# Patient Record
Sex: FEMALE | Race: WHITE | NOT HISPANIC OR LATINO | Employment: FULL TIME | URBAN - METROPOLITAN AREA
[De-identification: names, ages, dates, MRNs, and addresses within clinical notes are randomized per-mention and may not be internally consistent; named-entity substitution may affect disease eponyms.]

---

## 2017-05-09 ENCOUNTER — OFFICE VISIT (OUTPATIENT)
Dept: FAMILY MEDICINE | Facility: CLINIC | Age: 32
End: 2017-05-09
Payer: COMMERCIAL

## 2017-05-09 VITALS
RESPIRATION RATE: 16 BRPM | SYSTOLIC BLOOD PRESSURE: 110 MMHG | BODY MASS INDEX: 39.27 KG/M2 | TEMPERATURE: 98 F | WEIGHT: 221.6 LBS | HEART RATE: 78 BPM | DIASTOLIC BLOOD PRESSURE: 82 MMHG | HEIGHT: 63 IN

## 2017-05-09 DIAGNOSIS — Z00.00 ENCOUNTER FOR ROUTINE ADULT HEALTH EXAMINATION WITHOUT ABNORMAL FINDINGS: Primary | ICD-10-CM

## 2017-05-09 DIAGNOSIS — J45.990 EXERCISE-INDUCED ASTHMA: ICD-10-CM

## 2017-05-09 LAB
ANION GAP SERPL CALCULATED.3IONS-SCNC: 6 MMOL/L (ref 3–14)
BUN SERPL-MCNC: 13 MG/DL (ref 7–30)
CALCIUM SERPL-MCNC: 9 MG/DL (ref 8.5–10.1)
CHLORIDE SERPL-SCNC: 107 MMOL/L (ref 94–109)
CHOLEST SERPL-MCNC: 129 MG/DL
CO2 SERPL-SCNC: 28 MMOL/L (ref 20–32)
CREAT SERPL-MCNC: 0.8 MG/DL (ref 0.52–1.04)
GFR SERPL CREATININE-BSD FRML MDRD: 82 ML/MIN/1.7M2
GLUCOSE SERPL-MCNC: 90 MG/DL (ref 70–99)
HBA1C MFR BLD: 5 % (ref 4.3–6)
HDLC SERPL-MCNC: 36 MG/DL
LDLC SERPL CALC-MCNC: 77 MG/DL
NONHDLC SERPL-MCNC: 93 MG/DL
POTASSIUM SERPL-SCNC: 4.1 MMOL/L (ref 3.4–5.3)
SODIUM SERPL-SCNC: 141 MMOL/L (ref 133–144)
TRIGL SERPL-MCNC: 81 MG/DL
TSH SERPL DL<=0.005 MIU/L-ACNC: 2.43 MU/L (ref 0.4–4)

## 2017-05-09 PROCEDURE — 36415 COLL VENOUS BLD VENIPUNCTURE: CPT | Performed by: FAMILY MEDICINE

## 2017-05-09 PROCEDURE — 84443 ASSAY THYROID STIM HORMONE: CPT | Performed by: FAMILY MEDICINE

## 2017-05-09 PROCEDURE — 83036 HEMOGLOBIN GLYCOSYLATED A1C: CPT | Performed by: FAMILY MEDICINE

## 2017-05-09 PROCEDURE — G0145 SCR C/V CYTO,THINLAYER,RESCR: HCPCS | Performed by: FAMILY MEDICINE

## 2017-05-09 PROCEDURE — 80048 BASIC METABOLIC PNL TOTAL CA: CPT | Performed by: FAMILY MEDICINE

## 2017-05-09 PROCEDURE — 87624 HPV HI-RISK TYP POOLED RSLT: CPT | Performed by: FAMILY MEDICINE

## 2017-05-09 PROCEDURE — 99395 PREV VISIT EST AGE 18-39: CPT | Performed by: FAMILY MEDICINE

## 2017-05-09 PROCEDURE — 80061 LIPID PANEL: CPT | Performed by: FAMILY MEDICINE

## 2017-05-09 RX ORDER — ALBUTEROL SULFATE 90 UG/1
2 AEROSOL, METERED RESPIRATORY (INHALATION) EVERY 4 HOURS PRN
Qty: 1 INHALER | Refills: 1 | Status: SHIPPED | OUTPATIENT
Start: 2017-05-09 | End: 2018-03-27

## 2017-05-09 NOTE — NURSING NOTE
"Chief Complaint   Patient presents with     Physical     with pap; fasting       Initial /82 (BP Location: Right arm, Patient Position: Chair, Cuff Size: Adult Large)  Pulse 78  Temp 98  F (36.7  C) (Oral)  Resp 16  Ht 5' 3.25\" (1.607 m)  Wt 221 lb 9.6 oz (100.5 kg)  LMP 05/03/2017  Breastfeeding? No  BMI 38.94 kg/m2 Estimated body mass index is 38.94 kg/(m^2) as calculated from the following:    Height as of this encounter: 5' 3.25\" (1.607 m).    Weight as of this encounter: 221 lb 9.6 oz (100.5 kg).  Medication Reconciliation: complete   Sarah Soliz CMA ([facility])      "

## 2017-05-09 NOTE — PROGRESS NOTES
SUBJECTIVE:     CC: Tosin Pfeiffer is an 31 year old woman who presents for preventive health visit.     Healthy Habits:  [facility]  Answers for HPI/ROS submitted by the patient on 5/8/2017   Annual Exam:  Getting at least 3 servings of Calcium per day:: Yes  Bi-annual eye exam:: NO  Dental care twice a year:: Yes  Sleep apnea or symptoms of sleep apnea:: None  Diet:: Regular (no restrictions)  Frequency of exercise:: 2-3 days/week  Taking medications regularly:: Not Applicable  Medication side effects:: Not applicable  Additional concerns today:: No  Q1: Little interest or pleasure in doing things: 0=Not at all  Q2: Feeling down, depressed or hopeless: 0=Not at all  PHQ-2 Score: 0  Duration of exercise:: 30-45 minutes      Today's PHQ-2 Score:   PHQ-2 ( 1999 [employer]) 5/9/2017 5/8/2017   Q1: Little interest or pleasure in doing things 0 -   Q2: Feeling down, depressed or hopeless 0 -   PHQ-2 Score 0 -   Little interest or pleasure in doing things - Not at all   Feeling down, depressed or hopeless - Not at all   PHQ-2 Score - 0     Abuse: Current or Past(Physical, Sexual or Emotional)- No  Do you feel safe in your environment - Yes    Social History   Substance Use Topics     Smoking status: Former Smoker     Types: Cigarettes     Smokeless tobacco: Never Used     Alcohol use 1.2 oz/week     2 Standard drinks or equivalent per week     The patient does not drink >3 drinks per day nor >7 drinks per week.    Recent Labs   Lab Test  05/19/15   0910  02/15/13   1027   CHOL  143  133   HDL  38*  53   LDL  83  69   TRIG  112  57   CHOLHDLRATIO  3.8  2.5       Reviewed orders with patient.  Reviewed health maintenance and updated orders accordingly - Yes    Mammo Decision Support:  Mammogram not appropriate for this patient based on age.    Pertinent mammograms are reviewed under the imaging tab.  History of abnormal Pap smear: NO - age 30-65 PAP every 5 years with negative HPV co-testing  "recommended    Reviewed and updated as needed this visit by clinical staff  Tobacco  Allergies  Meds  Soc Hx        Reviewed and updated as needed this visit by Provider            ROS:  C: NEGATIVE for fever, chills, change in weight  I: NEGATIVE for worrisome rashes, moles or lesions  E: NEGATIVE for vision changes or irritation  ENT: NEGATIVE for ear, mouth and throat problems  R: NEGATIVE for significant cough or SOB  B: NEGATIVE for masses, tenderness or discharge  CV: NEGATIVE for chest pain, palpitations or peripheral edema  GI: NEGATIVE for nausea, abdominal pain, heartburn, or change in bowel habits  : NEGATIVE for unusual urinary or vaginal symptoms. Periods are regular but are lighter than previous, last child born 2 years ago. Felt irritable, bloating. Not using contraception, LMP less than 1 week ago.   M: NEGATIVE for significant arthralgias or myalgia  N: NEGATIVE for weakness, dizziness or paresthesias  P: NEGATIVE for changes in mood or affect    Problem list, Medication list, Allergies, and Medical/Social/Surgical histories reviewed in EPIC and updated as appropriate.  OBJECTIVE:     /82 (BP Location: Right arm, Patient Position: Chair, Cuff Size: Adult Large)  Pulse 78  Temp 98  F (36.7  C) (Oral)  Resp 16  Ht 5' 3.25\" (1.607 m)  Wt 221 lb 9.6 oz (100.5 kg)  LMP 05/03/2017  Breastfeeding? No  BMI 38.94 kg/m2  EXAM:  GENERAL: healthy, alert and no distress  EYES: Eyes grossly normal to inspection, PERRL and conjunctivae and sclerae normal  HENT: ear canals and TM's normal, nose and mouth without ulcers or lesions  NECK: no adenopathy, no asymmetry, masses, or scars and thyroid normal to palpation  RESP: lungs clear to auscultation - no rales, rhonchi or wheezes  BREAST: normal without masses, tenderness or nipple discharge and no palpable axillary masses or adenopathy  CV: regular rate and rhythm, normal S1 S2, no S3 or S4, no murmur, click or rub, no peripheral edema and " "peripheral pulses strong  ABDOMEN: soft, nontender, no hepatosplenomegaly, no masses and bowel sounds normal  : normal external genitalia, vaginal mucosa, cervix, adnexa and uterus  MS: no gross musculoskeletal defects noted, no edema  SKIN: no suspicious lesions or rashes  NEURO: Normal strength and tone, mentation intact and speech normal  PSYCH: mentation appears normal, affect normal/bright    ASSESSMENT/PLAN:       1. Encounter for routine adult health examination without abnormal findings  - Lipid Profile with reflex to direct LDL  - Hemoglobin A1c  - TSH with free T4 reflex  - Pap imaged thin layer screen with HPV - recommended age 30 - 65 years (select HPV order below)  - HPV High Risk Types DNA Cervical  - Basic metabolic panel  (Ca, Cl, CO2, Creat, Gluc, K, Na, BUN)    2. BMI 38.0-38.9,adult - discussed weight loss, she is working on diet and exercise currently.  - TSH with free T4 reflex    3. Exercise-induced asthma - occasion, will make refills available should she need them  - albuterol (PROAIR HFA/PROVENTIL HFA/VENTOLIN HFA) 108 (90 BASE) MCG/ACT Inhaler; Inhale 2 puffs into the lungs every 4 hours as needed  Dispense: 1 Inhaler; Refill: 1    COUNSELING:   Reviewed preventive health counseling, as reflected in patient instructions     reports that she has quit smoking. Her smoking use included Cigarettes. She has never used smokeless tobacco.    Estimated body mass index is 38.94 kg/(m^2) as calculated from the following:    Height as of this encounter: 5' 3.25\" (1.607 m).    Weight as of this encounter: 221 lb 9.6 oz (100.5 kg).       Anali Salazar MD  "

## 2017-05-09 NOTE — MR AVS SNAPSHOT
After Visit Summary   5/9/2017    Tosin Pfeiffer    MRN: [id number]           Patient Information     Date Of Birth          1985        Visit Information        Provider Department      5/9/2017 8:30 AM Anali Salazar MD [facility]        Today's Diagnoses     Encounter for routine adult health examination without abnormal findings    -  1    BMI 38.0-38.9,adult        Exercise-induced asthma          Care Instructions      Preventive Health Recommendations  Female Ages 26 - 39  Yearly exam:   See your health care provider every year in order to    Review health changes.     Discuss preventive care.      Review your medicines if you your doctor has prescribed any.    Until age 30: Get a Pap test every three years (more often if you have had an abnormal result).    After age 30: Talk to your doctor about whether you should have a Pap test every 3 years or have a Pap test with HPV screening every 5 years.   You do not need a Pap test if your uterus was removed (hysterectomy) and you have not had cancer.  You should be tested each year for STDs (sexually transmitted diseases), if you're at risk.   Talk to your provider about how often to have your cholesterol checked.  If you are at risk for diabetes, you should have a diabetes test (fasting glucose).  Shots: Get a flu shot each year. Get a tetanus shot every 10 years.   Nutrition:     Eat at least 5 servings of fruits and vegetables each day.    Eat whole-grain bread, whole-wheat pasta and brown rice instead of white grains and rice.    Talk to your provider about Calcium and Vitamin D.     Lifestyle    Exercise at least 150 minutes a week (30 minutes a day, 5 days of the week). This will help you control your weight and prevent disease.    Limit alcohol to one drink per day.    No smoking.     Wear sunscreen to prevent skin cancer.    See your dentist every six months for an exam and cleaning.          Follow-ups after your  "visit        Who to contact     If you have questions or need follow up information about today's clinic visit or your schedule please contact [facility] directly at [phone].  Normal or non-critical lab and imaging results will be communicated to you by MyChart, letter or phone within 4 business days after the clinic has received the results. If you do not hear from us within 7 days, please contact the clinic through MyChart or phone. If you have a critical or abnormal lab result, we will notify you by phone as soon as possible.  Submit refill requests through [employer] or call your pharmacy and they will forward the refill request to us. Please allow 3 business days for your refill to be completed.          Additional Information About Your Visit        [employer]ial[employer] Information     [employer] gives you secure access to your electronic health record. If you see a primary care provider, you can also send messages to your care team and make appointments. If you have questions, please call your primary care clinic.  If you do not have a primary care provider, please call [phone] and they will assist you.        Care EveryWhere ID     This is your Care EveryWhere ID. This could be used by other organizations to access your [place] medical records  [id number]        Your Vitals Were     Pulse Temperature Respirations Height Last Period Breastfeeding?    78 98  F (36.7  C) (Oral) 16 5' 3.25\" (1.607 m) 05/03/2017 No    BMI (Body Mass Index)                   38.94 kg/m2            Blood Pressure from Last 3 Encounters:   05/09/17 110/82   09/10/15 110/80   05/19/15 123/80    Weight from Last 3 Encounters:   05/09/17 221 lb 9.6 oz (100.5 kg)   09/10/15 219 lb (99.3 kg)   05/19/15 226 lb 11.2 oz (102.8 kg)              We Performed the Following     Basic metabolic panel  (Ca, Cl, CO2, Creat, Gluc, K, Na, BUN)     Hemoglobin A1c     HPV High Risk Types DNA Cervical     Lipid Profile with reflex to direct " LDL     Pap imaged thin layer screen with HPV - recommended age 30 - 65 years (select HPV order below)     TSH with free T4 reflex          Today's Medication Changes          These changes are accurate as of: 5/9/17  9:05 AM.  If you have any questions, ask your nurse or doctor.               Start taking these medicines.        Dose/Directions    albuterol 108 (90 BASE) MCG/ACT Inhaler   Commonly known as:  PROAIR HFA/PROVENTIL HFA/VENTOLIN HFA   Used for:  Exercise-induced asthma   Started by:  Anali Salazar MD        Dose:  2 puff   Inhale 2 puffs into the lungs every 4 hours as needed   Quantity:  1 Inhaler   Refills:  1            Where to get your medicines      These medications were sent to [place] [address] [place] [facility] [address] [place] [id number] [facility] [address]    Hours:  Tech issues with their phone system Phone:  [phone]     albuterol 108 (90 BASE) MCG/ACT Inhaler                Primary Care Provider Office Phone # Fax #    Ramona Ann Aaseby-Aguilera, PA-C [phone]       [facility] [address] [address] [facility] [address]        Thank you!     Thank you for choosing [facility]  for your care. Our goal is always to provide you with excellent care. Hearing back from our patients is one way we can continue to improve our services. Please take a few minutes to complete the written survey that you may receive in the mail after your visit with us. Thank you!             Your Updated Medication List - Protect others around you: Learn how to safely use, store and throw away your medicines at www.disposemymeds.org.          This list is accurate as of: 5/9/17  9:05 AM.  Always use your most recent med list.                   Brand Name Dispense Instructions for use    albuterol 108 (90 BASE) MCG/ACT Inhaler    PROAIR HFA/PROVENTIL HFA/VENTOLIN HFA    1 Inhaler    Inhale 2 puffs into the lungs every 4 hours as needed

## 2017-05-09 NOTE — LETTER
My Asthma Action Plan  Name: Tosin Pfeiffer   YOB: 1985  Date: 5/9/2017   My doctor: Anali Salazar MD   My clinic: [facility]        My Control Medicine:   My Rescue Medicine: Albuterol (Proair/Ventolin/Proventil) inhaler 2 puffs every 4 hours prn   My Asthma Severity: intermittent  Avoid your asthma triggers:                GREEN ZONE     Good Control    I feel good    No cough or wheeze    Can work, sleep and play without asthma symptoms       Take your asthma control medicine every day.     1. If exercise triggers your asthma, take your rescue medication    15 minutes before exercise or sports, and    During exercise if you have asthma symptoms  2. Spacer to use with inhaler: If you have a spacer, make sure to use it with your inhaler             YELLOW ZONE     Getting Worse  I have ANY of these:    I do not feel good    Cough or wheeze    Chest feels tight    Wake up at night   1. Keep taking your Green Zone medications  2. Start taking your rescue medicine:    every 20 minutes for up to 1 hour. Then every 4 hours for 24-48 hours.  3. If you stay in the Yellow Zone for more than 12-24 hours, contact your doctor.  4. If you do not return to the Green Zone in 12-24 hours or you get worse, start taking your oral steroid medicine if prescribed by your provider.           RED ZONE     Medical Alert - Get Help  I have ANY of these:    I feel awful    Medicine is not helping    Breathing getting harder    Trouble walking or talking    Nose opens wide to breathe       1. Take your rescue medicine NOW  2. If your provider has prescribed an oral steroid medicine, start taking it NOW  3. Call your doctor NOW  4. If you are still in the Red Zone after 20 minutes and you have not reached your doctor:    Take your rescue medicine again and    Call 911 or go to the emergency room right away    See your regular doctor within 2 weeks of an Emergency Room or Urgent Care visit for follow-up  treatment.        Electronically signed by: Anali Salazar, May 9, 2017    Annual Reminders:  Meet with Asthma Educator,  Flu Shot in the Fall, consider Pneumonia Vaccination for patients with asthma (aged 19 and older).    Pharmacy:    [facility] [address] [place] [facility] [address]  [facility] [address] [place] [facility] [address] [facility]                    Asthma Triggers  How To Control Things That Make Your Asthma Worse    Triggers are things that make your asthma worse.  Look at the list below to help you find your triggers and what you can do about them.  You can help prevent asthma flare-ups by staying away from your triggers.      Trigger                                                          What you can do   Cigarette Smoke  Tobacco smoke can make asthma worse. Do not allow smoking in your home, car or around you.  Be sure no one smokes at a child s day care or school.  If you smoke, ask your health care provider for ways to help you quit.  Ask family members to quit too.  Ask your health care provider for a referral to Quit Plan to help you quit smoking, or call 8-968-531-PLAN.     Colds, Flu, Bronchitis  These are common triggers of asthma. Wash your hands often.  Don t touch your eyes, nose or mouth.  Get a flu shot every year.     Dust Mites  These are tiny bugs that live in cloth or carpet. They are too small to see. Wash sheets and blankets in hot water every week.   Encase pillows and mattress in dust mite proof covers.  Avoid having carpet if you can. If you have carpet, vacuum weekly.   Use a dust mask and HEPA vacuum.   Pollen and Outdoor Mold  Some people are allergic to trees, grass, or weed pollen, or molds. Try to keep your windows closed.  Limit time out doors when pollen count is high.   Ask you health care provider about taking medicine during allergy season.     Animal Dander  Some people are allergic to skin flakes, urine or saliva from pets with fur or feathers.  Keep pets with fur or feathers out of your home.    If you can t keep the pet outdoors, then keep the pet out of your bedroom.  Keep the bedroom door closed.  Keep pets off cloth furniture and away from stuffed toys.     Mice, Rats, and Cockroaches  Some people are allergic to the waste from these pests.   Cover food and garbage.  Clean up spills and food crumbs.  Store grease in the refrigerator.   Keep food out of the bedroom.   Indoor Mold  This can be a trigger if your home has high moisture. Fix leaking faucets, pipes, or other sources of water.   Clean moldy surfaces.  Dehumidify basement if it is damp and smelly.   Smoke, Strong Odors, and Sprays  These can reduce air quality. Stay away from strong odors and sprays, such as perfume, powder, hair spray, paints, smoke incense, paint, cleaning products, candles and new carpet.   Exercise or Sports  Some people with asthma have this trigger. Be active!  Ask your doctor about taking medicine before sports or exercise to prevent symptoms.    Warm up for 5-10 minutes before and after sports or exercise.     Other Triggers of Asthma  Cold air:  Cover your nose and mouth with a scarf.  Sometimes laughing or crying can be a trigger.  Some medicines and food can trigger asthma.

## 2017-05-10 LAB
COPATH REPORT: NORMAL
PAP: NORMAL

## 2017-05-10 ASSESSMENT — ASTHMA QUESTIONNAIRES: ACT_TOTALSCORE: 22

## 2017-05-11 LAB
FINAL DIAGNOSIS: NORMAL
HPV HR 12 DNA CVX QL NAA+PROBE: NEGATIVE
HPV16 DNA SPEC QL NAA+PROBE: NEGATIVE
HPV18 DNA SPEC QL NAA+PROBE: NEGATIVE
SPECIMEN DESCRIPTION: NORMAL

## 2018-02-01 ENCOUNTER — ALLIED HEALTH/NURSE VISIT (OUTPATIENT)
Dept: NURSING | Facility: CLINIC | Age: 33
End: 2018-02-01
Payer: COMMERCIAL

## 2018-02-01 ENCOUNTER — TELEPHONE (OUTPATIENT)
Dept: OBGYN | Facility: CLINIC | Age: 33
End: 2018-02-01

## 2018-02-01 DIAGNOSIS — Z67.11 BLOOD TYPE A-: Primary | ICD-10-CM

## 2018-02-01 DIAGNOSIS — O20.9 VAGINAL BLEEDING IN PREGNANCY, FIRST TRIMESTER: ICD-10-CM

## 2018-02-01 DIAGNOSIS — O26.859 SPOTTING IN EARLY PREGNANCY: ICD-10-CM

## 2018-02-01 DIAGNOSIS — O26.859 SPOTTING IN EARLY PREGNANCY: Primary | ICD-10-CM

## 2018-02-01 LAB — B-HCG SERPL-ACNC: ABNORMAL IU/L (ref 0–5)

## 2018-02-01 PROCEDURE — 99207 ZZC NO CHARGE NURSE ONLY: CPT

## 2018-02-01 PROCEDURE — 96372 THER/PROPH/DIAG INJ SC/IM: CPT

## 2018-02-01 PROCEDURE — 84702 CHORIONIC GONADOTROPIN TEST: CPT | Performed by: FAMILY MEDICINE

## 2018-02-01 PROCEDURE — 36415 COLL VENOUS BLD VENIPUNCTURE: CPT | Performed by: FAMILY MEDICINE

## 2018-02-01 NOTE — TELEPHONE ENCOUNTER
Pt calls with LMP 12/20/17.  She is about 6w1d.  She had intercourse this am and had some spotting after. Some bright red blood today and then a little clot this afternoon.  Slight/dull cramping.    Pt blood type listed as A neg, will come in for rhogam.    Will go to lab for hcg as well.  Routed to MD as chidi HECTOR R.N.  [facility]

## 2018-02-01 NOTE — MR AVS SNAPSHOT
After Visit Summary   2/1/2018    Tosin Pfeiffer    MRN: [id number]           Patient Information     Date Of Birth          1985        Visit Information        Provider Department      2/1/2018 3:45 PM RI OB NURSE [facility]        Today's Diagnoses     Blood type A-    -  1    Vaginal bleeding in pregnancy, first trimester           Follow-ups after your visit        Your next 10 appointments already scheduled     Feb 06, 2018  8:30 AM CST   [facility]    [address]  [address]  [facility] [address] [phone]            Feb 20, 2018  1:15 PM CST   (Arrive by 1:00 PM)   Ultrasound with RIUS1   [facility]    [address]  [address]  [facility] [address] [phone]            Feb 27, 2018  1:00 PM CST   New Prenatal with Tari Ramirez, DO   [facility]    [address] [address] [phone]              Future tests that were ordered for you today     Open Standing Orders        Priority Remaining Interval Expires Ordered    HCG quantitative pregnancy STAT 1/2 2/1/2019 2/1/2018            Who to contact     If you have questions or need follow up information about today's clinic visit or your schedule please contact [facility] directly at [phone].  Normal or non-critical lab and imaging results will be communicated to you by MyChart, letter or phone within 4 business days after the clinic has received the results. If you do not hear from us within 7 days, please contact the clinic through MyChart or phone. If you have a critical or abnormal lab result, we will notify you by phone as soon as possible.  Submit refill requests through [employer] or call your pharmacy and they will forward the refill request to us. Please  allow 3 business days for your refill to be completed.          Additional Information About Your Visit        MyChart Information     [employer] gives you secure access to your electronic health record. If you see a primary care provider, you can also send messages to your care team and make appointments. If you have questions, please call your primary care clinic.  If you do not have a primary care provider, please call [phone] and they will assist you.        Care EveryWhere ID     This is your Care EveryWhere ID. This could be used by other organizations to access your [place] medical records  [id number]         Blood Pressure from Last 3 Encounters:   05/09/17 110/82   09/10/15 110/80   05/19/15 123/80    Weight from Last 3 Encounters:   05/09/17 221 lb 9.6 oz (100.5 kg)   09/10/15 219 lb (99.3 kg)   05/19/15 226 lb 11.2 oz (102.8 kg)              We Performed the Following     INJECTION INTRAMUSCULAR OR SUB-Q     RH IMMUNE GLOBULIN        Primary Care Provider Office Phone # Fax #    Meron Ann Aaseby-Aguilera, PA-C [phone] [address] [address] [place] [id number]        Equal Access to Services     FARIHA SOLIS AH: Hadii aad ku hadasho Soomaali, waaxda luqadaha, qaybta kaalmada adeegyada, molly renteria. So [facility] [phone].    ATENCIÓN: Si habla español, tiene a albright disposición servicios gratuitos de asistencia lingüística. Joelle al [phone].    We comply with applicable federal civil rights laws and [place] laws. We do not discriminate on the basis of race, color, national origin, age, disability, sex, sexual orientation, or gender identity.            Thank you!     Thank you for choosing [facility]  for your care. Our goal is always to provide you with excellent care. Hearing back from our patients is one way we can continue to improve our services. Please take a few minutes to complete the written survey that you may receive in the  mail after your visit with us. Thank you!             Your Updated Medication List - Protect others around you: Learn how to safely use, store and throw away your medicines at www.disposemymeds.org.          This list is accurate as of 2/1/18  4:08 PM.  Always use your most recent med list.                   Brand Name Dispense Instructions for use Diagnosis    albuterol 108 (90 BASE) MCG/ACT Inhaler    PROAIR HFA/PROVENTIL HFA/VENTOLIN HFA    1 Inhaler    Inhale 2 puffs into the lungs every 4 hours as needed    Exercise-induced asthma

## 2018-02-01 NOTE — TELEPHONE ENCOUNTER
Lets set her up with ultrasound as well please   [facility] [place] and in [place] hours   And ultrasound   Dr. Tari Ramirez, DO    Obstetrics and Gynecology  [facility] [place] [facility] [place]

## 2018-02-01 NOTE — NURSING NOTE
Patient came in for Rhogam injection due to vaginal bleeding/spotting in early pregnancy. She is Rh negative.   Musa Gee, CMA

## 2018-02-02 ENCOUNTER — RADIANT APPOINTMENT (OUTPATIENT)
Dept: ULTRASOUND IMAGING | Facility: CLINIC | Age: 33
End: 2018-02-02
Attending: FAMILY MEDICINE
Payer: COMMERCIAL

## 2018-02-02 DIAGNOSIS — O26.859 SPOTTING IN EARLY PREGNANCY: ICD-10-CM

## 2018-02-02 PROCEDURE — 76801 OB US < 14 WKS SINGLE FETUS: CPT | Performed by: OBSTETRICS & GYNECOLOGY

## 2018-02-02 PROCEDURE — 76817 TRANSVAGINAL US OBSTETRIC: CPT | Performed by: OBSTETRICS & GYNECOLOGY

## 2018-02-06 ENCOUNTER — NURSE TRIAGE (OUTPATIENT)
Dept: NURSING | Facility: CLINIC | Age: 33
End: 2018-02-06

## 2018-02-06 ENCOUNTER — PRENATAL OFFICE VISIT (OUTPATIENT)
Dept: NURSING | Facility: CLINIC | Age: 33
End: 2018-02-06
Payer: COMMERCIAL

## 2018-02-06 ENCOUNTER — TELEPHONE (OUTPATIENT)
Dept: OBGYN | Facility: CLINIC | Age: 33
End: 2018-02-06

## 2018-02-06 VITALS
BODY MASS INDEX: 39.51 KG/M2 | HEIGHT: 63 IN | HEART RATE: 66 BPM | WEIGHT: 223 LBS | SYSTOLIC BLOOD PRESSURE: 120 MMHG | DIASTOLIC BLOOD PRESSURE: 76 MMHG

## 2018-02-06 DIAGNOSIS — O26.859 SPOTTING IN EARLY PREGNANCY: Primary | ICD-10-CM

## 2018-02-06 DIAGNOSIS — Z34.80 PRENATAL CARE, SUBSEQUENT PREGNANCY, UNSPECIFIED TRIMESTER: Primary | ICD-10-CM

## 2018-02-06 DIAGNOSIS — O26.859 SPOTTING IN EARLY PREGNANCY: ICD-10-CM

## 2018-02-06 LAB
ABO + RH BLD: ABNORMAL
ABO + RH BLD: ABNORMAL
B-HCG SERPL-ACNC: ABNORMAL IU/L (ref 0–5)
BLD GP AB INVEST PLASRBC-IMP: ABNORMAL
BLD GP AB SCN SERPL QL: ABNORMAL
BLOOD BANK CMNT PATIENT-IMP: ABNORMAL
ERYTHROCYTE [DISTWIDTH] IN BLOOD BY AUTOMATED COUNT: 13.1 % (ref 10–15)
HBV SURFACE AG SERPL QL IA: NONREACTIVE
HCT VFR BLD AUTO: 38.5 % (ref 35–47)
HGB BLD-MCNC: 12.5 G/DL (ref 11.7–15.7)
HIV 1+2 AB+HIV1 P24 AG SERPL QL IA: NONREACTIVE
MCH RBC QN AUTO: 31.1 PG (ref 26.5–33)
MCHC RBC AUTO-ENTMCNC: 32.5 G/DL (ref 31.5–36.5)
MCV RBC AUTO: 96 FL (ref 78–100)
PLATELET # BLD AUTO: 222 10E9/L (ref 150–450)
RBC # BLD AUTO: 4.02 10E12/L (ref 3.8–5.2)
SPECIMEN EXP DATE BLD: ABNORMAL
T PALLIDUM IGG+IGM SER QL: NEGATIVE
WBC # BLD AUTO: 8.6 10E9/L (ref 4–11)

## 2018-02-06 PROCEDURE — 36415 COLL VENOUS BLD VENIPUNCTURE: CPT | Performed by: FAMILY MEDICINE

## 2018-02-06 PROCEDURE — 86900 BLOOD TYPING SEROLOGIC ABO: CPT | Performed by: FAMILY MEDICINE

## 2018-02-06 PROCEDURE — 86901 BLOOD TYPING SEROLOGIC RH(D): CPT | Performed by: FAMILY MEDICINE

## 2018-02-06 PROCEDURE — 86850 RBC ANTIBODY SCREEN: CPT | Performed by: FAMILY MEDICINE

## 2018-02-06 PROCEDURE — 86780 TREPONEMA PALLIDUM: CPT | Performed by: FAMILY MEDICINE

## 2018-02-06 PROCEDURE — 84702 CHORIONIC GONADOTROPIN TEST: CPT | Performed by: FAMILY MEDICINE

## 2018-02-06 PROCEDURE — 86762 RUBELLA ANTIBODY: CPT | Performed by: FAMILY MEDICINE

## 2018-02-06 PROCEDURE — 99207 ZZC NO CHARGE NURSE ONLY: CPT

## 2018-02-06 PROCEDURE — 85027 COMPLETE CBC AUTOMATED: CPT | Performed by: FAMILY MEDICINE

## 2018-02-06 PROCEDURE — 87086 URINE CULTURE/COLONY COUNT: CPT | Performed by: FAMILY MEDICINE

## 2018-02-06 PROCEDURE — 86870 RBC ANTIBODY IDENTIFICATION: CPT | Performed by: FAMILY MEDICINE

## 2018-02-06 PROCEDURE — 87340 HEPATITIS B SURFACE AG IA: CPT | Performed by: FAMILY MEDICINE

## 2018-02-06 PROCEDURE — 87389 HIV-1 AG W/HIV-1&-2 AB AG IA: CPT | Performed by: FAMILY MEDICINE

## 2018-02-06 RX ORDER — PRENATAL VIT/IRON FUM/FOLIC AC 27MG-0.8MG
TABLET ORAL DAILY
COMMUNITY
End: 2021-02-24

## 2018-02-06 NOTE — TELEPHONE ENCOUNTER
Patient states she called to schedule for follow ultra sound as recommended by MADELAINE Rodriguez RN, and was told there is no order.  FNA routed to PCP Pool.

## 2018-02-06 NOTE — TELEPHONE ENCOUNTER
Clinic Action Needed:  Yes  Reason for Call:  Patient states she called to schedule for follow ultra sound as recommended by MADELAINE Rodriguez RN, and was told there is no order.  Routed to:  PCP Pool

## 2018-02-06 NOTE — MR AVS SNAPSHOT
After Visit Summary   2/6/2018    Tosin Pfeiffer    MRN: [id number]           Patient Information     Date Of Birth          1985        Visit Information        Provider Department      2/6/2018 8:30 AM EA PRENATAL NURSE [facility]        Today's Diagnoses     Prenatal care, subsequent pregnancy, unspecified trimester    -  1    Spotting in early pregnancy           Follow-ups after your visit        Your next 10 appointments already scheduled     Feb 20, 2018  1:15 PM CST   (Arrive by 1:00 PM)   [facility] with RIUS[facility]   [facility]    [address]  [address]  [facility] [id number] [phone]            Feb 27, 2018  1:00 PM CST   New Prenatal with Tari Ramirez, DO   [facility] ([facility])    [address] [place] [id number] [phone]              Future tests that were ordered for you today     Open Future Orders        Priority Expected Expires Ordered    US OB < 14 weeks, single,  for dating ([id number]) Routine  5/7/2018 2/6/2018            Who to contact     If you have questions or need follow up information about today's clinic visit or your schedule please [facility] at [phone].  Normal or non-critical lab and imaging results will be communicated to you by MyChart, letter or phone within 4 business days after the clinic has received the results. If you do not hear from us within 7 days, please contact the clinic through MyChart or phone. If you have a critical or abnormal lab result, we will notify you by phone as soon as possible.  Submit refill requests through [employer] or call your pharmacy and they will forward the refill request to us. Please allow 3 business days for your refill to be completed.          Additional Information About Your Visit        [employer] Information     [employer] gives you secure access to your  "electronic health record. If you see a primary care provider, you can also send messages to your care team and make appointments. If you have questions, please call your primary care clinic.  If you do not have a primary care provider, please call [phone] and they will assist you.        Care EveryWhere ID     This is your Care EveryWhere ID. This could be used by other organizations to access your [place] medical records  [id number]        Your Vitals Were     Pulse Height Last Period BMI (Body Mass Index)          66 5' 3\" (1.6 m) 12/20/2017 (Exact Date) 39.5 kg/m2         Blood Pressure from Last 3 Encounters:   02/06/18 120/76   05/09/17 110/82   09/10/15 110/80    Weight from Last 3 Encounters:   02/06/18 223 lb (101.2 kg)   05/09/17 221 lb 9.6 oz (100.5 kg)   09/10/15 219 lb (99.3 kg)              We Performed the Following     ABO/RH Type and Screen     Anti Treponema     CBC with Platelets     HCG quantitative pregnancy     Hepatitis B surface antigen     HIV Antigen Antibody Combo     Rubella Antibody IgG Quantitative     Urine Culture Aerobic Bacterial        Primary Care Provider Office Phone # Fax #    Meron Ann Aaseby-Aguilera, PA-C [phone] [address] [address] [facility] [address]        Equal Access to Services     FARIHA SOLIS AH: Hadii aad ku hadasho Soomaali, waaxda luqadaha, qaybta kaalmada adeegyada, waxay neida haymichael avila . So [facility] [phone].    ATENCIÓN: Si habla español, tiene a albright disposición servicios gratuitos de asistencia lingüística. Llame al [phone].    We comply with applicable federal civil rights laws and [place] laws. We do not discriminate on the basis of race, color, national origin, age, disability, sex, sexual orientation, or gender identity.            Thank you!     Thank you for choosing [facility]  for your care. Our goal is always to provide you with excellent care. Hearing back from our patients is one way " we can continue to improve our services. Please take a few minutes to complete the written survey that you may receive in the mail after your visit with us. Thank you!             Your Updated Medication List - Protect others around you: Learn how to safely use, store and throw away your medicines at www.disposemymeds.org.          This list is accurate as of 2/6/18  8:56 AM.  Always use your most recent med list.                   Brand Name Dispense Instructions for use Diagnosis    albuterol 108 (90 BASE) MCG/ACT Inhaler    PROAIR HFA/PROVENTIL HFA/VENTOLIN HFA    1 Inhaler    Inhale 2 puffs into the lungs every 4 hours as needed    Exercise-induced asthma        MG Caps           prenatal multivitamin plus iron 27-0.8 MG Tabs per tablet      Take by mouth daily

## 2018-02-06 NOTE — PROGRESS NOTES
"Chief Complaint   Patient presents with     Prenatal Care     NPN nurse visit   6w6d  Estimated Date of Delivery: Sep 26, 2018      Initial /76 (BP Location: Right arm, Patient Position: Sitting, Cuff Size: Adult Large)  Pulse 66  Ht 5' 3\" (1.6 m)  Wt 223 lb (101.2 kg)  LMP 12/20/2017 (Exact Date)  BMI 39.5 kg/m2 Estimated body mass index is 39.5 kg/(m^2) as calculated from the following:    Height as of this encounter: 5' 3\" (1.6 m).    Weight as of this encounter: 223 lb (101.2 kg).  Medication Reconciliation: complete     Prenatal book and folder (containing standard educational hand-outs and brochures) given to patient. Information in folder reviewed. Questions answered.     Discussed and gave written information on options available for 1st and 2nd trimester screening, CVS, amniocentesis, AFP, and QUAD screen plus optimal time-frame for testing. Brochure given on optional screening available to determine Cystic Fibrosis carrier status. Pt instructed to check with insurance regarding coverage of these optional tests. Pt advised to call back if she desires testing or has any questions or concerns.    Prenatal labs obtained. New prenatal visit scheduled on 2/27 with Dr. Ramirez.    Pap not due. Last pap 5/2017.        "

## 2018-02-07 LAB
BACTERIA SPEC CULT: NO GROWTH
RUBV IGG SERPL IA-ACNC: 23 IU/ML
SPECIMEN SOURCE: NORMAL

## 2018-02-15 DIAGNOSIS — O26.859 SPOTTING IN EARLY PREGNANCY: ICD-10-CM

## 2018-02-27 ENCOUNTER — PRENATAL OFFICE VISIT (OUTPATIENT)
Dept: OBGYN | Facility: CLINIC | Age: 33
End: 2018-02-27
Payer: COMMERCIAL

## 2018-02-27 VITALS
BODY MASS INDEX: 40.2 KG/M2 | WEIGHT: 226.9 LBS | DIASTOLIC BLOOD PRESSURE: 70 MMHG | HEIGHT: 63 IN | SYSTOLIC BLOOD PRESSURE: 104 MMHG

## 2018-02-27 DIAGNOSIS — Z34.91 PRENATAL CARE IN FIRST TRIMESTER: Primary | ICD-10-CM

## 2018-02-27 PROCEDURE — 99207 ZZC FIRST OB VISIT: CPT | Performed by: FAMILY MEDICINE

## 2018-02-27 PROCEDURE — 87491 CHLMYD TRACH DNA AMP PROBE: CPT | Performed by: FAMILY MEDICINE

## 2018-02-27 PROCEDURE — 87591 N.GONORRHOEAE DNA AMP PROB: CPT | Performed by: FAMILY MEDICINE

## 2018-02-27 NOTE — PROGRESS NOTES
Tosin Pfeiffer is a 32 year old  @ 9w6d wks EGA with Sep 26, 2018 who presents to the clinic for an new ob visit.       Estimated Date of Delivery: Sep 26, 2018  Reviewed nurse intake visit on 18  Concerns: Patient has had two ultrasounds revealing corpus luteal cysts but otherwise wnl. Denies nausea or vomiting.     Patient has a 3.5 year old daughter at home. Previous delivery was a c/s. Hx of insulin gestational diabetes. 8 lbs 11 oz. Tosin is considering .     Patient Active Problem List   Diagnosis     Hyperlipidemia LDL goal <160     Blood type A-     BMI 38.0-38.9,adult     Exercise-induced asthma     Past Medical History:   Diagnosis Date     Asthma      Diabetes (H)     Gestational diabetes     Past Surgical History:   Procedure Laterality Date      SECTION  2014    Procedure:  SECTION;  Surgeon: Cam Armendariz MD;  Location: [facility] OR     wisdom teeth removed  age 15     Current Outpatient Prescriptions   Medication Sig Dispense Refill     Prenatal Vit-Fe Fumarate-FA (PRENATAL MULTIVITAMIN PLUS IRON) 27-0.8 MG TABS per tablet Take by mouth daily       Docosahexaenoic Acid (DHA) 200 MG CAPS        albuterol (PROAIR HFA/PROVENTIL HFA/VENTOLIN HFA) 108 (90 BASE) MCG/ACT Inhaler Inhale 2 puffs into the lungs every 4 hours as needed 1 Inhaler 1       ====================================================  PERSONAL/SOCIAL HISTORY  Social History     Social History     Marital status:      Spouse name: N/A     Number of children: N/A     Years of education: N/A     Social History Main Topics     Smoking status: Former Smoker     Packs/day: 1.00     Types: Cigarettes     Start date:      Quit date:      Smokeless tobacco: Never Used     Alcohol use No     Drug use: No     Sexual activity: Yes     Partners: Male     Birth control/ protection: None      Comment: using nothing for contraception     Other Topics Concern     Parent/Sibling W/ Cabg, Mi Or Angioplasty  "Before 65f 55m? No     Social History Narrative     since 2013 and has a 10 month old infant    Working full time, doing desk work, accounting,             =====================================================   REVIEW OF SYSTEMS  C: NEGATIVE for fever, chills, change in weight  I: NEGATIVE for worrisome rashes, moles or lesions  E: NEGATIVE for vision changes or irritation  ENT: NEGATIVE for ear, mouth and throat problems  R: NEGATIVE for significant cough or SOB  B: NEGATIVE for masses, tenderness or discharge  CV: NEGATIVE for chest pain, palpitations or peripheral edema  GI: NEGATIVE for nausea, abdominal pain, heartburn, or change in bowel habits  : NEGATIVE for unusual urinary or vaginal symptoms. Periods are regular.  M: NEGATIVE for significant arthralgias or myalgia  N: NEGATIVE for weakness, dizziness or paresthesias  P: NEGATIVE for changes in mood or affect  ====================================================    This document serves as a record of the services and decisions personally performed and made by Tari Ramirez DO. It was created on his/her behalf by Nils Izaguirre, a trained medical scribe. The creation of this document is based the provider's statements to the medical scribe.  Scribemanuel Izaguirre 1:08 PM, February 27, 2018    PHYSICAL EXAM:  /70  Ht 1.6 m (5' 3\")  Wt 102.9 kg (226 lb 14.4 oz)  LMP 12/20/2017 (Exact Date)  BMI 40.19 kg/m2   GENERAL:  Pleasant pregnant female, alert, well groomed.  SKIN:  Warm and dry, without lesions or rashes  HEAD: Symmetrical features.  LUNGS:  Clear to auscultation.  BREAST:  Symmetrical.  No dominant, fixed or suspicious masses are noted.  No skin or nipple changes or axillary nodes.  Self exam is taught and encouraged.  Nipples everted.      HEART:  RRR without murmur.  ABDOMEN: Soft without masses , tenderness or organomegaly.  No CVA tenderness. No scars noted. FHT 150s  MUSCULOSKELETAL:  Full range of motion  EXTREMITIES:  No " edema. No significant varicosities.   GENITALIA:  BUS WNL, no lesions noted   VAGINA:  Pink, normal rugae and discharge normal and physiologic,   CERVIX:  smooth, without discharge or CMT and parous os,   firm/ closed 4 cm long.  UTERUS: Anteverted, nontender 12 weeks in size.  ADNEXA: Without masses or tenderness  PELVIS:   Adequate, Pelvis proven to - pelvis not tested.    =========================================  ICSI Routine Prenatal [place] Guideline  ASSESSMENT/PLAN:  Tosin Pfeiffer is a 32 year old  @ 9w6d  wks EGA with EDC Estimated Date of Delivery: Sep 26, 2018 who presents to the clinic for an new ob visit.       1) Intrauterine pregnancy: Concerns: none today  2) EDUCATION :    RECOMMENDED WEIGHT GAIN: 25-35 lbs.  Instructed on best evidence for: weight gain for her BMI for pregnancy; healthy diet and foods to avoid; exercise and activity during pregnancy; avoiding exposure to toxoplasmosis; and maintenance of a generally healthy lifestyle.   Discussed the harms, benefits, side effects and alternative therapies for current prescribed and OTC medications.  3) Counseled about screening tests (1st trimester screen and targeted anatomy scan and AFP and quad screen if first trimester screening not done) and invasive definitive testing (chorionic villus sampling and genetic amniocentesis).  Patient declines genetic screening.   4) Hx of insulin GDM: GTT at 20 weeks with repeat at 28 weeks  5) Patient is considering :  calculator 35%. Discussed risks.  Would be concerned with CPD due to history of 8# 11 oz baby and arrest of descent, would not specifically recommend  but would be ok for MELANY if the patient desires and the fetal weight is less than previous baby.  Information given and will carefully proceed. She is undecided today.     6) Return to clinic in 4 weeks    The information in this document, created by the medical scribe for me, accurately reflects the services I personally performed  and the decisions made by me. I have reviewed and approved this document for accuracy prior to leaving the patient care area.  aTri Ramirez,   1:29 PM, 02/27/18    Dr. Tari Ramirez, DO    OB/GYN   [facility]

## 2018-02-27 NOTE — NURSING NOTE
"9w6d    Chief Complaint   Patient presents with     Prenatal Care     pap not due--had US 2/15/18--discuss US       Initial /70  Ht 5' 3\" (1.6 m)  Wt 226 lb 14.4 oz (102.9 kg)  LMP 12/20/2017 (Exact Date)  BMI 40.19 kg/m2 Estimated body mass index is 40.19 kg/(m^2) as calculated from the following:    Height as of this encounter: 5' 3\" (1.6 m).    Weight as of this encounter: 226 lb 14.4 oz (102.9 kg).  Medication Reconciliation: complete   Denise Thomason CMA      "

## 2018-02-27 NOTE — PATIENT INSTRUCTIONS
For innatal fetal dna test:  Schedule nurse appt [place] location   [phone]  (gender)     Return in 4 weeks     Patients                              Statement of Acknowledgement re:  MELANY/      RISKS:    I understand that I have option for a repeat C/section or to attempt a       I understand that that the risk of a uterine rupture during an attempted  is at least 1%.        Should my uterus rupture, even if being monitored closely in labor and delivery; there may not be sufficient time to operate and prevent death or permanent brain injury to my baby.      The exact frequency of death or permanent brain injury to the baby when the uterus ruptures is uncertain; but has been reported as high as 50%.        The risks to me after rupture of the uterus include but are not limited to:  o Hysterectomy (loss of uterus and ability to have future children)  o Blood transfusion  o Infection  o Injury to internal organs (bowel, bladder or ureters)  o Blood coagulation problems  o Death        I understand that if I choose a  and then ultimately need to have a C/section; that the risks of a repeat C/section is then higher.      BENEFITS:    I understand that approximately 60% of woman who undergo a trial of , successfully deliver vaginally.      I understand that the benefits of a successful  when compared to a repeat C/section may include; decreased blood loss, and a shorter recovery time.  Factors to be considered in patient's who may be candidates for a trial of labor and potential vaginal birth after  section:      1. The patient and provider will review at length the previous obstetric history and discuss the risks and benefits and alternative forms of therapy and provide singed   Informed consent.  The patient agrees to comply with these guidelines to minimize   Risk and maximize chances for successful vaginal birth after  section.     2. The patient must agree to delivery  "at [facility].  The [facility] In House OB call physician will serve as the immediately available phyiscian after hours, on weekends, and on holidays allowing the primary OB physician to care   For other patients outside of labor and delivery.  The in house call physician will satisfy the requirement of \"being immediately available\" which is taken to mean being in the hospital in close proximity to labor and delivery.  A patient who shows up at [facility] who may otherwise be a candidate for a trial of labor after  section would not be allowed a trial of labor and would be a candidate for a repeat  section as appropriate backup support is note available through our practice.     3. The patient will have one previously documented low segment transverse uterine incision.   Documentation includes review of the operative report.  Previous uterine   Rupture, incisions the contractile portion of the uterus, uterine abnormalities or   Previous complications that could compromise the integrity of the previous uterine   Incision may be consider contraindications to a trial of labor after  section.    4.  Labor onset must be spontaneous and note greater than 40 weeks of gestation. Induction of labor may increase the risk of uterine rupture and would prevent the patient from being a good candidate for a trial of labor.     5. The use of oxytocin to augment labor may be considered appropriate and is not a   Contraindication to a trial of labor after  section.      6.  A carbone gestation in vertex presentation is needed.  Datea ont eh use of external cephalic version (ECV) is limited and the decision to consider ECV and subsequent trial   Of labor after  section will be made with the patient and physician prior to the onset of labor.      7.  Epidural analgesia is an option for pain management in labor.     8.  The patient must agree to " continuous fetal monitoring and admission laboratory testing including but not limited to a hemoglobin, type and screen, and IV access.     9.  The patient must have adequate prenatal care and no confounding maternal or fetal medical or obstetrical risk factors.         Dr. Tari Ramirez, DO    Obstetrics and Gynecology  [facility] [place]

## 2018-02-27 NOTE — MR AVS SNAPSHOT
After Visit Summary   2018    Tosin Pfeiffer    MRN: [id number]           Patient Information     Date Of Birth          1985        Visit Information        Provider Department      2018 1:00 PM Tari Ramirez, [facility]        Today's Diagnoses     Prenatal care in first trimester    -  1      Care Instructions    For innatal fetal dna test:  Schedule nurse appt [place] location   [phone]  (gender)     Return in 4 weeks     Patients                              Statement of Acknowledgement re:  MELANY/      RISKS:    I understand that I have option for a repeat C/section or to attempt a       I understand that that the risk of a uterine rupture during an attempted  is at least 1%.        Should my uterus rupture, even if being monitored closely in labor and delivery; there may not be sufficient time to operate and prevent death or permanent brain injury to my baby.      The exact frequency of death or permanent brain injury to the baby when the uterus ruptures is uncertain; but has been reported as high as 50%.        The risks to me after rupture of the uterus include but are not limited to:  o Hysterectomy (loss of uterus and ability to have future children)  o Blood transfusion  o Infection  o Injury to internal organs (bowel, bladder or ureters)  o Blood coagulation problems  o Death        I understand that if I choose a  and then ultimately need to have a C/section; that the risks of a repeat C/section is then higher.      BENEFITS:    I understand that approximately 60% of woman who undergo a trial of , successfully deliver vaginally.      I understand that the benefits of a successful  when compared to a repeat C/section may include; decreased blood loss, and a shorter recovery time.  Factors to be considered in patient's who may be candidates for a trial of labor and potential vaginal birth after  section:      1.  "The patient and provider will review at length the previous obstetric history and discuss the risks and benefits and alternative forms of therapy and provide singed   Informed consent.  The patient agrees to comply with these guidelines to minimize   Risk and maximize chances for successful vaginal birth after  section.     2. The patient must agree to delivery at [facility].  The [facility] In House OB call physician will serve as the immediately available phyiscian after hours, on weekends, and on holidays allowing the primary OB physician to care   For other patients outside of labor and delivery.  The in house call physician will satisfy the requirement of \"being immediately available\" which is taken to mean being in the hospital in close proximity to labor and delivery.  A patient who shows up at [facility] who may otherwise be a candidate for a trial of labor after  section would not be allowed a trial of labor and would be a candidate for a repeat  section as appropriate backup support is note available through our practice.     3. The patient will have one previously documented low segment transverse uterine incision.   Documentation includes review of the operative report.  Previous uterine   Rupture, incisions the contractile portion of the uterus, uterine abnormalities or   Previous complications that could compromise the integrity of the previous uterine   Incision may be consider contraindications to a trial of labor after  section.    4.  Labor onset must be spontaneous and note greater than 40 weeks of gestation. Induction of labor may increase the risk of uterine rupture and would prevent the patient from being a good candidate for a trial of labor.     5. The use of oxytocin to augment labor may be considered appropriate and is not a   Contraindication to a trial of labor after  section.      6.  A carbone " gestation in vertex presentation is needed.  Datea ont eh use of external cephalic version (ECV) is limited and the decision to consider ECV and subsequent trial   Of labor after  section will be made with the patient and physician prior to the onset of labor.      7.  Epidural analgesia is an option for pain management in labor.     8.  The patient must agree to continuous fetal monitoring and admission laboratory testing including but not limited to a hemoglobin, type and screen, and IV access.     9.  The patient must have adequate prenatal care and no confounding maternal or fetal medical or obstetrical risk factors.         Dr. Tari Ramirez, DO    Obstetrics and Gynecology  [facility] and [place]                 Follow-ups after your visit        Who to contact     If you have questions or need follow up information about today's clinic visit or your schedule please contact [facility] directly at [phone].  Normal or non-critical lab and imaging results will be communicated to you by Indixhart, letter or phone within 4 business days after the clinic has received the results. If you do not hear from us within 7 days, please contact the clinic through [employer] or phone. If you have a critical or abnormal lab result, we will notify you by phone as soon as possible.  Submit refill requests through Traka or call your pharmacy and they will forward the refill request to us. Please allow 3 business days for your refill to be completed.          Additional Information About Your Visit        Traka Information     Traka gives you secure access to your electronic health record. If you see a primary care provider, you can also send messages to your care team and make appointments. If you have questions, please call your primary care clinic.  If you do not have a primary care provider, please call [phone] and they will assist you.        Care EveryWhere ID     This  "is your Care EveryWhere ID. This could be used by other organizations to access your [place] medical records  [id number]        Your Vitals Were     Height Last Period BMI (Body Mass Index)             5' 3\" (1.6 m) 12/20/2017 (Exact Date) 40.19 kg/m2          Blood Pressure from Last 3 Encounters:   02/27/18 104/70   02/06/18 120/76   05/09/17 110/82    Weight from Last 3 Encounters:   02/27/18 226 lb 14.4 oz (102.9 kg)   02/06/18 223 lb (101.2 kg)   05/09/17 221 lb 9.6 oz (100.5 kg)              We Performed the Following     CHLAMYDIA TRACHOMATIS PCR     NEISSERIA GONORRHOEA PCR        Primary Care Provider Office Phone # Fax #    Ramona Ann Aaseby-Aguilera, PA-C [phone] [address] [facility] [address]        Equal Access to Services     FARIHA SOLIS : Hadii aad ku hadasho Soomaali, waaxda luqadaha, qaybta kaalmada adeegyada, waxay neida avila . So [facility] [phone].    ATENCIÓN: Si ada perez, tiene a albright disposición servicios gratuitos de asistencia lingüística. Fernandoame al [phone].    We comply with applicable federal civil rights laws and [place] laws. We do not discriminate on the basis of race, color, national origin, age, disability, sex, sexual orientation, or gender identity.            Thank you!     Thank you for choosing [facility]  for your care. Our goal is always to provide you with excellent care. Hearing back from our patients is one way we can continue to improve our services. Please take a few minutes to complete the written survey that you may receive in the mail after your visit with us. Thank you!             Your Updated Medication List - Protect others around you: Learn how to safely use, store and throw away your medicines at www.disposemymeds.org.          This list is accurate as of 2/27/18  1:29 PM.  Always use your most recent med list.                   Brand Name Dispense Instructions for use Diagnosis    " albuterol 108 (90 BASE) MCG/ACT Inhaler    PROAIR HFA/PROVENTIL HFA/VENTOLIN HFA    1 Inhaler    Inhale 2 puffs into the lungs every 4 hours as needed    Exercise-induced asthma        MG Caps           prenatal multivitamin plus iron 27-0.8 MG Tabs per tablet      Take by mouth daily

## 2018-02-28 LAB
C TRACH DNA SPEC QL NAA+PROBE: NEGATIVE
N GONORRHOEA DNA SPEC QL NAA+PROBE: NEGATIVE
SPECIMEN SOURCE: NORMAL
SPECIMEN SOURCE: NORMAL

## 2018-03-27 ENCOUNTER — PRENATAL OFFICE VISIT (OUTPATIENT)
Dept: OBGYN | Facility: CLINIC | Age: 33
End: 2018-03-27
Payer: COMMERCIAL

## 2018-03-27 VITALS
BODY MASS INDEX: 39.86 KG/M2 | SYSTOLIC BLOOD PRESSURE: 118 MMHG | DIASTOLIC BLOOD PRESSURE: 70 MMHG | HEART RATE: 79 BPM | WEIGHT: 225 LBS

## 2018-03-27 DIAGNOSIS — Z34.91 PRENATAL CARE IN FIRST TRIMESTER: Primary | ICD-10-CM

## 2018-03-27 PROCEDURE — 99207 ZZC PRENATAL VISIT: CPT | Performed by: FAMILY MEDICINE

## 2018-03-27 NOTE — PATIENT INSTRUCTIONS
Return in 4 weeks     For innatal fetal dna test:  Schedule nurse appt [place] location   [phone]    Dr. Tari Ramirez, DO    Obstetrics and Gynecology  [facility] [place] and [place]

## 2018-03-27 NOTE — NURSING NOTE
"Chief Complaint   Patient presents with     Prenatal Care     13 weeks 6 days- no concerns        Initial /70 (BP Location: Right arm, Patient Position: Sitting, Cuff Size: Adult Large)  Pulse 79  Wt 225 lb (102.1 kg)  LMP 12/20/2017 (Exact Date)  Breastfeeding? No  BMI 39.86 kg/m2 Estimated body mass index is 39.86 kg/(m^2) as calculated from the following:    Height as of 2/27/18: 5' 3\" (1.6 m).    Weight as of this encounter: 225 lb (102.1 kg).  Medication Reconciliation: complete     13w6d  Link Brown CMA       "

## 2018-03-27 NOTE — MR AVS SNAPSHOT
After Visit Summary   3/27/2018    Tosin Pfeiffer    MRN: [id number]           Patient Information     Date Of Birth          1985        Visit Information        Provider Department      3/27/2018 3:45 PM Tari Ramirez, DO [facility]        Today's Diagnoses     Prenatal care in first trimester    -  1      Care Instructions    Return in 4 weeks     For innatal fetal dna test:  Schedule nurse appt [place] location   [phone]    Dr. Tari Ramirez, DO    Obstetrics and Gynecology  [facility] [place] [facility] [place]                 Follow-ups after your visit        Who to contact     If you have questions or need follow up information about today's clinic visit or your schedule please contact [facility] directly at [phone].  Normal or non-critical lab and imaging results will be communicated to you by JinkoS[employer] Holdinghart, letter or phone within 4 business days after the clinic has received the results. If you do not hear from us within 7 days, please contact the clinic through [employer] or phone. If you have a critical or abnormal lab result, we will notify you by phone as soon as possible.  Submit refill requests through Rentamus or call your pharmacy and they will forward the refill request to us. Please allow 3 business days for your refill to be completed.          Additional Information About Your Visit        [employer] Information     Rentamus gives you secure access to your electronic health record. If you see a primary care provider, you can also send messages to your care team and make appointments. If you have questions, please call your primary care clinic.  If you do not have a primary care provider, please call [phone] and they will assist you.        Care EveryWhere ID     This is your Care EveryWhere ID. This could be used by other organizations to access your [place] medical records  [id number]        Your Vitals Were     Pulse Last  Period Breastfeeding? BMI (Body Mass Index)          79 12/20/2017 (Exact Date) No 39.86 kg/m2         Blood Pressure from Last 3 Encounters:   03/27/18 118/70   02/27/18 104/70   02/06/18 120/76    Weight from Last 3 Encounters:   03/27/18 225 lb (102.1 kg)   02/27/18 226 lb 14.4 oz (102.9 kg)   02/06/18 223 lb (101.2 kg)              Today, you had the following     No orders found for display       Primary Care Provider Office Phone # Fax #    Meron Ann Aaseby-Aguilera, PA-C [phone] [address]  [facility] [address]        Equal Access to Services     FARIHA SOLIS : Hadii yadira henaoo Solyle, waaxda luqadaha, qaybta kaalmada adeegyada, molly renteria. So [facility] [phone].    ATENCIÓN: Si habla español, tiene a albright disposición servicios gratuitos de asistencia lingüística. LlOhio[facility] [phone].    We comply with applicable federal civil rights laws and [place] laws. We do not discriminate on the basis of race, color, national origin, age, disability, sex, sexual orientation, or gender identity.            Thank you!     Thank you for choosing [facility]  for your care. Our goal is always to provide you with excellent care. Hearing back from our patients is one way we can continue to improve our services. Please take a few minutes to complete the written survey that you may receive in the mail after your visit with us. Thank you!             Your Updated Medication List - Protect others around you: Learn how to safely use, store and throw away your medicines at www.disposemymeds.org.          This list is accurate as of 3/27/18  3:56 PM.  Always use your most recent med list.                   Brand Name Dispense Instructions for use Diagnosis     MG Caps           prenatal multivitamin plus iron 27-0.8 MG Tabs per tablet      Take by mouth daily

## 2018-03-27 NOTE — PROGRESS NOTES
CC: Here for routine prenatal visit   32 year old y/o  @ 13w6d with Estimated Date of Delivery: Sep 26, 2018   HPI: Patient states she is doing well. No vaginal bleeding.    This document serves as a record of the services and decisions personally performed and made by Tari Ramirez DO. It was created on his/her behalf by Nils Izaguirre, a trained medical scribe. The creation of this document is based the provider's statements to the medical scribe.  Scribe Nils Izaguirre 3:52 PM, 2018    /70 (BP Location: Right arm, Patient Position: Sitting, Cuff Size: Adult Large)  Pulse 79  Wt 102.1 kg (225 lb)  LMP 2017 (Exact Date)  Breastfeeding? No  BMI 39.86 kg/m2  See OB flowsheet    1) concerns: none today  2) Routine Care: Patient is undecided on genetic screening at this time  3) Hx of insulin GDM: GTT at 20 weeks with repeat at 28 weeks  4) Patient is considering :  calculator 35%. Discussed risks.  Would be concerned with CPD due to history of 8# 11 oz baby and arrest of descent, would not specifically recommend  but would be ok for MELANY if the patient desires and the fetal weight is less than previous baby.  Information given and will carefully proceed. She is undecided today.   5)  Return to clinic in 4 weeks    The information in this document, created by the medical scribe for me, accurately reflects the services I personally performed and the decisions made by me. I have reviewed and approved this document for accuracy prior to leaving the patient care area.  Tari Ramirez DO  3:56 PM, 18

## 2018-04-24 ENCOUNTER — PRENATAL OFFICE VISIT (OUTPATIENT)
Dept: OBGYN | Facility: CLINIC | Age: 33
End: 2018-04-24
Payer: COMMERCIAL

## 2018-04-24 VITALS
DIASTOLIC BLOOD PRESSURE: 70 MMHG | SYSTOLIC BLOOD PRESSURE: 110 MMHG | HEIGHT: 63 IN | BODY MASS INDEX: 40.38 KG/M2 | WEIGHT: 227.9 LBS

## 2018-04-24 DIAGNOSIS — Z34.92 PRENATAL CARE IN SECOND TRIMESTER: Primary | ICD-10-CM

## 2018-04-24 PROCEDURE — 99207 ZZC PRENATAL VISIT: CPT | Performed by: FAMILY MEDICINE

## 2018-04-24 NOTE — MR AVS SNAPSHOT
After Visit Summary   4/24/2018    Tosin Pfeiffer    MRN: [id number]           Patient Information     Date Of Birth          1985        Visit Information        Provider Department      4/24/2018 2:15 PM Tari Ramirez,  [facility]        Today's Diagnoses     Prenatal care in second trimester    -  1      Care Instructions    Ultrasound will call you       Dr. Tari Ramirez, DO    Obstetrics and Gynecology  [facility] [place]                 Follow-ups after your visit        Your next 10 appointments already scheduled     May 22, 2018  4:00 PM CDT   ESTABLISHED PRENATAL with Tari Ramirez DO   [facility]    [address] [address]   [phone]            Jun 19, 2018  1:00 PM CDT   ESTABLISHED PRENATAL with Tari Ramirez DO   [facility]    [address] [address]   [phone]            Jul 17, 2018  1:00 PM CDT   ESTABLISHED PRENATAL with Tari Ramirez DO   [facility]    [address] [address]   [phone]            Aug 14, 2018  1:00 PM CDT   ESTABLISHED PRENATAL with Tari Ramirez DO   [facility] ([facility]    [address] [address]   [phone]              Future tests that were ordered for you today     Open Future Orders        Priority Expected Expires Ordered    US OB > 14 Weeks Complete Single Routine  4/24/2019 4/24/2018            Who to contact     If you have questions or need follow up information about today's clinic visit or your schedule please contact [facility] directly at [phone].  Normal or non-critical lab and imaging results will be communicated to you by MyChart, letter or phone within 4 business days after the  "clinic has received the results. If you do not hear from us within 7 days, please contact the clinic through Desti or phone. If you have a critical or abnormal lab result, we will notify you by phone as soon as possible.  Submit refill requests through Desti or call your pharmacy and they will forward the refill request to us. Please allow 3 business days for your refill to be completed.          Additional Information About Your Visit        [employer] Information     Desti gives you secure access to your electronic health record. If you see a primary care provider, you can also send messages to your care team and make appointments. If you have questions, please call your primary care clinic.  If you do not have a primary care provider, please call [phone] and they will assist you.        Care EveryWhere ID     This is your Care EveryWhere ID. This could be used by other organizations to access your [place] medical records  [id number]        Your Vitals Were     Height Last Period BMI (Body Mass Index)             5' 3\" (1.6 m) 12/20/2017 (Exact Date) 40.37 kg/m2          Blood Pressure from Last 3 Encounters:   04/24/18 110/70   03/27/18 118/70   02/27/18 104/70    Weight from Last 3 Encounters:   04/24/18 227 lb 14.4 oz (103.4 kg)   03/27/18 225 lb (102.1 kg)   02/27/18 226 lb 14.4 oz (102.9 kg)               Primary Care Provider Office Phone # Fax #    Meron Ann Aaseby-Aguilera, PA-C [phone] [address] [facility] [address]        Equal Access to Services     [facility] Center[facility] AH: Hadii aad ku hadasho Soomaali, waaxda luqadaha, qaybta kaalmada adeegyada, molly renteria. So [facility] [phone].    ATENCIÓN: Si habla español, tiene a albright disposición servicios gratuitos de asistencia lingüística. Fernandoame al [phone].    We comply with applicable federal civil rights laws and [place] laws. We do not discriminate on the basis of race, color, national " origin, age, disability, sex, sexual orientation, or gender identity.            Thank you!     Thank you for choosing [facility]  for your care. Our goal is always to provide you with excellent care. Hearing back from our patients is one way we can continue to improve our services. Please take a few minutes to complete the written survey that you may receive in the mail after your visit with us. Thank you!             Your Updated Medication List - Protect others around you: Learn how to safely use, store and throw away your medicines at www.disposemymeds.org.          This list is accurate as of 4/24/18  2:36 PM.  Always use your most recent med list.                   Brand Name Dispense Instructions for use Diagnosis     MG Caps           prenatal multivitamin plus iron 27-0.8 MG Tabs per tablet      Take by mouth daily

## 2018-04-24 NOTE — PATIENT INSTRUCTIONS
Ultrasound will call you       Dr. Tari Ramirez, DO    Obstetrics and Gynecology  [facility] [place] [facility] [place]

## 2018-04-24 NOTE — PROGRESS NOTES
"CC: Here for routine prenatal visit   32 year old y/o  @ 17w6d with Estimated Date of Delivery: Sep 26, 2018   HPI: Patient states she is doing well. + fetal movement. No vaginal bleeding, no LOF.    This document serves as a record of the services and decisions personally performed and made by Tari Ramirez DO. It was created on his/her behalf by Nils Izaguirre, a trained medical scribe. The creation of this document is based the provider's statements to the medical scribe.  Scribemanuel Izaguirre 2:36 PM, 2018    /70  Ht 1.6 m (5' 3\")  Wt 103.4 kg (227 lb 14.4 oz)  LMP 2017 (Exact Date)  BMI 40.37 kg/m2  See OB flowsheet    1) concerns: none today  2) Routine Care: Patient is undecided on genetic screening at this time, GTT at next visit  3) Hx of insulin GDM: GTT at 20 weeks with repeat at 28 weeks  4) Patient is considering :  calculator 35%. Discussed risks.  Would be concerned with CPD due to history of 8# 11 oz baby and arrest of descent, would not specifically recommend  but would be ok for MELANY if the patient desires and the fetal weight is less than previous baby. Information given and will carefully proceed. She is undecided today.   5)  Return to clinic in 4 weeks    The information in this document, created by the medical scribe for me, accurately reflects the services I personally performed and the decisions made by me. I have reviewed and approved this document for accuracy prior to leaving the patient care area.  Tari Ramirez DO  2:38 PM, 18    "

## 2018-04-24 NOTE — NURSING NOTE
"17w6d    Chief Complaint   Patient presents with     Prenatal Care       Initial /70  Ht 5' 3\" (1.6 m)  Wt 227 lb 14.4 oz (103.4 kg)  LMP 12/20/2017 (Exact Date)  BMI 40.37 kg/m2 Estimated body mass index is 40.37 kg/(m^2) as calculated from the following:    Height as of this encounter: 5' 3\" (1.6 m).    Weight as of this encounter: 227 lb 14.4 oz (103.4 kg).  Medication Reconciliation: complete   Denise Thomason CMA      "

## 2018-05-07 ENCOUNTER — RADIANT APPOINTMENT (OUTPATIENT)
Dept: ULTRASOUND IMAGING | Facility: CLINIC | Age: 33
End: 2018-05-07
Attending: FAMILY MEDICINE
Payer: COMMERCIAL

## 2018-05-07 DIAGNOSIS — Z34.92 PRENATAL CARE IN SECOND TRIMESTER: ICD-10-CM

## 2018-05-07 PROCEDURE — 76805 OB US >/= 14 WKS SNGL FETUS: CPT | Performed by: FAMILY MEDICINE

## 2018-05-22 ENCOUNTER — PRENATAL OFFICE VISIT (OUTPATIENT)
Dept: OBGYN | Facility: CLINIC | Age: 33
End: 2018-05-22
Payer: COMMERCIAL

## 2018-05-22 VITALS
DIASTOLIC BLOOD PRESSURE: 70 MMHG | BODY MASS INDEX: 40.75 KG/M2 | HEIGHT: 63 IN | WEIGHT: 230 LBS | SYSTOLIC BLOOD PRESSURE: 118 MMHG

## 2018-05-22 DIAGNOSIS — R73.09 ELEVATED GLUCOSE: ICD-10-CM

## 2018-05-22 DIAGNOSIS — Z34.92 PRENATAL CARE IN SECOND TRIMESTER: Primary | ICD-10-CM

## 2018-05-22 PROCEDURE — 82950 GLUCOSE TEST: CPT | Performed by: FAMILY MEDICINE

## 2018-05-22 PROCEDURE — 36415 COLL VENOUS BLD VENIPUNCTURE: CPT | Performed by: FAMILY MEDICINE

## 2018-05-22 PROCEDURE — 99207 ZZC PRENATAL VISIT: CPT | Performed by: FAMILY MEDICINE

## 2018-05-22 NOTE — MR AVS SNAPSHOT
After Visit Summary   5/22/2018    Tosin Pfeiffer    MRN: [id number]           Patient Information     Date Of Birth          1985        Visit Information        Provider Department      5/22/2018 4:00 PM Tari Ramirez, DO [facility]        Today's Diagnoses     Prenatal care in second trimester    -  1      Care Instructions    Return in 4 weeks    Phone numbers [place]:  Day/ night [phone] ask for ob triage  Emergency:  Call labor and delivery:  [phone]    What should I call about??    Contraction every 5 minutes for 1 hour 1 minute long (032), bleeding, loss of fluid, headache that doesn't resolve with tylenol, and decreased fetal movement     Start kick counts @ 26-28 weeks Normal movement is listed below.    Please call if you do not feel the baby move!  We will have you come in for fetal heart rate monitoring:   Perception of at least 10 FMs during 12 hours of normal maternal activity   Perception of least 10 FMs over two hours when the mother is at rest and focused on [facility] Address   [address], [place] [address] [phone]    Dr. Tari Ramirez, DO    OB/GYN   [facility]                            Follow-ups after your visit        Your next 10 appointments already scheduled     May 25, 2018  8:15 AM CDT   US OB LIMITED ONE OR MORE FETUSES with OXUS1   [facility]    [address] [address] [phone]           Please bring a list of your medicines (including vitamins, minerals and over-the-counter drugs). Also, tell your doctor about any allergies you may have. Wear comfortable clothes and leave your valuables at home.  If you re less than 20 weeks drink four 8-ounce glasses of fluid an hour before your exam. If you need to empty your bladder before your exam, try to release only a  little urine. Then, drink another glass of fluid.  You may have up to two family members in the exam room. If you bring a small child, an adult must be there to care for him or her.  Please call the Imaging Department at your exam site with any questions.            Jun 19, 2018 11:00 AM CDT   ESTABLISHED PRENATAL with Tari Ramirez,    [facility] ([facility])    [address]  [facility] [address]   [phone]            Jul 17, 2018  1:00 PM CDT   ESTABLISHED PRENATAL with Tari Ramirez,    [facility]    [address] [facility] [address] [phone]            Aug 14, 2018  1:00 PM CDT   ESTABLISHED PRENATAL with Tari Ramirez, DO   [facility]    [address] [facility] [address] [phone]              Who to contact     If you have questions or need follow up information about today's clinic visit or your schedule please contact [facility] directly at [phone].  Normal or non-critical lab and imaging results will be communicated to you by MyChart, letter or phone within 4 business days after the clinic has received the results. If you do not hear from us within 7 days, please contact the clinic through [employer] or phone. If you have a critical or abnormal lab result, we will notify you by phone as soon as possible.  Submit refill requests through [employer] or call your pharmacy and they will forward the refill request to us. Please allow 3 business days for your refill to be completed.          Additional Information About Your Visit        [employer] Information     [employer] gives you secure access to your electronic health record. If you see a primary care provider, you can also send messages to your care team and make appointments. If you have questions, please call your primary care clinic.  If you do not  "have a primary care provider, please call [phone] and they will assist you.        Care EveryWhere ID     This is your Care EveryWhere ID. This could be used by other organizations to access your [place] medical records  [id number]        Your Vitals Were     Height Last Period BMI (Body Mass Index)             5' 3\" (1.6 m) 12/20/2017 (Exact Date) 40.74 kg/m2          Blood Pressure from Last 3 Encounters:   05/22/18 118/70   04/24/18 110/70   03/27/18 118/70    Weight from Last 3 Encounters:   05/22/18 230 lb (104.3 kg)   04/24/18 227 lb 14.4 oz (103.4 kg)   03/27/18 225 lb (102.1 kg)              We Performed the Following     Glucose tolerance, gest screen, 1 hour          Today's Medication Changes          These changes are accurate as of 5/22/18  4:21 PM.  If you have any questions, ask your nurse or doctor.               Start taking these medicines.        Dose/Directions    ranitidine 300 MG tablet   Commonly known as:  ZANTAC   Used for:  Prenatal care in second trimester   Started by:  Tari Ramirez DO        Dose:  300 mg   Take 1 tablet (300 mg) by mouth At Bedtime   Quantity:  30 tablet   Refills:  1            Where to get your medicines      These medications were sent to [place] [facility] [id number]    Hours:  Tech issues with their phone system Phone:  [phone]     ranitidine 300 MG tablet                Primary Care Provider Office Phone # Fax #    Ramona Ann Aaseby-Aguilera, PA-C [phone] [address] [facility] [address]        Equal Access to Services     TIFFANY SOLIS AH: Hadii yadira Islas, wamariida luqadaha, qaybta kaalmada fanny, molly renteria. So [facility] [phone].    ATENCIÓN: Si habla español, tiene a albright disposición servicios gratuitos de asistencia lingüística. Llame al [phone].    We comply with applicable federal civil rights " laws and [place] laws. We do not discriminate on the basis of race, color, national origin, age, disability, sex, sexual orientation, or gender identity.            Thank you!     Thank you for choosing [facility]  for your care. Our goal is always to provide you with excellent care. Hearing back from our patients is one way we can continue to improve our services. Please take a few minutes to complete the written survey that you may receive in the mail after your visit with us. Thank you!             Your Updated Medication List - Protect others around you: Learn how to safely use, store and throw away your medicines at www.disposemymeds.org.          This list is accurate as of 5/22/18  4:21 PM.  Always use your most recent med list.                   Brand Name Dispense Instructions for use Diagnosis     MG Caps           prenatal multivitamin plus iron 27-0.8 MG Tabs per tablet      Take by mouth daily        ranitidine 300 MG tablet    ZANTAC    30 tablet    Take 1 tablet (300 mg) by mouth At Bedtime    Prenatal care in second trimester

## 2018-05-22 NOTE — NURSING NOTE
"21w6d    Chief Complaint   Patient presents with     Prenatal Care     having acid reflux       Initial /70  Ht 5' 3\" (1.6 m)  Wt 230 lb (104.3 kg)  LMP 2017 (Exact Date)  BMI 40.74 kg/m2 Estimated body mass index is 40.74 kg/(m^2) as calculated from the following:    Height as of this encounter: 5' 3\" (1.6 m).    Weight as of this encounter: 230 lb (104.3 kg).  BP completed using cuff size: large          "

## 2018-05-22 NOTE — PATIENT INSTRUCTIONS
Return in 4 weeks    Phone numbers [place]:  Day/ night [phone] ask for ob triage  Emergency:  Call labor and delivery:  [phone]    What should I call about??    Contraction every 5 minutes for 1 hour 1 minute long (511), bleeding, loss of fluid, headache that doesn't resolve with tylenol, and decreased fetal movement     Start kick counts @ 26-28 weeks Normal movement is listed below.    Please call if you do not feel the baby move!  We will have you come in for fetal heart rate monitoring:   Perception of at least 10 FMs during 12 hours of normal maternal activity   Perception of least 10 FMs over two hours when the mother is at rest and focused on [facility] Address   [address] [facility], [place] [address] [phone]    Dr. Tari Ramirez, DO    OB/GYN   [facility]

## 2018-05-22 NOTE — PROGRESS NOTES
"CC: Here for routine prenatal visit   32 year old y/o  @ 21w6d with Estimated Date of Delivery: Sep 26, 2018   HPI: acid reflux, wants to do 1 hour test early today           LMP 2017 (Exact Date)  /70  Ht 5' 3\" (1.6 m)  Wt 230 lb (104.3 kg)  LMP 2017 (Exact Date)  BMI 40.74 kg/m2   See OB flowsheet  + fetal movement, no contractions, no bleeding, no loss of fluid   Discussed monitoring fetal movement       1) concerns: GERD, rx zantac, early 1 hour test today, hx of GDM   2) Routine care: Undecided on genetic screening; GC - negative; GTT - pending  3) Hx of insulin GDM: GTT at 20 weeks with repeat at 28 weeks  4) Patient is considering :  calculator 35%. Discussed risks.  Would be concerned with CPD due to history of 8# 11 oz baby and arrest of descent, would not specifically recommend  but would be ok for MELANY if the patient desires and the fetal weight is less than previous baby. Information given and will carefully proceed. She is undecided today.  5) Return: 4 weeks        Tillmeenakshi    "

## 2018-05-23 LAB — GLUCOSE 1H P 50 G GLC PO SERPL-MCNC: 163 MG/DL (ref 60–129)

## 2018-05-25 ENCOUNTER — RADIANT APPOINTMENT (OUTPATIENT)
Dept: ULTRASOUND IMAGING | Facility: CLINIC | Age: 33
End: 2018-05-25
Attending: FAMILY MEDICINE
Payer: COMMERCIAL

## 2018-05-25 DIAGNOSIS — Z34.92 PRENATAL CARE IN SECOND TRIMESTER: ICD-10-CM

## 2018-05-25 PROCEDURE — 76816 OB US FOLLOW-UP PER FETUS: CPT | Performed by: OBSTETRICS & GYNECOLOGY

## 2018-05-29 DIAGNOSIS — R73.09 ELEVATED GLUCOSE: ICD-10-CM

## 2018-05-29 DIAGNOSIS — O09.92 SUPERVISION OF HIGH RISK PREGNANCY IN SECOND TRIMESTER: Primary | ICD-10-CM

## 2018-05-29 LAB
GLUCOSE 1H P 100 G GLC PO SERPL-MCNC: 216 MG/DL (ref 60–179)
GLUCOSE 2H P 100 G GLC PO SERPL-MCNC: 147 MG/DL (ref 60–154)
GLUCOSE 3H P 100 G GLC PO SERPL-MCNC: 96 MG/DL (ref 60–139)
GLUCOSE P FAST SERPL-MCNC: 93 MG/DL (ref 60–94)

## 2018-05-29 PROCEDURE — 82951 GLUCOSE TOLERANCE TEST (GTT): CPT | Performed by: FAMILY MEDICINE

## 2018-05-29 PROCEDURE — 36415 COLL VENOUS BLD VENIPUNCTURE: CPT | Performed by: FAMILY MEDICINE

## 2018-05-29 PROCEDURE — 82952 GTT-ADDED SAMPLES: CPT | Performed by: FAMILY MEDICINE

## 2018-06-04 ENCOUNTER — TELEPHONE (OUTPATIENT)
Dept: OBGYN | Facility: CLINIC | Age: 33
End: 2018-06-04

## 2018-06-04 NOTE — TELEPHONE ENCOUNTER
Diabetes Education Scheduling Outreach #1:    Call to patient to schedule. Patient declined to schedule. Would like to speak to provider before scheduling.            Yolanda Jamison  [place] [facility]  Diabetes and Nutrition Scheduling

## 2018-06-05 NOTE — TELEPHONE ENCOUNTER
Scheduled for GDM class 6/11/18.  No further outreach needed at this time.    Mercedes Morillo MS, RD, LD, CDE

## 2018-06-11 ENCOUNTER — ALLIED HEALTH/NURSE VISIT (OUTPATIENT)
Dept: EDUCATION SERVICES | Facility: CLINIC | Age: 33
End: 2018-06-11
Payer: COMMERCIAL

## 2018-06-11 DIAGNOSIS — O24.419 GESTATIONAL DIABETES: Primary | ICD-10-CM

## 2018-06-11 PROCEDURE — G0109 DIAB MANAGE TRN IND/GROUP: HCPCS

## 2018-06-11 RX ORDER — LANCETS
EACH MISCELLANEOUS
Qty: 100 EACH | Refills: 1 | Status: SHIPPED | OUTPATIENT
Start: 2018-06-11 | End: 2018-07-06 | Stop reason: ALTCHOICE

## 2018-06-11 NOTE — PROGRESS NOTES
Diabetes Self-Management Training - Gestational Diabetes    SUBJECTIVE/OBJECTIVE:  Tosin Pfeiffer presents today for education related to gestational diabetes.  She is accompanied by self    Patient's gestational diabetes management related comments/concerns: had it in previous pregnancy    Patient's emotional response to diabetes: expresses readiness to learn and concern for health and well-being    LMP 2017 (Exact Date)    Pre pregnancy weight: 224#    Weight gain 5 lbs at 24 weeks gestation.    Estimated Date of Delivery: Sep 26, 2018    Lab Results   Component Value Date    GLC 90 2017       1 hour OGTT: 163 on 18  3 hour OGTT: Fastin; 1 hr: 216; 2 hr: 147, 3 hr: 96 on 18    History   Smoking Status     Former Smoker     Packs/day: 1.00     Types: Cigarettes     Start date:      Quit date:    Smokeless Tobacco     Never Used       Lifestyle and Health Behaviors:  Physical Activity: walking 45 min 4x/week    Nutrition:  Patient eats 3 meals and 1 snacks per day.    Breakfast:  2 egg, 2 miguel, 1/2 bagel woth cream cheese, strawberries  Snack:  no  Lunch:  Lettuce salad with chicken and croutons OR sandwich with fruit and veggies  Snack:  no  Dinner:  Pork tacos OR pork tenderloin, potatoes, corn  Bedtime Snack:  Ice cream    Other time(s) food is eaten? no     Beverages: mostly water    Cultural/Islam diet restrictions: No     Biggest challenge to healthy eating is:  knowing what to eat    Pre-Vira Vitamin: Yes    Supplements: No   Experiencing nausea?  No     Socio/Economic considerations:  Support System: family and spouse/significant other    Health Beliefs and Attitudes:   Stage of Change: PREPARATION (Decided to change - considering how)    ASSESSMENT:  Needs assistance with meal planning and BG testing    INTERVENTION:  Patient was instructed on Accu-Chek Autumn meter and was able to provide an accurate return demonstration. Patient's blood glucose reading today was 126  mg/dL.    Educational topics covered today:  GDM diagnosis, pathophysiology, Risks and Complications of GDM, Means of controlling GDM, Using a Blood Glucose Monitor, Blood Glucose Goals, Logging and Interpreting Glucose Results, Ketone Testing, When to Call a Diabetes Educator or OB Provider, Healthy Eating During Pregnancy, Counting Carbohydrates, Meal Planning for GDM, and Physical Activity    Educational materials provided today:   Rex Understanding Gestational Diabetes  GDM Log Book  Sharps Disposal  Care After Delivery    Pt verbalized understanding of concepts discussed and recommendations provided today.     PLAN:  Check glucose 4 times daily, before breakfast and 1 hour after each meal.     Check Ketones daily for one week, if negative, reduce testing to once a week.     Physical activity recommended: as able.    Meal plan: 2-3 carbs at breakfast, 3-4 carbs at lunch, 3-4 carbs at supper, 1-2 carbs at 3 snacks a day.  Follow consistent CHO meal plan, eat CHO and protein/fat at all meals/snacks.    Call/e-mail/MyChart message diabetes educator if 3 or more blood sugars are above the goal in 1 week or if ketones are positive.     Call/e-mail/MyChart message with questions/concerns.    FOLLOW-UP:  Call or e-mail educator if 3 or more blood sugars are above goal in 1 week.  Call or e-mail with questions or concerns.  Appointment scheduled on 6/21.     ROBB Toribio CDE  Time Spent: 90 minutes  Encounter type: Group class

## 2018-06-11 NOTE — MR AVS SNAPSHOT
After Visit Summary   6/11/2018    Tosin Pfeiffer    MRN: [id number]           Patient Information     Date Of Birth          1985        Visit Information        Provider Department      6/11/2018 8:30 AM RI GDM [facility]        Today's Diagnoses     Gestational diabetes    -  1       Follow-ups after your visit        Your next 10 appointments already scheduled     Jun 19, 2018 11:00 AM CDT   ESTABLISHED PRENATAL with Tari Ramirez DO   [facility]    [address]  [facility] [address]   [phone]            Jun 21, 2018 12:30 PM CDT   Diabetic Education with Sabrina Cho RN   [facility] ([facility]    [address]  [facility] [id number] [phone]            Jul 17, 2018  1:00 PM CDT   ESTABLISHED PRENATAL with Tari Ramirez DO   [facility]    [address] [facility] [address] [phone]            Aug 14, 2018  1:00 PM CDT   ESTABLISHED PRENATAL with Tari Ramirez DO   [facility] ([facility])    [address] [facility] [address] [phone]              Who to contact     If you have questions or need follow up information about today's clinic visit or your schedule please contact [facility] directly at [phone].  Normal or non-critical lab and imaging results will be communicated to you by MyChart, letter or phone within 4 business days after the clinic has received the results. If you do not hear from us within 7 days, please contact the clinic through [employer] or phone. If you have a critical or abnormal lab result, we will notify you by phone as soon as possible.  Submit refill requests through [employer] or call your pharmacy and they will forward the refill request to us. Please  allow 3 business days for your refill to be completed.          Additional Information About Your Visit        MyChart Information     [employer] gives you secure access to your electronic health record. If you see a primary care provider, you can also send messages to your care team and make appointments. If you have questions, please call your primary care clinic.  If you do not have a primary care provider, please call [phone] and they will assist you.        Care EveryWhere ID     This is your Care EveryWhere ID. This could be used by other organizations to access your [place] medical records  [id number]        Your Vitals Were     Last Period                   12/20/2017 (Exact Date)            Blood Pressure from Last 3 Encounters:   05/22/18 118/70   04/24/18 110/70   03/27/18 118/70    Weight from Last 3 Encounters:   05/22/18 104.3 kg (230 lb)   04/24/18 103.4 kg (227 lb 14.4 oz)   03/27/18 102.1 kg (225 lb)              We Performed the Following     DIABETES EDUCATION - Group []           Today's Medication Changes          These changes are accurate as of 6/11/18 10:31 AM.  If you have any questions, ask your nurse or doctor.               Start taking these medicines.        Dose/Directions    acetone (Urine) test Strp   Used for:  Gestational diabetes        Test once daily x1 week, then reduce to once weekly once consistently negative   Quantity:  25 each   Refills:  1       blood glucose monitoring lancets   Used for:  Gestational diabetes        Use to test blood sugar 4 times daily or as directed.   Quantity:  100 each   Refills:  1       blood glucose monitoring test strip   Commonly known as:  ACCU-CHEK GUIDE   Used for:  Gestational diabetes        Use to test blood sugar 4 times daily or as directed.   Quantity:  50 each   Refills:  5            Where to get your medicines      These medications were sent to [facility] [address] [address] [facility] [address] [facility]  [address] [facility]  [place] [address]    Hours:  Tech issues with their phone system Phone:  [phone]     acetone (Urine) test Strp    blood glucose monitoring lancets    blood glucose monitoring test strip                Primary Care Provider Office Phone # Fax #    Ramona Ann Aaseby-Aguilera, PA-C [phone] [address]  [facility] [address]        Equal Access to Services     FARIHA SOLIS : Hadii aad ku hadasho Soomaali, waaxda luqadaha, qaybta kaalmada adeegyada, waxay idiin hayaan adeeg khcarolyn la'jamierah . So [facility] [phone].    ATENCIÓN: Si habla español, tiene a albright disposición servicios gratuitos de asistencia lingüística. Llame al [phone].    We comply with applicable federal civil rights laws and [place] laws. We do not discriminate on the basis of race, color, national origin, age, disability, sex, sexual orientation, or gender identity.            Thank you!     Thank you for choosing [facility]  for your care. Our goal is always to provide you with excellent care. Hearing back from our patients is one way we can continue to improve our services. Please take a few minutes to complete the written survey that you may receive in the mail after your visit with us. Thank you!             Your Updated Medication List - Protect others around you: Learn how to safely use, store and throw away your medicines at www.disposemymeds.org.          This list is accurate as of 6/11/18 10:31 AM.  Always use your most recent med list.                   Brand Name Dispense Instructions for use Diagnosis    acetone (Urine) test Strp     25 each    Test once daily x1 week, then reduce to once weekly once consistently negative    Gestational diabetes       blood glucose monitoring lancets     100 each    Use to test blood sugar 4 times daily or as directed.    Gestational diabetes       blood glucose monitoring test strip    ACCU-CHEK GUIDE    50 each    Use to test blood  sugar 4 times daily or as directed.    Gestational diabetes        MG Caps           prenatal multivitamin plus iron 27-0.8 MG Tabs per tablet      Take by mouth daily        ranitidine 300 MG tablet    ZANTAC    30 tablet    Take 1 tablet (300 mg) by mouth At Bedtime    Prenatal care in second trimester

## 2018-06-13 ENCOUNTER — MYC MEDICAL ADVICE (OUTPATIENT)
Dept: OBGYN | Facility: CLINIC | Age: 33
End: 2018-06-13

## 2018-06-13 DIAGNOSIS — O24.419 GESTATIONAL DIABETES: Primary | ICD-10-CM

## 2018-06-13 DIAGNOSIS — O24.419 GESTATIONAL DIABETES MELLITUS (GDM): Primary | ICD-10-CM

## 2018-06-15 ENCOUNTER — TELEPHONE (OUTPATIENT)
Dept: EDUCATION SERVICES | Facility: CLINIC | Age: 33
End: 2018-06-15

## 2018-06-15 NOTE — TELEPHONE ENCOUNTER
Gestational Diabetes Follow-up    Subjective/Objective:    Tosin Pfeiffer sent in blood glucose log for review. Last date of communication was: 6/11/18.    Gestational diabetes is being managed with diet and activity    Taking diabetes medications: no    Estimated Date of Delivery: Sep 26, 2018    BG/Food Log:     Good Morning,    My glucose levels have been elevated yesterday and this morning so I thought I better check in let the team know.      6/14 my levels were, starting in the morning 115, 141, 158, 146  This morning my level was 115    I am trying to get rid of a cold so I am not sure if that may have an effect on the levels.    On a side note, I had some issues with the testing supplies.  When I attended the glucose education class, I brought my old meter, but the educator asked if I would like a new one because it was free, of course I said sure.  I was given an Accu-Check Guide at the class.  I went to go and  the rest of the supplies at the pharmacy and the lancets were the Accu-Check brand but they said only OneStep testing strips were covered, which I thought was odd.  The pharmacy said that they would contact my doctor. I picked up the Accu-check lancets but not the stips in hopes they can get my insurance to cover the Accu-Check strips.  2 days later I did not hear anything so I contacted my clinic. After much confusion, come to find out my insurance only covers OneStep brand.  I had to purchase a new meter that was OneStep (paid out of pocket) because the Accu-Check meter was already processed through my insurance which I received at the Glucose Educator Class and now cannot use and the lancets that I previous picked up as well because I needed the OneStep brand (paid out of pocket).  Not sure if there is anything that can be done as far as reimbursement on this mess, if anything I just wanted to inform the clinic that either they should mention to prospective patients that they should be  informed as to what their insurance covers before attending the class or the Educators should double check this before processing meters for patients. Because of this I was not able to test for a few days.     Thank you    Tosin Hernandez    Assessment:  No food logs or ketone test results provided.     Fasting blood glucoses: 0% in target.  After breakfast: 0% in target.  After lunch: 0% in target.  After dinner: 0% in target.    Discussed patient's meal plan and BG targets over the phone. Patient had GDM with previous pregnancy and required insulin at that time as well. Reviewed bedtime snack with patient - she ate 1/2 an english muffin with peanut butter. This morning she had 1 cup Cheerios and 1/2 cup milk. 1 hour post-breakfast she was 158. Discussed the importance of sticking with the meal plan, shooting for 30 g CHO at breakfast with some protein. Reviewed likely need for insulin in the near future. Patient was understanding and agreeable to this. She has a CDE follow up visit next week (6/21). Explained that she can get education on injections, type of insulin, etc.at that visit. Patient asked about an insulin pump. Discussed likely need for only 1 injection/day at HS to start so an insulin pump is likely not necessary.     Also discussed issues patient had with insurance covering meter and test strips. She now has supplies for the OneTouch meter and is able to check 4 times/day. Explained that there is no way to reimburse patient for meter or supplies and that original meter provided was not run through her insurance. Also explained that we do choose meters based on what we think patient's insurance will cover but it's not always accurate. No further questions or concerns.     Plan/Response:  If fasting numbers remain above goal while following diet closely, recommend that patient begin NPH insulin - 10 units at HS with plan to adjust dosing per protocol.  Follow-up on 6/21 for diet & BG review, insulin  joycelyn Serna RD, LD     Any diabetes medication dose changes were made via the CDE Protocol and Collaborative Practice Agreement with the patient's OB/GYN provider. A copy of this encounter was shared with the provider.

## 2018-06-19 ENCOUNTER — PRENATAL OFFICE VISIT (OUTPATIENT)
Dept: OBGYN | Facility: CLINIC | Age: 33
End: 2018-06-19
Payer: COMMERCIAL

## 2018-06-19 VITALS
SYSTOLIC BLOOD PRESSURE: 110 MMHG | HEIGHT: 63 IN | WEIGHT: 228.4 LBS | BODY MASS INDEX: 40.47 KG/M2 | DIASTOLIC BLOOD PRESSURE: 64 MMHG

## 2018-06-19 DIAGNOSIS — Z34.92 PRENATAL CARE IN SECOND TRIMESTER: Primary | ICD-10-CM

## 2018-06-19 DIAGNOSIS — O24.419 GDM, CLASS A2: ICD-10-CM

## 2018-06-19 PROCEDURE — 99207 ZZC PRENATAL VISIT: CPT | Performed by: FAMILY MEDICINE

## 2018-06-19 NOTE — PATIENT INSTRUCTIONS
Return in 4 weeks    Phone numbers [place]:  Day/ night [phone] ask for ob triage  Emergency:  Call labor and delivery:  [phone]    What should I call about??    Contraction every 5 minutes for 1 hour 1 minute long (511), bleeding, loss of fluid, headache that doesn't resolve with tylenol, and decreased fetal movement     Start kick counts @ 26-28 weeks   Keep track of movement and discover your normal baby movement pattern   guideline is listed below  Please call if you do not feel the baby move!  We will have you come in for fetal heart rate monitoring:   Perception of at least 10 FMs during 12 hours of normal maternal activity   Perception of least 10 FMs over two hours when the mother is at rest and focused on [facility] Address   [address], [place] [address] [phone]    Dr. Tari Ramirez, DO    OB/GYN   [facility] and [facility]

## 2018-06-19 NOTE — PROGRESS NOTES
"CC: Here for routine prenatal visit   32 year old y/o  @ 25w6d with Estimated Date of Delivery: Sep 26, 2018   HPI: doing well, starting insulin  /64  Ht 5' 3\" (1.6 m)  Wt 228 lb 6.4 oz (103.6 kg)  LMP 2017 (Exact Date)  BMI 40.46 kg/m2  See OB flowsheet  + fetal movement, no contractions, no bleeding, no loss of fluid   Discussed monitoring fetal movement       1) concerns: GERD, rx zantac, early 1 hour test today, hx of GDM   2) Routine care: Undecided on genetic screening; GC - negative; GTT - pending  3) GDM: will be starting insulin   4) Patient is considering :  calculator 35%. Discussed risks.  I am concerned with CPD due to history of 8# 11 oz baby @ 38 weeks and arrest of descent, would not specifically recommend  but would be ok for MELANY if the patient desires and the fetal weight is less than previous baby. Information given and will carefully proceed. Desires ,   consent [x]   Plan for CS if no labor by 40-41 weeks   5) Return: 4 weeks    Tillleonardas    "

## 2018-06-19 NOTE — NURSING NOTE
"25w6d    Chief Complaint   Patient presents with     Prenatal Care     checking blood sugars--numbers high--coordinating with diabetic ed       Initial /64  Ht 5' 3\" (1.6 m)  Wt 228 lb 6.4 oz (103.6 kg)  LMP 2017 (Exact Date)  BMI 40.46 kg/m2 Estimated body mass index is 40.46 kg/(m^2) as calculated from the following:    Height as of this encounter: 5' 3\" (1.6 m).    Weight as of this encounter: 228 lb 6.4 oz (103.6 kg).  BP completed using cuff size: large      "

## 2018-06-19 NOTE — MR AVS SNAPSHOT
After Visit Summary   6/19/2018    Tosin Pfeiffer    MRN: [id number]           Patient Information     Date Of Birth          1985        Visit Information        Provider Department      6/19/2018 11:00 AM Tari Ramirez, DO [facility]        Today's Diagnoses     Prenatal care in second trimester    -  1    GDM, class A2          Care Instructions    Return in 4 weeks    Phone numbers [place]:  Day/ night [phone] ask for ob triage  Emergency:  Call labor and delivery:  [phone]    What should I call about??    Contraction every 5 minutes for 1 hour 1 minute long (600), bleeding, loss of fluid, headache that doesn't resolve with tylenol, and decreased fetal movement     Start kick counts @ 26-28 weeks   Keep track of movement and discover your normal baby movement pattern   guideline is listed below  Please call if you do not feel the baby move!  We will have you come in for fetal heart rate monitoring:   Perception of at least 10 FMs during 12 hours of normal maternal activity   Perception of least 10 FMs over two hours when the mother is at rest and focused on [facility] Address   [address] [facility], [place] [address] [phone]    Dr. Tari Ramirez, DO    OB/GYN   [facility]                                      Follow-ups after your visit        Additional Services     MAT FETAL MED CTR REFERRAL-PREGNANCY       Body mass index is 40.46 kg/(m^2).    >> Patient may proceed with recommendations for further testing as directed by the Maternal Fetal Medicine Specialist >>    >> If requesting Fetal Echo: MFM will determine appropriate location for exam due to indication.    >> If requesting Lung Maturity Amnio:  If results indicate fetal lung maturity, induction or C/S is recommended within 36 hours.  Please schedule accordingly.     Dear Patient:   Please be aware that coverage of these services  is subject to the terms and limitations of your health insurance plan.  Call member services at your health plan with any benefit or coverage questions.      Please bring the following to your appointment:    >>  Any x-rays, CTs or MRIs which have been performed.  Contact the facility where they were done to arrange for  prior to your scheduled appointment.  Any new CT, MRI or other procedures ordered by your specialist must be performed at a [facility] or coordinated by your clinic's referral office.  >>  List of current medications   >>  This referral request   >>  Any documents/labs given to you for this referral                  Your next 10 appointments already scheduled     Jun 21, 2018 12:30 PM CDT   Diabetic Education with Sabrina Cho RN   [facility] [place] ([facility])    [address] [facility] [id number] [phone]            Jul 17, 2018  1:00 PM CDT   ESTABLISHED PRENATAL with Tari Ramirez DO   [facility]    [address] [facility] [address] [phone]            Aug 14, 2018  1:00 PM CDT   ESTABLISHED PRENATAL with Tari Ramirez DO   [facility] ([facility]    [address] [facility] [address] [phone]              Who to contact     If you have questions or need follow up information about today's clinic visit or your schedule please contact [facility] directly at [phone].  Normal or non-critical lab and imaging results will be communicated to you by MyChart, letter or phone within 4 business days after the clinic has received the results. If you do not hear from us within 7 days, please contact the clinic through MyChart or phone. If you have a critical or abnormal lab result, we will notify you by phone as soon as possible.  Submit refill requests through [employer] or call your  "pharmacy and they will forward the refill request to us. Please allow 3 business days for your refill to be completed.          Additional Information About Your Visit        MyChart Information     [employer] gives you secure access to your electronic health record. If you see a primary care provider, you can also send messages to your care team and make appointments. If you have questions, please call your primary care clinic.  If you do not have a primary care provider, please call [phone] and they will assist you.        Care EveryWhere ID     This is your Care EveryWhere ID. This could be used by other organizations to access your [place] medical records  [id number]        Your Vitals Were     Height Last Period BMI (Body Mass Index)             5' 3\" (1.6 m) 12/20/2017 (Exact Date) 40.46 kg/m2          Blood Pressure from Last 3 Encounters:   06/19/18 110/64   05/22/18 118/70   04/24/18 110/70    Weight from Last 3 Encounters:   06/19/18 228 lb 6.4 oz (103.6 kg)   05/22/18 230 lb (104.3 kg)   04/24/18 227 lb 14.4 oz (103.4 kg)              We Performed the Following     MAT FETAL MED CTR REFERRAL-PREGNANCY        Primary Care Provider Office Phone # Fax #    Ramona Ann Aaseby-Aguilera, PA-C [phone] [address] [facility] [address]        Equal Access to Services     [facility]: Hadii aad ku hadasho Soomaali, waaxda luqadaha, qaybta kaalmada adeegyada, molly avila . So [facility] [phone].    ATENCIÓN: Si habla español, tiene a albright disposición servicios gratuitos de asistencia lingüística. Llame al [phone].    We comply with applicable federal civil rights laws and [place] laws. We do not discriminate on the basis of race, color, national origin, age, disability, sex, sexual orientation, or gender identity.            Thank you!     Thank you for choosing [facility]  for your care. Our goal is always to provide you with " excellent care. Hearing back from our patients is one way we can continue to improve our services. Please take a few minutes to complete the written survey that you may receive in the mail after your visit with us. Thank you!             Your Updated Medication List - Protect others around you: Learn how to safely use, store and throw away your medicines at www.disposemymeds.org.          This list is accurate as of 6/19/18 11:20 AM.  Always use your most recent med list.                   Brand Name Dispense Instructions for use Diagnosis    acetone (Urine) test Strp     25 each    Test once daily x1 week, then reduce to once weekly once consistently negative    Gestational diabetes       blood glucose lancets standard    no brand specified    100 each    Use to test blood sugar 4 times daily or as directed.    Gestational diabetes mellitus (GDM)       blood glucose monitoring lancets     100 each    Use to test blood sugar 4 times daily or as directed.    Gestational diabetes       blood glucose monitoring meter device kit    no brand specified    1 kit    Use to test blood sugar 4 times daily or as directed.    Gestational diabetes mellitus (GDM)       * blood glucose monitoring test strip    ACCU-CHEK GUIDE    50 each    Use to test blood sugar 4 times daily or as directed.    Gestational diabetes       * blood glucose monitoring test strip    ONETOUCH ULTRA    100 strip    Use to test blood sugars 4 times daily or as directed.    Gestational diabetes       * blood glucose monitoring test strip    no brand specified    100 strip    100 strips by In Vitro route 4 times daily Use to test blood sugars 4 times daily or as directed    Gestational diabetes        MG Caps           prenatal multivitamin plus iron 27-0.8 MG Tabs per tablet      Take by mouth daily        ranitidine 300 MG tablet    ZANTAC    30 tablet    Take 1 tablet (300 mg) by mouth At Bedtime    Prenatal care in second trimester       *  Notice:  This list has 3 medication(s) that are the same as other medications prescribed for you. Read the directions carefully, and ask your doctor or other care provider to review them with you.

## 2018-06-20 ENCOUNTER — TELEPHONE (OUTPATIENT)
Dept: EDUCATION SERVICES | Facility: CLINIC | Age: 33
End: 2018-06-20

## 2018-06-20 NOTE — TELEPHONE ENCOUNTER
Email to CDE:    Just a question.  I do meet with the Diabetes Nurse tomorrow but I am wondering if this is okay or not.  I took my glucose 1 hour after I ate about 1/2 C. of cream of wheat for breakfast and my level was 185 which was a higher than all of my highest previous tests so I thought I would retake it again right away and it was 170.  Is it okay to have 15 points difference within a minute of testing?  Thought I would inquire as it seems to be quite a range.    Thank you    Tosin Hernandez     Reply:    Te Leahy,    Great question! All blood sugar meters are only within ~15% of a lab instrument, so inherently you will see a different reading if you check your number back to back. That is one of the reasons why we always look for trends more than any  1 spot high number before making any treatment decisions. I agree that it was a good decision to at least double check your number with that reading, but it does look that second reading was within the range of accuracy. Be sure to bring your log book and food journal to your appointment tomorrow and we can review any other questions you have!

## 2018-06-21 ENCOUNTER — TELEPHONE (OUTPATIENT)
Dept: EDUCATION SERVICES | Facility: CLINIC | Age: 33
End: 2018-06-21

## 2018-06-21 ENCOUNTER — ALLIED HEALTH/NURSE VISIT (OUTPATIENT)
Dept: EDUCATION SERVICES | Facility: CLINIC | Age: 33
End: 2018-06-21
Payer: COMMERCIAL

## 2018-06-21 DIAGNOSIS — O24.414 GESTATIONAL DIABETES MELLITUS (GDM) REQUIRING INSULIN: ICD-10-CM

## 2018-06-21 DIAGNOSIS — O24.419 GESTATIONAL DIABETES: Primary | ICD-10-CM

## 2018-06-21 DIAGNOSIS — O24.410 DIET CONTROLLED GESTATIONAL DIABETES MELLITUS (GDM) IN SECOND TRIMESTER: ICD-10-CM

## 2018-06-21 PROCEDURE — G0108 DIAB MANAGE TRN  PER INDIV: HCPCS

## 2018-06-21 PROCEDURE — 95250 CONT GLUC MNTR PHYS/QHP EQP: CPT

## 2018-06-21 NOTE — PROGRESS NOTES
Gestational Diabetes Follow-up Visit    SUBJECTIVE/OBJECTIVE:  Tosin Pfeiffer presents today for education and evaluation of glucose control related to gestational diabetes    She is accompanied by self    Patient's gestational diabetes management related comments/concerns: blood sugars are high    LMP 12/20/2017 (Exact Date)      Blood Glucose/Ketone Log:    Date Ketones Fasting Post Breakfast Post Lunch Post Supper   6/12 n 100 130 111 -   6/14 n 115 141 158 146   6/15 n 115 158 160 144   6/16 tr 114 130 172 166   6/17 n 117 116 170 115   6/18 n 120 142 122 148   6/19 tr 111 142 160 172   6/20 n 117 185/170 172 203/165   6/21 - 114 133 129      0% in target 44% in target 33% in target 14% in target     Current gestational diabetes management:    Taking medications for gestational diabetes? No    Physical Activity: no regular exercise program    Nutrition:  Patient eats 3 meals and 2-3 snacks per day  Dinner at 7:30pm    ASSESSMENT:  Ketones: neg-tr.   Fasting blood glucoses: 0% in target.  After breakfast: 44% in target.  After lunch: 33% in target.  After dinner: 14% in target.    Patient glucose consistently above goal,she would benefit from some dietary changes but not likely to bring glucose into goal.  Will review GDM diet plan and recommend she start NPH insulin 10 units bid.     Health Beliefs and Attitudes:   Stage of Change: ACTION (Actively working towards change)    INTERVENTION:  Order request sent to OB provider to start insulin NPH 10 units bid    Insulin administration technique taught using a Vial and Syringe and pen for NPH (Humulin-N or Novolin-N) - 10 units at morning and bedtime. Patient verbalized understanding and was able to perform an accurate return demonstration of administration technique.       LibrePro sensor started today. (Lot # [id number], Serial # [id number], Expiration date 10/31/18) Sensor was inserted with no resistance or bleeding at insertion site.    Pt will plan to wear the  sensor through  7/3/18.    WRITTEN AND VERBAL INFORMATION GIVEN TO SUPPORT UNDERSTANDING OF:  LibrePro CGM: Sensor insertion, intention of monitoring for 14 days. Keep records of BG, food intake, exercise, and medication dosing during wear.       Patient verbalizes understanding of how to remove sensor and all instructions provided.       Educational topics covered today:  Review of GDM meal plan, What to expect after delivery, Future testing for Type 2 diabetes (2 hour OGTT at 6 week post-partum check-up and annual fasting blood glucose level), Risk of GDM and planning ahead for future pregnancies, Recommended lifestyle interventions for reducing the risk of Type 2 Diabetes, When to Call a Diabetes Educator or OB Provider    Educational Materials provided today:  Rex Preventing Diabetes  Insulin instructions  Hypoglycemia signs and symptoms and treatment  45-60 gram meal ideas  GDM 30 gram carbohydrate + protein snack ideas    PLAN:  Check glucose before and 1 hour after mealsy.  Check ketones once a week when readings are consistently negative.  Continue with recommended physical activity.  Continue to follow recommended meal plan: 2-3 carbs at breakfast, 3-4 carbs at lunch, 3-4 carbs at supper, 1-2 carbs at snacks.  Follow consistent CHO meal plan, eat CHO and protein/fat at all meals/snacks.    Call/e-mail/MyChart message diabetes educator if 3 or more blood sugars are above the goal in 1 week or if ketones are positive.     FOLLOW-UP:  Follow up with diabetes educator in 1 week.    Call/e-mail/MyChart message diabetes educator if 3 or more blood sugars are above the goal in 1 week or if ketones are positive.     Sabrina Cho RN,CDE   Time spent was 70 minutes  Encounter type: Individual    Any diabetes medication dose changes were made via the CDE Protocol and Collaborative Practice Agreement with the patient's referring provider. A copy of this encounter was shared with the provider.

## 2018-06-21 NOTE — MR AVS SNAPSHOT
After Visit Summary   6/21/2018    Tosin Pfeiffer    MRN: [id number]           Patient Information     Date Of Birth          1985        Visit Information        Provider Department      6/21/2018 12:30 PM Sabrina Cho RN [place] [facility] Instructions    Here are some ideas for breakfast or bedtime snack. Pick a carbohydrate from the right and a protein from the left. If you have carbohydrates 30 grams of total carbohydrate and 3-5 grams of fiber that is even better.   Fruits are not listed as they can cause the blood sugar to raise blood sugar quickly and be used up early in the night. Fruits are better at meals, or morning or afternoon snack.     Protein/Fat list (about 14 grams of protein or 2 fat servings) Carbohydrate list (about 30 grams of carbohydrate)   2 slices of cheese English Muffin   2 TBS Peanut butter/[place] butter/Sun butter 10 crackers     cup Cottage cheese 2% 2 pieces of toast   2 oz any cooked meat - less than deck of cards size 1 hamburger or hot dog bun   1.5 oz of soy nuts 1 whole wheat nissa   Avocado or guacamole 6 rachna cracker squares     cup tuna - can add mayonnaise 1 cup full fat ice cream - no candy or sauce   2 eggs - boiled, poached, fried, scrambled, omelet 30 chips   1 veggie rito (might have carbohydrates also) Greek yogurt - 30 grams of carbohydrate   2 TBS Coconut 2 - 6 inch tortillas corn or flour   12 shrimp - not breaded 2 toaster waffles - no syrup   2-1oz meatballs   bagel   2 Tbs cream cheese - plain, veggie, salmon - no fruit or honey. 6 cups popcorn - unsweetened     cup of nuts Granola bar of 3o grams of carbohydrate   10 olives 1 cup of unsweetened lentils or beans.    Tofu or Temph 4-5oz 1 cup potato salad     You can always add vegetables with dip, salad dressing or salsa also.         Test blood sugars before and 1 hour after each meal    Taking Insulin:    1. Take NPH (Humulin-N or Novolin-N) - 10 units  "at morning and bedtime.     - Do a 2 unit \"prime\" before each injection, be sure a stream of insulin comes out of the needle before you give your injection.    - After you inject, hold the needle under the skin to the count of 10 to be sure all of the insulin goes in.     - Rotate injection sites, keeping at least 1 inch apart from last injection site and 2 inches away from belly button or surgical scars.    2. Pen - Use a new pen needle for each injection. Always remove pen needle from the insulin pen after use and do not store insulin pens with the needle on the pen.     3. Store insulin you are not using in the refrigerator (do not freeze). Take new insulin out of the refrigerator a few hours prior to use to bring to room temperature.     4. Once opened NPH Pens (Humulin N or Novolin N) can be kept at room temperature for 14 days after opened., NPH Vial - Novolin N can be kept at room temperature for 42 days after opened. and NPH Vial - Humulin N can be kept at room temperature for 28 days after opened.. Do not use the opened insulin after this time has passed, even if there is still medicine inside.     5. Always carry your blood sugar meter and a sugar source like glucose tablets with you in case of a low blood sugar. Treat a low blood sugar (less than 70) with 15 grams of carbohydrate (1 carb choice). Wait 15 minutes, recheck blood sugar. If blood sugar is still below 70, repeat the treatment.    6. Wear Medical ID or carry a wallet card stating you have Diabetes.    7. Call your doctor or diabetes educator if you begin having low blood sugars or if you have questions or concerns.     8. Complete and mail in the form to inform the [place] Department [employer] Public Safety that you have started taking insulin.      [place] [facility] Services for the [facility]:  For Your Diabetes Education or Nutrition Appointments Call:  [phone]   For Nutrition Related Questions Call:   Phone: " [phone]  E-mail: [email or web address]  Fax: [phone]   If you need a medication refill please contact your pharmacy. Please allow 3 business days for your refills to be completed.             Follow-ups after your visit        Your next 10 appointments already scheduled     Jun 28, 2018  1:30 PM CDT   Diabetic Education with Radha MCCORMICK James   [place] [place] ([facility])    [address] [id number] [phone]            Jul 03, 2018 11:00 AM CDT   MFM US COMP with [id number]   [facility] Ultrasound - [facility])    [address]  [facility] [address] [phone]           Wear comfortable clothes and leave your valuables at home.            Jul 03, 2018 11:30 AM CDT   Radiology MD with [facility]   [facility])    [address]  [facility] [address] [phone]           Please arrive at the time given for your first appointment. This visit is used internally to schedule the physician's time during your ultrasound.            Jul 03, 2018  2:30 PM CDT   Diabetic Education with Sabrina Cho RN   [facility] [place] ([facility])    [address] [id number] [phone]            Jul 17, 2018  1:00 PM CDT   ESTABL[facility] Ramirez DO   [facility]    [address] [address] [id number] [phone]            Aug 14, 2018  1:00 PM CDT   ESTABLISHED PRENATAL with Tari Ramirez DO   [facility]    [address] [id number] [phone]              Who to contact     If you have questions or need follow up information about today's clinic visit or your schedule please contact [place]  [facility] [place] directly at [phone].  Normal or non-critical lab and imaging results will be communicated to you by MyChart, letter or phone within 4 business days after the clinic has received the results. If you do not hear from us within 7 days, please contact the clinic through [facility] or phone. If you have a critical or abnormal lab result, we will notify you by phone as soon as possible.  Submit refill requests through [employer] or call your pharmacy and they will forward the refill request to us. Please allow 3 business days for your refill to be completed.          Additional Information About Your Visit        [facility] Information     [employer] gives you secure access to your electronic health record. If you see a primary care provider, you can also send messages to your care team and make appointments. If you have questions, please call your primary care clinic.  If you do not have a primary care provider, please call [phone] and they will assist you.        Care EveryWhere ID     This is your Care EveryWhere ID. This could be used by other organizations to access your [place] medical records  [id number]        Your Vitals Were     Last Period                   12/20/2017 (Exact Date)            Blood Pressure from Last 3 Encounters:   06/19/18 110/64   05/22/18 118/70   04/24/18 110/70    Weight from Last 3 Encounters:   06/19/18 103.6 kg (228 lb 6.4 oz)   05/22/18 104.3 kg (230 lb)   04/24/18 103.4 kg (227 lb 14.4 oz)              Today, you had the following     No orders found for display       Primary Care Provider Office Phone # Fax #    Ramona Ann Aaseby-Aguilera, PA-C [phone] [address]  [facility] [address]        Equal Access to Services     FARIHA SOLIS : Moira Islas, wasamantha evans, qaybta kaalphong escobedo, molly renteria. So [facility] [phone].    ATENCIÓN: Si stefani hernandez albright  disposición servicios gratuitos de asistencia lingüística. Joelle arguello [phone].    We comply with applicable federal civil rights laws and [place] laws. We do not discriminate on the basis of race, color, national origin, age, disability, sex, sexual orientation, or gender identity.            Thank you!     Thank you for choosing [facility]  for your care. Our goal is always to provide you with excellent care. Hearing back from our patients is one way we can continue to improve our services. Please take a few minutes to complete the written survey that you may receive in the mail after your visit with us. Thank you!             Your Updated Medication List - Protect others around you: Learn how to safely use, store and throw away your medicines at www.disposemymeds.org.          This list is accurate as of 6/21/18  1:39 PM.  Always use your most recent med list.                   Brand Name Dispense Instructions for use Diagnosis    acetone (Urine) test Strp     25 each    Test once daily x1 week, then reduce to once weekly once consistently negative    Gestational diabetes       blood glucose lancets standard    no brand specified    100 each    Use to test blood sugar 4 times daily or as directed.    Gestational diabetes mellitus (GDM)       blood glucose monitoring lancets     100 each    Use to test blood sugar 4 times daily or as directed.    Gestational diabetes       blood glucose monitoring meter device kit    no brand specified    1 kit    Use to test blood sugar 4 times daily or as directed.    Gestational diabetes mellitus (GDM)       * blood glucose monitoring test strip    ACCU-CHEK GUIDE    50 each    Use to test blood sugar 4 times daily or as directed.    Gestational diabetes       * blood glucose monitoring test strip    ONETOUCH ULTRA    100 strip    Use to test blood sugars 4 times daily or as directed.    Gestational diabetes       * blood glucose monitoring  test strip    no brand specified    100 strip    100 strips by In Vitro route 4 times daily Use to test blood sugars 4 times daily or as directed    Gestational diabetes        MG Caps           prenatal multivitamin plus iron 27-0.8 MG Tabs per tablet      Take by mouth daily        ranitidine 300 MG tablet    ZANTAC    30 tablet    Take 1 tablet (300 mg) by mouth At Bedtime    Prenatal care in second trimester       * Notice:  This list has 3 medication(s) that are the same as other medications prescribed for you. Read the directions carefully, and ask your doctor or other care provider to review them with you.

## 2018-06-21 NOTE — TELEPHONE ENCOUNTER
,    Please see visit notes from today.  Tosin's blood sugars are consistently elevated so I would recommend she start NPH 10 units BID.      If you agree please accept orers in  or indicate alternate plan.      Sabrina Cho RN,CDE

## 2018-06-21 NOTE — TELEPHONE ENCOUNTER
Rx for Humulin N Pen faxed to [facility] pharmacy at [phone].  Denise Thomason CMA

## 2018-06-21 NOTE — PATIENT INSTRUCTIONS
"Here are some ideas for breakfast or bedtime snack. Pick a carbohydrate from the right and a protein from the left. If you have carbohydrates 30 grams of total carbohydrate and 3-5 grams of fiber that is even better.   Fruits are not listed as they can cause the blood sugar to raise blood sugar quickly and be used up early in the night. Fruits are better at meals, or morning or afternoon snack.     Protein/Fat list (about 14 grams of protein or 2 fat servings) Carbohydrate list (about 30 grams of carbohydrate)   2 slices of cheese English Muffin   2 TBS Peanut butter/[place] butter/Sun butter 10 crackers     cup Cottage cheese 2% 2 pieces of toast   2 oz any cooked meat - less than deck of cards size 1 hamburger or hot dog bun   1.5 oz of soy nuts 1 whole wheat nissa   Avocado or guacamole 6 rachna cracker squares     cup tuna - can add mayonnaise 1 cup full fat ice cream - no candy or sauce   2 eggs - boiled, poached, fried, scrambled, omelet 30 chips   1 veggie rito (might have carbohydrates also) Greek yogurt - 30 grams of carbohydrate   2 TBS Coconut 2 - 6 inch tortillas corn or flour   12 shrimp - not breaded 2 toaster waffles - no syrup   2-1oz meatballs   bagel   2 Tbs cream cheese - plain, veggie, salmon - no fruit or honey. 6 cups popcorn - unsweetened     cup of nuts Granola bar of 3o grams of carbohydrate   10 olives 1 cup of unsweetened lentils or beans.    Tofu or Temph 4-5oz 1 cup potato salad     You can always add vegetables with dip, salad dressing or salsa also.         Test blood sugars before and 1 hour after each meal    Taking Insulin:    1. Take NPH (Humulin-N or Novolin-N) - 10 units at morning and bedtime.     - Do a 2 unit \"prime\" before each injection, be sure a stream of insulin comes out of the needle before you give your injection.    - After you inject, hold the needle under the skin to the count of 10 to be sure all of the insulin goes in.     - Rotate injection sites, keeping at least " 1 inch apart from last injection site and 2 inches away from belly button or surgical scars.    2. Pen - Use a new pen needle for each injection. Always remove pen needle from the insulin pen after use and do not store insulin pens with the needle on the pen.     3. Store insulin you are not using in the refrigerator (do not freeze). Take new insulin out of the refrigerator a few hours prior to use to bring to room temperature.     4. Once opened NPH Pens (Humulin N or Novolin N) can be kept at room temperature for 14 days after opened., NPH Vial - Novolin N can be kept at room temperature for 42 days after opened. and NPH Vial - Humulin N can be kept at room temperature for 28 days after opened.. Do not use the opened insulin after this time has passed, even if there is still medicine inside.     5. Always carry your blood sugar meter and a sugar source like glucose tablets with you in case of a low blood sugar. Treat a low blood sugar (less than 70) with 15 grams of carbohydrate (1 carb choice). Wait 15 minutes, recheck blood sugar. If blood sugar is still below 70, repeat the treatment.    6. Wear Medical ID or carry a wallet card stating you have Diabetes.    7. Call your doctor or diabetes educator if you begin having low blood sugars or if you have questions or concerns.     8. Complete and mail in the form to inform the [place] Department of Public Safety that you have started taking insulin.      [place] [facility] Services for the [facility]:  For Your Diabetes Education or Nutrition Appointments Call:  [phone]   For Nutrition Related Questions Call:   Phone: [phone]  E-mail: [email or web address]  Fax: [phone]   If you need a medication refill please contact your pharmacy. Please allow 3 business days for your refills to be completed.

## 2018-06-22 NOTE — TELEPHONE ENCOUNTER
[facility] notified us that pts insurance does not cover the Humulin N kwikpen. The insurance only covers Novalin N kwikpen.     Routed to RN and md. MADELAINE Rodriguez, RN

## 2018-06-22 NOTE — TELEPHONE ENCOUNTER
New RX called in   plz advise   Dr. Tari Ramirez, DO    Obstetrics and Gynecology  [facility] [place] [facility] [place]

## 2018-06-22 NOTE — TELEPHONE ENCOUNTER
BRYANT for pt letting her know new rx was sent to pharmacy.    Josie HECTOR R.N.  [facility]

## 2018-06-26 ENCOUNTER — VIRTUAL VISIT (OUTPATIENT)
Dept: EDUCATION SERVICES | Facility: CLINIC | Age: 33
End: 2018-06-26
Payer: COMMERCIAL

## 2018-06-26 ENCOUNTER — PRE VISIT (OUTPATIENT)
Dept: MATERNAL FETAL MEDICINE | Facility: CLINIC | Age: 33
End: 2018-06-26

## 2018-06-26 DIAGNOSIS — O24.414 GESTATIONAL DIABETES MELLITUS (GDM) REQUIRING INSULIN: ICD-10-CM

## 2018-06-26 NOTE — PROGRESS NOTES
Gestational Diabetes Follow-up    Subjective/Objective:    Tosin Pfeiffer sent in blood glucose log for review. Last date of communication was: 6/12/18.    Gestational diabetes is being managed with diet, activity and medications    Taking diabetes medications:   yes:     Diabetes Medication(s)     Insulin Sig    insulin isophane human (HUMULIN N PEN) 100 UNIT/ML injection Inject 10 Units Subcutaneous 2 times daily    insulin isophane human (HUMULIN N PEN) 100 UNIT/ML injection Humulin N kwikpen 10 units in the morning and 10 units at bedtime, titrate as needed        Wt Readings from Last 3 Encounters:   06/19/18 103.6 kg (228 lb 6.4 oz)   05/22/18 104.3 kg (230 lb)   04/24/18 103.4 kg (227 lb 14.4 oz)       Estimated Date of Delivery: Sep 26, 2018    BG/Food Log:   I had a blood glucose monitor put on at my appointment with Sabrina UGARTE on June 21st and it just fell off today.  I have an appointment scheduled with another diabetic educator with Radha on June 28th.  Let me know if I need to come in before then and get another one put on.  Also, I just wanted to let you know some of my glucose levels are still elevated throughout the day even with the insulin night and in the morning.  See image below of my glucose levels.  I started to take the 10 units of insulin at night on 6/21.     Thank you        Assessment:  Fasting blood sugars remain most elevated.  Patient would benefit an increased dose of NPH at bedtime, which would, additionally bring down her daytime readings.  New sensor can be placed if needed on 6/28 appointment.    Ketones: negative.   Fasting blood glucoses: 0% in target.  After breakfast: 29% in target.  Before lunch: 33% in target.  After lunch: 33% in target since starting insulin.  Before dinner: 25% in target.  After dinner: 50% in target.    Plan/Response:  Recommend increase to insulin - NPH 10-0-0-10 --> 10-0-0-14.  Follow up visit with CDE 6/28/18.    Mercedes Morillo MS, RD, LD, CDE    Any  diabetes medication dose changes were made via the CDE Protocol and Collaborative Practice Agreement with the patient's OB/GYN provider. A copy of this encounter was shared with the provider.    E-mail reply to patient:  Te Leahy,    Sorry that sensor came off!  Sometimes they re harder to keep on in the summer with increased sweat.  Just bring in the sensor and Radha can still look at the data from it.  If she wants to put another one on she can do it during the visit.    It looks like your fasting readings are most elevated, so I d like to increase your evening insulin.  Usually if you start high then you run high the rest of the day.  So if we can lower your fasting reading the rest usually come down too.    Please continue taking 10 units at breakfast, take 14 units at bedtime.      Then bring in your logbook and sensor to your appointment Thursday with Radha.    Have a great day!      Mercedes Morillo MS, RD, LD, CDE

## 2018-06-26 NOTE — MR AVS SNAPSHOT
After Visit Summary   6/26/2018    Tosin Pfeiffer    MRN: [id number]           Patient Information     Date Of Birth          1985        Visit Information        Provider Department      6/26/2018 9:00 AM [facility]        Today's Diagnoses     Gestational diabetes mellitus (GDM) requiring insulin           Follow-ups after your visit        Your next 10 appointments already scheduled     Jun 28, 2018  1:30 PM CDT   Diabetic Education with Radha Jonesncer   [facility] [place] ([facility])    [address]  [address]   [phone]            Jul 03, 2018 11:00 AM CDT   MFM US COMP with MFMUSWENDY   [facility] Maternal Fetal Medicine Ultrasound - [facility])    [address]  [facility] [address] [phone]           Wear comfortable clothes and leave your valuables at home.            Jul 03, 2018 11:30 AM CDT   [facility]   [facility] Maternal Fetal Medicine - [facility])    [address]  [facility] [address] [phone]           Please arrive at the time given for your first appointment. This visit is used internally to schedule the physician's time during your ultrasound.            Jul 03, 2018  2:30 PM CDT   Diabetic Education with Sabrina Cho RN   [facility] [place] ([facility])    [address]  [address]   [phone]            Jul 17, 2018  1:00 PM CDT   ESTABLISHED PRENATAL with Tari Ramirez DO   [facility] ([facility])    [address] [facility] [address]   [phone]            Aug 14, 2018  1:00 PM CDT   ESTABLISHED PRENATAL with Tari Ramirez DO   [facility] ([facility])    [address] [facility]  [id number] [phone]              Who to contact     If you have questions or need follow up information about today's clinic visit or your schedule please contact [facility] directly at [phone].  Normal or non-critical lab and imaging results will be communicated to you by reKode Educationhart, letter or phone within 4 business days after the clinic has received the results. If you do not hear from us within 7 days, please contact the clinic through Basic-Fitt or phone. If you have a critical or abnormal lab result, we will notify you by phone as soon as possible.  Submit refill requests through Triplify or call your pharmacy and they will forward the refill request to us. Please allow 3 business days for your refill to be completed.          Additional Information About Your Visit        Triplify Information     Triplify gives you secure access to your electronic health record. If you see a primary care provider, you can also send messages to your care team and make appointments. If you have questions, please call your primary care clinic.  If you do not have a primary care provider, please call [phone] and they will assist you.        Care EveryWhere ID     This is your Care EveryWhere ID. This could be used by other organizations to access your [facility] medical records  [id number]        Your Vitals Were     Last Period                   12/20/2017 (Exact Date)            Blood Pressure from Last 3 Encounters:   06/19/18 110/64   05/22/18 118/70   04/24/18 110/70    Weight from Last 3 Encounters:   06/19/18 103.6 kg (228 lb 6.4 oz)   05/22/18 104.3 kg (230 lb)   04/24/18 103.4 kg (227 lb 14.4 oz)              Today, you had the following     No orders found for display         Today's Medication Changes          These changes are accurate as of 6/26/18  3:06 PM.  If you have any questions, ask your nurse or doctor.               These medicines have changed or have updated prescriptions.         Dose/Directions    * insulin isophane human 100 UNIT/ML injection   Commonly known as:  HumuLIN N PEN   This may have changed:  Another medication with the same name was changed. Make sure you understand how and when to take each.   Used for:  Gestational diabetes mellitus (GDM) requiring insulin        Humulin N kwikpen 10 units in the morning and 10 units at bedtime, titrate as needed   Quantity:  15 mL   Refills:  1       * insulin isophane human 100 UNIT/ML injection   Commonly known as:  HumuLIN N PEN   This may have changed:    - how much to take  - how to take this  - when to take this  - additional instructions   Used for:  Gestational diabetes mellitus (GDM) requiring insulin        Inject 10 units with breakfast and 14 units at bedtime.   Quantity:  15 mL   Refills:  3       * Notice:  This list has 2 medication(s) that are the same as other medications prescribed for you. Read the directions carefully, and ask your doctor or other care provider to review them with you.         Where to get your medicines      These medications were sent to [facility]/pharmacy #[id number] - [place], [place] - [address] [facility]  [address] [facility] [address]    Hours:  [facility] to [facility] Phone:  [phone]     insulin isophane human 100 UNIT/ML injection                Primary Care Provider Office Phone # Fax #    Ramona Ann Aaseby-Aguilera, PA-C [phone] [address]  [facility] [address]        Equal Access to Services     FARIHA SOLIS AH: Hadii yadira prakash Solyle, waaxda luqadaha, qaybta kaalmada adeedwaryataye, molly avila . So [facility] [phone].    ATENCIÓN: Si habla español, tiene a albright disposición servicios gratuitos de asistencia lingüística. Joelle al [phone].    We comply with applicable federal civil rights laws and [place] laws. We do not discriminate on the basis of race, color, national origin, age, disability, sex, sexual orientation, or  gender identity.            Thank you!     Thank you for choosing [facility]  for your care. Our goal is always to provide you with excellent care. Hearing back from our patients is one way we can continue to improve our services. Please take a few minutes to complete the written survey that you may receive in the mail after your visit with us. Thank you!             Your Updated Medication List - Protect others around you: Learn how to safely use, store and throw away your medicines at www.disposemymeds.org.          This list is accurate as of 6/26/18  3:06 PM.  Always use your most recent med list.                   Brand Name Dispense Instructions for use Diagnosis    acetone (Urine) test Strp     25 each    Test once daily x1 week, then reduce to once weekly once consistently negative    Gestational diabetes       blood glucose lancets standard    no brand specified    100 each    Use to test blood sugar 4 times daily or as directed.    Gestational diabetes mellitus (GDM)       blood glucose monitoring lancets     100 each    Use to test blood sugar 4 times daily or as directed.    Gestational diabetes       blood glucose monitoring meter device kit    no brand specified    1 kit    Use to test blood sugar 4 times daily or as directed.    Gestational diabetes mellitus (GDM)       * blood glucose monitoring test strip    ACCU-CHEK GUIDE    50 each    Use to test blood sugar 4 times daily or as directed.    Gestational diabetes       * blood glucose monitoring test strip    ONETOUCH ULTRA    100 strip    Use to test blood sugars 4 times daily or as directed.    Gestational diabetes       * blood glucose monitoring test strip    no brand specified    100 strip    100 strips by In Vitro route 4 times daily Use to test blood sugars 4 times daily or as directed    Gestational diabetes       * blood glucose monitoring test strip    ONETOUCH ULTRA    300 strip    Use to test blood sugar 6 times  daily or as directed. One touch ultra    Gestational diabetes mellitus (GDM) requiring insulin        MG Caps           * insulin isophane human 100 UNIT/ML injection    HumuLIN N PEN    15 mL    Humulin N kwikpen 10 units in the morning and 10 units at bedtime, titrate as needed    Gestational diabetes mellitus (GDM) requiring insulin       * insulin isophane human 100 UNIT/ML injection    HumuLIN N PEN    15 mL    Inject 10 units with breakfast and 14 units at bedtime.    Gestational diabetes mellitus (GDM) requiring insulin       insulin pen needle 32G X 4 MM    BD CHRIS U/F    100 each    Use 2 daily as directed.    Gestational diabetes mellitus (GDM) requiring insulin       prenatal multivitamin plus iron 27-0.8 MG Tabs per tablet      Take by mouth daily        ranitidine 300 MG tablet    ZANTAC    30 tablet    Take 1 tablet (300 mg) by mouth At Bedtime    Prenatal care in second trimester       * Notice:  This list has 6 medication(s) that are the same as other medications prescribed for you. Read the directions carefully, and ask your doctor or other care provider to review them with you.

## 2018-06-28 ENCOUNTER — ALLIED HEALTH/NURSE VISIT (OUTPATIENT)
Dept: EDUCATION SERVICES | Facility: CLINIC | Age: 33
End: 2018-06-28
Payer: COMMERCIAL

## 2018-06-28 VITALS — BODY MASS INDEX: 40.74 KG/M2 | WEIGHT: 230 LBS

## 2018-06-28 DIAGNOSIS — O24.414 GESTATIONAL DIABETES MELLITUS (GDM) REQUIRING INSULIN: Primary | ICD-10-CM

## 2018-06-28 PROCEDURE — G0108 DIAB MANAGE TRN  PER INDIV: HCPCS | Performed by: DIETITIAN, REGISTERED

## 2018-06-28 NOTE — MR AVS SNAPSHOT
After Visit Summary   6/28/2018    Tosin Pfeiffer    MRN: [id number]           Patient Information     Date Of Birth          1985        Visit Information        Provider Department      6/28/2018 1:30 PM Radha Ramirez [facility] [place]        Care Instructions    My Diabetes Care Goals:    1. Let's increase your NPH insulin from 10-0-0-14 to 12-0-0-16.    2. Continue meal plan- can aim for lower end of carbohydrates 2-3 choices per meal if that is more comfortable. Morning and afternoon snacks could be optional depending on blood sugar, but do not skip bedtime snack.     3. Please avoid sugary drinks and sugary snacks (regular soda, fruit snacks, etc).    4. Watch for signs of low blood sugar.     Follow up:  1 week for download of monitor.   E-mail your blood sugars in 3-4 days (Mon)     Bring blood glucose meter and logbook with you to all doctor and follow-up appointments.     [facility] for the [facility] Area:  For Your Diabetes Education and Nutrition Appointments Call:  [phone]   For Diabetes Education or Nutrition Related Questions:   Phone: [phone]  E-mail: [email or web address]  Fax: [phone]   If you need a medication refill please contact your pharmacy. Please allow 3 business days for your refills to be completed.    Instructions for emailing the Diabetes Educators    If you need to communicate a non-urgent message to a Diabetes Educator via email, please send to [email or web address].    Please follow the following email guidelines:    Subject line: Secure: your clinic name (example: Secure: [facility])  In the email please include: First name, middle initial, last name and date of birth.    We will be in touch with you within one (1) business day.             Follow-ups after your visit        Your next 10 appointments already scheduled     Jul 03, 2018 11:00 AM CDT   MFM US COMP with [id number]    [facility] Maternal Fetal Medicine Ultrasound - [facility] ([facility])    [address]  [facility] [address] [phone]           Wear comfortable clothes and leave your valuables at home.            Jul 03, 2018 11:30 AM CDT   Radiology MD with  HEIDY PANTOJA   [facility] Maternal Fetal Medicine - [facility])    [address]  [facility] [address] [phone]           Please arrive at the time given for your first appointment. This visit is used internally to schedule the physician's time during your ultrasound.            Jul 03, 2018  2:30 PM CDT   Diabetic Education with Sabrina Cho RN   [facility] ([facility])    [address] [facility] [id number] [phone]            Jul 17, 2018  1:00 PM CDT   ESTABLISHED PRENATAL with Tari Ramirez DO   [facility] ([facility])    [address] [facility] [address] [phone]            Aug 14, 2018  1:00 PM CDT   ESTABLISHED PRENATAL with Tari Ramirez DO   [facility] ([facility]    [address] [facility] [address] [phone]              Who to contact     If you have questions or need follow up information about today's clinic visit or your schedule please contact [facility] directly at [phone].  Normal or non-critical lab and imaging results will be communicated to you by MyChart, letter or phone within 4 business days after the clinic has received the results. If you do not hear from us within 7 days, please contact the clinic through SpinalMotionhart or phone. If you have a critical or abnormal lab result, we will notify you by phone as soon as possible.  Submit refill requests through orangutrans or call your pharmacy and they will forward the refill request to us. Please allow 3 business days for  your refill to be completed.          Additional Information About Your Visit        MyChart Information     f-star Biotechhart gives you secure access to your electronic health record. If you see a primary care provider, you can also send messages to your care team and make appointments. If you have questions, please call your primary care clinic.  If you do not have a primary care provider, please call [phone] and they will assist you.        Care EveryWhere ID     This is your Care EveryWhere ID. This could be used by other organizations to access your [facility] medical records  [id number]        Your Vitals Were     Last Period                   12/20/2017 (Exact Date)            Blood Pressure from Last 3 Encounters:   06/19/18 110/64   05/22/18 118/70   04/24/18 110/70    Weight from Last 3 Encounters:   06/19/18 103.6 kg (228 lb 6.4 oz)   05/22/18 104.3 kg (230 lb)   04/24/18 103.4 kg (227 lb 14.4 oz)              Today, you had the following     No orders found for display       Primary Care Provider Office Phone # Fax #    Meron Ann Aaseby-Aguilera, PA-C [phone] [address] [id number]        Equal Access to Services     FARIHA SOLIS : Hadii aad ku hadasho Soomaali, waaxda luqadaha, qaybta kaalmada adeegyada, waxay idiin hayjamien ne khcarolyn ladanny renteria. So [facility] [phone].    ATENCIÓN: Si habla español, tiene a albright disposición servicios gratuitos de asistencia lingüística. Llame al [phone].    We comply with applicable federal civil rights laws and [place] laws. We do not discriminate on the basis of race, color, national origin, age, disability, sex, sexual orientation, or gender identity.            Thank you!     Thank you for choosing [facility]  for your care. Our goal is always to provide you with excellent care. Hearing back from our patients is one way we can continue to improve our services. Please take a few minutes to complete  the written survey that you may receive in the mail after your visit with us. Thank you!             Your Updated Medication List - Protect others around you: Learn how to safely use, store and throw away your medicines at www.disposemymeds.org.          This list is accurate as of 6/28/18  2:05 PM.  Always use your most recent med list.                   Brand Name Dispense Instructions for use Diagnosis    acetone (Urine) test Strp     25 each    Test once daily x1 week, then reduce to once weekly once consistently negative    Gestational diabetes       blood glucose lancets standard    no brand specified    100 each    Use to test blood sugar 4 times daily or as directed.    Gestational diabetes mellitus (GDM)       blood glucose monitoring lancets     100 each    Use to test blood sugar 4 times daily or as directed.    Gestational diabetes       blood glucose monitoring meter device kit    no brand specified    1 kit    Use to test blood sugar 4 times daily or as directed.    Gestational diabetes mellitus (GDM)       * blood glucose monitoring test strip    ACCU-CHEK GUIDE    50 each    Use to test blood sugar 4 times daily or as directed.    Gestational diabetes       * blood glucose monitoring test strip    ONETOUCH ULTRA    100 strip    Use to test blood sugars 4 times daily or as directed.    Gestational diabetes       * blood glucose monitoring test strip    no brand specified    100 strip    100 strips by In Vitro route 4 times daily Use to test blood sugars 4 times daily or as directed    Gestational diabetes       * blood glucose monitoring test strip    ONETOUCH ULTRA    300 strip    Use to test blood sugar 6 times daily or as directed. One touch ultra    Gestational diabetes mellitus (GDM) requiring insulin        MG Caps           * insulin isophane human 100 UNIT/ML injection    HumuLIN N PEN    15 mL    Humulin N kwikpen 10 units in the morning and 10 units at bedtime, titrate as needed     Gestational diabetes mellitus (GDM) requiring insulin       * insulin isophane human 100 UNIT/ML injection    HumuLIN N PEN    15 mL    Inject 10 units with breakfast and 14 units at bedtime.    Gestational diabetes mellitus (GDM) requiring insulin       insulin pen needle 32G X 4 MM    BD CHRIS U/F    100 each    Use 2 daily as directed.    Gestational diabetes mellitus (GDM) requiring insulin       prenatal multivitamin plus iron 27-0.8 MG Tabs per tablet      Take by mouth daily        ranitidine 300 MG tablet    ZANTAC    30 tablet    Take 1 tablet (300 mg) by mouth At Bedtime    Prenatal care in second trimester       * Notice:  This list has 6 medication(s) that are the same as other medications prescribed for you. Read the directions carefully, and ask your doctor or other care provider to review them with you.

## 2018-06-28 NOTE — LETTER
"    6/28/2018         RE: Tosin Pfeiffer  [address]  [facility] [address]        Dear Colleague,    Thank you for referring your patient, Tosin Pfeiffer, to the [facility]. Please see a copy of my visit note below.    Gestational Diabetes Follow-up Visit    SUBJECTIVE/OBJECTIVE:  Tosin Pfeiffer presents today for education and evaluation of glucose control related to gestational diabetes    She is accompanied by self    Patient's gestational diabetes management related comments/concerns: CGM came off after 2 days, pt asking about whether she needs to eat \"so many carbs\", has also noticed snacks make her blood sugars go higher    LMP 12/20/2017 (Exact Date)      Weight gain 6 lbs at 27 weeks gestation.    Wt Readings from Last 3 Encounters:   06/28/18 104.3 kg (230 lb)   06/19/18 103.6 kg (228 lb 6.4 oz)   05/22/18 104.3 kg (230 lb)       Blood Glucose/Ketone Log:            Current gestational diabetes management:    Taking medications for gestational diabetes? Yes    Diabetes Medication(s)     Insulin Sig    insulin isophane human (HUMULIN N PEN) 100 UNIT/ML injection Inject 10 units with breakfast and 14 units at bedtime.          Physical Activity: not assessed    Nutrition:  Patient eats 3 meals and 3 snacks per day.               ASSESSMENT:  Ketones: Neg.   All post meal blood glucose values in target past 3 days.  Fasting blood glucoses: 0% in target, improving since start of NPH with increase in dose on 6/26/18  After breakfast: 57% in target   Before lunch: 20% in target.  After lunch: 40% in target   Before dinner: 40% in target.  After dinner: 60% in target.    High sugar snacks likely contributing to elevated pre-lunch/ pre-dinner blood sugars. May benefit from increase in NPH doses.     CGM download:              Health Beliefs and Attitudes:   Stage of Change: ACTION (Actively working towards change)    INTERVENTION:  LibrePro sensor replaced today. (Lot # [id number], " Serial # [id number], Expiration date 2018-10-31) Sensor was inserted with no resistance or bleeding at insertion site. SkinTac applied for additional adhesion.      Patient verbalizes understanding of how to remove sensor and all instructions provided.     Education provided today:  AADE Self-Care Behaviors  Healthy Eating: reinforced meal planning guidelines, recommend avoid sugary snacks/ drinks such as fruit snacks, regular soda, etc  Problem Solving: high blood glucose - causes, signs/symptoms, treatment and prevention  Monitoring: review of BG pattern    Educational Materials provided today:  No new materials provided at today's visit    PLAN:  Increase NPH insulin from 10-0-0-14 --> to 12-0-0-16.  Check glucose 6 times daily.  Check ketones once a week.  Continue with recommended physical activity.  Okay to stay at lower end of carb servings per meal: 2 carbs at breakfast, 2-3 carbs at lunch, 2-3 carbs at supper, 0-1 carbs at snacks. Please avoid sugary drinks and sugary snacks (regular soda, fruit snacks, etc). Morning and afternoon snacks may be optional depending on blood sugars, however do not skip bedtime time snack.  Follow consistent CHO meal plan, eat CHO and protein/fat at all meals/snacks.    Call/e-mail/MyChart message diabetes educator if 3 or more blood sugars are above the goal in 1 week or if ketones are positive.     FOLLOW-UP:  Follow up with Sabrina Cho RD, FILIP[facility] on 7/3/18 at 2:30.  Send BG update to diabetes educator in 3-4 days.     FILIPPO Bergeron RD  Diabetes     Time spent was 45 minutes  Encounter type: Individual    Any diabetes medication dose changes were made via the CDE Protocol and Collaborative Practice Agreement with the patient's OB/GYN provider. A copy of this encounter was shared with the provider.

## 2018-06-28 NOTE — PATIENT INSTRUCTIONS
My Diabetes Care Goals:    1. Let's increase your NPH insulin from 10-0-0-14 to 12-0-0-16.    2. Continue meal plan- can aim for lower end of carbohydrates 2-3 choices per meal if that is more comfortable. Morning and afternoon snacks could be optional depending on blood sugar, but do not skip bedtime snack.     3. Please avoid sugary drinks and sugary snacks (regular soda, fruit snacks, etc).    4. Watch for signs of low blood sugar.     Follow up:  1 week for download of monitor.   E-mail your blood sugars in 3-4 days (Mon)     Bring blood glucose meter and logbook with you to all doctor and follow-up appointments.     [facility] for the [facility] Area:  For Your Diabetes Education and Nutrition Appointments Call:  [phone]   For Diabetes Education or Nutrition Related Questions:   Phone: [phone]  E-mail: [email or web address]  Fax: [phone]   If you need a medication refill please contact your pharmacy. Please allow 3 business days for your refills to be completed.    Instructions for emailing the Diabetes Educators    If you need to communicate a non-urgent message to a Diabetes Educator via email, please send to [email or web address].    Please follow the following email guidelines:    Subject line: Secure: your clinic name (example: Secure: [facility])  In the email please include: First name, middle initial, last name and date of birth.    We will be in touch with you within one (1) business day.

## 2018-06-28 NOTE — PROGRESS NOTES
"Gestational Diabetes Follow-up Visit    SUBJECTIVE/OBJECTIVE:  Tosin Pfeiffer presents today for education and evaluation of glucose control related to gestational diabetes    She is accompanied by self    Patient's gestational diabetes management related comments/concerns: CGM came off after 2 days, pt asking about whether she needs to eat \"so many carbs\", has also noticed snacks make her blood sugars go higher    LMP 12/20/2017 (Exact Date)      Weight gain 6 lbs at 27 weeks gestation.    Wt Readings from Last 3 Encounters:   06/28/18 104.3 kg (230 lb)   06/19/18 103.6 kg (228 lb 6.4 oz)   05/22/18 104.3 kg (230 lb)       Blood Glucose/Ketone Log:            Current gestational diabetes management:    Taking medications for gestational diabetes? Yes    Diabetes Medication(s)     Insulin Sig    insulin isophane human (HUMULIN N PEN) 100 UNIT/ML injection Inject 10 units with breakfast and 14 units at bedtime.          Physical Activity: not assessed    Nutrition:  Patient eats 3 meals and 3 snacks per day.               ASSESSMENT:  Ketones: Neg.   All post meal blood glucose values in target past 3 days.  Fasting blood glucoses: 0% in target, improving since start of NPH with increase in dose on 6/26/18  After breakfast: 57% in target   Before lunch: 20% in target.  After lunch: 40% in target   Before dinner: 40% in target.  After dinner: 60% in target.    High sugar snacks likely contributing to elevated pre-lunch/ pre-dinner blood sugars. May benefit from increase in NPH doses.     CGM download:              Health Beliefs and Attitudes:   Stage of Change: ACTION (Actively working towards change)    INTERVENTION:  LibrePro sensor replaced today. (Lot # [id number], Serial # [id number], Expiration date 2018-10-31) Sensor was inserted with no resistance or bleeding at insertion site. SkinTac applied for additional adhesion.      Patient verbalizes understanding of how to remove sensor and all instructions provided. "     Education provided today:  AADE Self-Care Behaviors  Healthy Eating: reinforced meal planning guidelines, recommend avoid sugary snacks/ drinks such as fruit snacks, regular soda, etc  Problem Solving: high blood glucose - causes, signs/symptoms, treatment and prevention  Monitoring: review of BG pattern    Educational Materials provided today:  No new materials provided at today's visit    PLAN:  Increase NPH insulin from 10-0-0-14 --> to 12-0-0-16.  Check glucose 6 times daily.  Check ketones once a week.  Continue with recommended physical activity.  Okay to stay at lower end of carb servings per meal: 2 carbs at breakfast, 2-3 carbs at lunch, 2-3 carbs at supper, 0-1 carbs at snacks. Please avoid sugary drinks and sugary snacks (regular soda, fruit snacks, etc). Morning and afternoon snacks may be optional depending on blood sugars, however do not skip bedtime time snack.  Follow consistent CHO meal plan, eat CHO and protein/fat at all meals/snacks.    Call/e-mail/[employer] message diabetes educator if 3 or more blood sugars are above the goal in 1 week or if ketones are positive.     FOLLOW-UP:  Follow up with FILIPPO Adam RD on 7/3/18 at 2:30.  Send BG update to diabetes educator in 3-4 days.     Radha Ramirez RD, CDE  Diabetes     Time spent was 45 minutes  Encounter type: Individual    Any diabetes medication dose changes were made via the [facility] Protocol and Collaborative Practice Agreement with the patient's OB/GYN provider. A copy of this encounter was shared with the provider.

## 2018-07-03 ENCOUNTER — ALLIED HEALTH/NURSE VISIT (OUTPATIENT)
Dept: EDUCATION SERVICES | Facility: CLINIC | Age: 33
End: 2018-07-03
Payer: COMMERCIAL

## 2018-07-03 ENCOUNTER — HOSPITAL ENCOUNTER (OUTPATIENT)
Dept: ULTRASOUND IMAGING | Facility: CLINIC | Age: 33
Discharge: HOME | End: 2018-07-03
Attending: FAMILY MEDICINE | Admitting: FAMILY MEDICINE
Payer: COMMERCIAL

## 2018-07-03 ENCOUNTER — OFFICE VISIT (OUTPATIENT)
Dept: MATERNAL FETAL MEDICINE | Facility: CLINIC | Age: 33
End: 2018-07-03
Attending: FAMILY MEDICINE
Payer: COMMERCIAL

## 2018-07-03 DIAGNOSIS — O24.419 GESTATIONAL DIABETES: Primary | ICD-10-CM

## 2018-07-03 DIAGNOSIS — O24.414 INSULIN CONTROLLED GESTATIONAL DIABETES MELLITUS (GDM) DURING PREGNANCY, ANTEPARTUM: Primary | ICD-10-CM

## 2018-07-03 DIAGNOSIS — O26.90 PREGNANCY RELATED CONDITION, ANTEPARTUM: ICD-10-CM

## 2018-07-03 PROCEDURE — G0108 DIAB MANAGE TRN  PER INDIV: HCPCS

## 2018-07-03 PROCEDURE — 76811 OB US DETAILED SNGL FETUS: CPT

## 2018-07-03 NOTE — MR AVS SNAPSHOT
After Visit Summary   7/3/2018    Tosin Pfeiffer    MRN: [id number]           Patient Information     Date Of Birth          1985        Visit Information        Provider Department      7/3/2018 11:30 AM Lyn Kahn MD [facility]        Today's Diagnoses     Insulin controlled gestational diabetes mellitus (GDM) during pregnancy, antepartum    -  1       Follow-ups after your visit        Your next 10 appointments already scheduled     Jul 03, 2018  2:30 PM CDT   Diabetic Education with Sabrina Cho RN   [facility] [place] ([facility])    [address] [facility] [address]   [phone]            Jul 10, 2018  3:30 PM CDT   MFM BPP SINGLE with [id number]   [facility] Ul[facility]ound - [facility])    [address]  [facility] [id number] [phone]            Jul 10, 2018  4:00 PM CDT   Radiology MD with JOSEPH MOODY MD   [facility] Maternal Fetal Medicine [facility] ([facility])    [address]  [facility] [address] [phone]           Please arrive at the time given for your first appointment. This visit is used internally to schedule the physician's time during your ultrasound.            Jul 17, 2018  1:00 PM CDT   ESTABLISHED PRENATAL with Tari Ramirez,    [facility] ([facility])    [id number] [place] [address]   [phone]            Jul 17, 2018  1:30 PM CDT   MFM BPP SINGLE with [id number]   [facility] Maternal Fetal Medicine Ultrasound - [facility] ([facility])    [address]  [facility] [address] [phone]            Jul 17, 2018  2:00 PM CDT   Radiology MD with JOSEPH MOODY MD   [facility] Maternal Fetal Medicine [facility] ([facility])    [address]  [facility] [address]    [phone]           Please arrive at the time given for your first appointment. This visit is used internally to schedule the physician's time during your ultrasound.            Jul 24, 2018 11:00 AM CDT   M US COMPRE SINGLE F/U with [id number]   [facility] Maternal Fetal Medicine Ultrasound - [facility] ([facility])    [address]  [facility] [address] [phone]           Wear comfortable clothes and leave your valuables at home.            Jul 24, 2018 11:30 AM CDT   Radiology MD with  MFM MD   [facility] Maternal Fetal Medicine [facility] ([facility])    [address]  [facility] [address] [phone]           Please arrive at the time given for your first appointment. This visit is used internally to schedule the physician's time during your ultrasound.            Aug 14, 2018  1:00 PM CDT   ESTABLISHED PRENATAL with Tari Ramirez,    [facility]    [address] [facility] [address] [phone]              Future tests that were ordered for you today     Open Future Orders        Priority Expected Expires Ordered    [facility] BPP Single Routine 7/17/2018 5/3/2019 7/3/2018    [facility] BPP Single Routine 7/10/2018 5/3/2019 7/3/2018    [facility] US Comprehensive Single F/U Routine 7/24/2018 7/3/2019 7/3/2018            Who to contact     If you have questions or need follow up information about today's clinic visit or your schedule please contact [facility] MATERNAL FETAL MEDICINE [facility] directly at [phone].  Normal or non-critical lab and imaging results will be communicated to you by MyChart, letter or phone within 4 business days after the clinic has received the results. If you do not hear from us within 7 days, please contact the clinic through MyChart or phone. If you have a critical or abnormal lab result, we will notify you by phone as soon as possible.  Submit refill requests  through [employer] or call your pharmacy and they will forward the refill request to us. Please allow 3 business days for your refill to be completed.          Additional Information About Your Visit        [email or web address] Information     [employer] gives you secure access to your electronic health record. If you see a primary care provider, you can also send messages to your care team and make appointments. If you have questions, please call your primary care clinic.  If you do not have a primary care provider, please call [phone] and they will assist you.        Care EveryWhere ID     This is your Care EveryWhere ID. This could be used by other organizations to access your [place] medical records  [id number]        Your Vitals Were     Last Period                   12/20/2017 (Exact Date)            Blood Pressure from Last 3 Encounters:   06/19/18 110/64   05/22/18 118/70   04/24/18 110/70    Weight from Last 3 Encounters:   06/28/18 104.3 kg (230 lb)   06/19/18 103.6 kg (228 lb 6.4 oz)   05/22/18 104.3 kg (230 lb)               Primary Care Provider Office Phone # Fax #    Meron Ann Aaseby-Aguilera, PA-C [phone] [address] [id number]        Equal Access to Services     FARIHA SOLIS : Hadii yadira ku hadasho Soomaali, waaxda luqadaha, qaybta kaalmada adeegyada, waxay neida renteria. So [facility] [phone].    ATENCIÓN: Si habla español, tiene a albright disposición servicios gratuitos de asistencia lingüística. Llame al [phone].    We comply with applicable federal civil rights laws and [place] laws. We do not discriminate on the basis of race, color, national origin, age, disability, sex, sexual orientation, or gender identity.            Thank you!     Thank you for choosing [facility]  for your care. Our goal is always to provide you with excellent care. Hearing back from our patients is one way we can continue to improve our  services. Please take a few minutes to complete the written survey that you may receive in the mail after your visit with us. Thank you!             Your Updated Medication List - Protect others around you: Learn how to safely use, store and throw away your medicines at www.disposemymeds.org.          This list is accurate as of 7/3/18  1:20 PM.  Always use your most recent med list.                   Brand Name Dispense Instructions for use Diagnosis    acetone (Urine) test Strp     25 each    Test once daily x1 week, then reduce to once weekly once consistently negative    Gestational diabetes       blood glucose lancets standard    no brand specified    100 each    Use to test blood sugar 4 times daily or as directed.    Gestational diabetes mellitus (GDM)       blood glucose monitoring lancets     100 each    Use to test blood sugar 4 times daily or as directed.    Gestational diabetes       blood glucose monitoring meter device kit    no brand specified    1 kit    Use to test blood sugar 4 times daily or as directed.    Gestational diabetes mellitus (GDM)       * blood glucose monitoring test strip    ACCU-CHEK GUIDE    50 each    Use to test blood sugar 4 times daily or as directed.    Gestational diabetes       * blood glucose monitoring test strip    ONETOUCH ULTRA    100 strip    Use to test blood sugars 4 times daily or as directed.    Gestational diabetes       * blood glucose monitoring test strip    no brand specified    100 strip    100 strips by In Vitro route 4 times daily Use to test blood sugars 4 times daily or as directed    Gestational diabetes       * blood glucose monitoring test strip    ONETOUCH ULTRA    300 strip    Use to test blood sugar 6 times daily or as directed. One touch ultra    Gestational diabetes mellitus (GDM) requiring insulin        MG Caps           * insulin isophane human 100 UNIT/ML injection    HumuLIN N PEN    15 mL    Humulin N kwikpen 10 units in the morning  and 10 units at bedtime, titrate as needed    Gestational diabetes mellitus (GDM) requiring insulin       * insulin isophane human 100 UNIT/ML injection    HumuLIN N PEN    15 mL    Inject 10 units with breakfast and 14 units at bedtime.    Gestational diabetes mellitus (GDM) requiring insulin       insulin pen needle 32G X 4 MM    BD CHRIS U/F    100 each    Use 2 daily as directed.    Gestational diabetes mellitus (GDM) requiring insulin       prenatal multivitamin plus iron 27-0.8 MG Tabs per tablet      Take by mouth daily        ranitidine 300 MG tablet    ZANTAC    30 tablet    Take 1 tablet (300 mg) by mouth At Bedtime    Prenatal care in second trimester       * Notice:  This list has 6 medication(s) that are the same as other medications prescribed for you. Read the directions carefully, and ask your doctor or other care provider to review them with you.

## 2018-07-03 NOTE — LETTER
7/3/2018         RE: Tosin Pfeiffer  [address]  [place] [address]        Dear Colleague,    Thank you for referring your patient, Tosin Pfeiffer, to the [place] [facility] [place]. Please see a copy of my visit note below.    Diabetes Self Management Training: Follow-up Visit and Continuous Glucose Monitor Download    Tosin Pfeiffer presents today for education, evaluation of glucose control and download of continuous glucose monitoring related to Gestational diabetes.    She is accompanied by friend    Patient's diabetes management related comments/concerns: didn't do very well ,was busy with daughters bday party      Current Diabetes Management per Patient:  Taking diabetes medications?   yes:     Diabetes Medication(s)     Insulin Sig    insulin isophane human (HUMULIN N PEN) 100 UNIT/ML injection Inject 12 units with breakfast and 1 units at bedtime.          *Abbreviated insulin dose documentation key: Insulin Trade Name (eiyjrjhdq-acjoy-fzcdsx-bedtime) - i.e. Humalog 5-5-5-0 (Humalog 5 units at breakfast, 5 units at lunch, and 5 units at dinner).    Most Recent A1c Result:    Lab Results   Component Value Date    A1C 5.0 05/09/2017     Indication/s for LibrePro study: hyperglycemia in pregnancy.    LibrePro Continuous Glucose Monitor Interpretation         Statistics/Data evaluation:       Patient's Logbook shows the following:           ASSESSMENT:  Ketones: neg - trace.   Fasting blood glucoses: 0% in target.  After breakfast: 80% in target.  Before lunch: 66 % in target.  After lunch: 0% in target.  After dinner: 100% in target.    Patient is not following recommended meal plan and skipping snacks.  This is contributing to the higher glucose and the overnight hypoglycemia.  CGM stopped working on 7/2/18 in the afternoon. Patient BG does not reflect hypoglycemia at that time.   There is a difference between blood glucose and sensor glucose but overall pattern consistent.  Patient  currently using a onetouch mini glucose meter, there is a  she is not fulling trusting the accuracy. She does not have control solution to validate results. One touch verio meter provided to patient to use over the next few days.    Patient did experience hypoglycemia overnight 6/30-7/1 likely from skipped dinner and bedtime snack and still took full insulin dose.    INTERVENTION:   Recommend she follow up in 3 days for additional evaluation of glucose to determine if further increase to NPH needed or need for mealtime insulin.   Based on blood glucose recommend she increase to bedtime NPH by 2 units and  make sure to follow the meal plan and not skip her bedtime snack.  Will hold off on increase to morning insulin until seen on 7/6/18 to validate BG and if glucose improved with following GDM meal plan.        Education provided today on:  AADE Self-Care Behaviors:  Healthy Eating: review of GDM diet and the benefits of following the diet.   Monitoring: individual blood glucose targets and frequency of monitoring  Taking Medication: action of prescribed medication, when to take medications and dosing  Problem Solving: low blood glucose - causes, signs/symptoms, treatment and prevention    Pt verbalized understanding of concepts discussed and recommendations provided today.       PLAN:  1. Check glucose before and 1 hour after meals as recommended.    2. Check ketones daily   3. Continue with recommended physical activity.    4. Continue to follow recommended meal plan: 2-3 carbs at breakfast, 3-4 carbs at lunch, 3-4 carbs at supper, 1-2 carbs at snacks.  Follow consistent CHO meal plan, eat CHO and protein/fat at all meals/snacks.    Increase bedtime insulin to 18 units , continue with 12 units in the morning.    Follow up with Radha on Friday 7/6/18 at 2:30pm     AVS printed and provided to patient today.    FOLLOW-UP:  Follow-up appointment scheduled on 7/6/18.  Chart routed to referring provider.    Sabrina Cho  RN,CDE   Time Spent: 30 minutes  Encounter Type: Individual    Any diabetes medication dose changes were made via the CDE Protocol and Collaborative Practice Agreement with the patient's referring provider. A copy of this encounter was shared with the provider.      Gestational Diabetes Follow-up Visit    SUBJECTIVE/OBJECTIVE:  Tosin Pfeiffer presents today for education and evaluation of glucose control related to gestational diabetes

## 2018-07-03 NOTE — PATIENT INSTRUCTIONS
1. Check glucose before and 1 hour after meals as recommended.    2. Check ketones daily   3. Continue with recommended physical activity.    4. Continue to follow recommended meal plan: 2-3 carbs at breakfast, 3-4 carbs at lunch, 3-4 carbs at supper, 1-2 carbs at snacks.  Follow consistent CHO meal plan, eat CHO and protein/fat at all meals/snacks.    Increase bedtime insulin to 18 units , continue with 12 units in the morning.    Follow up with Radha on Friday 7/6/18 at 2:30pm

## 2018-07-03 NOTE — PROGRESS NOTES
Diabetes Self Management Training: Follow-up Visit and Continuous Glucose Monitor Download    Tosin Pfeiffer presents today for education, evaluation of glucose control and download of continuous glucose monitoring related to Gestational diabetes.    She is accompanied by friend    Patient's diabetes management related comments/concerns: didn't do very well ,was busy with daughters bday party      Current Diabetes Management per Patient:  Taking diabetes medications?   yes:     Diabetes Medication(s)     Insulin Sig    insulin isophane human (HUMULIN N PEN) 100 UNIT/ML injection Inject 12 units with breakfast and 1 units at bedtime.          *Abbreviated insulin dose documentation key: Insulin Trade Name (njfawwaya-lpeuk-ixrowf-bedtime) - i.e. Humalog 5-5-5-0 (Humalog 5 units at breakfast, 5 units at lunch, and 5 units at dinner).    Most Recent A1c Result:    Lab Results   Component Value Date    A1C 5.0 05/09/2017     Indication/s for LibrePro study: hyperglycemia in pregnancy.    LibrePro Continuous Glucose Monitor Interpretation         Statistics/Data evaluation:       Patient's Logbook shows the following:           ASSESSMENT:  Ketones: neg - trace.   Fasting blood glucoses: 0% in target.  After breakfast: 80% in target.  Before lunch: 66 % in target.  After lunch: 0% in target.  After dinner: 100% in target.    Patient is not following recommended meal plan and skipping snacks.  This is contributing to the higher glucose and the overnight hypoglycemia.  CGM stopped working on 7/2/18 in the afternoon. Patient BG does not reflect hypoglycemia at that time.   There is a difference between blood glucose and sensor glucose but overall pattern consistent.  Patient currently using a onetouch mini glucose meter, there is a  she is not fulling trusting the accuracy. She does not have control solution to validate results. One touch verio meter provided to patient to use over the next few days.    Patient did experience  hypoglycemia overnight 6/30-7/1 likely from skipped dinner and bedtime snack and still took full insulin dose.    INTERVENTION:   Recommend she follow up in 3 days for additional evaluation of glucose to determine if further increase to NPH needed or need for mealtime insulin.   Based on blood glucose recommend she increase to bedtime NPH by 2 units and  make sure to follow the meal plan and not skip her bedtime snack.  Will hold off on increase to morning insulin until seen on 7/6/18 to validate BG and if glucose improved with following GDM meal plan.        Education provided today on:  AADE Self-Care Behaviors:  Healthy Eating: review of GDM diet and the benefits of following the diet.   Monitoring: individual blood glucose targets and frequency of monitoring  Taking Medication: action of prescribed medication, when to take medications and dosing  Problem Solving: low blood glucose - causes, signs/symptoms, treatment and prevention    Pt verbalized understanding of concepts discussed and recommendations provided today.       PLAN:  1. Check glucose before and 1 hour after meals as recommended.    2. Check ketones daily   3. Continue with recommended physical activity.    4. Continue to follow recommended meal plan: 2-3 carbs at breakfast, 3-4 carbs at lunch, 3-4 carbs at supper, 1-2 carbs at snacks.  Follow consistent CHO meal plan, eat CHO and protein/fat at all meals/snacks.    Increase bedtime insulin to 18 units , continue with 12 units in the morning.    Follow up with Radha on Friday 7/6/18 at 2:30pm     AVS printed and provided to patient today.    FOLLOW-UP:  Follow-up appointment scheduled on 7/6/18.  Chart routed to referring provider.    Sabrina Cho RN,CDE   Time Spent: 30 minutes  Encounter Type: Individual    Any diabetes medication dose changes were made via the CDE Protocol and Collaborative Practice Agreement with the patient's referring provider. A copy of this encounter was shared with the  provider.      Gestational Diabetes Follow-up Visit    SUBJECTIVE/OBJECTIVE:  Tosin Pfeiffer presents today for education and evaluation of glucose control related to gestational diabetes

## 2018-07-03 NOTE — PROGRESS NOTES
Please see ultrasound report under imaging tab for details on ultrasound performed today.    Lyn Kahn MD  , OB/GYN  Maternal-Fetal Medicine  [email or web address]  [phone] (Academic office)  [phone] (Pager)

## 2018-07-03 NOTE — Clinical Note
Radha, just an FYI I added her to your schedule on Friday afternoon. I thought about requesting mealtime insulin but her prelunch readings are low from not having a morning snack and her post lunch readings are high due to portions.  I do think it is related to diet.  Sabrina Cho RN,CDE

## 2018-07-03 NOTE — MR AVS SNAPSHOT
After Visit Summary   7/3/2018    Tosin Pfeiffer    MRN: [id number]           Patient Information     Date Of Birth          1985        Visit Information        Provider Department      7/3/2018 2:30 PM Sabrina Cho RN [facility] [place] [facility]        Care Instructions    1. Check glucose before and 1 hour after meals as recommended.    2. Check ketones daily   3. Continue with recommended physical activity.    4. Continue to follow recommended meal plan: 2-3 carbs at breakfast, 3-4 carbs at lunch, 3-4 carbs at supper, 1-2 carbs at snacks.  Follow consistent CHO meal plan, eat CHO and protein/fat at all meals/snacks.    Increase bedtime insulin to 18 units , continue with 12 units in the morning.    Follow up with Radha on Friday 7/6/18 at 2:30pm             Follow-ups after your visit        Your next 10 appointments already scheduled     Jul 06, 2018  2:30 PM CDT   Diabetic Education with Radha Ramirez   [facility] [place] ([facility])    [address] [facility] [address] [phone]            Jul 10, 2018  3:30 PM CDT   MFM BPP SINGLE with [id number]   [facility] Maternal Fetal Medicine Ultrasound - [facility])    [address]  [facility] [address] [phone]            Jul 10, 2018  4:00 PM CDT   Radiology MD with  HEIDY PANTOJA   [facility] Maternal Fetal Medicine [facility])    [address]  [facility] [address] [phone]           Please arrive at the time given for your first appointment. This visit is used internally to schedule the physician's time during your ultrasound.            Jul 17, 2018  1:00 PM CDT   ESTABLISHED PRENATAL with Tari Ramirez,    [facility] ([facility])    [address] [facility] [address] [phone]            Jul 17, 2018  1:30 PM CDT   MFM BPP  SINGLE with [id number]   [facility] ([facility])    [address] [facility] [address]  [facility] [address]   [phone]            Jul 17, 2018  2:00 PM CDT   Radiology MD with RH MFSHAHLA PANTOJA   [facility] Maternal Fetal Medicine [facility] ([facility])    [address]  [facility] [address]   [phone]           Please arrive at the time given for your first appointment. This visit is used internally to schedule the physician's time during your ultrasound.            Jul 24, 2018 11:00 AM CDT   MFM US COMPRE SINGLE F/U with [id number]   [facility] Maternal Fetal Medicine Ultrasound - [facility] ([facility])    [address]  [facility] [address]   [phone]           Wear comfortable clothes and leave your valuables at home.            Jul 24, 2018 11:30 AM CDT   Radiology MD with AdventHealthSHAHLA PANTOJA   [facility] Maternal Fetal Medicine [facility] ([facility])    [address]  [facility] [address]   [phone]           Please arrive at the time given for your first appointment. This visit is used internally to schedule the physician's time during your ultrasound.            Aug 14, 2018  1:00 PM CDT   ESTABLISHED PRENATAL with Tari Ramirez,    [facility] ([facility])    [address] [place] [address] [phone]              Future tests that were ordered for you today     Open Future Orders        Priority Expected Expires Ordered    MFM BPP Single Routine 7/17/2018 5/3/2019 7/3/2018    MFM BPP Single Routine 7/10/2018 5/3/2019 7/3/2018    MFM US Comprehensive Single F/U Routine 7/24/2018 7/3/2019 7/3/2018            Who to contact     If you have questions or need follow up information about today's clinic visit or your schedule please contact [facility] [place] directly at [phone].  Normal or  non-critical lab and imaging results will be communicated to you by MyChart, letter or phone within 4 business days after the clinic has received the results. If you do not hear from us within 7 days, please contact the clinic through Razoomt or phone. If you have a critical or abnormal lab result, we will notify you by phone as soon as possible.  Submit refill requests through Vinsula or call your pharmacy and they will forward the refill request to us. Please allow 3 business days for your refill to be completed.          Additional Information About Your Visit        Vinsula Information     Vinsula gives you secure access to your electronic health record. If you see a primary care provider, you can also send messages to your care team and make appointments. If you have questions, please call your primary care clinic.  If you do not have a primary care provider, please call [phone] and they will assist you.        Care EveryWhere ID     This is your Care EveryWhere ID. This could be used by other organizations to access your [place] medical records  [id number]        Your Vitals Were     Last Period                   12/20/2017 (Exact Date)            Blood Pressure from Last 3 Encounters:   06/19/18 110/64   05/22/18 118/70   04/24/18 110/70    Weight from Last 3 Encounters:   06/28/18 104.3 kg (230 lb)   06/19/18 103.6 kg (228 lb 6.4 oz)   05/22/18 104.3 kg (230 lb)              Today, you had the following     No orders found for display       Primary Care Provider Office Phone # Fax #    Meron Ann Aaseby-Aguilera, PA-C [phone] [address]  [facility] [address]        Equal Access to Services     [facility] Center[facility] AH: Hadii yadira jaimes hadashleena Soheydiali, waaxda luqadaha, qaybta kaalmada fanny, molly renteria. So [facility] [phone].    ATENCIÓN: Si habla español, tiene a albright disposición servicios gratuitos de asistencia lingüística. Joelle arguello [phone].    We  comply with applicable federal civil rights laws and [place] laws. We do not discriminate on the basis of race, color, national origin, age, disability, sex, sexual orientation, or gender identity.            Thank you!     Thank you for choosing [facility]  for your care. Our goal is always to provide you with excellent care. Hearing back from our patients is one way we can continue to improve our services. Please take a few minutes to complete the written survey that you may receive in the mail after your visit with us. Thank you!             Your Updated Medication List - Protect others around you: Learn how to safely use, store and throw away your medicines at www.disposemymeds.org.          This list is accurate as of 7/3/18  3:11 PM.  Always use your most recent med list.                   Brand Name Dispense Instructions for use Diagnosis    acetone (Urine) test Strp     25 each    Test once daily x1 week, then reduce to once weekly once consistently negative    Gestational diabetes       blood glucose lancets standard    no brand specified    100 each    Use to test blood sugar 4 times daily or as directed.    Gestational diabetes mellitus (GDM)       blood glucose monitoring lancets     100 each    Use to test blood sugar 4 times daily or as directed.    Gestational diabetes       blood glucose monitoring meter device kit    no brand specified    1 kit    Use to test blood sugar 4 times daily or as directed.    Gestational diabetes mellitus (GDM)       * blood glucose monitoring test strip    ACCU-CHEK GUIDE    50 each    Use to test blood sugar 4 times daily or as directed.    Gestational diabetes       * blood glucose monitoring test strip    ONETOUCH ULTRA    100 strip    Use to test blood sugars 4 times daily or as directed.    Gestational diabetes       * blood glucose monitoring test strip    no brand specified    100 strip    100 strips by In Vitro route 4 times daily  Use to test blood sugars 4 times daily or as directed    Gestational diabetes       * blood glucose monitoring test strip    ONETOUCH ULTRA    300 strip    Use to test blood sugar 6 times daily or as directed. One touch ultra    Gestational diabetes mellitus (GDM) requiring insulin        MG Caps           * insulin isophane human 100 UNIT/ML injection    HumuLIN N PEN    15 mL    Humulin N kwikpen 10 units in the morning and 10 units at bedtime, titrate as needed    Gestational diabetes mellitus (GDM) requiring insulin       * insulin isophane human 100 UNIT/ML injection    HumuLIN N PEN    15 mL    Inject 10 units with breakfast and 14 units at bedtime.    Gestational diabetes mellitus (GDM) requiring insulin       insulin pen needle 32G X 4 MM    BD CHRIS U/F    100 each    Use 2 daily as directed.    Gestational diabetes mellitus (GDM) requiring insulin       prenatal multivitamin plus iron 27-0.8 MG Tabs per tablet      Take by mouth daily        ranitidine 300 MG tablet    ZANTAC    30 tablet    Take 1 tablet (300 mg) by mouth At Bedtime    Prenatal care in second trimester       * Notice:  This list has 6 medication(s) that are the same as other medications prescribed for you. Read the directions carefully, and ask your doctor or other care provider to review them with you.

## 2018-07-06 ENCOUNTER — ALLIED HEALTH/NURSE VISIT (OUTPATIENT)
Dept: EDUCATION SERVICES | Facility: CLINIC | Age: 33
End: 2018-07-06
Payer: COMMERCIAL

## 2018-07-06 DIAGNOSIS — O24.419 GESTATIONAL DIABETES MELLITUS (GDM): ICD-10-CM

## 2018-07-06 DIAGNOSIS — O24.414 GESTATIONAL DIABETES MELLITUS (GDM) REQUIRING INSULIN: ICD-10-CM

## 2018-07-06 DIAGNOSIS — O24.419 GESTATIONAL DIABETES: ICD-10-CM

## 2018-07-06 PROCEDURE — G0108 DIAB MANAGE TRN  PER INDIV: HCPCS | Performed by: DIETITIAN, REGISTERED

## 2018-07-06 NOTE — LETTER
7/6/2018         RE: Tosin Pfeiffer  [address]  [facility] [address]        Dear Colleague,    Thank you for referring your patient, Tosin Pfeiffer, to the [facility] [place]. Please see a copy of my visit note below.    Gestational Diabetes Follow-up Visit    SUBJECTIVE/OBJECTIVE:  Tosin Pfeiffer presents today for education and evaluation of glucose control related to gestational diabetes    She is accompanied by daughter    Patient's gestational diabetes management related comments/concerns: new blood sugar meter is working better, fasting blood sugars were finally in target after last insulin dose increase - except for this morning    LMP 12/20/2017 (Exact Date)    Weight gain 6 lbs at 28 weeks gestation.    Estimated Date of Delivery: Sep 26, 2018    Wt Readings from Last 3 Encounters:   06/28/18 104.3 kg (230 lb)   06/19/18 103.6 kg (228 lb 6.4 oz)   05/22/18 104.3 kg (230 lb)     Blood Glucose/Ketone Log:            Current gestational diabetes management:    Taking medications for gestational diabetes? Yes       Diabetes Medication(s)     Insulin Sig    insulin isophane human (HUMULIN N PEN) 100 UNIT/ML injection Inject 18 units with breakfast and 12 units at bedtime.        Physical Activity: has been walking more, up to 14,000 steps yesterday    Nutrition:  Patient reports doing better with consistently having a bed time snack.            ASSESSMENT:  Ketones: usually negative  Fasting blood glucoses: 66% in target.  After breakfast: 100% in target.  After lunch: 0% in target (one value)  After dinner: 100% in target.    Health Beliefs and Attitudes:   Stage of Change: ACTION (Actively working towards change)    INTERVENTION:  Educational topics covered today:  What to expect after delivery, Future testing for Type 2 diabetes (2 hour OGTT at 6 week post-partum check-up and annual fasting blood glucose level), Risk of GDM and planning ahead for future pregnancies,  Recommended lifestyle interventions for reducing the risk of Type 2 Diabetes, When to Call a Diabetes Educator or OB Provider    Educational Materials provided today:  [facility] Preventing Diabetes    PLAN:  Increase NPH at bedtime to 20 units, continue 12 units every morning. Do not skip bedtime snack.  Check glucose 4-6 times daily.  Check ketones once a week when readings are consistently negative.  Continue with recommended physical activity.  Continue to follow recommended meal plan: 2-3 carbs at breakfast, 3-4 carbs at lunch, 3-4 carbs at supper, 1-2 carbs at snacks.  Follow consistent CHO meal plan, eat CHO and protein/fat at all meals/snacks.    Call/e-mail/MyChart message diabetes educator if 3 or more blood sugars are above the goal in 1 week or if ketones are positive.     FOLLOW-UP:  Follow up with diabetes educator in 3 days.    Call/e-mail/MyChart message diabetes educator if 3 or more blood sugars are above the goal in 1 week or if ketones are positive.     Radha Ramirez RD, CDE  Diabetes     Time spent was 45 minutes  Encounter type: Individual    Any diabetes medication dose changes were made via the CDE Protocol and Collaborative Practice Agreement with the patient's OB/GYN provider. A copy of this encounter was shared with the provider.

## 2018-07-06 NOTE — MR AVS SNAPSHOT
After Visit Summary   7/6/2018    Tosin Pfeiffer    MRN: [id number]           Patient Information     Date Of Birth          1985        Visit Information        Provider Department      7/6/2018 2:30 PM Radha Ramirez [employer] [facility] [place]        Today's Diagnoses     Gestational diabetes        Gestational diabetes mellitus (GDM)        Gestational diabetes mellitus (GDM) requiring insulin          Care Instructions    Increase NPH insulin to 20 units at bedtime, and 12 units every morning.    1. Check glucose  before breakfast daily and 1 hour after each meal, as recommended. Include some pre-lunch and pre-dinner readings as well.    2. Check ketones daily or once a week after they have been negative for 7 days in a row. If ketones are positive, let your diabetes educator know and continue to check daily until they are negative for 7 days in a row.    3. Continue with recommended physical activity.    4. Continue to follow recommended meal plan: 2-3 carbs at breakfast, 3-4 carbs at lunch, 3-4 carbs at supper, 1-2 carbs at snacks.  Follow consistent CHO meal plan, eat CHO and protein/fat at all meals/snacks.    5. Follow-up with OB doctor as recommended.    6. E-mail blood sugar update in 3 days.    Radha Ramirez RD, LD, CDE  Diabetes     AFTER YOU DELIVER:  - Continue with healthy eating and physical activity to get back to your pre-pregnancy weight.   - Have a follow-up 2-hour Glucose Tolerance Test at your 6-week post-partum check-up.   - Have your fasting blood sugar checked once a year.  - Plan ahead for future pregnancies - eat healthy, keep active, work with your doctor to check for gestational diabetes early on in the pregnancy and check blood sugars as recommended by your doctor.             Follow-ups after your visit        Your next 10 appointments already scheduled     Jul 10, 2018  3:30 PM CDT   MFM BPP SINGLE with [id number]   [facility] ([facility])    [address]  [facility] [address]   [phone]            Jul 10, 2018  4:00 PM CDT   Radiology MD with JOSEPH MOODY MD   [facility] Maternal Fetal Medicine [facility] ([facility])    [address]  [facility] [address]   [phone]           Please arrive at the time given for your first appointment. This visit is used internally to schedule the physician's time during your ultrasound.            Jul 17, 2018  1:00 PM CDT   ESTABLISHED PRENATAL with Tari Ramirez, DO   [facility]    [address] [place] [address]   [phone]            Jul 17, 2018  1:30 PM CDT   MFM BPP SINGLE with [id number]   [facility] Maternal Fetal Medicine [facility])    [address]  [facility] [address] [phone]            Jul 17, 2018  2:00 PM CDT   Radiology MD with JOSEPH MOODY MD   [facility] Maternal Fetal Medicine [facility] ([facility])    [address]  [facility] [id number] [phone]           Please arrive at the time given for your first appointment. This visit is used internally to schedule the physician's time during your ultrasound.            Jul 24, 2018 11:00 AM CDT   MFM US COMPRE SINGLE F/U with [id number]   [facility] Maternal Fetal Medicine Ultrasound [facility])    [address]  [facility] [address]   [phone]           Wear comfortable clothes and leave your valuables at home.            Jul 24, 2018 11:30 AM CDT   Radiology MD with RH HEIDY PANTOJA   [facility] Maternal Fetal Medicine [facility])    [address]  [facility] [address]   [phone]           Please arrive at the time given for your first appointment. This visit is used internally to schedule the  physician's time during your ultrasound.            Aug 14, 2018  1:00 PM CDT   ESTABLISHED PRENATAL with Tari Ramirez, DO   [facility]    [address] [facility] [address] [phone]              Who to contact     If you have questions or need follow up information about today's clinic visit or your schedule please contact [facility] [place] directly at [phone].  Normal or non-critical lab and imaging results will be communicated to you by BetterDoctorhart, letter or phone within 4 business days after the clinic has received the results. If you do not hear from us within 7 days, please contact the clinic through BetterDoctorhart or phone. If you have a critical or abnormal lab result, we will notify you by phone as soon as possible.  Submit refill requests through L4 Mobile or call your pharmacy and they will forward the refill request to us. Please allow 3 business days for your refill to be completed.          Additional Information About Your Visit        L4 Mobile Information     L4 Mobile gives you secure access to your electronic health record. If you see a primary care provider, you can also send messages to your care team and make appointments. If you have questions, please call your primary care clinic.  If you do not have a primary care provider, please call [phone] and they will assist you.        Care EveryWhere ID     This is your Care EveryWhere ID. This could be used by other organizations to access your [place] medical records  [id number]        Your Vitals Were     Last Period                   12/20/2017 (Exact Date)            Blood Pressure from Last 3 Encounters:   06/19/18 110/64   05/22/18 118/70   04/24/18 110/70    Weight from Last 3 Encounters:   06/28/18 104.3 kg (230 lb)   06/19/18 103.6 kg (228 lb 6.4 oz)   05/22/18 104.3 kg (230 lb)              We Performed the Following     DIABETES EDUCATION - Individual   []          Today's Medication Changes          These changes are accurate as of 7/6/18 11:59 PM.  If you have any questions, ask your nurse or doctor.               These medicines have changed or have updated prescriptions.        Dose/Directions    blood glucose lancets standard   Commonly known as:  no brand specified   This may have changed:  additional instructions   Used for:  Gestational diabetes mellitus (GDM)        Use to test blood sugar 6 times daily or as directed. OneTouch delica lancets.   Quantity:  100 each   Refills:  PRN       blood glucose monitoring test strip   Commonly known as:  no brand specified   This may have changed:    - how much to take  - how to take this  - when to take this  - additional instructions  - Another medication with the same name was removed. Continue taking this medication, and follow the directions you see here.   Used for:  Gestational diabetes        Use to test blood sugars 6 times daily or as directed. OneTouch Verio test strips.   Quantity:  200 strip   Refills:  3       insulin isophane human 100 UNIT/ML injection   Commonly known as:  HumuLIN N PEN   This may have changed:    - additional instructions  - Another medication with the same name was removed. Continue taking this medication, and follow the directions you see here.   Used for:  Gestational diabetes mellitus (GDM) requiring insulin        Inject 12 units with breakfast and 20 units at bedtime.   Quantity:  15 mL   Refills:  3         Stop taking these medicines if you haven't already. Please contact your care team if you have questions.     blood glucose monitoring lancets                Where to get your medicines      These medications were sent to [facility]/pharmacy #[id number] - [place], [place] - [address] [facility]  [address] [facility] [address]    Hours:  [facility] Phone:  [phone]     blood glucose lancets standard    blood glucose monitoring test strip    insulin  isophane human 100 UNIT/ML injection                Primary Care Provider Office Phone # Fax #    Meron Ann Aaseby-Aguilera, PA-C [phone] [address]  [facility] [address]        Equal Access to Services     FARIHA SOLIS : Hadii aad ku hadasho Soomaali, waaxda luqadaha, qaybta kaalmada adeegyada, waxay idiin hayaan adeeg khcarolyn larenettarah renteria. So [facility] [phone].    ATENCIÓN: Si habla español, tiene a albright disposición servicios gratuitos de asistencia lingüística. Llame al [phone].    We comply with applicable federal civil rights laws and [place] laws. We do not discriminate on the basis of race, color, national origin, age, disability, sex, sexual orientation, or gender identity.            Thank you!     Thank you for choosing [facility]  for your care. Our goal is always to provide you with excellent care. Hearing back from our patients is one way we can continue to improve our services. Please take a few minutes to complete the written survey that you may receive in the mail after your visit with us. Thank you!             Your Updated Medication List - Protect others around you: Learn how to safely use, store and throw away your medicines at www.disposemymeds.org.          This list is accurate as of 7/6/18 11:59 PM.  Always use your most recent med list.                   Brand Name Dispense Instructions for use Diagnosis    acetone (Urine) test Strp     25 each    Test once daily x1 week, then reduce to once weekly once consistently negative    Gestational diabetes       blood glucose lancets standard    no brand specified    100 each    Use to test blood sugar 6 times daily or as directed. OneTouch delica lancets.    Gestational diabetes mellitus (GDM)       blood glucose monitoring meter device kit    no brand specified    1 kit    Use to test blood sugar 4 times daily or as directed.    Gestational diabetes mellitus (GDM)       blood glucose  monitoring test strip    no brand specified    200 strip    Use to test blood sugars 6 times daily or as directed. OneTouch Verio test strips.    Gestational diabetes        MG Caps           insulin isophane human 100 UNIT/ML injection    HumuLIN N PEN    15 mL    Inject 12 units with breakfast and 20 units at bedtime.    Gestational diabetes mellitus (GDM) requiring insulin       insulin pen needle 32G X 4 MM    BD CHRIS U/F    100 each    Use 2 daily as directed.    Gestational diabetes mellitus (GDM) requiring insulin       prenatal multivitamin plus iron 27-0.8 MG Tabs per tablet      Take by mouth daily        ranitidine 300 MG tablet    ZANTAC    30 tablet    Take 1 tablet (300 mg) by mouth At Bedtime    Prenatal care in second trimester

## 2018-07-06 NOTE — PATIENT INSTRUCTIONS
Increase NPH insulin to 20 units at bedtime, and 12 units every morning.    1. Check glucose  before breakfast daily and 1 hour after each meal, as recommended. Include some pre-lunch and pre-dinner readings as well.    2. Check ketones daily or once a week after they have been negative for 7 days in a row. If ketones are positive, let your diabetes educator know and continue to check daily until they are negative for 7 days in a row.    3. Continue with recommended physical activity.    4. Continue to follow recommended meal plan: 2-3 carbs at breakfast, 3-4 carbs at lunch, 3-4 carbs at supper, 1-2 carbs at snacks.  Follow consistent CHO meal plan, eat CHO and protein/fat at all meals/snacks.    5. Follow-up with OB doctor as recommended.    6. E-mail blood sugar update in 3 days.    Radha Ramirez RD, LD, CDE  Diabetes     AFTER YOU DELIVER:  - Continue with healthy eating and physical activity to get back to your pre-pregnancy weight.   - Have a follow-up 2-hour Glucose Tolerance Test at your 6-week post-partum check-up.   - Have your fasting blood sugar checked once a year.  - Plan ahead for future pregnancies - eat healthy, keep active, work with your doctor to check for gestational diabetes early on in the pregnancy and check blood sugars as recommended by your doctor.

## 2018-07-06 NOTE — PROGRESS NOTES
Gestational Diabetes Follow-up Visit    SUBJECTIVE/OBJECTIVE:  Tosin Pfeiffer presents today for education and evaluation of glucose control related to gestational diabetes    She is accompanied by daughter    Patient's gestational diabetes management related comments/concerns: new blood sugar meter is working better, fasting blood sugars were finally in target after last insulin dose increase - except for this morning    LMP 12/20/2017 (Exact Date)    Weight gain 6 lbs at 28 weeks gestation.    Estimated Date of Delivery: Sep 26, 2018    Wt Readings from Last 3 Encounters:   06/28/18 104.3 kg (230 lb)   06/19/18 103.6 kg (228 lb 6.4 oz)   05/22/18 104.3 kg (230 lb)     Blood Glucose/Ketone Log:            Current gestational diabetes management:    Taking medications for gestational diabetes? Yes       Diabetes Medication(s)     Insulin Sig    insulin isophane human (HUMULIN N PEN) 100 UNIT/ML injection Inject 18 units with breakfast and 12 units at bedtime.        Physical Activity: has been walking more, up to 14,000 steps yesterday    Nutrition:  Patient reports doing better with consistently having a bed time snack.            ASSESSMENT:  Ketones: usually negative  Fasting blood glucoses: 66% in target.  After breakfast: 100% in target.  After lunch: 0% in target (one value)  After dinner: 100% in target.    Health Beliefs and Attitudes:   Stage of Change: ACTION (Actively working towards change)    INTERVENTION:  Educational topics covered today:  What to expect after delivery, Future testing for Type 2 diabetes (2 hour OGTT at 6 week post-partum check-up and annual fasting blood glucose level), Risk of GDM and planning ahead for future pregnancies, Recommended lifestyle interventions for reducing the risk of Type 2 Diabetes, When to Call a Diabetes Educator or OB Provider    Educational Materials provided today:  [facility] Preventing Diabetes    PLAN:  Increase NPH at bedtime to 20 units, continue 12 units  every morning. Do not skip bedtime snack.  Check glucose 4-6 times daily.  Check ketones once a week when readings are consistently negative.  Continue with recommended physical activity.  Continue to follow recommended meal plan: 2-3 carbs at breakfast, 3-4 carbs at lunch, 3-4 carbs at supper, 1-2 carbs at snacks.  Follow consistent CHO meal plan, eat CHO and protein/fat at all meals/snacks.    Call/e-mail/MyChart message diabetes educator if 3 or more blood sugars are above the goal in 1 week or if ketones are positive.     FOLLOW-UP:  Follow up with diabetes educator in 3 days.    Call/e-mail/MyChart message diabetes educator if 3 or more blood sugars are above the goal in 1 week or if ketones are positive.     Radha Ramirez RD, CDE  Diabetes     Time spent was 45 minutes  Encounter type: Individual    Any diabetes medication dose changes were made via the CDE Protocol and Collaborative Practice Agreement with the patient's OB/GYN provider. A copy of this encounter was shared with the provider.

## 2018-07-09 ENCOUNTER — VIRTUAL VISIT (OUTPATIENT)
Dept: EDUCATION SERVICES | Facility: CLINIC | Age: 33
End: 2018-07-09
Payer: COMMERCIAL

## 2018-07-09 DIAGNOSIS — O24.414 GESTATIONAL DIABETES MELLITUS (GDM) REQUIRING INSULIN: ICD-10-CM

## 2018-07-09 NOTE — MR AVS SNAPSHOT
After Visit Summary   7/9/2018    Tosin Pfeiffer    MRN: [id number]           Patient Information     Date Of Birth          1985        Visit Information        Provider Department      7/9/2018 9:30 AM  DIABETIC ED RESOURCE [place] [facility] [place]        Today's Diagnoses     Gestational diabetes mellitus (GDM) requiring insulin           Follow-ups after your visit        Your next 10 appointments already scheduled     Jul 10, 2018  3:30 PM CDT   MFM BPP SINGLE with [id number]   [facility])    [address]  [facility] [address]   [phone]            Jul 10, 2018  4:00 PM CDT   Radiology MD with JOSEPH MOODY MD   [facility])    [address]  [facility] [address]   [phone]           Please arrive at the time given for your first appointment. This visit is used internally to schedule the physician's time during your ultrasound.            Jul 17, 2018  1:00 PM CDT   ESTABL[facility] PR[facility]ATAL with Tari Ramirez, DO   [facility]    [address] [place] [address] [phone]            Jul 17, 2018  1:30 PM CDT   MFM BPP SINGLE with [id number]   [facility])    [address]  [facility] [address]   [phone]            Jul 17, 2018  2:00 PM CDT   Radiology MD with JOSEPH MOODY MD   [facility] ([facility])    [address]  [facility] [address]   [phone]           Please arrive at the time given for your first appointment. This visit is used internally to schedule the physician's time during your ultrasound.            Jul 24, 2018 11:00 AM CDT   MFM US COMPRE SINGLE F/U with [id number]   [facility] ([facility])    [address]  [facility] [address] [phone]           Wear comfortable clothes and leave your valuables at home.            Jul 24, 2018 11:30 AM CDT   Radiology MD with  HEIDY PANTOJA   [facility])    [address]  [facility] [address] [phone]           Please arrive at the time given for your first appointment. This visit is used internally to schedule the physician's time during your ultrasound.            Aug 14, 2018  1:00 PM CDT   ESTABLISHED PRENATAL with Tari Ramirez, DO   [facility]    [address] [facility] [address] [phone]              Who to contact     If you have questions or need follow up information about today's clinic visit or your schedule please contact [place] [facility] directly at [phone].  Normal or non-critical lab and imaging results will be communicated to you by RevTraxhart, letter or phone within 4 business days after the clinic has received the results. If you do not hear from us within 7 days, please contact the clinic through [employer] or phone. If you have a critical or abnormal lab result, we will notify you by phone as soon as possible.  Submit refill requests through [employer] or call your pharmacy and they will forward the refill request to us. Please allow 3 business days for your refill to be completed.          Additional Information About Your Visit        [employer] Information     [employer] gives you secure access to your electronic health record. If you see a primary care provider, you can also send messages to your care team and make appointments. If you have questions, please call your primary care clinic.  If you do not have a primary care provider, please call [phone] and they will assist you.        Care EveryWhere ID      This is your Care EveryWhere ID. This could be used by other organizations to access your [place] medical records  [id number]        Your Vitals Were     Last Period                   12/20/2017 (Exact Date)            Blood Pressure from Last 3 Encounters:   06/19/18 110/64   05/22/18 118/70   04/24/18 110/70    Weight from Last 3 Encounters:   06/28/18 104.3 kg (230 lb)   06/19/18 103.6 kg (228 lb 6.4 oz)   05/22/18 104.3 kg (230 lb)              Today, you had the following     No orders found for display         Today's Medication Changes          These changes are accurate as of 7/9/18  3:10 PM.  If you have any questions, ask your nurse or doctor.               These medicines have changed or have updated prescriptions.        Dose/Directions    insulin isophane human 100 UNIT/ML injection   Commonly known as:  HumuLIN N PEN   This may have changed:  additional instructions   Used for:  Gestational diabetes mellitus (GDM) requiring insulin        Inject 12 units with breakfast and 22 units at bedtime.   Quantity:  15 mL   Refills:  3            Where to get your medicines      These medications were sent to [facility]/pharmacy #[id number] - [place], [place] - [address] [address]  [address] [facility] [address]    Hours:  Old som drug converted to [facility] Phone:  [phone]     insulin isophane human 100 UNIT/ML injection                Primary Care Provider Office Phone # Fax #    Meron Ann Aaseby-Aguilera, PA-C [phone] [address]  [facility] [address]        Equal Access to Services     TIFFANY SOLIS AH: Hadii aad ku hadasho Soomaali, waaxda luqadaha, qaybta kaalmada adeegyada, molly renteria. So [facility] [phone].    ATENCIÓN: Si habla español, tiene a albright disposición servicios gratuitos de asistencia lingüística. Llame al [phone].    We comply with applicable federal civil rights laws and [place] laws. We do not discriminate on the basis of race, color,  national origin, age, disability, sex, sexual orientation, or gender identity.            Thank you!     Thank you for choosing [place] [facility]  for your care. Our goal is always to provide you with excellent care. Hearing back from our patients is one way we can continue to improve our services. Please take a few minutes to complete the written survey that you may receive in the mail after your visit with us. Thank you!             Your Updated Medication List - Protect others around you: Learn how to safely use, store and throw away your medicines at www.disposemymeds.org.          This list is accurate as of 7/9/18  3:10 PM.  Always use your most recent med list.                   Brand Name Dispense Instructions for use Diagnosis    acetone (Urine) test Strp     25 each    Test once daily x1 week, then reduce to once weekly once consistently negative    Gestational diabetes       blood glucose lancets standard    no brand specified    100 each    Use to test blood sugar 6 times daily or as directed. OneTouch delica lancets.    Gestational diabetes mellitus (GDM)       blood glucose monitoring meter device kit    no brand specified    1 kit    Use to test blood sugar 4 times daily or as directed.    Gestational diabetes mellitus (GDM)       blood glucose monitoring test strip    no brand specified    200 strip    Use to test blood sugars 6 times daily or as directed. OneTouch Verio test strips.    Gestational diabetes        MG Caps           insulin isophane human 100 UNIT/ML injection    HumuLIN N PEN    15 mL    Inject 12 units with breakfast and 22 units at bedtime.    Gestational diabetes mellitus (GDM) requiring insulin       insulin pen needle 32G X 4 MM    BD CHRIS U/F    100 each    Use 2 daily as directed.    Gestational diabetes mellitus (GDM) requiring insulin       prenatal multivitamin plus iron 27-0.8 MG Tabs per tablet      Take by mouth daily        ranitidine 300 MG  tablet    ZANTAC    30 tablet    Take 1 tablet (300 mg) by mouth At Bedtime    Prenatal care in second trimester

## 2018-07-09 NOTE — PROGRESS NOTES
Gestational Diabetes Follow-up    Subjective/Objective:    Tosincedrick Pfeiffer sent in blood glucose log for review. Last date of communication was: 7/6/18.    Gestational diabetes is being managed with diet, activity and medications    Taking diabetes medications:   yes:     Diabetes Medication(s)     Insulin Sig    insulin isophane human (HUMULIN N PEN) 100 UNIT/ML injection Inject 12 units with breakfast and 20 units at bedtime.          Estimated Date of Delivery: Sep 26, 2018    BG/Food Log:   I was told to email Monday to update the diabetic educators.  Currently taking 20 units of insulin at bedtime and 12 units of  insulin in the morning. Since my last appointment my levels are the following with testing negative for ketones each day.    date     before breakfast     after breakfast     after lunch     after supper  7/7        96                              108                    109                101  7/8        95                              107                    167                119  7/9        92                              105                    136     Went out to eat for my nephew's birthday - looks like I might of had too much carbs    Thank you    Tosin Hernandez    Assessment:  Fasting readings are above target, but trending down since insulin increase.  One post meal elevation explained by increased carbohydrate intake.     Ketones: negative.   Fasting blood glucoses: 67% in target.  After breakfast: 100% in target.  After lunch: 67% in target.  After dinner: 100% in target.    Plan/Response:  Recommend increase to insulin - 12-0-0-22.  Follow up Thursday 7/12/18.    Mercedes Morillo MS, RD, LD, CDE      Any diabetes medication dose changes were made via the CDE Protocol and Collaborative Practice Agreement with the patient's OB/GYN provider. A copy of this encounter was shared with the provider.    E-mail reply to patient:  Te Leahy,    Aundrea for sending in the update.  The insulin increase is  definitely making a difference!  Since your fasting readings are now just on the edge of in target or just above let s have you increase to 22 units at bedtime.  Continue taking 12 units with breakfast since your after meal reading today was in goal.  Celebrations happen J      Can you send in another update Thursday 7/12/18?    Thanks      Mercedes Morillo MS, RD, LD, CDE

## 2018-07-10 ENCOUNTER — HOSPITAL ENCOUNTER (OUTPATIENT)
Dept: ULTRASOUND IMAGING | Facility: CLINIC | Age: 33
Discharge: HOME | End: 2018-07-10
Attending: OBSTETRICS & GYNECOLOGY | Admitting: OBSTETRICS & GYNECOLOGY
Payer: COMMERCIAL

## 2018-07-10 ENCOUNTER — OFFICE VISIT (OUTPATIENT)
Dept: MATERNAL FETAL MEDICINE | Facility: CLINIC | Age: 33
End: 2018-07-10
Attending: OBSTETRICS & GYNECOLOGY
Payer: COMMERCIAL

## 2018-07-10 DIAGNOSIS — O24.414 INSULIN CONTROLLED GESTATIONAL DIABETES MELLITUS (GDM) DURING PREGNANCY, ANTEPARTUM: Primary | ICD-10-CM

## 2018-07-10 DIAGNOSIS — O24.414 INSULIN CONTROLLED GESTATIONAL DIABETES MELLITUS (GDM) DURING PREGNANCY, ANTEPARTUM: ICD-10-CM

## 2018-07-10 PROCEDURE — 76819 FETAL BIOPHYS PROFIL W/O NST: CPT

## 2018-07-10 NOTE — MR AVS SNAPSHOT
After Visit Summary   7/10/2018    Tosin Pfeiffer    MRN: [id number]           Patient Information     Date Of Birth          1985        Visit Information        Provider Department      7/10/2018 4:00 PM Ifrah Frey MD [facility]        Today's Diagnoses     Insulin controlled gestational diabetes mellitus (GDM) during pregnancy, antepartum    -  1       Follow-ups after your visit        Your next 10 appointments already scheduled     Jul 17, 2018  1:00 PM CDT   ESTABLISHED PRENATAL with Tari Ramirez DO   [facility])    [id number] [place] [address]   [phone]            Jul 17, 2018  1:30 PM CDT   MFM BPP SINGLE with [id number]   [facility]tras[facility])    [address]  [facility] [address]   [phone]            Jul 17, 2018  2:00 PM CDT   Radiology MD with  HEIDY PANTOJA   [facility] ([facility])    [address]  [facility] [address] [phone]           Please arrive at the time given for your first appointment. This visit is used internally to schedule the physician's time during your ultrasound.            Jul 24, 2018 11:00 AM CDT   M US COMPRE SINGLE F/U with [id number]   [facility] M[facility]etal Medicine Ultrasound - [facility] ([facility])    [address]  [facility] [address] [phone]           Wear comfortable clothes and leave your valuables at home.            Jul 24, 2018 11:30 AM CDT   Radiology MD with RH HEIDY PANTOJA   [facility] ([facility])    [address]  [facility] [address] [phone]           Please arrive at the time given for your first appointment. This visit is used internally to schedule the physician's time during  your ultrasound.            Aug 14, 2018  1:00 PM CDT   ESTABLISHED PRENATAL with Tari Ramirez, DO   [facility]    [address] [facility] [address] [phone]              Who to contact     If you have questions or need follow up information about today's clinic visit or your schedule please contact [facility] directly at [phone].  Normal or non-critical lab and imaging results will be communicated to you by GOQiihart, letter or phone within 4 business days after the clinic has received the results. If you do not hear from us within 7 days, please contact the clinic through [employer]t or phone. If you have a critical or abnormal lab result, we will notify you by phone as soon as possible.  Submit refill requests through [employer] or call your pharmacy and they will forward the refill request to us. Please allow 3 business days for your refill to be completed.          Additional Information About Your Visit        [employer] Information     [employer] gives you secure access to your electronic health record. If you see a primary care provider, you can also send messages to your care team and make appointments. If you have questions, please call your primary care clinic.  If you do not have a primary care provider, please call [phone] and they will assist you.        Care EveryWhere ID     This is your Care EveryWhere ID. This could be used by other organizations to access your [place] medical records  [id number]        Your Vitals Were     Last Period                   12/20/2017 (Exact Date)            Blood Pressure from Last 3 Encounters:   06/19/18 110/64   05/22/18 118/70   04/24/18 110/70    Weight from Last 3 Encounters:   06/28/18 104.3 kg (230 lb)   06/19/18 103.6 kg (228 lb 6.4 oz)   05/22/18 104.3 kg (230 lb)              Today, you had the following     No orders found for display       Primary Care  Provider Office Phone # Fax #    Meron Ann Aaseby-Aguilera, PA-C [phone] [address]  [facility] [address]        Equal Access to Services     FARIHA SOLIS : Moira yadira ku earleneo Soomaali, waaxda luqadaha, qaybta kaalmada adeegyada, molly wallsn ne ramires laLeximichael renteria. So [facility] [phone].    ATENCIÓN: Si habla español, tiene a albright disposición servicios gratuitos de asistencia lingüística. Llame al [phone].    We comply with applicable federal civil rights laws and [place] laws. We do not discriminate on the basis of race, color, national origin, age, disability, sex, sexual orientation, or gender identity.            Thank you!     Thank you for choosing [facility]  for your care. Our goal is always to provide you with excellent care. Hearing back from our patients is one way we can continue to improve our services. Please take a few minutes to complete the written survey that you may receive in the mail after your visit with us. Thank you!             Your Updated Medication List - Protect others around you: Learn how to safely use, store and throw away your medicines at www.disposemymeds.org.          This list is accurate as of 7/10/18  4:11 PM.  Always use your most recent med list.                   Brand Name Dispense Instructions for use Diagnosis    acetone (Urine) test Strp     25 each    Test once daily x1 week, then reduce to once weekly once consistently negative    Gestational diabetes       blood glucose lancets standard    no brand specified    100 each    Use to test blood sugar 6 times daily or as directed. OneTouch delica lancets.    Gestational diabetes mellitus (GDM)       blood glucose monitoring meter device kit    no brand specified    1 kit    Use to test blood sugar 4 times daily or as directed.    Gestational diabetes mellitus (GDM)       blood glucose monitoring test strip    no brand specified    200 strip    Use to  test blood sugars 6 times daily or as directed. OneTouch Verio test strips.    Gestational diabetes        MG Caps           insulin isophane human 100 UNIT/ML injection    HumuLIN N PEN    15 mL    Inject 12 units with breakfast and 22 units at bedtime.    Gestational diabetes mellitus (GDM) requiring insulin       insulin pen needle 32G X 4 MM    BD CHRIS U/F    100 each    Use 2 daily as directed.    Gestational diabetes mellitus (GDM) requiring insulin       prenatal multivitamin plus iron 27-0.8 MG Tabs per tablet      Take by mouth daily        ranitidine 300 MG tablet    ZANTAC    30 tablet    Take 1 tablet (300 mg) by mouth At Bedtime    Prenatal care in second trimester

## 2018-07-13 ENCOUNTER — TELEPHONE (OUTPATIENT)
Dept: EDUCATION SERVICES | Facility: CLINIC | Age: 33
End: 2018-07-13

## 2018-07-13 NOTE — TELEPHONE ENCOUNTER
Gestational Diabetes Follow-up    Subjective/Objective:    Tosin Pfeiffer sent in blood glucose log for review. Last date of communication was: 7/9/18.    Gestational diabetes is being managed with diet, activity and medications    Taking diabetes medications:   yes:     Diabetes Medication(s)     Insulin Sig    insulin isophane human (HUMULIN N PEN) 100 UNIT/ML injection Inject 12 units with breakfast and 22 units at bedtime.        Estimated Date of Delivery: Sep 26, 2018    BG/Food Log:     Update on glucose levels - 22 Units at night and 12 units morning      date     before breakfast     after breakfast     after lunch     after supper  7/10        94                              112                     73                 111  7/11        98                               92*                     98                105  7/12        94                              108                    139               115  7/13        93                              140    Thank you    Tosin David  1985    Assessment:    Ketones: NA.   Fasting blood glucoses: 75% in target.  After breakfast: 100% in target.  After lunch: 100% in target.  After dinner: 100% in target.    Plan/Response:  No changes in the patient's current treatment plan.  Follow-up in 1 week.    Email reply:  Te Leahy,   Thanks for sending in numbers again! Things are looking great on your current doses of insulin. No changes for now. Keep up the great work and please send your numbers in again next week, Thursday or Friday. Of course, if you re seeing 3 or more high numbers before then, feel free to send in sooner.   Thanks,   Kristy Serna RD, LD     Any diabetes medication dose changes were made via the CDE Protocol and Collaborative Practice Agreement with the patient's OB/GYN provider. A copy of this encounter was shared with the provider.

## 2018-07-16 ENCOUNTER — OFFICE VISIT (OUTPATIENT)
Dept: MATERNAL FETAL MEDICINE | Facility: CLINIC | Age: 33
End: 2018-07-16
Attending: OBSTETRICS & GYNECOLOGY
Payer: COMMERCIAL

## 2018-07-16 ENCOUNTER — HOSPITAL ENCOUNTER (OUTPATIENT)
Dept: ULTRASOUND IMAGING | Facility: CLINIC | Age: 33
Discharge: HOME | End: 2018-07-16
Attending: OBSTETRICS & GYNECOLOGY | Admitting: OBSTETRICS & GYNECOLOGY
Payer: COMMERCIAL

## 2018-07-16 DIAGNOSIS — O24.414 INSULIN CONTROLLED GESTATIONAL DIABETES MELLITUS (GDM) DURING PREGNANCY, ANTEPARTUM: Primary | ICD-10-CM

## 2018-07-16 DIAGNOSIS — O24.414 INSULIN CONTROLLED GESTATIONAL DIABETES MELLITUS (GDM) DURING PREGNANCY, ANTEPARTUM: ICD-10-CM

## 2018-07-16 PROCEDURE — 76819 FETAL BIOPHYS PROFIL W/O NST: CPT

## 2018-07-16 NOTE — MR AVS SNAPSHOT
After Visit Summary   7/16/2018    Tosin Pfeiffer    MRN: [id number]           Patient Information     Date Of Birth          1985        Visit Information        Provider Department      7/16/2018 4:00 PM Lyn Kahn MD [facility] Maternal Fetal Medicine [facility]        Today's Diagnoses     Insulin controlled gestational diabetes mellitus (GDM) during pregnancy, antepartum    -  1       Follow-ups after your visit        Your next 10 appointments already scheduled     Jul 17, 2018  1:00 PM CDT   ESTABLISHED PRENATAL with Tari Ramirez,    [facility])    [address] [address]   [phone]            Jul 24, 2018 11:00 AM CDT   MFM US COMPRE SINGLE F/U with [id number]   [facility] Maternal Fetal Medicine Ultrasound - [facility])    [address]  [facility] [address] [phone]           Wear comfortable clothes and leave your valuables at home.            Jul 24, 2018 11:30 AM CDT   Radiology MD with  HEIDY PANTOJA   [facility] Maternal Fetal Medicine [facility])    [address]  [facility] [address] [phone]           Please arrive at the time given for your first appointment. This visit is used internally to schedule the physician's time during your ultrasound.            Aug 14, 2018  1:00 PM CDT   ESTABLISHED PRENATAL with Tari Ramirez DO   [facility])    [address] [id number] [phone]              Who to contact     If you have questions or need follow up information about today's clinic visit or your schedule please contact [facility] MATERNAL FETAL [facility] directly at [phone].  Normal or non-critical lab and imaging results will be communicated to you by MyChart, letter or phone within 4 business days after the clinic has  received the results. If you do not hear from us within 7 days, please contact the clinic through [employer] or phone. If you have a critical or abnormal lab result, we will notify you by phone as soon as possible.  Submit refill requests through [employer]annaBuild or call your pharmacy and they will forward the refill request to us. Please allow 3 business days for your refill to be completed.          Additional Information About Your Visit        [employer]Â® Information     [employer] gives you secure access to your electronic health record. If you see a primary care provider, you can also send messages to your care team and make appointments. If you have questions, please call your primary care clinic.  If you do not have a primary care provider, please call [phone] and they will assist you.        Care EveryWhere ID     This is your Care EveryWhere ID. This could be used by other organizations to access your [place] medical records  [id number]        Your Vitals Were     Last Period                   12/20/2017 (Exact Date)            Blood Pressure from Last 3 Encounters:   06/19/18 110/64   05/22/18 118/70   04/24/18 110/70    Weight from Last 3 Encounters:   06/28/18 104.3 kg (230 lb)   06/19/18 103.6 kg (228 lb 6.4 oz)   05/22/18 104.3 kg (230 lb)              Today, you had the following     No orders found for display       Primary Care Provider Office Phone # Fax #    Meron Ann Aaseby-Aguilera, PA-C [phone] [address]  [facility] [address]        Equal Access to Services     TIFFANY SOLIS : Hadii aad ku hadasho Soomaali, waaxda luqadaha, qaybta kaalmada adeegyada, molly avila . So [facility] [phone].    ATENCIÓN: Si habla español, tiene a albright disposición servicios gratuitos de asistencia lingüística. Llame al [phone].    We comply with applicable federal civil rights laws and [place] laws. We do not discriminate on the basis of race, color, national  origin, age, disability, sex, sexual orientation, or gender identity.            Thank you!     Thank you for choosing [facility]  for your care. Our goal is always to provide you with excellent care. Hearing back from our patients is one way we can continue to improve our services. Please take a few minutes to complete the written survey that you may receive in the mail after your visit with us. Thank you!             Your Updated Medication List - Protect others around you: Learn how to safely use, store and throw away your medicines at www.disposemymeds.org.          This list is accurate as of 7/16/18  4:12 PM.  Always use your most recent med list.                   Brand Name Dispense Instructions for use Diagnosis    acetone (Urine) test Strp     25 each    Test once daily x1 week, then reduce to once weekly once consistently negative    Gestational diabetes       blood glucose lancets standard    no brand specified    100 each    Use to test blood sugar 6 times daily or as directed. OneTouch delica lancets.    Gestational diabetes mellitus (GDM)       blood glucose monitoring meter device kit    no brand specified    1 kit    Use to test blood sugar 4 times daily or as directed.    Gestational diabetes mellitus (GDM)       blood glucose monitoring test strip    no brand specified    200 strip    Use to test blood sugars 6 times daily or as directed. OneTouch Verio test strips.    Gestational diabetes        MG Caps           insulin isophane human 100 UNIT/ML injection    HumuLIN N PEN    15 mL    Inject 12 units with breakfast and 22 units at bedtime.    Gestational diabetes mellitus (GDM) requiring insulin       insulin pen needle 32G X 4 MM    BD CHRIS U/F    100 each    Use 2 daily as directed.    Gestational diabetes mellitus (GDM) requiring insulin       prenatal multivitamin plus iron 27-0.8 MG Tabs per tablet      Take by mouth daily        ranitidine 300 MG  tablet    ZANTAC    30 tablet    Take 1 tablet (300 mg) by mouth At Bedtime    Prenatal care in second trimester

## 2018-07-16 NOTE — PROGRESS NOTES
Please see ultrasound report under imaging tab for details on ultrasound performed today.    Lyn Kahn MD  , OB/GYN  Maternal-Fetal Medicine  [email or web address]  [phone] (Academic office)  [phone] (Pager)

## 2018-07-17 ENCOUNTER — TELEPHONE (OUTPATIENT)
Dept: EDUCATION SERVICES | Facility: CLINIC | Age: 33
End: 2018-07-17

## 2018-07-17 ENCOUNTER — PRENATAL OFFICE VISIT (OUTPATIENT)
Dept: OBGYN | Facility: CLINIC | Age: 33
End: 2018-07-17
Payer: COMMERCIAL

## 2018-07-17 VITALS
WEIGHT: 234.3 LBS | BODY MASS INDEX: 41.52 KG/M2 | DIASTOLIC BLOOD PRESSURE: 60 MMHG | HEIGHT: 63 IN | SYSTOLIC BLOOD PRESSURE: 110 MMHG

## 2018-07-17 DIAGNOSIS — D50.8 OTHER IRON DEFICIENCY ANEMIA: ICD-10-CM

## 2018-07-17 DIAGNOSIS — Z34.92 PRENATAL CARE IN SECOND TRIMESTER: ICD-10-CM

## 2018-07-17 DIAGNOSIS — Z34.92 PRENATAL CARE IN SECOND TRIMESTER: Primary | ICD-10-CM

## 2018-07-17 LAB
BLD GP AB SCN SERPL QL: NORMAL
ERYTHROCYTE [DISTWIDTH] IN BLOOD BY AUTOMATED COUNT: 14.1 % (ref 10–15)
HCT VFR BLD AUTO: 33.1 % (ref 35–47)
HGB BLD-MCNC: 10.8 G/DL (ref 11.7–15.7)
MCH RBC QN AUTO: 30.2 PG (ref 26.5–33)
MCHC RBC AUTO-ENTMCNC: 32.6 G/DL (ref 31.5–36.5)
MCV RBC AUTO: 93 FL (ref 78–100)
PLATELET # BLD AUTO: 230 10E9/L (ref 150–450)
RBC # BLD AUTO: 3.58 10E12/L (ref 3.8–5.2)
WBC # BLD AUTO: 9.5 10E9/L (ref 4–11)

## 2018-07-17 PROCEDURE — 96372 THER/PROPH/DIAG INJ SC/IM: CPT | Performed by: FAMILY MEDICINE

## 2018-07-17 PROCEDURE — 36415 COLL VENOUS BLD VENIPUNCTURE: CPT | Performed by: FAMILY MEDICINE

## 2018-07-17 PROCEDURE — 99207 ZZC PRENATAL VISIT: CPT | Performed by: FAMILY MEDICINE

## 2018-07-17 PROCEDURE — 90715 TDAP VACCINE 7 YRS/> IM: CPT | Performed by: FAMILY MEDICINE

## 2018-07-17 PROCEDURE — 85027 COMPLETE CBC AUTOMATED: CPT | Performed by: FAMILY MEDICINE

## 2018-07-17 PROCEDURE — 86780 TREPONEMA PALLIDUM: CPT | Performed by: FAMILY MEDICINE

## 2018-07-17 PROCEDURE — 86850 RBC ANTIBODY SCREEN: CPT | Performed by: FAMILY MEDICINE

## 2018-07-17 PROCEDURE — 90471 IMMUNIZATION ADMIN: CPT | Performed by: FAMILY MEDICINE

## 2018-07-17 NOTE — PROGRESS NOTES
"CC: Here for routine prenatal visit   32 year old y/o  @ 29w6d with Estimated Date of Delivery: Sep 26, 2018   HPI: Doing well. + fetal movement. No vaginal bleeding, LOF, contractions.     This document serves as a record of the services and decisions personally performed and made by Tari Ramirez DO. It was created on his behalf by Jimena Farr, a trained medical scribe. The creation of this document is based on the provider's statements to the medical scribe.  Jimena Farr 1:04 PM 2018    /60  Ht 1.6 m (5' 3\")  Wt 106.3 kg (234 lb 4.8 oz)  LMP 2017 (Exact Date)  BMI 41.5 kg/m2  See OB flowsheet    1) concerns: none today   2) Routine Care: Undecided on genetic screening; GC - negative; GTT - abnormal; Tdap given today  3) GDM: on insulin, followed by MFM,  testing and growth us 22 U PM and 12 U AM  4) Patient is considering :  calculator 35%. Discussed risks.  I am concerned with CPD due to history of 8# 11 oz baby @ 38 weeks and arrest of descent, would not specifically recommend  but would be ok for MELANY if the patient desires and the fetal weight is less than previous baby. Information given and will carefully proceed. Desires ,   consent [x]   Plan for CS if no labor by 40-41 weeks   5) Return: 2 weeks    The information in this document, created by the medical scribe for me, accurately reflects the services I personally performed and the decisions made by me. I have reviewed and approved this document for accuracy prior to leaving the patient care area.    Tari Ramirez DO  2018 1:30 PM        "

## 2018-07-17 NOTE — NURSING NOTE
"Chief Complaint   Patient presents with     Prenatal Care     Imm/Inj     tdap injection       Initial /60  Ht 5' 3\" (1.6 m)  Wt 234 lb 4.8 oz (106.3 kg)  LMP 2017 (Exact Date)  BMI 41.5 kg/m2 Estimated body mass index is 41.5 kg/(m^2) as calculated from the following:    Height as of this encounter: 5' 3\" (1.6 m).    Weight as of this encounter: 234 lb 4.8 oz (106.3 kg).  BP completed using cuff size: large        The following HM Due: NONE    "

## 2018-07-17 NOTE — TELEPHONE ENCOUNTER
"Requested Prescriptions   Pending Prescriptions Disp Refills     ranitidine (ZANTAC) 300 MG tablet [Pharmacy Med Name: RANITIDINE 300 MG TABLET] 30 tablet 0     Sig: TAKE 1 TABLET (300 MG) BY MOUTH AT BEDTIME    H2 Blockers Protocol Passed    7/17/2018 11:02 AM       Passed - Patient is age 12 or older       Passed - Recent (12 mo) or future (30 days) visit within the authorizing provider's specialty    Patient had office visit in the last 12 months or has a visit in the next 30 days with authorizing provider or within the authorizing provider's specialty.  See \"Patient Info\" tab in inbasket, or \"Choose Columns\" in Meds & Orders section of the refill encounter.            rx approved per [facility] protocol.      Jovita Hall RN    "

## 2018-07-17 NOTE — PATIENT INSTRUCTIONS
Return in 2 weeks     Phone numbers [place]:  Day/ night [phone] ask for ob triage  Emergency:  Call labor and delivery:  [phone]    What should I call about??    Contraction every 5 minutes for 1 hour 1 minute long (511), bleeding, loss of fluid, headache that doesn't resolve with tylenol, and decreased fetal movement     Start kick counts @ 26-28 weeks   Keep track of movement and discover your normal baby movement pattern   guideline is listed below  Please call if you do not feel the baby move!  We will have you come in for fetal heart rate monitoring:   Perception of at least 10 FMs during 12 hours of normal maternal activity   Perception of least 10 FMs over two hours when the mother is at rest and focused on [facility] Address   [address], [place] [address] [phone]    Dr. Tari Ramirez, DO    OB/GYN   [facility]

## 2018-07-17 NOTE — TELEPHONE ENCOUNTER
Email from patient:    Looking for your thoughts on this....    At my last in-person appointment, Radha gave me a different glucose monitor (OneTouch Verio Flex) because she had reasons to believe that the one I had (OneTouch Ultra Mini) ran higher.  I ran out of testing strips that Radha provided for the OneTouch Verio Flex a few days ago and now I am back to using the OneTouch Ultra Mini again and my levels are somewhat showing elevated numbers now. Yesterday my numbers were the following:    Before Breakfast 106; After Breakfast 140; After Lunch 161; After Supper 99    This morning I took my glucose reading three times and I received the following numbers 99, 106, 102    Please advise,    Tosin Hernandez  1985    Reply from CDE:    Te Leahy,     There was a prescription sent 7/6 to [facility] in [place] for the Verio strips (this is a newer meter/strip type, and is a little more accurate than the Mini meter/test strips) - can you  more at the pharmacy today? Either way, we always look for the averages before making any insulin changes, so we ll still look for an email Thursday/Friday this week for another update on your log book!    ROBB Toribio CDE

## 2018-07-17 NOTE — MR AVS SNAPSHOT
After Visit Summary   7/17/2018    Tosin Pfeiffer    MRN: [id number]           Patient Information     Date Of Birth          1985        Visit Information        Provider Department      7/17/2018 1:00 PM Tari Ramirez, DO [facility]        Today's Diagnoses     Prenatal care in second trimester    -  1      Care Instructions    Return in 2 weeks     Phone numbers [place]:  Day/ night [phone] ask for ob triage  Emergency:  Call labor and delivery:  [phone]    What should I call about??    Contraction every 5 minutes for 1 hour 1 minute long (511), bleeding, loss of fluid, headache that doesn't resolve with tylenol, and decreased fetal movement     Start kick counts @ 26-28 weeks   Keep track of movement and discover your normal baby movement pattern   guideline is listed below  Please call if you do not feel the baby move!  We will have you come in for fetal heart rate monitoring:   Perception of at least 10 FMs during 12 hours of normal maternal activity   Perception of least 10 FMs over two hours when the mother is at rest and focused on counting       [facility] Address   [address] [facility], [place] [address] [phone]    Dr. Tari Ramirez, DO    OB/GYN   [facility]                                      Follow-ups after your visit        Your next 10 appointments already scheduled     Jul 24, 2018 11:00 AM CDT   MFM US COMPRE SINGLE F/U with [id number]   [facility] Maternal Fetal Medicine Ultrasound - [facility])    [address]  [facility] [address] [phone]           Wear comfortable clothes and leave your valuables at home.            Jul 24, 2018 11:30 AM CDT   Radiology MD with  MFSHAHLA PANTOJA   [facility]    [address]  [facility] [address] [phone]           Please arrive  "at the time given for your first appointment. This visit is used internally to schedule the physician's time during your ultrasound.            Aug 14, 2018  1:00 PM CDT   ESTABLISHED PRENATAL with Tari Ramirez,    [facility]    [address] [address] [phone]              Who to contact     If you have questions or need follow up information about today's clinic visit or your schedule please contact [facility] directly at [phone].  Normal or non-critical lab and imaging results will be communicated to you by [employer], letter or phone within 4 business days after the clinic has received the results. If you do not hear from us within 7 days, please contact the clinic through [employer] or phone. If you have a critical or abnormal lab result, we will notify you by phone as soon as possible.  Submit refill requests through [employer] or call your pharmacy and they will forward the refill request to us. Please allow 3 business days for your refill to be completed.          Additional Information About Your Visit        MyChart Information     [employer] gives you secure access to your electronic health record. If you see a primary care provider, you can also send messages to your care team and make appointments. If you have questions, please call your primary care clinic.  If you do not have a primary care provider, please call [phone] and they will assist you.        Care EveryWhere ID     This is your Care EveryWhere ID. This could be used by other organizations to access your [place] medical records  [id number]        Your Vitals Were     Height Last Period BMI (Body Mass Index)             5' 3\" (1.6 m) 12/20/2017 (Exact Date) 41.5 kg/m2          Blood Pressure from Last 3 Encounters:   07/17/18 110/60   06/19/18 110/64   05/22/18 118/70    Weight from Last 3 Encounters:   07/17/18 234 lb 4.8 oz (106.3 kg)   06/28/18 " 230 lb (104.3 kg)   06/19/18 228 lb 6.4 oz (103.6 kg)              We Performed the Following      RHO D IMMUNE GLOBULIN INJ []     Antibody screen red cell     CBC with platelets     TDAP, IM (10 - 64 YRS) - Adacel     THER/PROPH/DIAG INJ, SC/IM     Treponema Abs w Reflex to RPR and Titer          Where to get your medicines      These medications were sent to [facility]/pharmacy #[id number] - [place], [place] - [address] [facility]  [address] [facility] [address]    Hours:  [facility] to [facility] Phone:  [phone]     ranitidine 300 MG tablet          Primary Care Provider Office Phone # Fax #    Ramona Ann Aaseby-Aguilera, PA-C [phone] [address]  [facility] [address]        Equal Access to Services     TIFFANY [facility] Center[facility]SANA : Hadii yadira jaimes hadasho Soheydiali, waaxda luqadaha, qaybta kaalmada adeegyada, molly avila . So [facility] [phone].    ATENCIÓN: Si habla español, tiene a albright disposición servicios gratuitos de asistencia lingüística. Llame al [phone].    We comply with applicable federal civil rights laws and [place] laws. We do not discriminate on the basis of race, color, national origin, age, disability, sex, sexual orientation, or gender identity.            Thank you!     Thank you for choosing [facility]  for your care. Our goal is always to provide you with excellent care. Hearing back from our patients is one way we can continue to improve our services. Please take a few minutes to complete the written survey that you may receive in the mail after your visit with us. Thank you!             Your Updated Medication List - Protect others around you: Learn how to safely use, store and throw away your medicines at www.disposemymeds.org.          This list is accurate as of 7/17/18  1:28 PM.  Always use your most recent med list.                   Brand Name Dispense Instructions for use Diagnosis    acetone (Urine) test Strp     25  each    Test once daily x1 week, then reduce to once weekly once consistently negative    Gestational diabetes       blood glucose lancets standard    no brand specified    100 each    Use to test blood sugar 6 times daily or as directed. OneTouch delica lancets.    Gestational diabetes mellitus (GDM)       blood glucose monitoring meter device kit    no brand specified    1 kit    Use to test blood sugar 4 times daily or as directed.    Gestational diabetes mellitus (GDM)       blood glucose monitoring test strip    no brand specified    200 strip    Use to test blood sugars 6 times daily or as directed. OneTouch Verio test strips.    Gestational diabetes        MG Caps           insulin isophane human 100 UNIT/ML injection    HumuLIN N PEN    15 mL    Inject 12 units with breakfast and 22 units at bedtime.    Gestational diabetes mellitus (GDM) requiring insulin       insulin pen needle 32G X 4 MM    BD CHRIS U/F    100 each    Use 2 daily as directed.    Gestational diabetes mellitus (GDM) requiring insulin       prenatal multivitamin plus iron 27-0.8 MG Tabs per tablet      Take by mouth daily        ranitidine 300 MG tablet    ZANTAC    30 tablet    TAKE 1 TABLET (300 MG) BY MOUTH AT BEDTIME    Prenatal care in second trimester

## 2018-07-18 LAB — T PALLIDUM AB SER QL: NONREACTIVE

## 2018-07-19 RX ORDER — FERROUS SULFATE 325(65) MG
325 TABLET ORAL
Qty: 30 TABLET | Refills: 2 | Status: ON HOLD | OUTPATIENT
Start: 2018-07-19 | End: 2018-09-17

## 2018-07-20 ENCOUNTER — VIRTUAL VISIT (OUTPATIENT)
Dept: EDUCATION SERVICES | Facility: CLINIC | Age: 33
End: 2018-07-20
Payer: COMMERCIAL

## 2018-07-20 DIAGNOSIS — O24.414 GESTATIONAL DIABETES MELLITUS (GDM) REQUIRING INSULIN: ICD-10-CM

## 2018-07-20 NOTE — Clinical Note
Dr. Ramirez, Just FYI, continuing to adjust insulin to optimize blood glucose.  Thanks! Ana Olivo RD, LD, CDE

## 2018-07-20 NOTE — PROGRESS NOTES
Gestational Diabetes Follow-up    Subjective/Objective:    Tosin Pfeiffer sent in blood glucose log for review. Last date of communication was: 7/17.    Gestational diabetes is being managed with medications    Taking diabetes medications:   yes:     Diabetes Medication(s)     Insulin Sig    insulin isophane human (HUMULIN N PEN) 100 UNIT/ML injection Inject 12 units with breakfast and 22 units at bedtime.          Estimated Date of Delivery: Sep 26, 2018    BG/Food Log:       Assessment:    Ketones: not noted.   Fasting blood glucoses: 0% in target.  After breakfast: 50% in target.  After lunch: 33 % in target.  After dinner: 33% in target.    Plan/Response:  Recommend increase to insulin - NPH from 12-0-0-22 --> 15-0-0-26 (20% increase overall)  CDE reply:  Te Leahy,   Thanks for the update! I believe Clari sent that new prescription over for strips, so whenever insurance allows you can go ahead and  ;)   Insurance is kind of picky!    You are doing great with checking, we re always looking for patterns and trends with the numbers, and it looks like maybe baby is having a growth spurt J  Need a little insulin increase to keep up with the mom hormones.     Please increase the morning dose to 15 units and the bedtime dose to 26 units and we ll see how that goes over the weekend.   Can you please send in again on Monday?   Happy Friday!    Ana Olivo RD, LD, CDE      Any diabetes medication dose changes were made via the CDE Protocol and Collaborative Practice Agreement with the patient's OB/GYN provider. A copy of this encounter was shared with the provider.

## 2018-07-20 NOTE — MR AVS SNAPSHOT
After Visit Summary   7/20/2018    Tosin Pfeiffer    MRN: [id number]           Patient Information     Date Of Birth          1985        Visit Information        Provider Department      7/20/2018 10:45 AM  DIABETIC ED RESOURCE [facility]        Today's Diagnoses     Gestational diabetes mellitus (GDM) requiring insulin           Follow-ups after your visit        Your next 10 appointments already scheduled     Jul 24, 2018 11:00 AM CDT   MFM US COMPRE SINGLE F/U with [id number]   [facility] Maternal Fetal Medicine Ultrasound - [facility])    [address]  [facility] [address]   [phone]           Wear comfortable clothes and leave your valuables at home.            Jul 24, 2018 11:30 AM CDT   [facility]   [facility] Maternal Fetal Medicine [facility])    [address]  [facility] [address]   [phone]           Please arrive at the time given for your first appointment. This visit is used internally to schedule the physician's time during your ultrasound.            Aug 06, 2018  1:45 PM CDT   ESTABL[facility]ENAT[facility]ari [facility] DO   [facility]    [address] [place] [address]   [phone]            Aug 14, 2018  1:00 PM CDT   ESTABLISHED PRENATAL with Tari Ramirez DO   [facility]    [address] [facility] [address]   [phone]            Aug 28, 2018  3:45 PM CDT   ESTABLISHED PRENATAL with Tari Ramirez DO   [facility]    [address] [facility] [address]   [phone]            Sep 04, 2018  3:30 PM CDT   ESTABLISHED PRENATAL with Tari Ramirez DO   [facility]    [address] [facility]  [address]   [phone]            Sep 11, 2018  1:30 PM CDT   ESTABLISHED PRENATAL with T[facility], DO   [facility]    [address] [facility] [address]   [phone]            Sep 18, 2018  2:00 PM CDT   ESTABLISHED PRENATAL with Tari Ramirez, DO   [facility]    [address] [facility] [address]   [phone]            Sep 25, 2018  2:00 PM CDT   ESTABLISHED PRENATAL with Tari Ramirez, DO   [facility]    [address] [facility] [address]   [phone]              Who to contact     If you have questions or need follow up information about today's clinic visit or your schedule please contact [facility] directly at [phone].  Normal or non-critical lab and imaging results will be communicated to you by KOEZYhart, letter or phone within 4 business days after the clinic has received the results. If you do not hear from us within 7 days, please contact the clinic through Hydrostor or phone. If you have a critical or abnormal lab result, we will notify you by phone as soon as possible.  Submit refill requests through Hydrostor or call your pharmacy and they will forward the refill request to us. Please allow 3 business days for your refill to be completed.          Additional Information About Your Visit        Hydrostor Information     Hydrostor gives you secure access to your electronic health record. If you see a primary care provider, you can also send messages to your care team and make appointments. If you have questions, please call your primary care clinic.  If you do not have a primary care provider, please call [phone] and they will assist you.        Care EveryWhere ID     This is your Care EveryWhere ID. This could be used by other organizations to access your [facility]  records  [id number]        Your Vitals Were     Last Period                   12/20/2017 (Exact Date)            Blood Pressure from Last 3 Encounters:   07/17/18 110/60   06/19/18 110/64   05/22/18 118/70    Weight from Last 3 Encounters:   07/17/18 106.3 kg (234 lb 4.8 oz)   06/28/18 104.3 kg (230 lb)   06/19/18 103.6 kg (228 lb 6.4 oz)              Today, you had the following     No orders found for display         Today's Medication Changes          These changes are accurate as of 7/20/18 11:49 AM.  If you have any questions, ask your nurse or doctor.               These medicines have changed or have updated prescriptions.        Dose/Directions    insulin isophane human 100 UNIT/ML injection   Commonly known as:  HumuLIN N PEN   This may have changed:  additional instructions   Used for:  Gestational diabetes mellitus (GDM) requiring insulin        NPH Inject 15 units with breakfast and 26 units at bedtime.   Quantity:  15 mL   Refills:  3            Where to get your medicines      These medications were sent to [facility]/pharmacy #[id number] - [place], [place] - [address] [address]  [id number] [address] [place] [address]    Hours:  [facility] Phone:  [phone]     insulin isophane human 100 UNIT/ML injection                Primary Care Provider Office Phone # Fax #    Ramona Ann Aaseby-Aguilera, PA-C [phone] [address] [address] [facility] [address]        Equal Access to Services     FARIHA SOLIS AH: Hadii yadira henaoo Soheydiali, waaxda luqadaha, qaybta kaalmada adeegyada, waxay neida whitley adeedwar renteria. So [facility] [phone].    ATENCIÓN: Si habla español, tiene a albright disposición servicios gratuitos de asistencia lingüística. Llame al [phone].    We comply with applicable federal civil rights laws and [place] laws. We do not discriminate on the basis of race, color, national origin, age, disability, sex, sexual orientation, or gender identity.            Thank  you!     Thank you for choosing [facility]  for your care. Our goal is always to provide you with excellent care. Hearing back from our patients is one way we can continue to improve our services. Please take a few minutes to complete the written survey that you may receive in the mail after your visit with us. Thank you!             Your Updated Medication List - Protect others around you: Learn how to safely use, store and throw away your medicines at www.disposemymeds.org.          This list is accurate as of 7/20/18 11:49 AM.  Always use your most recent med list.                   Brand Name Dispense Instructions for use Diagnosis    acetone (Urine) test Strp     25 each    Test once daily x1 week, then reduce to once weekly once consistently negative    Gestational diabetes       blood glucose lancets standard    no brand specified    100 each    Use to test blood sugar 6 times daily or as directed. OneTouch delica lancets.    Gestational diabetes mellitus (GDM)       blood glucose monitoring meter device kit    no brand specified    1 kit    Use to test blood sugar 4 times daily or as directed.    Gestational diabetes mellitus (GDM)       blood glucose monitoring test strip    no brand specified    200 strip    Use to test blood sugars 6 times daily or as directed. OneTouch Verio test strips.    Gestational diabetes        MG Caps           ferrous sulfate 325 (65 Fe) MG tablet    IRON    30 tablet    Take 1 tablet (325 mg) by mouth daily (with breakfast)    Other iron deficiency anemia       insulin isophane human 100 UNIT/ML injection    HumuLIN N PEN    15 mL    NPH Inject 15 units with breakfast and 26 units at bedtime.    Gestational diabetes mellitus (GDM) requiring insulin       insulin pen needle 32G X 4 MM    BD CHRIS U/F    100 each    Use 2 daily as directed.    Gestational diabetes mellitus (GDM) requiring insulin       prenatal multivitamin plus iron 27-0.8 MG  Tabs per tablet      Take by mouth daily        ranitidine 300 MG tablet    ZANTAC    30 tablet    TAKE 1 TABLET (300 MG) BY MOUTH AT BEDTIME    Prenatal care in second trimester

## 2018-07-24 ENCOUNTER — OFFICE VISIT (OUTPATIENT)
Dept: MATERNAL FETAL MEDICINE | Facility: CLINIC | Age: 33
End: 2018-07-24
Attending: OBSTETRICS & GYNECOLOGY
Payer: COMMERCIAL

## 2018-07-24 ENCOUNTER — HOSPITAL ENCOUNTER (OUTPATIENT)
Dept: ULTRASOUND IMAGING | Facility: CLINIC | Age: 33
Discharge: HOME | End: 2018-07-24
Attending: OBSTETRICS & GYNECOLOGY | Admitting: OBSTETRICS & GYNECOLOGY
Payer: COMMERCIAL

## 2018-07-24 DIAGNOSIS — O24.414 INSULIN CONTROLLED GESTATIONAL DIABETES MELLITUS (GDM) DURING PREGNANCY, ANTEPARTUM: ICD-10-CM

## 2018-07-24 DIAGNOSIS — O24.414 INSULIN CONTROLLED GESTATIONAL DIABETES MELLITUS (GDM) DURING PREGNANCY, ANTEPARTUM: Primary | ICD-10-CM

## 2018-07-24 PROCEDURE — 76819 FETAL BIOPHYS PROFIL W/O NST: CPT | Performed by: OBSTETRICS & GYNECOLOGY

## 2018-07-24 PROCEDURE — 76816 OB US FOLLOW-UP PER FETUS: CPT

## 2018-07-24 NOTE — PROGRESS NOTES
Please see ultrasound report under imaging tab for details on ultrasound performed today.    Lyn Kahn MD  , OB/GYN  Maternal-Fetal Medicine  [email or web address]  [phone] (Academic office)  [phone] (Pager)

## 2018-07-24 NOTE — MR AVS SNAPSHOT
After Visit Summary   7/24/2018    Tosin Pfeiffer    MRN: [id number]           Patient Information     Date Of Birth          1985        Visit Information        Provider Department      7/24/2018 11:30 AM Lyn Kahn MD [facility] Maternal Fetal Medicine [facility]        Today's Diagnoses     Insulin controlled gestational diabetes mellitus (GDM) during pregnancy, antepartum    -  1       Follow-ups after your visit        Your next 10 appointments already scheduled     Jul 27, 2018  7:30 AM CDT   MFM BPP SINGLE with [email or web address]   [facility])    [address]  [facility] [address]   [phone]            Jul 27, 2018  8:00 AM CDT   Radiology MD with RH HEIDY PANTOJA   [facility] Maternal Fetal Medicine [facility] ([facility])    [address]  [facility] [address]   [phone]           Please arrive at the time given for your first appointment. This visit is used internally to schedule the physician's time during your ultrasound.            Jul 31, 2018  3:30 PM CDT   MFM BPP SINGLE with [email or web address]   [facility] ([facility])    [address]  [facility] [address]   [phone]            Jul 31, 2018  4:00 PM CDT   Radiology MD with RH HEIDY PANTOJA   [facility] Maternal Fetal Medicine [facility] ([facility])    [address]  [facility] [address]   [phone]           Please arrive at the time given for your first appointment. This visit is used internally to schedule the physician's time during your ultrasound.            Aug 03, 2018  3:30 PM CDT   MFM BPP SINGLE with [email or web address]   [facility] ([facility])    [address]  [facility] [address]   [phone]            Aug 03, 2018  4:00 PM CDT    Radiology MD with RH HEIDY PANTOJA   [facility] Maternal Fetal Medicine [facility] ([facility])    [address]  [facility] [address]   [phone]           Please arrive at the time given for your first appointment. This visit is used internally to schedule the physician's time during your ultrasound.            Aug 06, 2018  1:45 PM CDT   ESTABLISHED PRENATAL with Tari Ramirez,    [facility]    [address]  [facility] [address]   [phone]            Aug 07, 2018  1:30 PM CDT   MFM BPP SINGLE with [email or web address]   [facility] Maternal [facility]etal Medicine Ultrasound - [facility] ([facility])    [address]  [facility] [address]   [phone]            Aug 07, 2018  2:00 PM CDT   Radiology MD with  HEIDY PANTOJA   [facility] Maternal Fetal Medicine [facility] ([facility])    [address]  [facility] [address]   [phone]           Please arrive at the time given for your first appointment. This visit is used internally to schedule the physician's time during your ultrasound.            Aug 10, 2018  8:00 AM CDT   MFM BPP SINGLE with [email or web address]   [facility]etal Medicine Ultrasound - [facility] ([facility])    [address]  [facility] [address]   [phone]              Future tests that were ordered for you today     Open Future Orders        Priority Expected Expires Ordered    MFM US Comprehensive Single F/U Routine 8/14/2018 7/24/2019 7/24/2018    MFM BPP Single Routine 7/27/2018 5/24/2019 7/24/2018    MFM BPP Single Routine 8/3/2018 5/24/2019 7/24/2018    MFM BPP Single Routine 7/31/2018 5/24/2019 7/24/2018    MFM BPP Single Routine 8/10/2018 5/24/2019 7/24/2018    MFM BPP Single Routine 8/7/2018 5/24/2019 7/24/2018            Who to contact     If you have questions or need follow up information about today's clinic visit  or your schedule please contact [facility] directly at [phone].  Normal or non-critical lab and imaging results will be communicated to you by MyChart, letter or phone within 4 business days after the clinic has received the results. If you do not hear from us within 7 days, please contact the clinic through Intellidenhart or phone. If you have a critical or abnormal lab result, we will notify you by phone as soon as possible.  Submit refill requests through Leonar3Do or call your pharmacy and they will forward the refill request to us. Please allow 3 business days for your refill to be completed.          Additional Information About Your Visit        Leonar3Do Information     Leonar3Do gives you secure access to your electronic health record. If you see a primary care provider, you can also send messages to your care team and make appointments. If you have questions, please call your primary care clinic.  If you do not have a primary care provider, please call [phone] and they will assist you.        Care EveryWhere ID     This is your Care EveryWhere ID. This could be used by other organizations to access your [facility] medical records  [id number]        Your Vitals Were     Last Period                   12/20/2017 (Exact Date)            Blood Pressure from Last 3 Encounters:   07/17/18 110/60   06/19/18 110/64   05/22/18 118/70    Weight from Last 3 Encounters:   07/17/18 106.3 kg (234 lb 4.8 oz)   06/28/18 104.3 kg (230 lb)   06/19/18 103.6 kg (228 lb 6.4 oz)               Primary Care Provider Office Phone # Fax #    Ramona Ann Aaseby-Aguilera, PA-C [phone] [address] [address] [facility] [address]        Equal Access to Services     TIFFANY SOLIS AH: Hadii yadira Islas, wamariida luqadaha, qaybta kaalmada fanny, molly renteria. So [facility] [phone].    ATENCIÓN: Si habla español, tiene a albright disposición servicios gratuitos de  asistencia lingüística. Joelle al [phone].    We comply with applicable federal civil rights laws and [place] laws. We do not discriminate on the basis of race, color, national origin, age, disability, sex, sexual orientation, or gender identity.            Thank you!     Thank you for choosing [facility]  for your care. Our goal is always to provide you with excellent care. Hearing back from our patients is one way we can continue to improve our services. Please take a few minutes to complete the written survey that you may receive in the mail after your visit with us. Thank you!             Your Updated Medication List - Protect others around you: Learn how to safely use, store and throw away your medicines at www.disposemymeds.org.          This list is accurate as of 7/24/18  1:24 PM.  Always use your most recent med list.                   Brand Name Dispense Instructions for use Diagnosis    acetone (Urine) test Strp     25 each    Test once daily x1 week, then reduce to once weekly once consistently negative    Gestational diabetes       blood glucose lancets standard    no brand specified    100 each    Use to test blood sugar 6 times daily or as directed. OneTouch delica lancets.    Gestational diabetes mellitus (GDM)       blood glucose monitoring meter device kit    no brand specified    1 kit    Use to test blood sugar 4 times daily or as directed.    Gestational diabetes mellitus (GDM)       blood glucose monitoring test strip    no brand specified    200 strip    Use to test blood sugars 6 times daily or as directed. OneTouch Verio test strips.    Gestational diabetes        MG Caps           ferrous sulfate 325 (65 Fe) MG tablet    IRON    30 tablet    Take 1 tablet (325 mg) by mouth daily (with breakfast)    Other iron deficiency anemia       insulin isophane human 100 UNIT/ML injection    HumuLIN N PEN    15 mL    NPH Inject 15 units with breakfast and 26  units at bedtime.    Gestational diabetes mellitus (GDM) requiring insulin       insulin pen needle 32G X 4 MM    BD CHRIS U/F    100 each    Use 2 daily as directed.    Gestational diabetes mellitus (GDM) requiring insulin       prenatal multivitamin plus iron 27-0.8 MG Tabs per tablet      Take by mouth daily        ranitidine 300 MG tablet    ZANTAC    30 tablet    TAKE 1 TABLET (300 MG) BY MOUTH AT BEDTIME    Prenatal care in second trimester

## 2018-07-25 ENCOUNTER — VIRTUAL VISIT (OUTPATIENT)
Dept: EDUCATION SERVICES | Facility: CLINIC | Age: 33
End: 2018-07-25
Payer: COMMERCIAL

## 2018-07-25 DIAGNOSIS — O24.414 GESTATIONAL DIABETES MELLITUS (GDM) REQUIRING INSULIN: ICD-10-CM

## 2018-07-25 NOTE — PROGRESS NOTES
Gestational Diabetes Follow-up    Subjective/Objective:    Tosin Pfeiffer sent in blood glucose log for review. Last date of communication was: 7/20/18.    Gestational diabetes is being managed with medications    Taking diabetes medications:   yes:     Diabetes Medication(s)     Insulin Sig    insulin isophane human (HUMULIN N PEN) 100 UNIT/ML injection NPH Inject 15 units with breakfast and 26 units at bedtime.          Estimated Date of Delivery: Sep 26, 2018    BG/Food Log:       Assessment:    Ketones: na.   Fasting blood glucoses: 0% in target.  After breakfast: 75% in target.  After lunch: 100% in target.  After dinner: 66% in target.    Plan/Response:  Recommend increase to insulin - NPH 15-0-0-26 --> 15-0-0-30  Follow up MOnday    Te Leahy,    Thanks for the update! Let s make a change to your bedtime insulin dose, and increase to 30 units tonight. No changes to your morning insulin. Will you email us again Monday morning for another check in?     Thanks, and have a great week!  Clari Harvey RD LD CDE    Any diabetes medication dose changes were made via the CDE Protocol and Collaborative Practice Agreement with the patient's OB/GYN provider. A copy of this encounter was shared with the provider.

## 2018-07-25 NOTE — MR AVS SNAPSHOT
After Visit Summary   7/25/2018    Tosin Pfeiffer    MRN: [id number]           Patient Information     Date Of Birth          1985        Visit Information        Provider Department      7/25/2018 8:00 AM [facility] DIABETIC ED RESOURCE [place] [facility]        Today's Diagnoses     Gestational diabetes mellitus (GDM) requiring insulin           Follow-ups after your visit        Your next 10 appointments already scheduled     Jul 27, 2018  7:30 AM CDT   MFM BPP SINGLE with [id number]   [facility] - [facility] [place] [facility])    [address]  [facility] [address]   [phone]            Jul 27, 2018  8:00 AM CDT   Radiology MD with JOSEPH MOODY MD   [facility] Maternal Fetal Medicine [facility])    [address]  [facility] [address]   [phone]           Please arrive at the time given for your first appointment. This visit is used internally to schedule the physician's time during your ultrasound.            Jul 31, 2018  3:30 PM CDT   MFM BPP SINGLE with [id number]   [facility] ([facility])    [address]  [facility] [address]   [phone]            Jul 31, 2018  4:00 PM CDT   Radiology MD with JOSEPH MOODY MD   [facility] Maternal Fetal Medicine [facility])    [address]  [facility] [address]   [phone]           Please arrive at the time given for your first appointment. This visit is used internally to schedule the physician's time during your ultrasound.            Aug 03, 2018  3:30 PM CDT   MFM BPP SINGLE with [id number]   [facility] [place] [facility])    [address]  [facility] [address]   [phone]            Aug 03, 2018  4:00 PM CDT   Radiology MD with JOSEPH MOODY MD   [facility]  Maternal Fetal Medicine - [facility] ([facility] [place])    [address]  [facility] [address]   [phone]           Please arrive at the time given for your first appointment. This visit is used internally to schedule the physician's time during your ultrasound.            Aug 06, 2018  1:45 PM CDT   ESTABLISHED PRENATAL with Tari Ramirez, DO   [facility]    [address]  [facility] [address]   [phone]            Aug 07, 2018  1:30 PM CDT   MFM BPP SINGLE with [id number]   [facility]ound - [facility] ([facility] [place])    [address]  [facility] [address]   [phone]            Aug 07, 2018  2:00 PM CDT   Radiology MD with RH MFM MD   [facility] Maternal Fetal Medicine [facility] ([facility] [place])    [address]  [facility] [address]   [phone]           Please arrive at the time given for your first appointment. This visit is used internally to schedule the physician's time during your ultrasound.            Aug 10, 2018  8:00 AM CDT   MFM BPP SINGLE with [id number]   [facility] Ul[facility]ound - [facility] ([facility] [place])    [address]  [facility] [address]   [phone]              Future tests that were ordered for you today     Open Future Orders        Priority Expected Expires Ordered    MFM US Comprehensive Single F/U Routine 8/14/2018 7/24/2019 7/24/2018    MFM BPP Single Routine 7/27/2018 5/24/2019 7/24/2018    MFM BPP Single Routine 8/3/2018 5/24/2019 7/24/2018    MFM BPP Single Routine 7/31/2018 5/24/2019 7/24/2018    MFM BPP Single Routine 8/10/2018 5/24/2019 7/24/2018    MFM BPP Single Routine 8/7/2018 5/24/2019 7/24/2018            Who to contact     If you have questions or need follow up information about today's clinic visit or your schedule please contact [place]  [facility] directly at [phone].  Normal or non-critical lab and imaging results will be communicated to you by [employer], letter or phone within 4 business days after the clinic has received the results. If you do not hear from us within 7 days, please contact the clinic through [employer] or phone. If you have a critical or abnormal lab result, we will notify you by phone as soon as possible.  Submit refill requests through [employer] or call your pharmacy and they will forward the refill request to us. Please allow 3 business days for your refill to be completed.          Additional Information About Your Visit        [employer] Information     [employer] gives you secure access to your electronic health record. If you see a primary care provider, you can also send messages to your care team and make appointments. If you have questions, please call your primary care clinic.  If you do not have a primary care provider, please call [phone] and they will assist you.        Care EveryWhere ID     This is your Care EveryWhere ID. This could be used by other organizations to access your [place] medical records  [id number]        Your Vitals Were     Last Period                   12/20/2017 (Exact Date)            Blood Pressure from Last 3 Encounters:   07/17/18 110/60   06/19/18 110/64   05/22/18 118/70    Weight from Last 3 Encounters:   07/17/18 106.3 kg (234 lb 4.8 oz)   06/28/18 104.3 kg (230 lb)   06/19/18 103.6 kg (228 lb 6.4 oz)              Today, you had the following     No orders found for display         Today's Medication Changes          These changes are accurate as of 7/25/18 12:41 PM.  If you have any questions, ask your nurse or doctor.               These medicines have changed or have updated prescriptions.        Dose/Directions    insulin isophane human 100 UNIT/ML injection   Commonly known as:  HumuLIN N PEN   This may have changed:  additional instructions   Used for:   Gestational diabetes mellitus (GDM) requiring insulin        NPH Inject 15 units with breakfast and 30 units at bedtime.   Quantity:  15 mL   Refills:  3            Where to get your medicines      These medications were sent to [facility]/pharmacy #[id number] - [place], [place] - [address] [facility]  [address] [facility] [address]    Hours:  [facility] som drug [facility] to [facility] Phone:  [phone]     insulin isophane human 100 UNIT/ML injection                Primary Care Provider Office Phone # Fax #    Ramona Ann Aaseby-Aguilera, PA-C [phone] [address]  [facility] [address]        Equal Access to Services     FARIHA SOLIS : Hadii aad ku hadasho Soomaali, waaxda luqadaha, qaybta kaalmada adeegyada, waxay neida renteria. So [facility] [phone].    ATENCIÓN: Si habla español, tiene a albright disposición servicios gratuitos de asistencia lingüística. Llame al [phone].    We comply with applicable federal civil rights laws and [place] laws. We do not discriminate on the basis of race, color, national origin, age, disability, sex, sexual orientation, or gender identity.            Thank you!     Thank you for choosing [facility]  for your care. Our goal is always to provide you with excellent care. Hearing back from our patients is one way we can continue to improve our services. Please take a few minutes to complete the written survey that you may receive in the mail after your visit with us. Thank you!             Your Updated Medication List - Protect others around you: Learn how to safely use, store and throw away your medicines at www.disposemymeds.org.          This list is accurate as of 7/25/18 12:41 PM.  Always use your most recent med list.                   Brand Name Dispense Instructions for use Diagnosis    acetone (Urine) test Strp     25 each    Test once daily x1 week, then reduce to once weekly once consistently negative    Gestational  diabetes       blood glucose lancets standard    no brand specified    100 each    Use to test blood sugar 6 times daily or as directed. OneTouch delica lancets.    Gestational diabetes mellitus (GDM)       blood glucose monitoring meter device kit    no brand specified    1 kit    Use to test blood sugar 4 times daily or as directed.    Gestational diabetes mellitus (GDM)       blood glucose monitoring test strip    no brand specified    200 strip    Use to test blood sugars 6 times daily or as directed. OneTouch Verio test strips.    Gestational diabetes        MG Caps           ferrous sulfate 325 (65 Fe) MG tablet    IRON    30 tablet    Take 1 tablet (325 mg) by mouth daily (with breakfast)    Other iron deficiency anemia       insulin isophane human 100 UNIT/ML injection    HumuLIN N PEN    15 mL    NPH Inject 15 units with breakfast and 30 units at bedtime.    Gestational diabetes mellitus (GDM) requiring insulin       insulin pen needle 32G X 4 MM    BD CHRIS U/F    100 each    Use 2 daily as directed.    Gestational diabetes mellitus (GDM) requiring insulin       prenatal multivitamin plus iron 27-0.8 MG Tabs per tablet      Take by mouth daily        ranitidine 300 MG tablet    ZANTAC    30 tablet    TAKE 1 TABLET (300 MG) BY MOUTH AT BEDTIME    Prenatal care in second trimester

## 2018-07-27 ENCOUNTER — OFFICE VISIT (OUTPATIENT)
Dept: MATERNAL FETAL MEDICINE | Facility: CLINIC | Age: 33
End: 2018-07-27
Attending: FAMILY MEDICINE
Payer: COMMERCIAL

## 2018-07-27 ENCOUNTER — HOSPITAL ENCOUNTER (OUTPATIENT)
Dept: ULTRASOUND IMAGING | Facility: CLINIC | Age: 33
Discharge: HOME | End: 2018-07-27
Attending: FAMILY MEDICINE | Admitting: FAMILY MEDICINE
Payer: COMMERCIAL

## 2018-07-27 DIAGNOSIS — O26.90 PREGNANCY RELATED CONDITION, ANTEPARTUM: ICD-10-CM

## 2018-07-27 DIAGNOSIS — O24.414 INSULIN CONTROLLED GESTATIONAL DIABETES MELLITUS (GDM) DURING PREGNANCY, ANTEPARTUM: Primary | ICD-10-CM

## 2018-07-27 PROCEDURE — 76819 FETAL BIOPHYS PROFIL W/O NST: CPT

## 2018-07-27 NOTE — MR AVS SNAPSHOT
After Visit Summary   7/27/2018    Tosin Pfeiffer    MRN: [id number]           Patient Information     Date Of Birth          1985        Visit Information        Provider Department      7/27/2018 8:00 AM Ricardo Zaldivar MD [facility] Maternal Fetal Medicine [facility]        Today's Diagnoses     Insulin controlled gestational diabetes mellitus (GDM) during pregnancy, antepartum    -  1       Follow-ups after your visit        Your next 10 appointments already scheduled     Jul 27, 2018  8:00 AM CDT   Radiology MD with Ricardo Zaldivar MD   [facility] Maternal Fetal Medicine [facility])    [address]  [facility] [address]   [phone]           Please arrive at the time given for your first appointment. This visit is used internally to schedule the physician's time during your ultrasound.            Jul 31, 2018  3:30 PM CDT   MFM BPP SINGLE with [id number]   [facility] Maternal Fetal Medicine Ultrasound - [facility])    [address]  [facility] [address]   [phone]            Jul 31, 2018  4:00 PM CDT   Radiology MD with RH HEIDY PANTOJA   [facility] Maternal Fetal Medicine [facility] ([facility])    [address]  [facility] [address]   [phone]           Please arrive at the time given for your first appointment. This visit is used internally to schedule the physician's time during your ultrasound.            Aug 03, 2018  3:30 PM CDT   MFM BPP SINGLE with [id number]   [facility] Maternal Fetal Medicine Ultrasound [facility] ([facility])    [address]  [facility] [address]   [phone]            Aug 03, 2018  4:00 PM CDT   Radiology MD with JOSEPH MOODY MD   [facility] Fetal Medicine [facility] ([facility])    [address]  [facility] [address]   [phone]           Please arrive at the time given for  your first appointment. This visit is used internally to schedule the physician's time during your ultrasound.            Aug 06, 2018  1:45 PM CDT   ESTABLISHED PRENATAL with Tari Ramirez,    [facility] ([facility])    [address] [place]  [facility] [address]   [phone]            Aug 07, 2018  1:30 PM CDT   MFM BPP SINGLE with [id number]   [facility] Maternal Fetal Medicine Ultrasound - [facility] ([facility])    [address]  [facility] [address]   [phone]            Aug 07, 2018  2:00 PM CDT   Radiology MD with RH HEIDY PANTOJA   [facility] Maternal Fetal [facility] ([facility])    [address]  [facility] [address]   [phone]           Please arrive at the time given for your first appointment. This visit is used internally to schedule the physician's time during your ultrasound.            Aug 10, 2018  8:00 AM CDT   MFM BPP SINGLE with [id number]   [facility] Maternal Fetal Medicine Ultrasound - [facility] ([facility])    [address]  [facility] [address]   [phone]            Aug 10, 2018  8:30 AM CDT   Radiology MD with RH HEIDY PANTOJA   [facility] Maternal Fetal [facility] ([facility])    [address]  [facility] [address]   [phone]           Please arrive at the time given for your first appointment. This visit is used internally to schedule the physician's time during your ultrasound.              Who to contact     If you have questions or need follow up information about today's clinic visit or your schedule please contact [facility] directly at [phone].  Normal or non-critical lab and imaging results will be communicated to you by MyChart, letter or phone within 4 business days after the clinic has received the results. If you do not hear from us within 7 days, please  contact the clinic through [employer] or phone. If you have a critical or abnormal lab result, we will notify you by phone as soon as possible.  Submit refill requests through [employer] or call your pharmacy and they will forward the refill request to us. Please allow 3 business days for your refill to be completed.          Additional Information About Your Visit        [employer] Information     [employer] gives you secure access to your electronic health record. If you see a primary care provider, you can also send messages to your care team and make appointments. If you have questions, please call your primary care clinic.  If you do not have a primary care provider, please call [phone] and they will assist you.        Care EveryWhere ID     This is your Care EveryWhere ID. This could be used by other organizations to access your [place] medical records  [id number]        Your Vitals Were     Last Period                   12/20/2017 (Exact Date)            Blood Pressure from Last 3 Encounters:   07/17/18 110/60   06/19/18 110/64   05/22/18 118/70    Weight from Last 3 Encounters:   07/17/18 106.3 kg (234 lb 4.8 oz)   06/28/18 104.3 kg (230 lb)   06/19/18 103.6 kg (228 lb 6.4 oz)              Today, you had the following     No orders found for display       Primary Care Provider Office Phone # Fax #    Meron Ann Aaseby-Aguilera, PA-C [phone] [address] [facility] [address]        Equal Access to Services     FARIHA SOLIS : Hadii aad ku hadasho Soomaali, waaxda luqadaha, qaybta kaalmada adeegyada, molly avila . So [facility] [phone].    ATENCIÓN: Si habla chris, tiene a albright disposición servicios gratuitos de asistencia lingüística. Llgrace al [phone].    We comply with applicable federal civil rights laws and [place] laws. We do not discriminate on the basis of race, color, national origin, age, disability, sex, sexual orientation, or gender  identity.            Thank you!     Thank you for choosing [facility]  for your care. Our goal is always to provide you with excellent care. Hearing back from our patients is one way we can continue to improve our services. Please take a few minutes to complete the written survey that you may receive in the mail after your visit with us. Thank you!             Your Updated Medication List - Protect others around you: Learn how to safely use, store and throw away your medicines at www.disposemymeds.org.          This list is accurate as of 7/27/18  7:46 AM.  Always use your most recent med list.                   Brand Name Dispense Instructions for use Diagnosis    acetone (Urine) test Strp     25 each    Test once daily x1 week, then reduce to once weekly once consistently negative    Gestational diabetes       blood glucose lancets standard    no brand specified    100 each    Use to test blood sugar 6 times daily or as directed. OneTouch delica lancets.    Gestational diabetes mellitus (GDM)       blood glucose monitoring meter device kit    no brand specified    1 kit    Use to test blood sugar 4 times daily or as directed.    Gestational diabetes mellitus (GDM)       blood glucose monitoring test strip    no brand specified    200 strip    Use to test blood sugars 6 times daily or as directed. OneTouch Verio test strips.    Gestational diabetes        MG Caps           ferrous sulfate 325 (65 Fe) MG tablet    IRON    30 tablet    Take 1 tablet (325 mg) by mouth daily (with breakfast)    Other iron deficiency anemia       insulin isophane human 100 UNIT/ML injection    HumuLIN N PEN    15 mL    NPH Inject 15 units with breakfast and 30 units at bedtime.    Gestational diabetes mellitus (GDM) requiring insulin       insulin pen needle 32G X 4 MM    BD CHRIS U/F    100 each    Use 2 daily as directed.    Gestational diabetes mellitus (GDM) requiring insulin       prenatal  multivitamin plus iron 27-0.8 MG Tabs per tablet      Take by mouth daily        ranitidine 300 MG tablet    ZANTAC    30 tablet    TAKE 1 TABLET (300 MG) BY MOUTH AT BEDTIME    Prenatal care in second trimester

## 2018-07-27 NOTE — PROGRESS NOTES
"Please see \"Imaging\" tab under \"Chart Review\" for details of today's US at the [facility].    Ricardo Zaldivar MD  Maternal-Fetal Medicine    "

## 2018-07-30 ENCOUNTER — TELEPHONE (OUTPATIENT)
Dept: EDUCATION SERVICES | Facility: CLINIC | Age: 33
End: 2018-07-30

## 2018-07-30 NOTE — TELEPHONE ENCOUNTER
Gestational Diabetes Follow-up    Subjective/Objective:    Tosin Pfeiffer sent in blood glucose log for review. Last date of communication was: 7/25/18.    Gestational diabetes is being managed with medications    Taking diabetes medications:   yes:     Diabetes Medication(s)     Insulin Sig    insulin isophane human (HUMULIN N PEN) 100 UNIT/ML injection NPH Inject 15 units with breakfast and 30 units at bedtime.          Estimated Date of Delivery: Sep 26, 2018    BG/Food Log:   Update on glucose levels - 30 Units at night and 15 units morning    date     before breakfast     after breakfast     after lunch     after supper  7/26           90                            126                  115                  152  7/27         105                            110                  143                  138  7/28          88                             121                 missed              179  7/29          91                              85                     130                 111  7/30          91                             133    Thank you    Tosin Hernandez  1985    Assessment:    Ketones: NA.   Fasting blood glucoses: 80% in target.  After breakfast: 100% in target.  After lunch: 66% in target.  After dinner: 50% in target.    Plan/Response:  No changes in the patient's current treatment plan.  Follow-up in 3-4 days.    Te Leahy,   Thanks so much for the email! That increase in your bedtime dose of insulin seemed to do the trick in getting your morning numbers into goal range. I think we should just keep your insulin dosing the same for now. In the evening when you ve had those higher numbers, could it have been that you ate a little more carbohydrate at those meals? Also, taking a walk after a larger meal can certainly make a difference. Either way, I think we can just continue to watch things for now. Could you send in numbers again on Thursday for review?   Thanks again and keep up the great  work!  Kristy Serna RD, LD     Any diabetes medication dose changes were made via the CDE Protocol and Collaborative Practice Agreement with the patient's OB/GYN provider. A copy of this encounter was shared with the provider.

## 2018-07-31 ENCOUNTER — OFFICE VISIT (OUTPATIENT)
Dept: MATERNAL FETAL MEDICINE | Facility: CLINIC | Age: 33
End: 2018-07-31
Attending: OBSTETRICS & GYNECOLOGY
Payer: COMMERCIAL

## 2018-07-31 ENCOUNTER — HOSPITAL ENCOUNTER (INPATIENT)
Facility: CLINIC | Age: 33
LOS: 2 days | Discharge: HOME | End: 2018-08-02
Attending: OBSTETRICS & GYNECOLOGY | Admitting: OBSTETRICS & GYNECOLOGY
Payer: COMMERCIAL

## 2018-07-31 ENCOUNTER — MYC MEDICAL ADVICE (OUTPATIENT)
Dept: OBGYN | Facility: CLINIC | Age: 33
End: 2018-07-31

## 2018-07-31 ENCOUNTER — HOSPITAL ENCOUNTER (OUTPATIENT)
Dept: ULTRASOUND IMAGING | Facility: CLINIC | Age: 33
Discharge: HOME | End: 2018-07-31
Attending: OBSTETRICS & GYNECOLOGY | Admitting: OBSTETRICS & GYNECOLOGY
Payer: COMMERCIAL

## 2018-07-31 DIAGNOSIS — O41.03X0 OLIGOHYDRAMNIOS IN THIRD TRIMESTER, SINGLE OR UNSPECIFIED FETUS: ICD-10-CM

## 2018-07-31 DIAGNOSIS — O24.414 INSULIN CONTROLLED GESTATIONAL DIABETES MELLITUS (GDM) DURING PREGNANCY, ANTEPARTUM: ICD-10-CM

## 2018-07-31 DIAGNOSIS — O24.414 INSULIN CONTROLLED GESTATIONAL DIABETES MELLITUS (GDM) DURING PREGNANCY, ANTEPARTUM: Primary | ICD-10-CM

## 2018-07-31 DIAGNOSIS — O24.414 INSULIN CONTROLLED GESTATIONAL DIABETES MELLITUS (GDM) IN THIRD TRIMESTER: Primary | ICD-10-CM

## 2018-07-31 LAB
ERYTHROCYTE [DISTWIDTH] IN BLOOD BY AUTOMATED COUNT: 14.1 % (ref 10–15)
GLUCOSE BLDC GLUCOMTR-MCNC: 164 MG/DL (ref 70–99)
GLUCOSE BLDC GLUCOMTR-MCNC: 79 MG/DL (ref 70–99)
HCT VFR BLD AUTO: 37.4 % (ref 35–47)
HGB BLD-MCNC: 12.2 G/DL (ref 11.7–15.7)
MCH RBC QN AUTO: 30.3 PG (ref 26.5–33)
MCHC RBC AUTO-ENTMCNC: 32.6 G/DL (ref 31.5–36.5)
MCV RBC AUTO: 93 FL (ref 78–100)
PLATELET # BLD AUTO: 228 10E9/L (ref 150–450)
RBC # BLD AUTO: 4.03 10E12/L (ref 3.8–5.2)
RUPTURE OF FETAL MEMBRANES BY ROM PLUS: NEGATIVE
WBC # BLD AUTO: 11.9 10E9/L (ref 4–11)

## 2018-07-31 PROCEDURE — 40000068 ZZH STATISTIC EVAL-PTS ADMITTED TO OTHER DEPTS: Mod: ZF

## 2018-07-31 PROCEDURE — 84112 EVAL AMNIOTIC FLUID PROTEIN: CPT | Performed by: OBSTETRICS & GYNECOLOGY

## 2018-07-31 PROCEDURE — 25000132 ZZH RX MED GY IP 250 OP 250 PS 637: Performed by: OBSTETRICS & GYNECOLOGY

## 2018-07-31 PROCEDURE — 25000128 H RX IP 250 OP 636: Performed by: OBSTETRICS & GYNECOLOGY

## 2018-07-31 PROCEDURE — 76819 FETAL BIOPHYS PROFIL W/O NST: CPT

## 2018-07-31 PROCEDURE — 00000146 ZZHCL STATISTIC GLUCOSE BY METER IP

## 2018-07-31 PROCEDURE — 36415 COLL VENOUS BLD VENIPUNCTURE: CPT | Performed by: OBSTETRICS & GYNECOLOGY

## 2018-07-31 PROCEDURE — 99221 1ST HOSP IP/OBS SF/LOW 40: CPT | Performed by: OBSTETRICS & GYNECOLOGY

## 2018-07-31 PROCEDURE — 85027 COMPLETE CBC AUTOMATED: CPT | Performed by: OBSTETRICS & GYNECOLOGY

## 2018-07-31 PROCEDURE — 12000029 ZZH R&B OB INTERMEDIATE

## 2018-07-31 PROCEDURE — 25000131 ZZH RX MED GY IP 250 OP 636 PS 637: Performed by: OBSTETRICS & GYNECOLOGY

## 2018-07-31 RX ORDER — ZOLPIDEM TARTRATE 5 MG/1
5 TABLET ORAL
Status: DISCONTINUED | OUTPATIENT
Start: 2018-07-31 | End: 2018-08-02 | Stop reason: HOSPADM

## 2018-07-31 RX ORDER — SYRINGE-NEEDLE,INSULIN,0.5 ML 27GX1/2"
SYRINGE, EMPTY DISPOSABLE MISCELLANEOUS
Qty: 100 EACH | Refills: 3 | Status: SHIPPED | OUTPATIENT
Start: 2018-07-31 | End: 2019-10-15

## 2018-07-31 RX ORDER — ACETAMINOPHEN 325 MG/1
325 TABLET ORAL EVERY 4 HOURS PRN
Status: DISCONTINUED | OUTPATIENT
Start: 2018-07-31 | End: 2018-08-02 | Stop reason: HOSPADM

## 2018-07-31 RX ORDER — BETAMETHASONE SODIUM PHOSPHATE AND BETAMETHASONE ACETATE 3; 3 MG/ML; MG/ML
12 INJECTION, SUSPENSION INTRA-ARTICULAR; INTRALESIONAL; INTRAMUSCULAR; SOFT TISSUE EVERY 24 HOURS
Status: COMPLETED | OUTPATIENT
Start: 2018-07-31 | End: 2018-08-01

## 2018-07-31 RX ORDER — SODIUM CHLORIDE, SODIUM LACTATE, POTASSIUM CHLORIDE, CALCIUM CHLORIDE 600; 310; 30; 20 MG/100ML; MG/100ML; MG/100ML; MG/100ML
INJECTION, SOLUTION INTRAVENOUS CONTINUOUS
Status: DISCONTINUED | OUTPATIENT
Start: 2018-07-31 | End: 2018-08-02 | Stop reason: CLARIF

## 2018-07-31 RX ORDER — DEXTROSE MONOHYDRATE 25 G/50ML
25-50 INJECTION, SOLUTION INTRAVENOUS
Status: DISCONTINUED | OUTPATIENT
Start: 2018-07-31 | End: 2018-08-02 | Stop reason: HOSPADM

## 2018-07-31 RX ORDER — ONDANSETRON 2 MG/ML
4 INJECTION INTRAMUSCULAR; INTRAVENOUS EVERY 6 HOURS PRN
Status: DISCONTINUED | OUTPATIENT
Start: 2018-07-31 | End: 2018-07-31

## 2018-07-31 RX ORDER — NICOTINE POLACRILEX 4 MG
15-30 LOZENGE BUCCAL
Status: DISCONTINUED | OUTPATIENT
Start: 2018-07-31 | End: 2018-08-02 | Stop reason: HOSPADM

## 2018-07-31 RX ORDER — ONDANSETRON 2 MG/ML
4 INJECTION INTRAMUSCULAR; INTRAVENOUS EVERY 6 HOURS PRN
Status: DISCONTINUED | OUTPATIENT
Start: 2018-07-31 | End: 2018-08-02 | Stop reason: HOSPADM

## 2018-07-31 RX ADMIN — ZOLPIDEM TARTRATE 5 MG: 5 TABLET, FILM COATED ORAL at 21:59

## 2018-07-31 RX ADMIN — BETAMETHASONE SODIUM PHOSPHATE AND BETAMETHASONE ACETATE 12 MG: 3; 3 INJECTION, SUSPENSION INTRA-ARTICULAR; INTRALESIONAL; INTRAMUSCULAR at 17:48

## 2018-07-31 RX ADMIN — SODIUM CHLORIDE, POTASSIUM CHLORIDE, SODIUM LACTATE AND CALCIUM CHLORIDE: 600; 310; 30; 20 INJECTION, SOLUTION INTRAVENOUS at 17:10

## 2018-07-31 RX ADMIN — INSULIN HUMAN 30 UNITS: 100 INJECTION, SUSPENSION SUBCUTANEOUS at 20:47

## 2018-07-31 NOTE — TELEPHONE ENCOUNTER
[facility], [facility] did not receive syringes are needles.    Rx sent after huddling with [facility] Educator.    Mahnaz ROWELL RN, BSN, PHN  [place] [facility] RN

## 2018-07-31 NOTE — MR AVS SNAPSHOT
After Visit Summary   7/31/2018    Tosin Pfeiffer    MRN: [id number]           Patient Information     Date Of Birth          1985        Visit Information        Provider Department      7/31/2018 4:00 PM Lyn Kahn MD [facility] Maternal Fetal Medicine [facility]        Today's Diagnoses     Insulin controlled gestational diabetes mellitus (GDM) during pregnancy, antepartum    -  1    Oligohydramnios in third trimester, single or unspecified fetus           Follow-ups after your visit        Your next 10 appointments already scheduled     Aug 03, 2018  3:30 PM CDT   MFM BPP SINGLE with [id number]   [facility] Maternal Fetal Medicine Ultrasound - [facility])    [address]  [facility] [address]   [phone]            Aug 03, 2018  4:00 PM CDT   Radiology MD with JOSEPH MOODY MD   [facility] Fetal [place] ([facility])    [address]  [facility] [address]   [phone]           Please arrive at the time given for your first appointment. This visit is used internally to schedule the physician's time during your ultrasound.            Aug 06, 2018  1:45 PM CDT   [facility]ari Ramirez,    [facility]    [address]  [facility] [address]   [phone]            Aug 07, 2018  1:30 PM CDT   MFM BPP SINGLE with [id number]   [facility] Maternal Fetal Medicine Ultrasound - [facility])    [address]  [facility] [address]   [phone]            Aug 07, 2018  2:00 PM CDT   Radiology MD with JOSEPH MOODY MD   [facility]etal [place] ([facility])    [address]  [facility] [address]   [phone]           Please arrive at the time given for your first appointment. This visit is used internally to schedule the physician's time during  your ultrasound.            Aug 10, 2018  8:00 AM CDT   M BPP SINGLE with [id number]   [facility] Maternal Fetal Medicine Ultrasound - [facility] ([facility])    [address]  [facility] [address] [phone]            Aug 10, 2018  8:30 AM CDT   Radiology MD with RH MFM MD   [facility] Fetal [facility] ([facility])    [address]  [facility] [address] [phone]           Please arrive at the time given for your first appointment. This visit is used internally to schedule the physician's time during your ultrasound.            Aug 14, 2018 11:00 AM CDT   [facility] US COMPRE SINGLE F/U with [id number]   [facility] Maternal Fetal [facility] Ultrasound [facility] ([facility])    [address]  [facility] [address] [phone]           Wear comfortable clothes and leave your valuables at home.            Aug 14, 2018 11:30 AM CDT   Radiology MD with [facility] MD   [facility] Maternal Fetal Medicine [facility] ([facility])    [address]  [facility] [address] [phone]           Please arrive at the time given for your first appointment. This visit is used internally to schedule the physician's time during your ultrasound.            Aug 14, 2018  1:00 PM CDT   ESTABLISHED PRENATAL with Tari Ramirez,    [facility]    [address] [facility] [address] [phone]              Who to contact     If you have questions or need follow up information about today's clinic visit or your schedule please contact [facility] MATERNAL FETAL [facility] directly at [phone].  Normal or non-critical lab and imaging results will be communicated to you by MyChart, letter or phone within 4 business days after the clinic has received the results. If you do not hear from us within 7 days, please contact the clinic through  [employer] or phone. If you have a critical or abnormal lab result, we will notify you by phone as soon as possible.  Submit refill requests through Disc[employer] or call your pharmacy and they will forward the refill request to us. Please allow 3 business days for your refill to be completed.          Additional Information About Your Visit        [employer] Information     [employer] gives you secure access to your electronic health record. If you see a primary care provider, you can also send messages to your care team and make appointments. If you have questions, please call your primary care clinic.  If you do not have a primary care provider, please call [phone] and they will assist you.        Care EveryWhere ID     This is your Care EveryWhere ID. This could be used by other organizations to access your [facility] medical records  [id number]        Your Vitals Were     Last Period                   12/20/2017 (Exact Date)            Blood Pressure from Last 3 Encounters:   07/17/18 110/60   06/19/18 110/64   05/22/18 118/70    Weight from Last 3 Encounters:   07/17/18 106.3 kg (234 lb 4.8 oz)   06/28/18 104.3 kg (230 lb)   06/19/18 103.6 kg (228 lb 6.4 oz)              Today, you had the following     No orders found for display       Primary Care Provider Office Phone # Fax #    Meron Ann Aaseby-Aguilera, PA-C [phone] [address] [facility] [address]        Equal Access to Services     [facility] : Hadii aad ku hadasho Soomaali, waaxda luqadaha, qaybta kaalmada adeegyada, molly avila . So [facility] [phone].    ATENCIÓN: Si habla español, tiene a albright disposición servicios gratuitos de asistencia lingüística. Joelle al [phone].    We comply with applicable federal civil rights laws and [place] laws. We do not discriminate on the basis of race, color, national origin, age, disability, sex, sexual orientation, or gender identity.            Thank you!      Thank you for choosing [facility]  for your care. Our goal is always to provide you with excellent care. Hearing back from our patients is one way we can continue to improve our services. Please take a few minutes to complete the written survey that you may receive in the mail after your visit with us. Thank you!             Your Updated Medication List - Protect others around you: Learn how to safely use, store and throw away your medicines at www.disposemymeds.org.          This list is accurate as of 7/31/18  4:24 PM.  Always use your most recent med list.                   Brand Name Dispense Instructions for use Diagnosis    acetone (Urine) test Strp     25 each    Test once daily x1 week, then reduce to once weekly once consistently negative    Gestational diabetes       blood glucose lancets standard    no brand specified    100 each    Use to test blood sugar 6 times daily or as directed. OneTouch delica lancets.    Gestational diabetes mellitus (GDM)       blood glucose monitoring meter device kit    no brand specified    1 kit    Use to test blood sugar 4 times daily or as directed.    Gestational diabetes mellitus (GDM)       blood glucose monitoring test strip    no brand specified    200 strip    Use to test blood sugars 6 times daily or as directed. OneTouch Verio test strips.    Gestational diabetes        MG Caps           ferrous sulfate 325 (65 Fe) MG tablet    IRON    30 tablet    Take 1 tablet (325 mg) by mouth daily (with breakfast)    Other iron deficiency anemia       * insulin isophane human 100 UNIT/ML injection    HumuLIN N PEN    15 mL    NPH Inject 15 units with breakfast and 30 units at bedtime.    Gestational diabetes mellitus (GDM) requiring insulin       * insulin  UNIT/ML injection    NovoLIN N VIAL    30 mL    15 units before breakfast  30 units at bedtime    Insulin controlled gestational diabetes mellitus (GDM) in third trimester        "insulin pen needle 32G X 4 MM    BD CHRIS U/F    100 each    Use 2 daily as directed.    Gestational diabetes mellitus (GDM) requiring insulin       insulin syringe-needle U-100 30G X 1/2\" 1 ML    BD insulin syringe ultrafine    100 each    Use one syringe Twice daily subcutaneously as directed.    Insulin controlled gestational diabetes mellitus (GDM) in third trimester       prenatal multivitamin plus iron 27-0.8 MG Tabs per tablet      Take by mouth daily        ranitidine 300 MG tablet    ZANTAC    30 tablet    TAKE 1 TABLET (300 MG) BY MOUTH AT BEDTIME    Prenatal care in second trimester       * Notice:  This list has 2 medication(s) that are the same as other medications prescribed for you. Read the directions carefully, and ask your doctor or other care provider to review them with you.      "

## 2018-07-31 NOTE — PROGRESS NOTES
Please see ultrasound report under imaging tab for details on ultrasound performed today.    Lyn Kahn MD  , OB/GYN  Maternal-Fetal Medicine  [email or web address]  [phone] (Academic office)  [phone] (Pager)

## 2018-07-31 NOTE — TELEPHONE ENCOUNTER
Previous insulin that was sent is not covered by insurance.  See message with diabetic educator.    Pt needs new rx for Novolin N insulin, syringes, and needles.    Pended these, please make sure they look correct and sign.    Josie HECTOR R.N.  [facility]

## 2018-07-31 NOTE — TELEPHONE ENCOUNTER
Josie,   Patient can transition to vial & syringe. There is not another option that is equivalent to what she is currently on & approved during pregnancy other than the Novolin N. It's unfortunate but I think we're stuck since this only comes in a vial & syringe form. It looks like she was educated on how to take injections via vial & syringe at one of her in-person appointments with CDE in June.   Let me know if there is further information I can provide or if the patient needs more information. She can always call our triage line at [phone] and get in touch with our triage educator as well.   Thanks,   Kristy Serna, RD, LD

## 2018-07-31 NOTE — NURSING NOTE
Patient to [facility] L& D due to BPP 6/8 no FBM. BERENICE 4 cm.  Dr Kahn spoke with Dr. Plaza. Pt left amb in good condition.

## 2018-07-31 NOTE — IP AVS SNAPSHOT
MRN:[id number]                      After Visit Summary   7/31/2018    Tosin Pfeiffer    MRN: [id number]           Thank you!     Thank you for choosing [facility] for your care. Our goal is always to provide you with excellent care. Hearing back from our patients is one way we can continue to improve our services. Please take a few minutes to complete the written survey that you may receive in the mail after you visit. If you would like to speak to someone directly about your visit please contact Patient Relations at [phone]. Thank you!          Patient Information     Date Of Birth          1985        About your hospital stay     You were admitted on:  July 31, 2018 You last received care in the:  [facility] Labor [facility] Delivery    You were discharged on:  August 2, 2018        Reason for your hospital stay       Fetal monitoring and steroid administration to help with fetal lung maturity in case of an early delivery.                  Who to Call     For medical emergencies, please call 911.  For non-urgent questions about your medical care, please call your primary care provider or clinic, [phone]          Attending Provider     Provider Specialty    Arya Plaza MD OB/Gyn    [facility]       Primary Care Provider Office Phone # Fax #    Ramona Ann Aaseby-Aguilera, PA-C [phone]      Follow-up Appointments     Follow-up and recommended labs and tests        Follow up with MFM on Friday and primary OB on Monday. Plan to work from home until your OB appointment on Monday.   Drink plenty of fluids and limit activity to moderate exertion, including activities where you can easily carry on a conversation.                  Your next 10 appointments already scheduled     Aug 03, 2018  3:30 PM CDT   MFM BPP SINGLE with [id number]   [facility] - [facility] ([facility])    [address]  [facility] [address]   [phone]            Aug 03, 2018  4:00 PM CDT   Radiology MD with RH HEIDY PANTOJA   [facility] ([facility])    [address] [place] [address]   [phone]           Please arrive at the time given for your first appointment. This visit is used internally to schedule the physician's time during your ultrasound.            Aug 06, 2018  1:45 PM CDT   ESTABLISHED PRENATAL with Tari Ramirez, DO   [facility] ([facility])    [address] [place]  [facility] [address]   [phone]            Aug 07, 2018  1:30 PM CDT   MFM BPP SINGLE with [id number]   [facility]etal [facility] Ultrasound - [facility])    [address] [place] [address]   [phone]            Aug 07, 2018  2:00 PM CDT   Radiology MD with JOSEPH MOODY MD   [facility] Maternal Fetal Medicine [facility] ([facility])    [address] [place] [address]   [phone]           Please arrive at the time given for your first appointment. This visit is used internally to schedule the physician's time during your ultrasound.            Aug 10, 2018  8:00 AM CDT   MFM BPP SINGLE with RHMFMUSR2   [facility]etal [facility] Ultrasound - [facility] ([facility])    [address] [place] [address]   [phone]            Aug 10, 2018  8:30 AM CDT   Radiology MD with RH HEIDY PANTOJA   [facility] Maternal Fetal Medicine [facility] ([facility])    [address] [place] [address]   [phone]           Please arrive at the time given for your first appointment. This visit is used internally to schedule the physician's time during your ultrasound.            Aug 14, 2018 11:00 AM CDT   MFM US COMPRE SINGLE F/U with RHMFMUSR1   [facility] Maternal [facility]etal Medicine Ultrasound -  [facility] ([facility])    [address] [facility] [address]  [facility] [id number] [phone]           Wear comfortable clothes and leave your valuables at home.            Aug 14, 2018 11:30 AM CDT   Radiology MD with  HEIDY PANTOJA   [facility] - [facility])    [address] [facility] [address]  [facility] [id number] [phone]           Please arrive at the time given for your first appointment. This visit is used internally to schedule the physician's time during your ultrasound.            Aug 14, 2018  1:00 PM CDT   ESTABLISHED PRENATAL with Tari Ramirez, DO   [facility] ([facility])    [address] [place] [address] [phone]              Further instructions from your care team       Discharge Instruction for Undelivered Patients      You were seen for: Fetal Assessment  We Consulted: , , and   You had (Test or Medicine):Monitoring, IV fluids, Betamethasone, BPPs     Diet:   Drink 8 to 12 glasses of liquids (milk, juice, water) every day.  You may eat meals and snacks.  To manager your diabetes, follow the guidelines for eating and drinking given to you by your Clinic Provider or Diabetes Educator.       Activity:  Call your doctor or nurse midwife if your baby is moving less than usual.   Work from home until OB appointment on Monday    Call your provider if you notice:  Swelling in your face or increased swelling in your hands or legs.  Headaches that are not relieved by Tylenol (acetaminophen).  Changes in your vision (blurring: seeing spots or stars.)  Nausea (sick to your stomach) and vomiting (throwing up).   Weight gain of 5 pounds or more per week.  Heartburn that doesn't go away.  Signs of bladder infection: pain when you urinate (use the toilet), need to go more often and more urgently.  The bag of moscoso (rupture of membranes) breaks, or you notice  leaking in your underwear.  Bright red blood in your underwear.  Abdominal (lower belly) or stomach pain.  Second (plus) baby: Contractions (tightening) less than 10 minutes apart and getting stronger.  *If less than 34 weeks: Contractions (tightenings) more than 6 times in one hour.  Increase or change in vaginal discharge (note the color and amount)      Follow-up:  Follow up as scheduled in [facility] tomorrow and OB clinic 8/6          Pending Results     No orders found from 7/29/2018 to 8/1/2018.            Statement of Approval     Ordered          08/02/18 1018  I have reviewed and agree with all the recommendations and orders detailed in this document.  EFFECTIVE NOW     Approved and electronically signed by:  Ayde Stringer MD             Admission Information     Date & Time Provider Department Dept. Phone    7/31/2018 Greyson Cadet MD [facility] Labor and Delivery [phone]      Your Vitals Were     Blood Pressure Temperature Respirations Last Period          113/71 97.5  F (36.4  C) (Oral) 18 12/20/2017 (Exact Date)        MyChart Information     [employer] gives you secure access to your electronic health record. If you see a primary care provider, you can also send messages to your care team and make appointments. If you have questions, please call your primary care clinic.  If you do not have a primary care provider, please call [phone] and they will assist you.        Care EveryWhere ID     This is your Care EveryWhere ID. This could be used by other organizations to access your [facility] medical records  [id number]        Equal Access to Services     FARIHA SOLIS : Hadii yadira henaoo Solyle, waaxda luqadaha, qaybta kaalmada neyataye, molly avila . So [facility] [phone].    ATENCIÓN: Si habla español, tiene a albright disposición servicios gratuitos de asistencia lingüística. Llame al [phone].    We comply with applicable federal civil rights laws and  [place] laws. We do not discriminate on the basis of race, color, national origin, age, disability, sex, sexual orientation, or gender identity.               Review of your medicines      CONTINUE these medicines which have NOT CHANGED        Dose / Directions    acetone (Urine) test Strp   Used for:  Gestational diabetes        Test once daily x1 week, then reduce to once weekly once consistently negative   Quantity:  25 each   Refills:  1       blood glucose lancets standard   Commonly known as:  no brand specified   Used for:  Gestational diabetes mellitus (GDM)        Use to test blood sugar 6 times daily or as directed. OneTouch delica lancets.   Quantity:  100 each   Refills:  PRN       blood glucose monitoring meter device kit   Commonly known as:  no brand specified   Used for:  Gestational diabetes mellitus (GDM)        Use to test blood sugar 4 times daily or as directed.   Quantity:  1 kit   Refills:  0       blood glucose monitoring test strip   Commonly known as:  no brand specified   Used for:  Gestational diabetes        Use to test blood sugars 6 times daily or as directed. OneTouch Verio test strips.   Quantity:  200 strip   Refills:  3        MG Caps        Refills:  0       ferrous sulfate 325 (65 Fe) MG tablet   Commonly known as:  IRON   Used for:  Other iron deficiency anemia        Dose:  325 mg   Take 1 tablet (325 mg) by mouth daily (with breakfast)   Quantity:  30 tablet   Refills:  2       * insulin isophane human 100 UNIT/ML injection   Commonly known as:  HumuLIN N PEN   Used for:  Gestational diabetes mellitus (GDM) requiring insulin        NPH Inject 15 units with breakfast and 30 units at bedtime.   Quantity:  15 mL   Refills:  3       * insulin  UNIT/ML injection   Commonly known as:  NovoLIN N VIAL   Used for:  Insulin controlled gestational diabetes mellitus (GDM) in third trimester        15 units before breakfast  30 units at bedtime   Quantity:  30 mL   Refills:  " 3       insulin pen needle 32G X 4 MM   Commonly known as:  BD CHRIS U/F   Used for:  Gestational diabetes mellitus (GDM) requiring insulin        Use 2 daily as directed.   Quantity:  100 each   Refills:  3       insulin syringe-needle U-100 30G X 1/2\" 1 ML   Commonly known as:  BD insulin syringe ultrafine   Used for:  Insulin controlled gestational diabetes mellitus (GDM) in third trimester        Use one syringe Twice daily subcutaneously as directed.   Quantity:  100 each   Refills:  3       prenatal multivitamin plus iron 27-0.8 MG Tabs per tablet        Take by mouth daily   Refills:  0       ranitidine 300 MG tablet   Commonly known as:  ZANTAC   Used for:  Prenatal care in second trimester        TAKE 1 TABLET (300 MG) BY MOUTH AT BEDTIME   Quantity:  30 tablet   Refills:  0       * Notice:  This list has 2 medication(s) that are the same as other medications prescribed for you. Read the directions carefully, and ask your doctor or other care provider to review them with you.             Protect others around you: Learn how to safely use, store and throw away your medicines at www.disposemymeds.org.             Medication List: This is a list of all your medications and when to take them. Check marks below indicate your daily home schedule. Keep this list as a reference.      Medications           Morning Afternoon Evening Bedtime As Needed    acetone (Urine) test Strp   Test once daily x1 week, then reduce to once weekly once consistently negative                                blood glucose lancets standard   Commonly known as:  no brand specified   Use to test blood sugar 6 times daily or as directed. OneTouch delica lancets.                                blood glucose monitoring meter device kit   Commonly known as:  no brand specified   Use to test blood sugar 4 times daily or as directed.                                blood glucose monitoring test strip   Commonly known as:  no brand specified   Use " "to test blood sugars 6 times daily or as directed. OneTouch Verio test strips.                                 MG Caps                                ferrous sulfate 325 (65 Fe) MG tablet   Commonly known as:  IRON   Take 1 tablet (325 mg) by mouth daily (with breakfast)   Last time this was given:  325 mg on 8/2/2018  6:00 AM                                * insulin isophane human 100 UNIT/ML injection   Commonly known as:  HumuLIN N PEN   NPH Inject 15 units with breakfast and 30 units at bedtime.   Last time this was given:  15 Units on 8/2/2018  7:38 AM                                * insulin  UNIT/ML injection   Commonly known as:  NovoLIN N VIAL   15 units before breakfast  30 units at bedtime   Last time this was given:  15 Units on 8/2/2018  7:38 AM                                insulin pen needle 32G X 4 MM   Commonly known as:  BD CHRIS U/F   Use 2 daily as directed.                                insulin syringe-needle U-100 30G X 1/2\" 1 ML   Commonly known as:  BD insulin syringe ultrafine   Use one syringe Twice daily subcutaneously as directed.                                prenatal multivitamin plus iron 27-0.8 MG Tabs per tablet   Take by mouth daily   Last time this was given:  1 tablet on 8/1/2018 11:24 AM                                ranitidine 300 MG tablet   Commonly known as:  ZANTAC   TAKE 1 TABLET (300 MG) BY MOUTH AT BEDTIME                                * Notice:  This list has 2 medication(s) that are the same as other medications prescribed for you. Read the directions carefully, and ask your doctor or other care provider to review them with you.      "

## 2018-07-31 NOTE — TELEPHONE ENCOUNTER
Te Wagner,  Called [facility]  Novolin N comes in only a vial.  That is what is covered.  If pt needs pen we have to order a new insulin (novolog family).  If we order novolin N then we just need to order syringes and needles.      What are your thoughts?    Josie HECTOR R.N.  [facility]

## 2018-07-31 NOTE — IP AVS SNAPSHOT
[facility] Labor and Delivery    [address]    [facility] [address]    Phone:  [phone]    Fax:  [phone]                                       After Visit Summary   7/31/2018    Tosin Pfeiffer    MRN: [id number]           After Visit Summary Signature Page     I have received my discharge instructions, and my questions have been answered. I have discussed any challenges I see with this plan with the nurse or doctor.    ..........................................................................................................................................  Patient/Patient Representative Signature      ..........................................................................................................................................  Patient Representative Print Name and Relationship to Patient    ..................................................               ................................................  Date                                            Time    ..........................................................................................................................................  Reviewed by Signature/Title    ...................................................              ..............................................  Date                                                            Time

## 2018-08-01 ENCOUNTER — APPOINTMENT (OUTPATIENT)
Dept: ULTRASOUND IMAGING | Facility: CLINIC | Age: 33
End: 2018-08-01
Attending: OBSTETRICS & GYNECOLOGY
Payer: COMMERCIAL

## 2018-08-01 LAB
GLUCOSE BLDC GLUCOMTR-MCNC: 104 MG/DL (ref 70–99)
GLUCOSE BLDC GLUCOMTR-MCNC: 123 MG/DL (ref 70–99)
GLUCOSE BLDC GLUCOMTR-MCNC: 129 MG/DL (ref 70–99)
GLUCOSE BLDC GLUCOMTR-MCNC: 140 MG/DL (ref 70–99)
GLUCOSE BLDC GLUCOMTR-MCNC: 141 MG/DL (ref 70–99)

## 2018-08-01 PROCEDURE — 99231 SBSQ HOSP IP/OBS SF/LOW 25: CPT | Performed by: OBSTETRICS & GYNECOLOGY

## 2018-08-01 PROCEDURE — 76819 FETAL BIOPHYS PROFIL W/O NST: CPT

## 2018-08-01 PROCEDURE — 00000146 ZZHCL STATISTIC GLUCOSE BY METER IP

## 2018-08-01 PROCEDURE — 25000132 ZZH RX MED GY IP 250 OP 250 PS 637: Performed by: OBSTETRICS & GYNECOLOGY

## 2018-08-01 PROCEDURE — 12000031 ZZH R&B OB CRITICAL

## 2018-08-01 PROCEDURE — 25000128 H RX IP 250 OP 636: Performed by: OBSTETRICS & GYNECOLOGY

## 2018-08-01 RX ORDER — PRENATAL VIT/IRON FUM/FOLIC AC 27MG-0.8MG
1 TABLET ORAL DAILY
Status: DISCONTINUED | OUTPATIENT
Start: 2018-08-01 | End: 2018-08-02 | Stop reason: HOSPADM

## 2018-08-01 RX ORDER — FERROUS SULFATE 325(65) MG
325 TABLET ORAL DAILY
Status: DISCONTINUED | OUTPATIENT
Start: 2018-08-01 | End: 2018-08-02 | Stop reason: HOSPADM

## 2018-08-01 RX ADMIN — SODIUM CHLORIDE, POTASSIUM CHLORIDE, SODIUM LACTATE AND CALCIUM CHLORIDE: 600; 310; 30; 20 INJECTION, SOLUTION INTRAVENOUS at 00:26

## 2018-08-01 RX ADMIN — ZOLPIDEM TARTRATE 5 MG: 5 TABLET, FILM COATED ORAL at 21:55

## 2018-08-01 RX ADMIN — FERROUS SULFATE TAB 325 MG (65 MG ELEMENTAL FE) 325 MG: 325 (65 FE) TAB at 11:24

## 2018-08-01 RX ADMIN — SODIUM CHLORIDE, POTASSIUM CHLORIDE, SODIUM LACTATE AND CALCIUM CHLORIDE: 600; 310; 30; 20 INJECTION, SOLUTION INTRAVENOUS at 08:12

## 2018-08-01 RX ADMIN — INSULIN HUMAN 30 UNITS: 100 INJECTION, SUSPENSION SUBCUTANEOUS at 19:22

## 2018-08-01 RX ADMIN — PRENATAL VIT W/ FE FUMARATE-FA TAB 27-0.8 MG 1 TABLET: 27-0.8 TAB at 11:24

## 2018-08-01 RX ADMIN — BETAMETHASONE SODIUM PHOSPHATE AND BETAMETHASONE ACETATE 12 MG: 3; 3 INJECTION, SUSPENSION INTRA-ARTICULAR; INTRALESIONAL; INTRAMUSCULAR at 17:48

## 2018-08-01 NOTE — PLAN OF CARE
"Problem: Patient Care Overview  Goal: Plan of Care/Patient Progress Review  Outcome: Improving  Patient was able to rest for short periods tonight. FHT overall category 1 tracing with occasional VD's. Irregular contractions noted tonight, patient stated \" tightness\" rating pain 1/10. VSS.  at 0300. IV LR continuous. Patient transferred to US this morning at 0517 for BPP, results pending.       "

## 2018-08-01 NOTE — H&P
Admitted:     2018      HISTORY OF PRESENT ILLNESS:  The patient is a 32-year-old white female  2, para 1-0-0-1, EDC of 2018, blood group A, Rh negative, rubella status immune with White's classification A2 gestational diabetes.  The patient underwent a biophysical profile and assessment by the Maternal Fetal Medicine specialist today, the biophysical score was 6 out 8 with 2 off for breathing.  At that time.  Amniotic fluid index was noted to be 4.  Due to the low fluid volume and biophysical profile score of 6 out of 8, the patient was sent to labor and delivery for fetal monitoring.  Her past history is remarkable for a  section due to arrest of descent with an 8 pound 11 ounce infant in a pregnancy also complicated with White's classification A2 gestational diabetes.  The patient states that the rest of her pregnancy has been unremarkable.        PAST MEDICAL HISTORY:  The patient is Rh negative, received RhoGAM at 28 weeks' gestation.   Past medical history is remarkable for A2 gestational diabetes, a history of exercise-induced asthma and anemia on iron therapy.      HABITS:  Smoking, none.  Alcohol, none.  Drug use, none.      PAST SURGICAL HISTORY:  [place] teeth at age 15,  section with the birth of her first child on 2014.      SOCIAL HISTORY:  The patient is an  and works for the [employer].      OBSTETRICAL HISTORY:  As noted.      FAMILY HISTORY:  Mother with bipolar disease.  Father with hypertension.  Paternal grandfather with MI at age 50.  Paternal aunt with breast cancer diagnosed in the early to mid-40s.      PHYSICAL EXAMINATION:   GENERAL:  Pleasant female in no acute distress.   VITAL SIGNS:  Temperature is 98.3, respiratory rate is 18, blood pressure 124/79.   HEENT:  Pupils are equally correct and accommodate.  EOMs intact and symmetrical.  There is no adenopathy.  Pharynx and thyroid are normal.   LUNGS:  Clear to auscultation and  percussion.  There is no CVA tenderness.   CARDIOVASCULAR:  Audible S1, S2, without S3, S4, 2/6 systolic ejection murmur loudest at the left sternal border.   ABDOMEN:  Bowel sounds, soft.  Avila infant, vertex presentation.  Estimated fetal weight 3.75 pounds.     PELVIC:  Cervix is closed.  ROM Plus was done and was negative for rupture of membranes.   NEUROLOGIC:  Alert and oriented x 3.      ASSESSMENT:   1.  Intrauterine pregnancy 31-6/7 weeks' gestation with oligohydramnios, no evidence of  premature rupture of membranes.   2.  White's classification A2 gestational diabetes.   3.  Previous  section with past history of A2 diabetes.      PLAN:  In consultation with Dr. Kahn from the  department, we will admit the patient for observation to labor and delivery.  The patient will receive betamethasone to accelerate lung maturity and we will repeat a biophysical profile of amniotic fluid assessment in the morning.  Late  or early term delivery is usually indicated if the oligohydramnios persists to 36/37 weeks as long as other testing is reassuring.        We will cover the patient with her insulin of 15 units of Novolin in the morning, 30 units of Novolin in the evening.  Insulin therapy will be adjusted based on her blood sugar readings.  I reviewed this finding with the patient.  I think she has a good understanding of the nature of the problem, the nature of the procedure, the risks, the benefits and the alternatives.  All of her questions have been answered and informed consent has been obtained.  The patient had questions previously about a trial of labor after  section and we will further address that as the pregnancy progresses.         HARSH MONTES MD             D: 2018   T: 2018   MT: TIFFANIE      Name:     JACOB AVERY   MRN:      [id number]        Account:      [id number]   :      1985        Admitted:     2018                    Document: [id number]       cc: Arya Plaza MD

## 2018-08-01 NOTE — PROGRESS NOTES
[facility] Labor and Delivery Progress Note    Tosin Pfeiffer MRN# [id number]   Age: 32 year old YOB: 1985           Subjective:   Admitted for oligohydramnios and abnormal  testing      -on BPP at M yesterday; low amniotic fluid and BPP -sent to L/D for monitoring and       -hydration    BPP today (at [facility])     BPP-8/     BERENICE; 5.8            Objective:   Patient Vitals for the past 8 hrs:   BP Temp Temp src Resp   18 0800 123/70 98.1  F (36.7  C) Oral 18          Membranes: Intact     Fetal Heart Rate:    Monitor: External US    Variability: Moderate    Baseline Rate: 120 bpm    Fetal Heart Rate Tracing: reactive          Assessment:   Tosin Pfeiffer is a 32 year old  who is 32w0d here with oligohydramnios and       -abnormal BPP          Plan:   Observation-continue monitoring    -complete Betamethasone    -repeat BPP tomorrow (at [facility])    -consider release thereafter      @sign@

## 2018-08-01 NOTE — PROVIDER NOTIFICATION
07/31/18 1846   Provider Notification   Provider Name/Title Dr. Plaza   Method of Notification In Department   Per MD, pt has closed cervix. Updated MD on negative ROM +. BR with bathroom privileges.

## 2018-08-01 NOTE — PLAN OF CARE
Data: [facility] presented to [facility]: 2018  4:24 PM.  Reason for maternal/fetal assessment is sent from [facility] for low BERENICE (4) BPP 6/10. Patient is a .  Prenatal record reviewed. Pregnancy  has been complicated by gestational diabetes, insulin controlled.  Gestational Age 31w6d. VSS. Fetal movement present. Patient denies leaking of vaginal fluid/rupture of membranes, vaginal bleeding, abdominal pain, pelvic pressure, nausea, vomiting, headache, visual disturbances, epigastric or URQ pain, significant edema. Support person is present.   Action: Verbal consent for EFM. Triage assessment completed. Bill of rights reviewed.  Response: Patient verbalized agreement with plan. Will contact Dr Arya Plaza with update and further orders.

## 2018-08-01 NOTE — PROVIDER NOTIFICATION
"   07/31/18 0664   Provider Notification   Provider Name/Title Dr. Plaza   Method of Notification Electronic Page   Request Evaluate - Remote   Notification Reason Patient Arrived;Status Update;Uterine Activity;Decels;Lab/Diagnostic Study   Updated Dr. Plaza on arrival of pt (MD aware of reason for admission from [facility]).  Updated MD on FHT's 130's, moderate variability with accelerations, VD x1. Pt has occasional \"tightness\" ctx--denies pain. Abdomen soft on palpation.     Orders for continuous fetal monitoring, Betamethasone, ROM +, continuous LR infusion, home insulin dosage/blood glucose monitoring 1 hr post-meals and AM fasting, BPP in AM. POC reviewed with pt and SO--all questions answered.   "

## 2018-08-01 NOTE — PLAN OF CARE
Problem: Patient Care Overview  Goal: Plan of Care/Patient Progress Review  Pt resting now. IV saline locked today. Continuing to monitor blood sugars following betamethasone injections. Fetal and uterine monitoring q shift until reactivity demonstrated. BPP ordered tomorrow am.  aware MFM not available, will be done here.

## 2018-08-02 ENCOUNTER — APPOINTMENT (OUTPATIENT)
Dept: ULTRASOUND IMAGING | Facility: CLINIC | Age: 33
End: 2018-08-02
Attending: OBSTETRICS & GYNECOLOGY
Payer: COMMERCIAL

## 2018-08-02 VITALS — RESPIRATION RATE: 18 BRPM | SYSTOLIC BLOOD PRESSURE: 113 MMHG | DIASTOLIC BLOOD PRESSURE: 71 MMHG | TEMPERATURE: 97.5 F

## 2018-08-02 LAB
GLUCOSE BLDC GLUCOMTR-MCNC: 118 MG/DL (ref 70–99)
GLUCOSE BLDC GLUCOMTR-MCNC: 120 MG/DL (ref 70–99)

## 2018-08-02 PROCEDURE — 99238 HOSP IP/OBS DSCHRG MGMT 30/<: CPT | Performed by: OBSTETRICS & GYNECOLOGY

## 2018-08-02 PROCEDURE — 00000146 ZZHCL STATISTIC GLUCOSE BY METER IP

## 2018-08-02 PROCEDURE — 76819 FETAL BIOPHYS PROFIL W/O NST: CPT

## 2018-08-02 RX ADMIN — FERROUS SULFATE TAB 325 MG (65 MG ELEMENTAL FE) 325 MG: 325 (65 FE) TAB at 06:00

## 2018-08-02 NOTE — DISCHARGE INSTRUCTIONS
Discharge Instruction for Undelivered Patients      You were seen for: Fetal Assessment  We Consulted: , , and   You had (Test or Medicine):Monitoring, IV fluids, Betamethasone, BPPs     Diet:   Drink 8 to 12 glasses of liquids (milk, juice, water) every day.  You may eat meals and snacks.  To manager your diabetes, follow the guidelines for eating and drinking given to you by your Clinic Provider or Diabetes Educator.       Activity:  Call your doctor or nurse midwife if your baby is moving less than usual.   Work from home until OB appointment on Monday    Call your provider if you notice:  Swelling in your face or increased swelling in your hands or legs.  Headaches that are not relieved by Tylenol (acetaminophen).  Changes in your vision (blurring: seeing spots or stars.)  Nausea (sick to your stomach) and vomiting (throwing up).   Weight gain of 5 pounds or more per week.  Heartburn that doesn't go away.  Signs of bladder infection: pain when you urinate (use the toilet), need to go more often and more urgently.  The bag of moscoso (rupture of membranes) breaks, or you notice leaking in your underwear.  Bright red blood in your underwear.  Abdominal (lower belly) or stomach pain.  Second (plus) baby: Contractions (tightening) less than 10 minutes apart and getting stronger.  *If less than 34 weeks: Contractions (tightenings) more than 6 times in one hour.  Increase or change in vaginal discharge (note the color and amount)      Follow-up:  Follow up as scheduled in [facility] tomorrow and OB clinic 8/6

## 2018-08-02 NOTE — DISCHARGE SUMMARY
"[facility] Discharge Summary    Tosin Pfeiffer MRN# [id number]   Age: 32 year old YOB: 1985     Date of Admission:  2018  Date of Discharge::  2018   Admitting Physician:  Arya Plaza MD  Discharge Physician:  Ayde Stringer MD      Home clinic: Dr. Ramirez, [facility]          Admission Diagnoses:   1. Intrauterine pregnancy at 31w6d   2. Abnormal  surveillance  3. Borderline oligohydramnios           Discharge Diagnosis:   1. IUP at 32w1d   2. Normal BPP          Procedures:   Procedure(s):  serial BPPs  Intermittent external fetal monitoring            Medications Prior to Admission:     Docosahexaenoic Acid (DHA) 200 MG CAPS    ferrous sulfate (IRON) 325 (65 Fe) MG tablet Take 1 tablet (325 mg) by mouth daily (with breakfast)   insulin isophane human (HUMULIN N PEN) 100 UNIT/ML injection NPH Inject 15 units with breakfast and 30 units at bedtime.   Prenatal Vit-Fe Fumarate-FA (PRENATAL MULTIVITAMIN PLUS IRON) 27-0.8 MG TABS per tablet Take by mouth daily   ranitidine (ZANTAC) 300 MG tablet TAKE 1 TABLET (300 MG) BY MOUTH AT BEDTIME   acetone, Urine, test STRP Test once daily x1 week, then reduce to once weekly once consistently negative   blood glucose (NO BRAND SPECIFIED) lancets standard Use to test blood sugar 6 times daily or as directed. OneTouch delica lancets.   blood glucose monitoring (NO BRAND SPECIFIED) meter device kit Use to test blood sugar 4 times daily or as directed.   blood glucose monitoring (NO BRAND SPECIFIED) test strip Use to test blood sugars 6 times daily or as directed. OneTouch Verio test strips.   insulin NPH (NOVOLIN N VIAL) 100 UNIT/ML injection 15 units before breakfast  30 units at bedtime   insulin pen needle (BD CHRIS U/F) 32G X 4 MM Use 2 daily as directed.   insulin syringe-needle U-100 (BD INSULIN SYRINGE ULTRAFINE) 30G X 1/2\" 1 ML Use one syringe Twice daily subcutaneously as directed.             Discharge Medications: " "      Current Outpatient Prescriptions   Medication Sig     Docosahexaenoic Acid (DHA) 200 MG CAPS      ferrous sulfate (IRON) 325 (65 Fe) MG tablet Take 1 tablet (325 mg) by mouth daily (with breakfast)     insulin isophane human (HUMULIN N PEN) 100 UNIT/ML injection NPH Inject 15 units with breakfast and 30 units at bedtime.     Prenatal Vit-Fe Fumarate-FA (PRENATAL MULTIVITAMIN PLUS IRON) 27-0.8 MG TABS per tablet Take by mouth daily     ranitidine (ZANTAC) 300 MG tablet TAKE 1 TABLET (300 MG) BY MOUTH AT BEDTIME     acetone, Urine, test STRP Test once daily x1 week, then reduce to once weekly once consistently negative     blood glucose (NO BRAND SPECIFIED) lancets standard Use to test blood sugar 6 times daily or as directed. OneTouch delica lancets.     blood glucose monitoring (NO BRAND SPECIFIED) meter device kit Use to test blood sugar 4 times daily or as directed.     blood glucose monitoring (NO BRAND SPECIFIED) test strip Use to test blood sugars 6 times daily or as directed. OneTouch Verio test strips.     insulin NPH (NOVOLIN N VIAL) 100 UNIT/ML injection 15 units before breakfast  30 units at bedtime     insulin pen needle (BD CHRIS U/F) 32G X 4 MM Use 2 daily as directed.     insulin syringe-needle U-100 (BD INSULIN SYRINGE ULTRAFINE) 30G X 1/2\" 1 ML Use one syringe Twice daily subcutaneously as directed.             Consultations:   HEIDY          Brief History of Illness:   Tosin Pfeiffer is a 32-year-old white female  2, para 1-0-0-1, EDC of 2018, blood group A, Rh negative, rubella status immune with White's classification A2 gestational diabetes.  The patient underwent a biophysical profile and assessment by the Maternal Fetal Medicine specialist today, the biophysical score was 6 out 8 with 2 off for breathing.  At that time.  Amniotic fluid index was noted to be 4.  Due to the low fluid volume and biophysical profile score of 6 out of 8, the patient was sent to labor and delivery for " fetal monitoring.  Her past history is remarkable for a  section due to arrest of descent with an 8 pound 11 ounce infant in a pregnancy also complicated with White's classification A2 gestational diabetes.  The patient states that the rest of her pregnancy has been unremarkable.            Hospital Course:   Tosin received 2 doses of BMZ and was continued on home doses of insulin while in house. Repeat BPPs daily were 8/8 with moderate improvement in fluid level. She will discharge home with limited activity, increased hydration, and follow up BPP tomorrow with clinic visit on Monday. She is working from home until she is evaluated again in clinic.           Discharge Instructions and Follow-Up:   Discharge diet: Encourage fluids   Discharge activity: Activity as tolerated           Discharge Disposition:   Discharged to home      Attestation:  I have reviewed today's vital signs, notes, medications, labs and imaging.    Ayde Stringer  OB/Gyn  2018

## 2018-08-02 NOTE — PROVIDER NOTIFICATION
08/02/18 1015   Provider Notification   Provider Name/Title    Method of Notification At Bedside   Notification Reason Status Update  (reviewed strip)   Reviewed BPP and FHR tracing. Orders to discharge home with MFM appointment tomorrow and OB clinic on Monday.  Pt will monitor blood sugars closely at home following betamethasone injections and call in elevated. Continue po hydration and monitor fetal movement. Pt to work from home until follow up appointment on Monday. Pt verbalized understanding of POC.

## 2018-08-02 NOTE — PLAN OF CARE
Reviewed discharge instructions and papers signs. No questions at this time.   Pt discharged home undelivered. Ambulatory at discharge.

## 2018-08-03 ENCOUNTER — OFFICE VISIT (OUTPATIENT)
Dept: MATERNAL FETAL MEDICINE | Facility: CLINIC | Age: 33
End: 2018-08-03
Attending: OBSTETRICS & GYNECOLOGY
Payer: COMMERCIAL

## 2018-08-03 ENCOUNTER — HOSPITAL ENCOUNTER (OUTPATIENT)
Dept: ULTRASOUND IMAGING | Facility: CLINIC | Age: 33
Discharge: HOME | End: 2018-08-03
Attending: OBSTETRICS & GYNECOLOGY | Admitting: OBSTETRICS & GYNECOLOGY
Payer: COMMERCIAL

## 2018-08-03 DIAGNOSIS — O24.414 INSULIN CONTROLLED GESTATIONAL DIABETES MELLITUS (GDM) DURING PREGNANCY, ANTEPARTUM: Primary | ICD-10-CM

## 2018-08-03 DIAGNOSIS — O24.414 INSULIN CONTROLLED GESTATIONAL DIABETES MELLITUS (GDM) DURING PREGNANCY, ANTEPARTUM: ICD-10-CM

## 2018-08-03 PROCEDURE — 76819 FETAL BIOPHYS PROFIL W/O NST: CPT

## 2018-08-03 NOTE — MR AVS SNAPSHOT
After Visit Summary   8/3/2018    Tosin Pfeiffer    MRN: [id number]           Patient Information     Date Of Birth          1985        Visit Information        Provider Department      8/3/2018 4:00 PM Kerry Dent,  [facility]        Today's Diagnoses     Insulin controlled gestational diabetes mellitus (GDM) during pregnancy, antepartum    -  1       Follow-ups after your visit        Your next 10 appointments already scheduled     Aug 03, 2018  4:00 PM CDT   Radiology MD with Kerry Dent,    [facility])    [address]  [facility] [address]   [phone]           Please arrive at the time given for your first appointment. This visit is used internally to schedule the physician's time during your ultrasound.            Aug 06, 2018  1:45 PM CDT   ESTABLISHED PRENATAL with Tari Ramirez DO   [facility]    [address] [place]  [facility] [address]   [phone]            Aug 07, 2018  1:30 PM CDT   MFM BPP SINGLE with [id number]   [facility])    [address]  [facility] [address]   [phone]            Aug 07, 2018  2:00 PM CDT   Radiology MD with  MFM MD   [facility])    [address]  [facility] [address]   [phone]           Please arrive at the time given for your first appointment. This visit is used internally to schedule the physician's time during your ultrasound.            Aug 10, 2018  8:00 AM CDT   MFM BPP SINGLE with [id number]   [facility])    [address]  [facility] [address]   [phone]            Aug 10, 2018  8:30 AM CDT    Radiology MD with  HEIDY PANTOJA   [facility] Maternal Fetal Medicine [facility] ([facility])    [address]  [facility] [address] [phone]           Please arrive at the time given for your first appointment. This visit is used internally to schedule the physician's time during your ultrasound.            Aug 14, 2018 11:00 AM CDT   MFM US COMPRE SINGLE F/U with [id number]   [facility]aternal [facility]etal Medicine [facility] ([facility])    [address]  [facility] [address] [phone]           Wear comfortable clothes and leave your valuables at home.            Aug 14, 2018 11:30 AM CDT   Radiology MD with  HEIDY PANTOJA   [facility] Maternal Fetal Medicine [facility] ([facility])    [address]  [facility] [id number] [phone]           Please arrive at the time given for your first appointment. This visit is used internally to schedule the physician's time during your ultrasound.            Aug 14, 2018  1:00 PM CDT   ESTABLISHED PRENATAL with Tari Ramirez, DO   [facility]    [address] [place] [address] [phone]            Aug 17, 2018  3:30 PM CDT   M BPP SINGLE with [id number]   [facility] Maternal Fetal Medicine Ultrasound [facility] ([facility])    [address]  [facility] [id number] [phone]              Who to contact     If you have questions or need follow up information about today's clinic visit or your schedule please contact [facility]ER[facility] FETAL [facility]IC[facility] directly at [phone].  Normal or non-critical lab and imaging results will be communicated to you by MyChart, letter or phone within 4 business days after the clinic has received the results. If you do not hear from us within 7 days, please contact the clinic through MyChart or phone. If you have a critical or abnormal  lab result, we will notify you by phone as soon as possible.  Submit refill requests through [employer] or call your pharmacy and they will forward the refill request to us. Please allow 3 business days for your refill to be completed.          Additional Information About Your Visit        [employer] Information     [employer] gives you secure access to your electronic health record. If you see a primary care provider, you can also send messages to your care team and make appointments. If you have questions, please call your primary care clinic.  If you do not have a primary care provider, please call [phone] and they will assist you.        Care EveryWhere ID     This is your Care EveryWhere ID. This could be used by other organizations to access your [place] medical records  [id number]        Your Vitals Were     Last Period                   12/20/2017 (Exact Date)            Blood Pressure from Last 3 Encounters:   08/02/18 113/71   07/17/18 110/60   06/19/18 110/64    Weight from Last 3 Encounters:   07/17/18 106.3 kg (234 lb 4.8 oz)   06/28/18 104.3 kg (230 lb)   06/19/18 103.6 kg (228 lb 6.4 oz)              Today, you had the following     No orders found for display       Primary Care Provider Office Phone # Fax #    Ramona Ann Aaseby-Aguilera, PA-C [phone] [address] [facility] [address]        Equal Access to Services     [facility]: Hadii aad ku hadasho Soomaali, waaxda luqadaha, qaybta kaalmada adeegyada, molly avila . So [facility] [phone].    ATENCIÓN: Si habla español, tiene a albright disposición servicios gratuitos de asistencia lingüística. Llame al [phone].    We comply with applicable federal civil rights laws and [place] laws. We do not discriminate on the basis of race, color, national origin, age, disability, sex, sexual orientation, or gender identity.            Thank you!     Thank you for choosing [facility]  for your care. Our goal is always to provide you with excellent care. Hearing back from our patients is one way we can continue to improve our services. Please take a few minutes to complete the written survey that you may receive in the mail after your visit with us. Thank you!             Your Updated Medication List - Protect others around you: Learn how to safely use, store and throw away your medicines at www.disposemymeds.org.          This list is accurate as of 8/3/18  3:51 PM.  Always use your most recent med list.                   Brand Name Dispense Instructions for use Diagnosis    acetone (Urine) test Strp     25 each    Test once daily x1 week, then reduce to once weekly once consistently negative    Gestational diabetes       blood glucose lancets standard    no brand specified    100 each    Use to test blood sugar 6 times daily or as directed. OneTouch delica lancets.    Gestational diabetes mellitus (GDM)       blood glucose monitoring meter device kit    no brand specified    1 kit    Use to test blood sugar 4 times daily or as directed.    Gestational diabetes mellitus (GDM)       blood glucose monitoring test strip    no brand specified    200 strip    Use to test blood sugars 6 times daily or as directed. OneTouch Verio test strips.    Gestational diabetes        MG Caps           ferrous sulfate 325 (65 Fe) MG tablet    IRON    30 tablet    Take 1 tablet (325 mg) by mouth daily (with breakfast)    Other iron deficiency anemia       * insulin isophane human 100 UNIT/ML injection    HumuLIN N PEN    15 mL    NPH Inject 15 units with breakfast and 30 units at bedtime.    Gestational diabetes mellitus (GDM) requiring insulin       * insulin  UNIT/ML injection    NovoLIN N VIAL    30 mL    15 units before breakfast  30 units at bedtime    Insulin controlled gestational diabetes mellitus (GDM) in third trimester       insulin pen needle 32G X 4 MM    BD CHRIS U/F    100 each  "   Use 2 daily as directed.    Gestational diabetes mellitus (GDM) requiring insulin       insulin syringe-needle U-100 30G X 1/2\" 1 ML    BD insulin syringe ultrafine    100 each    Use one syringe Twice daily subcutaneously as directed.    Insulin controlled gestational diabetes mellitus (GDM) in third trimester       prenatal multivitamin plus iron 27-0.8 MG Tabs per tablet      Take by mouth daily        ranitidine 300 MG tablet    ZANTAC    30 tablet    TAKE 1 TABLET (300 MG) BY MOUTH AT BEDTIME    Prenatal care in second trimester       * Notice:  This list has 2 medication(s) that are the same as other medications prescribed for you. Read the directions carefully, and ask your doctor or other care provider to review them with you.      "

## 2018-08-03 NOTE — PROGRESS NOTES
"Please see \"Imaging\" tab under \"Chart Review\" for details of today's US.    Kerry Dent, DO    "

## 2018-08-06 ENCOUNTER — MYC MEDICAL ADVICE (OUTPATIENT)
Dept: OBGYN | Facility: CLINIC | Age: 33
End: 2018-08-06

## 2018-08-06 ENCOUNTER — PRENATAL OFFICE VISIT (OUTPATIENT)
Dept: OBGYN | Facility: CLINIC | Age: 33
End: 2018-08-06
Payer: COMMERCIAL

## 2018-08-06 VITALS — WEIGHT: 229.6 LBS | BODY MASS INDEX: 40.67 KG/M2 | SYSTOLIC BLOOD PRESSURE: 116 MMHG | DIASTOLIC BLOOD PRESSURE: 70 MMHG

## 2018-08-06 DIAGNOSIS — Z34.93 PRENATAL CARE IN THIRD TRIMESTER: Primary | ICD-10-CM

## 2018-08-06 PROCEDURE — 99207 ZZC PRENATAL VISIT: CPT | Performed by: FAMILY MEDICINE

## 2018-08-06 PROCEDURE — 59025 FETAL NON-STRESS TEST: CPT | Performed by: FAMILY MEDICINE

## 2018-08-06 NOTE — LETTER
8/6/2018     Tosin Pfeiffer  [address]  [facility] [address]      To whom it may concern,      The above patient is pregnant with Estimated Date of Delivery: Sep 26, 2018   And will be on leave 9/26/18 until 12/19/18 with return on 12/20/18.   This could be subject to change depending on actual delivery date.         Sincerely,         [place] [address]  [facility] [address]  Phone: [phone]

## 2018-08-06 NOTE — PATIENT INSTRUCTIONS
Return in 2 weeks     Phone numbers [place]:  Day/ night [phone] ask for ob triage  Emergency:  Call labor and delivery:  [phone]    What should I call about??    Contraction every 5 minutes for 1 hour 1 minute long (511), bleeding, loss of fluid, headache that doesn't resolve with tylenol, and decreased fetal movement     Start kick counts @ 26-28 weeks   Keep track of movement and discover your normal baby movement pattern   guideline is listed below  Please call if you do not feel the baby move!  We will have you come in for fetal heart rate monitoring:   Perception of at least 10 FMs during 12 hours of normal maternal activity   Perception of least 10 FMs over two hours when the mother is at rest and focused on [facility] Address   [address], [place] [address] [phone]    Dr. Tari Ramirez, DO    OB/GYN   [facility]

## 2018-08-06 NOTE — PROGRESS NOTES
CC: Here for routine prenatal visit   32 year old y/o  @ 32w5d with Estimated Date of Delivery: Sep 26, 2018   HPI: Pt is doing well, states her sugars are doing well -- measuring 80's in the morning & up to 140's in the evening. She is currently on 15u of insulin in the morning & 30u in the evening.        This document serves as a record of the services and decisions personally performed and made by Tari Ramirez DO. It was created on her behalf by Kailey Beltre, a trained medical scribe. The creation of this document is based the provider's statements to the medical scribe.  Scribe Kailey Beltre 2:08 PM, 2018    /70 (BP Location: Right arm, Patient Position: Chair, Cuff Size: Adult Large)  Wt 104.1 kg (229 lb 9.6 oz)  LMP 2017 (Exact Date)  BMI 40.67 kg/m2  See OB flowsheet  + fetal movement, no contractions, no bleeding, no loss of fluid   Discussed monitoring fetal movement - aware of kick counts, reports good movement today      1) concerns: initially fetal heart rate @  165-180, NST completed in clinic with normal accelerations & reactive baseline at 140s, long accelerations noted,  2) Routine care: Boy; Declines genetic screening; GC - negative; GTT - abnormal; Tdap given today  3) Gestational Diabetes: on insulin [22 U PM and 12 U AM], followed by MFM, BPP & Growth US @ 36w  4) Delivery: Patient is considering  [calculator 35%], discussed risks.  I am concerned with CPD due to history of 8# 11 oz baby @ 38 weeks and arrest of descent, would not specifically recommend  but would be ok for MELANY if the patient desires and the fetal weight is less than previous baby. Information given and will carefully proceed.    - Plan for CS if no labor by 40-41 weeks   - Consent signed 2018  3) Return: 2 weeks        The information in this document, created by the medical scribe for me, accurately reflects the services I personally performed and the decisions made by me. I have  reviewed and approved this document for accuracy prior to leaving the patient care area.  2:08 PM, 08/06/18    James

## 2018-08-06 NOTE — MR AVS SNAPSHOT
After Visit Summary   8/6/2018    Tosin Pfeiffer    MRN: [id number]           Patient Information     Date Of Birth          1985        Visit Information        Provider Department      8/6/2018 1:45 PM Tari Ramirez, DO [facility]        Today's Diagnoses     Prenatal care in third trimester    -  1      Care Instructions    Return in 2 weeks     Phone numbers [place]:  Day/ night [phone] ask for ob triage  Emergency:  Call labor and delivery:  [phone]    What should I call about??    Contraction every 5 minutes for 1 hour 1 minute long (511), bleeding, loss of fluid, headache that doesn't resolve with tylenol, and decreased fetal movement     Start kick counts @ 26-28 weeks   Keep track of movement and discover your normal baby movement pattern   guideline is listed below  Please call if you do not feel the baby move!  We will have you come in for fetal heart rate monitoring:   Perception of at least 10 FMs during 12 hours of normal maternal activity   Perception of least 10 FMs over two hours when the mother is at rest and focused on counting       [facility] Address   [address], [place], [place] [address] [phone]    Dr. Tari Ramirez, DO    OB/GYN   [facility]                                      Follow-ups after your visit        Your next 10 appointments already scheduled     Aug 07, 2018  1:30 PM CDT   HEIDY PARKP SINGLE with [id number]   [facility] Maternal Fetal Medicine Ultrasound - [facility])    [address]  [facility] [address] [phone]            Aug 07, 2018  2:00 PM CDT   Radiology MD with  HEIDY PANTOJA   [facility] Maternal Fetal Medicine - [facility] ([facility])    [address]  [facility] [address] [phone]           Please arrive at the time given for your first appointment. This visit is used internally to  schedule the physician's time during your ultrasound.            Aug 10, 2018  8:00 AM CDT   MFM BPP SINGLE with [id number]   [facility]    [address]  [place] [address] [phone]            Aug 10, 2018  8:30 AM CDT   Radiology MD with RH HEIDY PANTOJA   [facility] Maternal Fetal Medicine - [facility]    [address]  [place] [address] [phone]           Please arrive at the time given for your first appointment. This visit is used internally to schedule the physician's time during your ultrasound.            Aug 14, 2018 11:00 AM CDT   MFM US COMPRE SINGLE F/U with [id number]   [facility])    [address] [place] [address] [phone]           Wear comfortable clothes and leave your valuables at home.            Aug 14, 2018 11:30 AM CDT   Radiology MD with JOSEPH MOODY MD   [facility] Maternal Fetal Medicine [facility] ([facility])    [address]  [place] [address] [phone]           Please arrive at the time given for your first appointment. This visit is used internally to schedule the physician's time during your ultrasound.            Aug 14, 2018  1:00 PM CDT   ESTABLISHED PRENATAL with Tari Ramirez,    [facility]    [id number] [place] [address] [phone]            Aug 17, 2018  3:30 PM CDT   MFM BPP SINGLE with [id number]   [facility]    [address]  [place] [address] [phone]            Aug 17, 2018  4:00 PM CDT   Radiology MD with RH HEIDY PANTOJA   [facility] Maternal Fetal Medicine [facility] ([facility])    [address]  [place]  [address]   [phone]           Please arrive at the time given for your first appointment. This visit is used internally to schedule the physician's time during your ultrasound.            Aug 28, 2018  3:45 PM CDT   ESTABLISHED PRENATAL with Tari Ramirez, DO   [facility]    [address] [facility] [address] [phone]              Who to contact     If you have questions or need follow up information about today's clinic visit or your schedule please contact [facility] directly at [phone].  Normal or non-critical lab and imaging results will be communicated to you by [employer], letter or phone within 4 business days after the clinic has received the results. If you do not hear from us within 7 days, please contact the clinic through [employer] or phone. If you have a critical or abnormal lab result, we will notify you by phone as soon as possible.  Submit refill requests through [employer] or call your pharmacy and they will forward the refill request to us. Please allow 3 business days for your refill to be completed.          Additional Information About Your Visit        [employer] Information     [employer] gives you secure access to your electronic health record. If you see a primary care provider, you can also send messages to your care team and make appointments. If you have questions, please call your primary care clinic.  If you do not have a primary care provider, please call [phone] and they will assist you.        Care EveryWhere ID     This is your Care EveryWhere ID. This could be used by other organizations to access your [place] medical records  [id number]        Your Vitals Were     Last Period BMI (Body Mass Index)                12/20/2017 (Exact Date) 40.67 kg/m2           Blood Pressure from Last 3 Encounters:   08/06/18 116/70   08/02/18 113/71   07/17/18 110/60    Weight from Last 3 Encounters:   08/06/18  229 lb 9.6 oz (104.1 kg)   07/17/18 234 lb 4.8 oz (106.3 kg)   06/28/18 230 lb (104.3 kg)              Today, you had the following     No orders found for display         Today's Medication Changes          These changes are accurate as of 8/6/18  2:12 PM.  If you have any questions, ask your nurse or doctor.               These medicines have changed or have updated prescriptions.        Dose/Directions    insulin  UNIT/ML injection   Commonly known as:  NovoLIN N VIAL   This may have changed:  Another medication with the same name was removed. Continue taking this medication, and follow the directions you see here.   Used for:  Insulin controlled gestational diabetes mellitus (GDM) in third trimester   Changed by:  Tari Ramirez, DO        15 units before breakfast  30 units at bedtime   Quantity:  30 mL   Refills:  3                Primary Care Provider Office Phone # Fax #    Meron Ann Aaseby-Aguilera, PA-C [phone] [address] [facility] [facility] [address]        Equal Access to Services     [facility]: Hadii yadira ku hadasho Soomaali, waaxda luqadaha, qaybta kaalmada adeegyada, waxay neida avila . So [facility] [phone].    ATENCIÓN: Si habla español, tiene a albright disposición servicios gratuitos de asistencia lingüística. Llame al [phone].    We comply with applicable federal civil rights laws and [place] laws. We do not discriminate on the basis of race, color, national origin, age, disability, sex, sexual orientation, or gender identity.            Thank you!     Thank you for choosing [facility]  for your care. Our goal is always to provide you with excellent care. Hearing back from our patients is one way we can continue to improve our services. Please take a few minutes to complete the written survey that you may receive in the mail after your visit with us. Thank you!             Your Updated Medication List - Protect others  "around you: Learn how to safely use, store and throw away your medicines at www.disposemymeds.org.          This list is accurate as of 8/6/18  2:12 PM.  Always use your most recent med list.                   Brand Name Dispense Instructions for use Diagnosis    acetone (Urine) test Strp     25 each    Test once daily x1 week, then reduce to once weekly once consistently negative    Gestational diabetes       blood glucose lancets standard    no brand specified    100 each    Use to test blood sugar 6 times daily or as directed. OneTouch delica lancets.    Gestational diabetes mellitus (GDM)       blood glucose monitoring meter device kit    no brand specified    1 kit    Use to test blood sugar 4 times daily or as directed.    Gestational diabetes mellitus (GDM)       blood glucose monitoring test strip    no brand specified    200 strip    Use to test blood sugars 6 times daily or as directed. OneTouch Verio test strips.    Gestational diabetes        MG Caps           ferrous sulfate 325 (65 Fe) MG tablet    IRON    30 tablet    Take 1 tablet (325 mg) by mouth daily (with breakfast)    Other iron deficiency anemia       insulin  UNIT/ML injection    NovoLIN N VIAL    30 mL    15 units before breakfast  30 units at bedtime    Insulin controlled gestational diabetes mellitus (GDM) in third trimester       insulin pen needle 32G X 4 MM    BD CHRIS U/F    100 each    Use 2 daily as directed.    Gestational diabetes mellitus (GDM) requiring insulin       insulin syringe-needle U-100 30G X 1/2\" 1 ML    BD insulin syringe ultrafine    100 each    Use one syringe Twice daily subcutaneously as directed.    Insulin controlled gestational diabetes mellitus (GDM) in third trimester       prenatal multivitamin plus iron 27-0.8 MG Tabs per tablet      Take by mouth daily        ranitidine 300 MG tablet    ZANTAC    30 tablet    TAKE 1 TABLET (300 MG) BY MOUTH AT BEDTIME    Prenatal care in second trimester       "

## 2018-08-06 NOTE — TELEPHONE ENCOUNTER
Do we have a note from today there?  I dont see one in her chart.    Josie HECTOR R.N.  [facility]

## 2018-08-06 NOTE — NURSING NOTE
"  Chief Complaint   Patient presents with     Prenatal Care     32 weeks 5 days     Hospital F/U     was admitted to L&D on 07/31-08/02 for low AF and decreased fetal movement/breathing. Patient states since d/c she has been feeling baby move. At BPP last Friday baby passed, still has low AF       Initial /70 (BP Location: Right arm, Patient Position: Chair, Cuff Size: Adult Large)  Wt 229 lb 9.6 oz (104.1 kg)  LMP 12/20/2017 (Exact Date)  BMI 40.67 kg/m2 Estimated body mass index is 40.67 kg/(m^2) as calculated from the following:    Height as of 7/17/18: 5' 3\" (1.6 m).    Weight as of this encounter: 229 lb 9.6 oz (104.1 kg).  Medication Reconciliation: complete    Musa Gee CMA      "

## 2018-08-07 ENCOUNTER — HOSPITAL ENCOUNTER (OUTPATIENT)
Dept: ULTRASOUND IMAGING | Facility: CLINIC | Age: 33
Discharge: HOME | End: 2018-08-07
Attending: OBSTETRICS & GYNECOLOGY | Admitting: OBSTETRICS & GYNECOLOGY
Payer: COMMERCIAL

## 2018-08-07 ENCOUNTER — OFFICE VISIT (OUTPATIENT)
Dept: MATERNAL FETAL MEDICINE | Facility: CLINIC | Age: 33
End: 2018-08-07
Attending: OBSTETRICS & GYNECOLOGY
Payer: COMMERCIAL

## 2018-08-07 DIAGNOSIS — O24.414 INSULIN CONTROLLED GESTATIONAL DIABETES MELLITUS (GDM) DURING PREGNANCY, ANTEPARTUM: ICD-10-CM

## 2018-08-07 DIAGNOSIS — O24.414 INSULIN CONTROLLED GESTATIONAL DIABETES MELLITUS (GDM) DURING PREGNANCY, ANTEPARTUM: Primary | ICD-10-CM

## 2018-08-07 PROCEDURE — 76819 FETAL BIOPHYS PROFIL W/O NST: CPT

## 2018-08-07 NOTE — TELEPHONE ENCOUNTER
Letter in EPIC dated 8/6/18 was copied and pasted in [employer] message to patient. Anali Perez LPN

## 2018-08-07 NOTE — PROGRESS NOTES
Please see full imaging report from ViewPoint program under imaging tab.    BPP 8/8. BERENICE 8.1 cm    Gennaro Irizarry MD  Maternal Fetal Medicine

## 2018-08-07 NOTE — MR AVS SNAPSHOT
After Visit Summary   8/7/2018    Tosin Pfeiffer    MRN: [id number]           Patient Information     Date Of Birth          1985        Visit Information        Provider Department      8/7/2018 2:00 PM Gennaro Irizarry MD [facility] Maternal Fetal Medicine [facility]        Today's Diagnoses     Insulin controlled gestational diabetes mellitus (GDM) during pregnancy, antepartum    -  1       Follow-ups after your visit        Your next 10 appointments already scheduled     Aug 07, 2018  2:00 PM CDT   Radiology MD with Gennaro Irizarry MD   [facility] Maternal Fetal Medicine [facility])    [address]  [facility] [address] [phone]           Please arrive at the time given for your first appointment. This visit is used internally to schedule the physician's time during your ultrasound.            Aug 10, 2018  8:00 AM CDT   MFM BPP SINGLE with [id number]   [facility] Maternal Fetal Medicine Ultrasound - [facility])    [address]  [facility] [address] [phone]            Aug 10, 2018  8:30 AM CDT   Radiology MD with RH HEIDY PANTOJA   [facility] Maternal Fetal Medicine [facility] ([facility])    [address]  [facility] [address] [phone]           Please arrive at the time given for your first appointment. This visit is used internally to schedule the physician's time during your ultrasound.            Aug 14, 2018 11:00 AM CDT   MFM US COMPRE SINGLE F/U with [id number]   [facility] Maternal Fetal Medicine Ultrasound [facility])    [address]  [facility] [address] [phone]           Wear comfortable clothes and leave your valuables at home.            Aug 14, 2018 11:30 AM CDT   Radiology MD with JOSEPH MOODY MD   [facility] Maternal Fetal Medicine [facility] ([facility])    [address]  [place]  [place] [address]   [phone]           Please arrive at the time given for your first appointment. This visit is used internally to schedule the physician's time during your ultrasound.            Aug 14, 2018  1:00 PM CDT   ESTABLISHED PRENATAL with Tari Ramirez, DO   [facility]    [address] [facility] [address]   [phone]            Aug 17, 2018  3:30 PM CDT   HEIDY PARKP SINGLE with [id number]   [facility] Maternal Fetal Medicine Ultrasound - [facility])    [address]  [facility] [address]   [phone]            Aug 17, 2018  4:00 PM CDT   Radiology MD with  HEIDY PANTOJA   [facility] Maternal Fetal Medicine [facility])    [address]  [facility] [address]   [phone]           Please arrive at the time given for your first appointment. This visit is used internally to schedule the physician's time during your ultrasound.            Aug 28, 2018  3:45 PM CDT   ESTABLISHED PRENATAL with Tari Ramirez, DO   [facility]    [address] [facility] [address] [phone]            Sep 04, 2018  3:30 PM CDT   ESTABLISHED PRENATAL with Tari Ramirez DO   [facility]    [address] [facility] [address]   [phone]              Who to contact     If you have questions or need follow up information about today's clinic visit or your schedule please contact [facility] MATERNAL FETAL MEDICINE [facility] directly at [phone].  Normal or non-critical lab and imaging results will be communicated to you by MyChart, letter or phone within 4 business days after the clinic has received the results. If you do not hear from us within 7 days, please contact the clinic through MyChart or phone. If you have a critical or abnormal lab result, we will notify you  by phone as soon as possible.  Submit refill requests through [employer] or call your pharmacy and they will forward the refill request to us. Please allow 3 business days for your refill to be completed.          Additional Information About Your Visit        [employer] Information     [employer] gives you secure access to your electronic health record. If you see a primary care provider, you can also send messages to your care team and make appointments. If you have questions, please call your primary care clinic.  If you do not have a primary care provider, please call [phone] and they will assist you.        Care EveryWhere ID     This is your Care EveryWhere ID. This could be used by other organizations to access your [place] medical records  [id number]        Your Vitals Were     Last Period                   12/20/2017 (Exact Date)            Blood Pressure from Last 3 Encounters:   08/06/18 116/70   08/02/18 113/71   07/17/18 110/60    Weight from Last 3 Encounters:   08/06/18 104.1 kg (229 lb 9.6 oz)   07/17/18 106.3 kg (234 lb 4.8 oz)   06/28/18 104.3 kg (230 lb)              Today, you had the following     No orders found for display       Primary Care Provider Office Phone # Fax #    Meron Ann Aaseby-Aguilera, PA-C [phone] [address] [address] [facility] [address]        Equal Access to Services     FARIHA SOLIS : Hadii aad ku hadasho Soomaali, waaxda luqadaha, qaybta kaalmada fanny, molly renteria. So [facility] [phone].    ATENCIÓN: Si habla español, tiene a albright disposición servicios gratuitos de asistencia lingüística. Joelle al [phone].    We comply with applicable federal civil rights laws and [place] laws. We do not discriminate on the basis of race, color, national origin, age, disability, sex, sexual orientation, or gender identity.            Thank you!     Thank you for choosing [facility]  for your care. Our  "goal is always to provide you with excellent care. Hearing back from our patients is one way we can continue to improve our services. Please take a few minutes to complete the written survey that you may receive in the mail after your visit with us. Thank you!             Your Updated Medication List - Protect others around you: Learn how to safely use, store and throw away your medicines at www.disposemymeds.org.          This list is accurate as of 8/7/18  1:46 PM.  Always use your most recent med list.                   Brand Name Dispense Instructions for use Diagnosis    acetone (Urine) test Strp     25 each    Test once daily x1 week, then reduce to once weekly once consistently negative    Gestational diabetes       blood glucose lancets standard    no brand specified    100 each    Use to test blood sugar 6 times daily or as directed. OneTouch delica lancets.    Gestational diabetes mellitus (GDM)       blood glucose monitoring meter device kit    no brand specified    1 kit    Use to test blood sugar 4 times daily or as directed.    Gestational diabetes mellitus (GDM)       blood glucose monitoring test strip    no brand specified    200 strip    Use to test blood sugars 6 times daily or as directed. OneTouch Verio test strips.    Gestational diabetes        MG Caps           ferrous sulfate 325 (65 Fe) MG tablet    IRON    30 tablet    Take 1 tablet (325 mg) by mouth daily (with breakfast)    Other iron deficiency anemia       insulin  UNIT/ML injection    NovoLIN N VIAL    30 mL    15 units before breakfast  30 units at bedtime    Insulin controlled gestational diabetes mellitus (GDM) in third trimester       insulin pen needle 32G X 4 MM    BD CHRIS U/F    100 each    Use 2 daily as directed.    Gestational diabetes mellitus (GDM) requiring insulin       insulin syringe-needle U-100 30G X 1/2\" 1 ML    BD insulin syringe ultrafine    100 each    Use one syringe Twice daily subcutaneously as " directed.    Insulin controlled gestational diabetes mellitus (GDM) in third trimester       prenatal multivitamin plus iron 27-0.8 MG Tabs per tablet      Take by mouth daily        ranitidine 300 MG tablet    ZANTAC    30 tablet    TAKE 1 TABLET (300 MG) BY MOUTH AT BEDTIME    Prenatal care in second trimester

## 2018-08-10 ENCOUNTER — OFFICE VISIT (OUTPATIENT)
Dept: MATERNAL FETAL MEDICINE | Facility: CLINIC | Age: 33
End: 2018-08-10
Attending: OBSTETRICS & GYNECOLOGY
Payer: COMMERCIAL

## 2018-08-10 ENCOUNTER — ALLIED HEALTH/NURSE VISIT (OUTPATIENT)
Dept: NURSING | Facility: CLINIC | Age: 33
End: 2018-08-10
Payer: COMMERCIAL

## 2018-08-10 ENCOUNTER — HOSPITAL ENCOUNTER (OUTPATIENT)
Dept: ULTRASOUND IMAGING | Facility: CLINIC | Age: 33
Discharge: HOME | End: 2018-08-10
Attending: OBSTETRICS & GYNECOLOGY | Admitting: OBSTETRICS & GYNECOLOGY
Payer: COMMERCIAL

## 2018-08-10 ENCOUNTER — TELEPHONE (OUTPATIENT)
Dept: OBGYN | Facility: CLINIC | Age: 33
End: 2018-08-10

## 2018-08-10 VITALS — DIASTOLIC BLOOD PRESSURE: 70 MMHG | SYSTOLIC BLOOD PRESSURE: 118 MMHG

## 2018-08-10 DIAGNOSIS — O24.414 INSULIN CONTROLLED GESTATIONAL DIABETES MELLITUS (GDM) DURING PREGNANCY, ANTEPARTUM: ICD-10-CM

## 2018-08-10 DIAGNOSIS — Z34.80 PRENATAL CARE, SUBSEQUENT PREGNANCY, UNSPECIFIED TRIMESTER: Primary | ICD-10-CM

## 2018-08-10 DIAGNOSIS — O24.414 INSULIN CONTROLLED GESTATIONAL DIABETES MELLITUS (GDM) DURING PREGNANCY, ANTEPARTUM: Primary | ICD-10-CM

## 2018-08-10 PROCEDURE — 76819 FETAL BIOPHYS PROFIL W/O NST: CPT

## 2018-08-10 PROCEDURE — 99207 ZZC NO CHARGE NURSE ONLY: CPT

## 2018-08-10 NOTE — MR AVS SNAPSHOT
After Visit Summary   8/10/2018    Tosin Pfeiffer    MRN: [id number]           Patient Information     Date Of Birth          1985        Visit Information        Provider Department      8/10/2018 3:45 PM RI OB NURSE [facility]        Today's Diagnoses     Prenatal care, subsequent pregnancy, unspecified trimester    -  1       Follow-ups after your visit        Your next 10 appointments already scheduled     Aug 14, 2018 11:00 AM CDT   MFM US COMPRE SINGLE F/U with [id number]   [facility] ([facility])    [address]  [facility] [address]   [phone]           Wear comfortable clothes and leave your valuables at home.            Aug 14, 2018 11:30 AM CDT   Radiology MD with  HEIDY PANTOJA   [facility] ([facility])    [address]  [facility] [address]   [phone]           Please arrive at the time given for your first appointment. This visit is used internally to schedule the physician's time during your ultrasound.            Aug 17, 2018  3:30 PM CDT   M BPP SINGLE with [id number]   [facility] ([facility])    [address]  [facility] [address]   [phone]            Aug 17, 2018  4:00 PM CDT   Radiology MD with  HEIDY PANTOJA   [facility] ([facility])    [address]  [facility] [id number] [phone]           Please arrive at the time given for your first appointment. This visit is used internally to schedule the physician's time during your ultrasound.            Aug 28, 2018  3:45 PM CDT   ESTABLISHED PRENATAL with Tari Ramirez,    [facility]    [id number] [place] [address] [phone]            Sep 04, 2018  3:30 PM  CDT   ESTABLISHED PRENATAL with Tari Ramirez, DO   [facility]    [address] [facility] [address] [phone]            Sep 18, 2018  2:00 PM CDT   ESTABLISHED PRENATAL with Tari Ramirez, DO   [facility]    [address] [facility] [address] [phone]            Sep 24, 2018  4:00 PM CDT   ESTABLISHED PRENATAL with Tari Ramirez, DO   [facility]    [address] [facility] [address] [phone]              Who to contact     If you have questions or need follow up information about today's clinic visit or your schedule please contact [facility] directly at [phone].  Normal or non-critical lab and imaging results will be communicated to you by MyChart, letter or phone within 4 business days after the clinic has received the results. If you do not hear from us within 7 days, please contact the clinic through [employer] or phone. If you have a critical or abnormal lab result, we will notify you by phone as soon as possible.  Submit refill requests through [employer] or call your pharmacy and they will forward the refill request to us. Please allow 3 business days for your refill to be completed.          Additional Information About Your Visit        MyChart Information     [employer] gives you secure access to your electronic health record. If you see a primary care provider, you can also send messages to your care team and make appointments. If you have questions, please call your primary care clinic.  If you do not have a primary care provider, please call [phone] and they will assist you.        Care EveryWhere ID     This is your Care EveryWhere ID. This could be used by other organizations to access your [place] medical records  [id number]        Your Vitals Were     Last Period                    12/20/2017 (Exact Date)            Blood Pressure from Last 3 Encounters:   08/10/18 118/70   08/06/18 116/70   08/02/18 113/71    Weight from Last 3 Encounters:   08/06/18 229 lb 9.6 oz (104.1 kg)   07/17/18 234 lb 4.8 oz (106.3 kg)   06/28/18 230 lb (104.3 kg)              Today, you had the following     No orders found for display       Primary Care Provider Office Phone # Fax #    Meron Ann Aaseby-Aguilera, PA-C [phone] [address] [address]        Equal Access to Services     FARIHA SOLIS : Hadii yadira henaoo Solyle, waaxda luqadaha, qaybta kaalmada adeedwaryada, molly renteria. So [facility] [phone].    ATENCIÓN: Si habla español, tiene a albright disposición servicios gratuitos de asistencia lingüística. Llame al [phone].    We comply with applicable federal civil rights laws and [place] laws. We do not discriminate on the basis of race, color, national origin, age, disability, sex, sexual orientation, or gender identity.            Thank you!     Thank you for choosing [facility]  for your care. Our goal is always to provide you with excellent care. Hearing back from our patients is one way we can continue to improve our services. Please take a few minutes to complete the written survey that you may receive in the mail after your visit with us. Thank you!             Your Updated Medication List - Protect others around you: Learn how to safely use, store and throw away your medicines at www.disposemymeds.org.          This list is accurate as of 8/10/18  4:23 PM.  Always use your most recent med list.                   Brand Name Dispense Instructions for use Diagnosis    acetone (Urine) test Strp     25 each    Test once daily x1 week, then reduce to once weekly once consistently negative    Gestational diabetes       blood glucose lancets standard    no brand specified    100 each    Use to test blood sugar 6 times daily  "or as directed. OneTouch delica lancets.    Gestational diabetes mellitus (GDM)       blood glucose monitoring meter device kit    no brand specified    1 kit    Use to test blood sugar 4 times daily or as directed.    Gestational diabetes mellitus (GDM)       blood glucose monitoring test strip    no brand specified    200 strip    Use to test blood sugars 6 times daily or as directed. OneTouch Verio test strips.    Gestational diabetes        MG Caps           ferrous sulfate 325 (65 Fe) MG tablet    IRON    30 tablet    Take 1 tablet (325 mg) by mouth daily (with breakfast)    Other iron deficiency anemia       insulin  UNIT/ML injection    NovoLIN N VIAL    30 mL    15 units before breakfast  30 units at bedtime    Insulin controlled gestational diabetes mellitus (GDM) in third trimester       insulin pen needle 32G X 4 MM    BD CHRIS U/F    100 each    Use 2 daily as directed.    Gestational diabetes mellitus (GDM) requiring insulin       insulin syringe-needle U-100 30G X 1/2\" 1 ML    BD insulin syringe ultrafine    100 each    Use one syringe Twice daily subcutaneously as directed.    Insulin controlled gestational diabetes mellitus (GDM) in third trimester       prenatal multivitamin plus iron 27-0.8 MG Tabs per tablet      Take by mouth daily        ranitidine 300 MG tablet    ZANTAC    30 tablet    TAKE 1 TABLET (300 MG) BY MOUTH AT BEDTIME    Prenatal care in second trimester         "

## 2018-08-10 NOTE — TELEPHONE ENCOUNTER
[id number]    Pt calls reporting dull HA and 'just doesn't feel like herself.'    Took tylenol with no relief.      Advised pt to come in for BP check.  Pt scheduled at [facility].    Josie HECTOR R.N.  [facility]

## 2018-08-10 NOTE — MR AVS SNAPSHOT
After Visit Summary   8/10/2018    Tosin Pfeiffer    MRN: [id number]           Patient Information     Date Of Birth          1985        Visit Information        Provider Department      8/10/2018 8:30 AM Tanna Richey MD [facility] Maternal Fetal Medicine [facility]        Today's Diagnoses     Insulin controlled gestational diabetes mellitus (GDM) during pregnancy, antepartum    -  1       Follow-ups after your visit        Your next 10 appointments already scheduled     Aug 10, 2018  8:30 AM CDT   Radiology MD with Tanna Richey MD   [facility] Fetal [facility])    [address]  [facility] [id number] [phone]           Please arrive at the time given for your first appointment. This visit is used internally to schedule the physician's time during your ultrasound.            Aug 14, 2018 11:00 AM CDT   MFM US COMPRE SINGLE F/U with [id number]   [facility] Maternal Fetal Medicine [facility] ([facility])    [address]  [facility] [address] [phone]           Wear comfortable clothes and leave your valuables at home.            Aug 14, 2018 11:30 AM CDT   Radiology MD with  HEIDY PANTOJA   [facility] Maternal Fetal Medicine [facility] ([facility])    [address]  [facility] [address] [phone]           Please arrive at the time given for your first appointment. This visit is used internally to schedule the physician's time during your ultrasound.            Aug 17, 2018  3:30 PM CDT   HEIDY BPP SINGLE with [id number]   [facility] Maternal Fetal Medicine Ultrasound [facility] ([facility])    [address]  [facility] [address]   [phone]            Aug 17, 2018  4:00 PM CDT   Radiology MD with JOSEPH MOODY MD   [facility] Maternal Fetal Medicine [facility] ([facility])    [address]  [facility] [address]    [phone]           Please arrive at the time given for your first appointment. This visit is used internally to schedule the physician's time during your ultrasound.            Aug 28, 2018  3:45 PM CDT   ESTABLISHED PRENATAL with Tari Ramirez DO   [facility] ([facility]    [address] [facility] [address]   [phone]            Sep 04, 2018  3:30 PM CDT   ESTABLISHED PRENATAL with Tari Ramirez DO   [facility])    [address] [facility] [address]   [phone]            Sep 11, 2018  1:30 PM CDT   ESTABLISHED PRENATAL with Tari Ramirez DO   [facility] ([facility]    [address] [facility] [address]   [phone]            Sep 18, 2018  2:00 PM CDT   ESTABLISHED PRENATAL with Tari Ramirez DO   [facility] ([facility]    [address] [facility] [address]   [phone]            Sep 24, 2018  4:00 PM CDT   ESTABLISHED PRENATAL with Tari Ramirez DO   [facility] ([facility]    [address] [facility] [address]   [phone]              Who to contact     If you have questions or need follow up information about today's clinic visit or your schedule please contact [facility] MATERNAL FETAL MEDICINE [facility] directly at [phone].  Normal or non-critical lab and imaging results will be communicated to you by MyChart, letter or phone within 4 business days after the clinic has received the results. If you do not hear from us within 7 days, please contact the clinic through MyChart or phone. If you have a critical or abnormal lab result, we will notify you by phone as soon as possible.  Submit refill requests through [employer] or call your pharmacy and they will forward the refill request to us. Please allow 3 business days for your refill to  be completed.          Additional Information About Your Visit        [employer] Information     [employer] gives you secure access to your electronic health record. If you see a primary care provider, you can also send messages to your care team and make appointments. If you have questions, please call your primary care clinic.  If you do not have a primary care provider, please call [phone] and they will assist you.        Care EveryWhere ID     This is your Care EveryWhere ID. This could be used by other organizations to access your [place] medical records  [id number]        Your Vitals Were     Last Period                   12/20/2017 (Exact Date)            Blood Pressure from Last 3 Encounters:   08/06/18 116/70   08/02/18 113/71   07/17/18 110/60    Weight from Last 3 Encounters:   08/06/18 104.1 kg (229 lb 9.6 oz)   07/17/18 106.3 kg (234 lb 4.8 oz)   06/28/18 104.3 kg (230 lb)              Today, you had the following     No orders found for display       Primary Care Provider Office Phone # Fax #    Meron Ann Aaseby-Aguilera, PA-C [phone] [address] [id number]        Equal Access to Services     FARIHA SOLIS : Hadii aad ku hadasho Soomaali, waaxda luqadaha, qaybta kaalmada adeegyada, molly renteria. So [facility] [phone].    ATENCIÓN: Si habla español, tiene a albright disposición servicios gratuitos de asistencia lingüística. Llame al [phone].    We comply with applicable federal civil rights laws and [place] laws. We do not discriminate on the basis of race, color, national origin, age, disability, sex, sexual orientation, or gender identity.            Thank you!     Thank you for choosing [facility]  for your care. Our goal is always to provide you with excellent care. Hearing back from our patients is one way we can continue to improve our services. Please take a few minutes to complete the written  "survey that you may receive in the mail after your visit with us. Thank you!             Your Updated Medication List - Protect others around you: Learn how to safely use, store and throw away your medicines at www.disposemymeds.org.          This list is accurate as of 8/10/18  8:26 AM.  Always use your most recent med list.                   Brand Name Dispense Instructions for use Diagnosis    acetone (Urine) test Strp     25 each    Test once daily x1 week, then reduce to once weekly once consistently negative    Gestational diabetes       blood glucose lancets standard    no brand specified    100 each    Use to test blood sugar 6 times daily or as directed. OneTouch delica lancets.    Gestational diabetes mellitus (GDM)       blood glucose monitoring meter device kit    no brand specified    1 kit    Use to test blood sugar 4 times daily or as directed.    Gestational diabetes mellitus (GDM)       blood glucose monitoring test strip    no brand specified    200 strip    Use to test blood sugars 6 times daily or as directed. OneTouch Verio test strips.    Gestational diabetes        MG Caps           ferrous sulfate 325 (65 Fe) MG tablet    IRON    30 tablet    Take 1 tablet (325 mg) by mouth daily (with breakfast)    Other iron deficiency anemia       insulin  UNIT/ML injection    NovoLIN N VIAL    30 mL    15 units before breakfast  30 units at bedtime    Insulin controlled gestational diabetes mellitus (GDM) in third trimester       insulin pen needle 32G X 4 MM    BD CHRIS U/F    100 each    Use 2 daily as directed.    Gestational diabetes mellitus (GDM) requiring insulin       insulin syringe-needle U-100 30G X 1/2\" 1 ML    BD insulin syringe ultrafine    100 each    Use one syringe Twice daily subcutaneously as directed.    Insulin controlled gestational diabetes mellitus (GDM) in third trimester       prenatal multivitamin plus iron 27-0.8 MG Tabs per tablet      Take by mouth daily        " ranitidine 300 MG tablet    ZANTAC    30 tablet    TAKE 1 TABLET (300 MG) BY MOUTH AT BEDTIME    Prenatal care in second trimester

## 2018-08-10 NOTE — PROGRESS NOTES
"Please see \"Imaging\" tab under Chart Review for full details.    BPP is 8/8.  Fluid is low normal, but stable.    Tanna Richey MD  Maternal Fetal Medicine    "

## 2018-08-14 ENCOUNTER — OFFICE VISIT (OUTPATIENT)
Dept: MATERNAL FETAL MEDICINE | Facility: CLINIC | Age: 33
End: 2018-08-14
Attending: OBSTETRICS & GYNECOLOGY
Payer: COMMERCIAL

## 2018-08-14 ENCOUNTER — TELEPHONE (OUTPATIENT)
Dept: EDUCATION SERVICES | Facility: CLINIC | Age: 33
End: 2018-08-14

## 2018-08-14 ENCOUNTER — HOSPITAL ENCOUNTER (OUTPATIENT)
Dept: ULTRASOUND IMAGING | Facility: CLINIC | Age: 33
Discharge: HOME | End: 2018-08-14
Attending: OBSTETRICS & GYNECOLOGY | Admitting: OBSTETRICS & GYNECOLOGY
Payer: COMMERCIAL

## 2018-08-14 DIAGNOSIS — O24.419 GESTATIONAL DIABETES MELLITUS (GDM) IN THIRD TRIMESTER, GESTATIONAL DIABETES METHOD OF CONTROL UNSPECIFIED: Primary | ICD-10-CM

## 2018-08-14 DIAGNOSIS — O24.414 INSULIN CONTROLLED GESTATIONAL DIABETES MELLITUS (GDM) IN THIRD TRIMESTER: ICD-10-CM

## 2018-08-14 DIAGNOSIS — O24.414 INSULIN CONTROLLED GESTATIONAL DIABETES MELLITUS (GDM) DURING PREGNANCY, ANTEPARTUM: ICD-10-CM

## 2018-08-14 PROCEDURE — 76819 FETAL BIOPHYS PROFIL W/O NST: CPT

## 2018-08-14 PROCEDURE — 76816 OB US FOLLOW-UP PER FETUS: CPT

## 2018-08-14 NOTE — MR AVS SNAPSHOT
After Visit Summary   8/14/2018    Tosin Pfeiffer    MRN: [id number]           Patient Information     Date Of Birth          1985        Visit Information        Provider Department      8/14/2018 11:30 AM Ifrah Frey MD [facility] Maternal Fetal Medicine [facility]        Today's Diagnoses     Gestational diabetes mellitus (GDM) in third trimester, gestational diabetes method of control unspecified    -  1       Follow-ups after your visit        Your next 10 appointments already scheduled     Aug 17, 2018  3:30 PM CDT   MFM BPP SINGLE with [id number]   [facility] - [facility] ([facility])    [address]  [facility] [address]   [phone]            Aug 17, 2018  4:00 PM CDT   Radiology MD with RH HEIDY PANTOJA   [facility] Maternal Fetal Medicine [facility] ([facility])    [address]  [place] [id number]   [phone]           Please arrive at the time given for your first appointment. This visit is used internally to schedule the physician's time during your ultrasound.            Aug 21, 2018  3:30 PM CDT   MFM BPP SINGLE with [id number]   [facility] ([facility])    [address]  [facility] [address]   [phone]            Aug 21, 2018  4:00 PM CDT   Radiology MD with RH HEIDY PANTOJA   [facility] Fetal Medicine [facility] ([facility])    [address]  [facility] [address]   [phone]           Please arrive at the time given for your first appointment. This visit is used internally to schedule the physician's time during your ultrasound.            Aug 24, 2018  3:30 PM CDT   MFM BPP SINGLE with [id number]   [facility] ([facility])    [address]  [facility] [address]   [phone]            Aug 24, 2018   4:00 PM CDT   Radiology MD with RH MFM MD   [facility] Maternal Fetal Medicine [facility] ([facility])    [address]  [facility] [address]   [phone]           Please arrive at the time given for your first appointment. This visit is used internally to schedule the physician's time during your ultrasound.            Aug 28, 2018  1:30 PM CDT   MFM BPP SINGLE with [id number]   [facility] Maternal Fetal Medicine Ultrasound - [facility] ([facility])    [address]  [facility] [address]   [phone]            Aug 28, 2018  2:00 PM CDT   Radiology MD with RH MFSHAHLA PANTOJA   [facility] Maternal Fetal Medicine [facility] ([facility])    [address] [facility] [address]  [facility] [address]   [phone]           Please arrive at the time given for your first appointment. This visit is used internally to schedule the physician's time during your ultrasound.            Aug 28, 2018  3:45 PM CDT   ESTABL[facility]ATAL with Tari Ramirez, DO   [facility]    [address] [place] [address] [phone]            Aug 31, 2018  3:30 PM CDT   MFM BPP SINGLE with [id number]   [facility] Maternal Fetal Medicine Ultrasound [facility])    [address]  [facility] [address]   [phone]              Future tests that were ordered for you today     Open Future Orders        Priority Expected Expires Ordered    MFM US Comprehensive Single F/U Routine 9/4/2018 8/14/2019 8/14/2018    MFM BPP Single Routine  6/14/2019 8/14/2018    MFM BPP Single Routine 8/24/2018 6/14/2019 8/14/2018    MFM BPP Single Routine 8/28/2018 6/14/2019 8/14/2018    MFM BPP Single Routine 8/31/2018 6/14/2019 8/14/2018            Who to contact     If you have questions or need follow up information about today's clinic visit or your schedule please contact [facility] [place] directly at [phone].  Normal or non-critical lab and imaging results will be communicated to you by MyChart, letter or phone within 4 business days after the clinic has received the results. If you do not hear from us within 7 days, please contact the clinic through Vinylminthart or phone. If you have a critical or abnormal lab result, we will notify you by phone as soon as possible.  Submit refill requests through Eyeview or call your pharmacy and they will forward the refill request to us. Please allow 3 business days for your refill to be completed.          Additional Information About Your Visit        VinylmintharResQâ„¢ Medical Information     Eyeview gives you secure access to your electronic health record. If you see a primary care provider, you can also send messages to your care team and make appointments. If you have questions, please call your primary care clinic.  If you do not have a primary care provider, please call [phone] and they will assist you.        Care EveryWhere ID     This is your Care EveryWhere ID. This could be used by other organizations to access your [place] medical records  [id number]        Your Vitals Were     Last Period                   12/20/2017 (Exact Date)            Blood Pressure from Last 3 Encounters:   08/10/18 118/70   08/06/18 116/70   08/02/18 113/71    Weight from Last 3 Encounters:   08/06/18 104.1 kg (229 lb 9.6 oz)   07/17/18 106.3 kg (234 lb 4.8 oz)   06/28/18 104.3 kg (230 lb)               Primary Care Provider Office Phone # Fax #    Meron Ann Aaseby-Aguilera, PA-C [phone] [address] [address] [facility] [address]        Equal Access to Services     TIFFANY [facility]NA AH: Hadii aad ku hadasho Soomaali, waaxda luqadaha, qaybta kaalmada adeegyada, molly renteria. So [facility] [phone].    ATENCIÓN: Si habla español, tiene a albright disposición servicios gratuitos de asistencia lingüística. Llame al [phone].    We  comply with applicable federal civil rights laws and [place] laws. We do not discriminate on the basis of race, color, national origin, age, disability, sex, sexual orientation, or gender identity.            Thank you!     Thank you for choosing [facility]  for your care. Our goal is always to provide you with excellent care. Hearing back from our patients is one way we can continue to improve our services. Please take a few minutes to complete the written survey that you may receive in the mail after your visit with us. Thank you!             Your Updated Medication List - Protect others around you: Learn how to safely use, store and throw away your medicines at www.disposemymeds.org.          This list is accurate as of 8/14/18  1:46 PM.  Always use your most recent med list.                   Brand Name Dispense Instructions for use Diagnosis    acetone (Urine) test Strp     25 each    Test once daily x1 week, then reduce to once weekly once consistently negative    Gestational diabetes       blood glucose lancets standard    no brand specified    100 each    Use to test blood sugar 6 times daily or as directed. OneTouch delica lancets.    Gestational diabetes mellitus (GDM)       blood glucose monitoring meter device kit    no brand specified    1 kit    Use to test blood sugar 4 times daily or as directed.    Gestational diabetes mellitus (GDM)       blood glucose monitoring test strip    no brand specified    200 strip    Use to test blood sugars 6 times daily or as directed. OneTouch Verio test strips.    Gestational diabetes        MG Caps           ferrous sulfate 325 (65 Fe) MG tablet    IRON    30 tablet    Take 1 tablet (325 mg) by mouth daily (with breakfast)    Other iron deficiency anemia       insulin  UNIT/ML injection    NovoLIN N VIAL    30 mL    15 units before breakfast  30 units at bedtime    Insulin controlled gestational diabetes mellitus (GDM)  "in third trimester       insulin pen needle 32G X 4 MM    BD CHRIS U/F    100 each    Use 2 daily as directed.    Gestational diabetes mellitus (GDM) requiring insulin       insulin syringe-needle U-100 30G X 1/2\" 1 ML    BD insulin syringe ultrafine    100 each    Use one syringe Twice daily subcutaneously as directed.    Insulin controlled gestational diabetes mellitus (GDM) in third trimester       prenatal multivitamin plus iron 27-0.8 MG Tabs per tablet      Take by mouth daily        ranitidine 300 MG tablet    ZANTAC    30 tablet    TAKE 1 TABLET (300 MG) BY MOUTH AT BEDTIME    Prenatal care in second trimester         "

## 2018-08-14 NOTE — TELEPHONE ENCOUNTER
Gestational Diabetes Follow-up    Subjective/Objective:    Tosin Pfeiffer sent in blood glucose log for review. Last date of communication was: 7/30/18.    Gestational diabetes is being managed with diet, activity and medications    Taking diabetes medications:   yes:     Diabetes Medication(s)     Insulin Sig    insulin NPH (NOVOLIN N VIAL) 100 UNIT/ML injection 15 units before breakfast  30 units at bedtime          Estimated Date of Delivery: Sep 26, 2018    BG/Food Log:   Update on glucose levels - 30 Units at night and 15 units morning    date     before breakfast     after breakfast     after lunch     after supper  8/12        85                             138                         96             147  8/13       111                             143                        114            150  8/14       94                              122                        138              Thank you    Tosin Hernandez    Sure.  I didn't send in an email on the 30th because I was admitted into the hospital for a few nights.  They were monitoring my blood sugars while in the hospital.  Below are the readings from 8/3/2018 to 8/11/2018     date     before breakfast     after breakfast     after lunch     after supper  8/3           84                              126                        113            155  8/4           86                               154                       missed       138  8/5           76                               95                         128            177  8/6           85                             110                          112            127  8/7           86                              132                         106           153  8/8          103                             106*                       122           145  8/9           95                              134                        116            128  8/10         95                              125                         169            146  8/11          85                              114                        missed       116*    Assessment:  Blood sugars with in target, with more frequent after dinner readings above goal.  Patient would benefit from AM NPH insulin dose increase.     Ketones: none noted.   Fasting blood glucoses: 87% in target.  After breakfast: 87% in target.  After lunch: 86% in target.  After dinner: 57% in target.    Plan/Response:  Recommend increase to insulin - NPH 15-0-0-30 --> 17-0-0-30.  Follow up Friday if after dinner elevations continue or 1 week if readings in target.       Mercedes Morillo MS, RD, LD, CDE      Any diabetes medication dose changes were made via the CDE Protocol and Collaborative Practice Agreement with the patient's OB/GYN provider. A copy of this encounter was shared with the provider.    CDE E-mail to patient:  Te Leahy,    Thanks for sending in your readings.  The last time we reviewed your readings was 7/30/18.  Would you be able to send in readings from at least the last week if not since the 30th ?  Otherwise it s really hard to tell how to change your insulin doses.    Thanks,      Mercedes Morillo MS, RD, LD, CDE    E-mail reply to patient:  Te Leahy,    This is prefect.  Thanks so much for the additional readings!  Let s just do a slight increase to the daytime insulin and see if that s just enough to bring those after dinner readings into target.  Please increase to 17 units with breakfast an continue taking the 30 units at bedtime.    If you re still seeing elevations send in again on Friday, otherwise send in again in 1 week 8/21/18.    Take care!      Mercedes Morillo MS, ROBB, LD, CDE

## 2018-08-17 ENCOUNTER — HOSPITAL ENCOUNTER (OUTPATIENT)
Dept: ULTRASOUND IMAGING | Facility: CLINIC | Age: 33
Discharge: HOME | End: 2018-08-17
Attending: OBSTETRICS & GYNECOLOGY | Admitting: OBSTETRICS & GYNECOLOGY
Payer: COMMERCIAL

## 2018-08-17 ENCOUNTER — OFFICE VISIT (OUTPATIENT)
Dept: MATERNAL FETAL MEDICINE | Facility: CLINIC | Age: 33
End: 2018-08-17
Attending: OBSTETRICS & GYNECOLOGY
Payer: COMMERCIAL

## 2018-08-17 ENCOUNTER — VIRTUAL VISIT (OUTPATIENT)
Dept: EDUCATION SERVICES | Facility: CLINIC | Age: 33
End: 2018-08-17
Payer: COMMERCIAL

## 2018-08-17 DIAGNOSIS — O24.414 INSULIN CONTROLLED GESTATIONAL DIABETES MELLITUS (GDM) DURING PREGNANCY, ANTEPARTUM: ICD-10-CM

## 2018-08-17 DIAGNOSIS — O24.414 INSULIN CONTROLLED GESTATIONAL DIABETES MELLITUS (GDM) IN THIRD TRIMESTER: Primary | ICD-10-CM

## 2018-08-17 DIAGNOSIS — O24.414 INSULIN CONTROLLED GESTATIONAL DIABETES MELLITUS (GDM) DURING PREGNANCY, ANTEPARTUM: Primary | ICD-10-CM

## 2018-08-17 PROCEDURE — 76819 FETAL BIOPHYS PROFIL W/O NST: CPT

## 2018-08-17 NOTE — PROGRESS NOTES
Gestational Diabetes Follow-up    Subjective/Objective:    Tosin Pfeiffer sent in blood glucose log for review. Last date of communication was: 8/14.    Gestational diabetes is being managed with medications    Taking diabetes medications:   yes:     Diabetes Medication(s)     Insulin Sig    insulin NPH (NOVOLIN N VIAL) 100 UNIT/ML injection 17 units before breakfast  30 units at bedtime          Estimated Date of Delivery: Sep 26, 2018    BG/Food Log:   Update on glucose levels - 30 Units at night and 17 units morning    date     before breakfast     after breakfast     after lunch     after supper  8/15        104                             134                        146            138  8/16        105                             140                        104            148  8/17        109                                                                     Assessment:    Ketones: NA.   Fasting blood glucoses: 0% in target.  After breakfast: 100% in target.  After lunch: 50% in target.  After dinner: 50% in target.    Plan/Response:  Recommend increase to insulin - NPH from 17-0-0-30 --> 20-0-0-36 (20% increase)     CDE reply:      Ana Olivo RD, LD, CDE    Any diabetes medication dose changes were made via the CDE Protocol and Collaborative Practice Agreement with the patient's OB/GYN provider. A copy of this encounter was shared with the provider.

## 2018-08-17 NOTE — PROGRESS NOTES
"Please see \"Imaging\" tab under \"Chart Review\" for details of today's US at the [facility].    Ricardo Zaldivar MD  Maternal-Fetal Medicine    "

## 2018-08-17 NOTE — MR AVS SNAPSHOT
After Visit Summary   8/17/2018    Tosin Pfeiffer    MRN: [id number]           Patient Information     Date Of Birth          1985        Visit Information        Provider Department      8/17/2018 4:00 PM Ricardo Zaldivar MD [facility] Maternal Fetal Medicine [facility]        Today's Diagnoses     Insulin controlled gestational diabetes mellitus (GDM) during pregnancy, antepartum    -  1       Follow-ups after your visit        Your next 10 appointments already scheduled     Aug 17, 2018  4:00 PM CDT   Radiology MD with Ricardo Zaldivar MD   [facility] Maternal Fetal Medicine [facility])    [address]  [facility] [address]   [phone]           Please arrive at the time given for your first appointment. This visit is used internally to schedule the physician's time during your ultrasound.            Aug 21, 2018  3:30 PM CDT   MFM BPP SINGLE with [id number]   [facility] Maternal Fetal Medicine Ultrasound - [facility])    [address]  [facility] [address]   [phone]            Aug 21, 2018  4:00 PM CDT   Radiology MD with JOSEPH MOODY MD   [facility] Maternal Fetal Medicine [facility] ([facility])    [address]  [facility] [address]   [phone]           Please arrive at the time given for your first appointment. This visit is used internally to schedule the physician's time during your ultrasound.            Aug 24, 2018  3:30 PM CDT   MFM BPP SINGLE with [id number]   [facility]etal Medicine [facility]trasound - [facility] ([facility])    [address]  [facility] [address]   [phone]            Aug 24, 2018  4:00 PM CDT   Radiology MD with JOSEPH MOODY MD   [facility] Fetal Medicine [facility] ([facility])    [address]  [facility] [address]   [phone]           Please arrive at the time given for  your first appointment. This visit is used internally to schedule the physician's time during your ultrasound.            Aug 28, 2018  1:30 PM CDT   MFM BPP SINGLE with [id number]   [facility] Fetal [facility] Ultrasound [facility] ([facility])    [address]  [facility] [address]   [phone]            Aug 28, 2018  2:00 PM CDT   Radiology MD with RH HEIDY PANTOJA   [facility] ([facility])    [address]  [facility] [address]   [phone]           Please arrive at the time given for your first appointment. This visit is used internally to schedule the physician's time during your ultrasound.            Aug 28, 2018  3:45 PM CDT   ESTABLISHED PRENATAL with Tari Ramirez,    [facility] ([address] [address]   [phone]            Aug 31, 2018  3:30 PM CDT   MFM BPP SINGLE with [email or web address]   [facility] Fetal [facility] Ultrasound [facility] ([facility])    [address]  [facility] [address]   [phone]            Aug 31, 2018  4:00 PM CDT   Radiology MD with RH HEIDY PANTOJA   [facility] ([facility])    [address]  [facility] [address]   [phone]           Please arrive at the time given for your first appointment. This visit is used internally to schedule the physician's time during your ultrasound.              Who to contact     If you have questions or need follow up information about today's clinic visit or your schedule please contact [facility] FETAL [facility] directly at [phone].  Normal or non-critical lab and imaging results will be communicated to you by MyChart, letter or phone within 4 business days after the clinic has received the results. If you do not hear from us within 7 days, please contact the  clinic through FÃƒÂ©vrier 46 or phone. If you have a critical or abnormal lab result, we will notify you by phone as soon as possible.  Submit refill requests through FÃƒÂ©vrier 46 or call your pharmacy and they will forward the refill request to us. Please allow 3 business days for your refill to be completed.          Additional Information About Your Visit        Meltyhart Information     FÃ[employer]Â©vrier 46 gives you secure access to your electronic health record. If you see a primary care provider, you can also send messages to your care team and make appointments. If you have questions, please call your primary care clinic.  If you do not have a primary care provider, please call [phone] and they will assist you.        Care EveryWhere ID     This is your Care EveryWhere ID. This could be used by other organizations to access your [place] medical records  [id number]        Your Vitals Were     Last Period                   12/20/2017 (Exact Date)            Blood Pressure from Last 3 Encounters:   08/10/18 118/70   08/06/18 116/70   08/02/18 113/71    Weight from Last 3 Encounters:   08/06/18 104.1 kg (229 lb 9.6 oz)   07/17/18 106.3 kg (234 lb 4.8 oz)   06/28/18 104.3 kg (230 lb)              Today, you had the following     No orders found for display         Today's Medication Changes          These changes are accurate as of 8/17/18  3:43 PM.  If you have any questions, ask your nurse or doctor.               These medicines have changed or have updated prescriptions.        Dose/Directions    insulin  UNIT/ML injection   Commonly known as:  NovoLIN N VIAL   This may have changed:  additional instructions   Used for:  Insulin controlled gestational diabetes mellitus (GDM) in third trimester        20 units before breakfast  35 units at bedtime   Quantity:  30 mL   Refills:  3            Where to get your medicines      These medications were [facility] to [facility]/pharmacy [facility][id number] - [place], [place] - [address] [facility]  [address]  [facility] [address]    Hours:  [facility] Phone:  [phone]     insulin  UNIT/ML injection                Primary Care Provider Office Phone # Fax #    Ramona Ann Aaseby-Aguilera, PA-C [phone] [address]  [facility] [address]        Equal Access to Services     [facility] Hospital[facility]SANA : Hadii aad ku hadasho Soomaali, waaxda luqadaha, qaybta kaalmada adeegyada, waxay idiin hayaan adeeg kharash la'aan ah. So [facility] [phone].    ATENCIÓN: Si habla español, tiene a albright disposición servicios gratuitos de asistencia lingüística. Fernandoame al [phone].    We comply with applicable federal civil rights laws and [place] laws. We do not discriminate on the basis of race, color, national origin, age, disability, sex, sexual orientation, or gender identity.            Thank you!     Thank you for choosing [facility]  for your care. Our goal is always to provide you with excellent care. Hearing back from our patients is one way we can continue to improve our services. Please take a few minutes to complete the written survey that you may receive in the mail after your visit with us. Thank you!             Your Updated Medication List - Protect others around you: Learn how to safely use, store and throw away your medicines at www.disposemymeds.org.          This list is accurate as of 8/17/18  3:43 PM.  Always use your most recent med list.                   Brand Name Dispense Instructions for use Diagnosis    acetone (Urine) test Strp     25 each    Test once daily x1 week, then reduce to once weekly once consistently negative    Gestational diabetes       blood glucose lancets standard    no brand specified    100 each    Use to test blood sugar 6 times daily or as directed. OneTouch delica lancets.    Gestational diabetes mellitus (GDM)       blood glucose monitoring meter device kit    no brand specified    1 kit    Use to test blood  "sugar 4 times daily or as directed.    Gestational diabetes mellitus (GDM)       blood glucose monitoring test strip    no brand specified    200 strip    Use to test blood sugars 6 times daily or as directed. OneTouch Verio test strips.    Gestational diabetes        MG Caps           ferrous sulfate 325 (65 Fe) MG tablet    IRON    30 tablet    Take 1 tablet (325 mg) by mouth daily (with breakfast)    Other iron deficiency anemia       insulin  UNIT/ML injection    NovoLIN N VIAL    30 mL    20 units before breakfast  35 units at bedtime    Insulin controlled gestational diabetes mellitus (GDM) in third trimester       insulin pen needle 32G X 4 MM    BD CHRIS U/F    100 each    Use 2 daily as directed.    Gestational diabetes mellitus (GDM) requiring insulin       insulin syringe-needle U-100 30G X 1/2\" 1 ML    BD insulin syringe ultrafine    100 each    Use one syringe Twice daily subcutaneously as directed.    Insulin controlled gestational diabetes mellitus (GDM) in third trimester       prenatal multivitamin plus iron 27-0.8 MG Tabs per tablet      Take by mouth daily        ranitidine 300 MG tablet    ZANTAC    30 tablet    TAKE 1 TABLET (300 MG) BY MOUTH AT BEDTIME    Prenatal care in second trimester         "

## 2018-08-20 ENCOUNTER — VIRTUAL VISIT (OUTPATIENT)
Dept: EDUCATION SERVICES | Facility: CLINIC | Age: 33
End: 2018-08-20
Payer: COMMERCIAL

## 2018-08-20 DIAGNOSIS — O24.414 INSULIN CONTROLLED GESTATIONAL DIABETES MELLITUS (GDM) IN THIRD TRIMESTER: ICD-10-CM

## 2018-08-20 NOTE — MR AVS SNAPSHOT
After Visit Summary   8/20/2018    Tosin Pfeiffer    MRN: [id number]           Patient Information     Date Of Birth          1985        Visit Information        Provider Department      8/20/2018 9:15 AM  DIABETIC ED RESOURCE [facility] [place]        Today's Diagnoses     Insulin controlled gestational diabetes mellitus (GDM) in third trimester           Follow-ups after your visit        Your next 10 appointments already scheduled     Aug 21, 2018  3:30 PM CDT   MFM BPP SINGLE with [id number]   [facility] - [facility])    [address]  [facility] [address]   [phone]            Aug 21, 2018  4:00 PM CDT   Radiology MD with JOSEPH MOODY MD   [facility] Maternal Fetal Medicine [facility])    [address]  [facility] [address]   [phone]           Please arrive at the time given for your first appointment. This visit is used internally to schedule the physician's time during your ultrasound.            Aug 24, 2018  3:30 PM CDT   MFM BPP SINGLE with [id number]   [facility] ([facility])    [address]  [facility] [address]   [phone]            Aug 24, 2018  4:00 PM CDT   Radiology MD with JOSEPH MOODY MD   [facility] Maternal Fetal Medicine [facility])    [address]  [facility] [address]   [phone]           Please arrive at the time given for your first appointment. This visit is used internally to schedule the physician's time during your ultrasound.            Aug 28, 2018  1:30 PM CDT   MFM BPP SINGLE with RHMFMUSR3   [facility] - [facility])    [address]  [facility] [address]   [phone]            Aug 28, 2018  2:00 PM CDT   Radiology MD with JOSEPH MOODY MD    [facility] Maternal Fetal Medicine [facility])    [address]  [facility] [address] [phone]           Please arrive at the time given for your first appointment. This visit is used internally to schedule the physician's time during your ultrasound.            Aug 28, 2018  3:45 PM CDT   ESTABLISHED PRENATAL with Tari Ramirez, DO   [facility]    [address] [facility] [address] [phone]            Aug 31, 2018  3:30 PM CDT   MFSHAHLA PARKP SINGLE with [id number]   [facility] Maternal Fetal Medicine Ultrasound - [facility])    [address] [facility] [address]  [facility] [id number] [phone]            Aug 31, 2018  4:00 PM CDT   Radiology MD with RH HEIDY PANTOJA   [facility])    [address]  [facility] [id number] [phone]           Please arrive at the time given for your first appointment. This visit is used internally to schedule the physician's time during your ultrasound.            Sep 04, 2018  3:30 PM CDT   ESTABLISHED PRENATAL with Tari Ramirez, DO   [facility])    [address] [facility] [address] [phone]              Who to contact     If you have questions or need follow up information about today's clinic visit or your schedule please contact [place] directly at [phone].  Normal or non-critical lab and imaging results will be communicated to you by MyChart, letter or phone within 4 business days after the clinic has received the results. If you do not hear from us within 7 days, please contact the clinic through MyChart or phone. If you have a critical or abnormal lab result, we will notify you by phone as soon as possible.  Submit refill requests through [employer] or call your pharmacy and  they will forward the refill request to us. Please allow 3 business days for your refill to be completed.          Additional Information About Your Visit        [employer] Information     [employer] gives you secure access to your electronic health record. If you see a primary care provider, you can also send messages to your care team and make appointments. If you have questions, please call your primary care clinic.  If you do not have a primary care provider, please call [phone] and they will assist you.        Care EveryWhere ID     This is your Care EveryWhere ID. This could be used by other organizations to access your [place] medical records  [id number]        Your Vitals Were     Last Period                   12/20/2017 (Exact Date)            Blood Pressure from Last 3 Encounters:   08/10/18 118/70   08/06/18 116/70   08/02/18 113/71    Weight from Last 3 Encounters:   08/06/18 104.1 kg (229 lb 9.6 oz)   07/17/18 106.3 kg (234 lb 4.8 oz)   06/28/18 104.3 kg (230 lb)              Today, you had the following     No orders found for display         Today's Medication Changes          These changes are accurate as of 8/20/18  1:44 PM.  If you have any questions, ask your nurse or doctor.               These medicines have changed or have updated prescriptions.        Dose/Directions    insulin  UNIT/ML injection   Commonly known as:  NovoLIN N VIAL   This may have changed:  additional instructions   Used for:  Insulin controlled gestational diabetes mellitus (GDM) in third trimester        25 units before breakfast  40 units at bedtime   Quantity:  30 mL   Refills:  3            Where to get your medicines      These medications were sent to [facility]/pharmacy #[id number] - [place], [place] - [address] [address]  [address] [facility] [address]    Hours:  [facility] to [facility] Phone:  [phone]     insulin  UNIT/ML injection                Primary Care Provider Office Phone # Fax #     Ramona Ann Aaseby-Aguilera, PA-C [phone]       [address]  [facility] [address]        Equal Access to Services     FARIHA SOLIS : Hadii yadira ku earleneo Soheydiali, waaxda luqadaha, qaybta kaalmada adeegyada, molly holguinmichael renteria. So [facility] [phone].    ATENCIÓN: Si habla español, tiene a albright disposición servicios gratuitos de asistencia lingüística. Llame al [phone].    We comply with applicable federal civil rights laws and [place] laws. We do not discriminate on the basis of race, color, national origin, age, disability, sex, sexual orientation, or gender identity.            Thank you!     Thank you for choosing [facility]  for your care. Our goal is always to provide you with excellent care. Hearing back from our patients is one way we can continue to improve our services. Please take a few minutes to complete the written survey that you may receive in the mail after your visit with us. Thank you!             Your Updated Medication List - Protect others around you: Learn how to safely use, store and throw away your medicines at www.disposemymeds.org.          This list is accurate as of 8/20/18  1:44 PM.  Always use your most recent med list.                   Brand Name Dispense Instructions for use Diagnosis    acetone (Urine) test Strp     25 each    Test once daily x1 week, then reduce to once weekly once consistently negative    Gestational diabetes       blood glucose lancets standard    no brand specified    100 each    Use to test blood sugar 6 times daily or as directed. OneTouch delica lancets.    Gestational diabetes mellitus (GDM)       blood glucose monitoring meter device kit    no brand specified    1 kit    Use to test blood sugar 4 times daily or as directed.    Gestational diabetes mellitus (GDM)       blood glucose monitoring test strip    no brand specified    200 strip    Use to test blood sugars 6 times daily or as  "directed. OneTouch Verio test strips.    Gestational diabetes        MG Caps           ferrous sulfate 325 (65 Fe) MG tablet    IRON    30 tablet    Take 1 tablet (325 mg) by mouth daily (with breakfast)    Other iron deficiency anemia       insulin  UNIT/ML injection    NovoLIN N VIAL    30 mL    25 units before breakfast  40 units at bedtime    Insulin controlled gestational diabetes mellitus (GDM) in third trimester       insulin pen needle 32G X 4 MM    BD CHRIS U/F    100 each    Use 2 daily as directed.    Gestational diabetes mellitus (GDM) requiring insulin       insulin syringe-needle U-100 30G X 1/2\" 1 ML    BD insulin syringe ultrafine    100 each    Use one syringe Twice daily subcutaneously as directed.    Insulin controlled gestational diabetes mellitus (GDM) in third trimester       prenatal multivitamin plus iron 27-0.8 MG Tabs per tablet      Take by mouth daily        ranitidine 300 MG tablet    ZANTAC    30 tablet    TAKE 1 TABLET (300 MG) BY MOUTH AT BEDTIME    Prenatal care in second trimester         "

## 2018-08-20 NOTE — PROGRESS NOTES
Gestational Diabetes Follow-up    Subjective/Objective:    Tosin Pfeiffer sent in blood glucose log for review. Last date of communication was: 8/17/18.    Gestational diabetes is being managed with diet, activity and medications    Taking diabetes medications:   yes:     Diabetes Medication(s)     Insulin Sig    insulin NPH (NOVOLIN N VIAL) 100 UNIT/ML injection 20 units before breakfast  35 units at bedtime          Estimated Date of Delivery: Sep 26, 2018    BG/Food Log:   Update on glucose levels - 35 Units at night and 20 units morning    date     before breakfast     after breakfast     after lunch     after supper  8/18         95                             117                      91                 194  8/19        114                            130                     204               134  8/20         93                                                                  Thank you    Assessment:  Fasting readings improved, post lunch and dinner increased compared to last assessment.   Patient would benefit from an insulin increase.     Ketones: none noted.   Fasting blood glucoses: 67% in target.  After breakfast: 100% in target.  After lunch: 50% in target.  After dinner: 50% in target.    Plan/Response:  Recommend increase to insulin - NPH 24-0-2-350--> 25-0-0-40 (20% increase).  Follow up 3-4 days.    Mercedes Morillo MS, RD, LD, CDE      Any diabetes medication dose changes were made via the CDE Protocol and Collaborative Practice Agreement with the patient's OB/GYN provider. A copy of this encounter was shared with the provider.    E-mail reply to patient:  Te Leahy,    Thanks for sending in your readings.    It looks like you re still above target or just right below for fasting readings.  Let s increase the dose and see how your body responds.    Please increase to 25 units before breakfast, and 40 units at bedtime.    Can you send in again on Thursday?    Thanks!  Have a great week!          Mercedes  Yisel MS, RD, LD, CDE

## 2018-08-21 ENCOUNTER — OFFICE VISIT (OUTPATIENT)
Dept: MATERNAL FETAL MEDICINE | Facility: CLINIC | Age: 33
End: 2018-08-21
Attending: OBSTETRICS & GYNECOLOGY
Payer: COMMERCIAL

## 2018-08-21 ENCOUNTER — HOSPITAL ENCOUNTER (OUTPATIENT)
Dept: ULTRASOUND IMAGING | Facility: CLINIC | Age: 33
Discharge: HOME | End: 2018-08-21
Attending: OBSTETRICS & GYNECOLOGY | Admitting: OBSTETRICS & GYNECOLOGY
Payer: COMMERCIAL

## 2018-08-21 DIAGNOSIS — O24.414 INSULIN CONTROLLED GESTATIONAL DIABETES MELLITUS (GDM) DURING PREGNANCY, ANTEPARTUM: Primary | ICD-10-CM

## 2018-08-21 DIAGNOSIS — O24.419 GESTATIONAL DIABETES MELLITUS (GDM) IN THIRD TRIMESTER, GESTATIONAL DIABETES METHOD OF CONTROL UNSPECIFIED: ICD-10-CM

## 2018-08-21 PROCEDURE — 76819 FETAL BIOPHYS PROFIL W/O NST: CPT

## 2018-08-21 NOTE — PROGRESS NOTES
Please see ultrasound report under imaging tab for details on ultrasound performed today.    Lyn Kahn MD  , OB/GYN  Maternal-Fetal Medicine  [email or web address]  [phone] (Academic office)  [phone] (Pager)

## 2018-08-21 NOTE — MR AVS SNAPSHOT
After Visit Summary   8/21/2018    Tosin Pfeiffer    MRN: [id number]           Patient Information     Date Of Birth          1985        Visit Information        Provider Department      8/21/2018 4:00 PM Lyn Kahn MD [facility] Maternal Fetal Medicine [facility]        Today's Diagnoses     Insulin controlled gestational diabetes mellitus (GDM) during pregnancy, antepartum    -  1       Follow-ups after your visit        Your next 10 appointments already scheduled     Aug 24, 2018  3:30 PM CDT   MFM BPP SINGLE with [email or web address]   [facility] - [facility] ([facility])    [address]  [facility] [address]   [phone]            Aug 24, 2018  4:00 PM CDT   Radiology MD with RH HEIDY PANTOJA   [facility] Maternal Fetal Medicine [facility] ([facility])    [address]  [facility] [address]   [phone]           Please arrive at the time given for your first appointment. This visit is used internally to schedule the physician's time during your ultrasound.            Aug 28, 2018  1:30 PM CDT   MFM BPP SINGLE with [id number]   [facility] Maternal Fetal Medicine Ultrasound - [facility] ([facility])    [address]  [facility] [address]   [phone]            Aug 28, 2018  2:00 PM CDT   Radiology MD with RH HEIDY PANTOJA   [facility] Maternal Fetal Medicine [facility] ([facility])    [address]  [facility] [address] [phone]           Please arrive at the time given for your first appointment. This visit is used internally to schedule the physician's time during your ultrasound.            Aug 28, 2018  3:45 PM CDT   ESTABLISHED PRENATAL with Tari Ramirez DO   [facility]    [address] [place] [address] [phone]            Aug 31, 2018  3:30 PM CDT   MFM BPP SINGLE with  [id number]   [facility] Maternal Fetal Medicine Ultrasound - [facility] ([facility])    [address]  [facility] [address]   [phone]            Aug 31, 2018  4:00 PM CDT   Radiology MD with  HEIDY PANTOJA   [facility] ([facility])    [address]  [facility] [address] [phone]           Please arrive at the time given for your first appointment. This visit is used internally to schedule the physician's time during your ultrasound.            Sep 04, 2018 11:00 AM CDT   MFM US COMPRE SINGLE F/U with RHMFMUSR2   [facility] Maternal Fetal [facility] Ultrasound [facility] ([facility])    [address]  [facility] [address] [phone]           Wear comfortable clothes and leave your valuables at home.            Sep 04, 2018 11:30 AM CDT   [facility]   [facility] ([facility])    [address]  [facility] [address] [phone]           Please arrive at the time given for your first appointment. This visit is used internally to schedule the physician's time during your ultrasound.            Sep 04, 2018  3:30 PM CDT   ESTABLISHED PRENATAL with Tari Ramirez, DO   [facility])    [address] [place] [address] [phone]              Who to contact     If you have questions or need follow up information about today's clinic visit or your schedule please contact [facility] directly at [phone].  Normal or non-critical lab and imaging results will be communicated to you by MyChart, letter or phone within 4 business days after the clinic has received the results. If you do not hear from us within 7 days, please contact the clinic through MyChart or phone. If you have a critical or abnormal lab result, we will  notify you by phone as soon as possible.  Submit refill requests through [employer] or call your pharmacy and they will forward the refill request to us. Please allow 3 business days for your refill to be completed.          Additional Information About Your Visit        [employer] Information     [employer] gives you secure access to your electronic health record. If you see a primary care provider, you can also send messages to your care team and make appointments. If you have questions, please call your primary care clinic.  If you do not have a primary care provider, please call [phone] and they will assist you.        Care EveryWhere ID     This is your Care EveryWhere ID. This could be used by other organizations to access your [place] medical records  [id number]        Your Vitals Were     Last Period                   12/20/2017 (Exact Date)            Blood Pressure from Last 3 Encounters:   08/10/18 118/70   08/06/18 116/70   08/02/18 113/71    Weight from Last 3 Encounters:   08/06/18 104.1 kg (229 lb 9.6 oz)   07/17/18 106.3 kg (234 lb 4.8 oz)   06/28/18 104.3 kg (230 lb)              Today, you had the following     No orders found for display       Primary Care Provider Office Phone # Fax #    Ramona Ann Aaseby-Aguilera, PA-C [phone] [address] [facility] [address]        Equal Access to Services     FARIHA SOLIS : Hadii yadira jaimes hadasho Soomaali, waaxda luqadaha, qaybta kaalmada adeedwaryada, molly avila . So [facility] [phone].    ATENCIÓN: Si habla español, tiene a albright disposición servicios gratuitos de asistencia lingüística. Joelle al [phone].    We comply with applicable federal civil rights laws and [place] laws. We do not discriminate on the basis of race, color, national origin, age, disability, sex, sexual orientation, or gender identity.            Thank you!     Thank you for choosing [facility]  for your  "care. Our goal is always to provide you with excellent care. Hearing back from our patients is one way we can continue to improve our services. Please take a few minutes to complete the written survey that you may receive in the mail after your visit with us. Thank you!             Your Updated Medication List - Protect others around you: Learn how to safely use, store and throw away your medicines at www.disposemymeds.org.          This list is accurate as of 8/21/18  4:15 PM.  Always use your most recent med list.                   Brand Name Dispense Instructions for use Diagnosis    acetone (Urine) test Strp     25 each    Test once daily x1 week, then reduce to once weekly once consistently negative    Gestational diabetes       blood glucose lancets standard    no brand specified    100 each    Use to test blood sugar 6 times daily or as directed. OneTouch delica lancets.    Gestational diabetes mellitus (GDM)       blood glucose monitoring meter device kit    no brand specified    1 kit    Use to test blood sugar 4 times daily or as directed.    Gestational diabetes mellitus (GDM)       blood glucose monitoring test strip    no brand specified    200 strip    Use to test blood sugars 6 times daily or as directed. OneTouch Verio test strips.    Gestational diabetes        MG Caps           ferrous sulfate 325 (65 Fe) MG tablet    IRON    30 tablet    Take 1 tablet (325 mg) by mouth daily (with breakfast)    Other iron deficiency anemia       insulin  UNIT/ML injection    NovoLIN N VIAL    30 mL    25 units before breakfast  40 units at bedtime    Insulin controlled gestational diabetes mellitus (GDM) in third trimester       insulin pen needle 32G X 4 MM    BD CHRIS U/F    100 each    Use 2 daily as directed.    Gestational diabetes mellitus (GDM) requiring insulin       insulin syringe-needle U-100 30G X 1/2\" 1 ML    BD insulin syringe ultrafine    100 each    Use one syringe Twice daily " subcutaneously as directed.    Insulin controlled gestational diabetes mellitus (GDM) in third trimester       prenatal multivitamin plus iron 27-0.8 MG Tabs per tablet      Take by mouth daily        ranitidine 300 MG tablet    ZANTAC    30 tablet    TAKE 1 TABLET (300 MG) BY MOUTH AT BEDTIME    Prenatal care in second trimester

## 2018-08-23 ENCOUNTER — PATIENT OUTREACH (OUTPATIENT)
Dept: EDUCATION SERVICES | Facility: CLINIC | Age: 33
End: 2018-08-23

## 2018-08-23 ENCOUNTER — PATIENT OUTREACH (OUTPATIENT)
Dept: EDUCATION SERVICES | Facility: CLINIC | Age: 33
End: 2018-08-23
Payer: COMMERCIAL

## 2018-08-23 DIAGNOSIS — O24.414 INSULIN CONTROLLED GESTATIONAL DIABETES MELLITUS (GDM) IN THIRD TRIMESTER: ICD-10-CM

## 2018-08-23 NOTE — PROGRESS NOTES
Gestational Diabetes Follow-up    Subjective/Objective:    Tosin Pfeiffer sent in blood glucose log for review. Last date of communication was: 8/20.    Gestational diabetes is being managed with medications    Taking diabetes medications:   yes:     Diabetes Medication(s)     Insulin Sig    insulin NPH (NOVOLIN N VIAL) 100 UNIT/ML injection 25 units before breakfast  40 units at bedtime          Estimated Date of Delivery: Sep 26, 2018    BG/Food Log:     Update on glucose levels - 40 Units at night and 25 units morning     date     before breakfast     after breakfast     after lunch     after supper  8/21         84                            135                      141                 148  8/22         105                          118                       154                 143  8/23         89                                                                  Assessment:    Ketones: not recorded.   Fasting blood glucoses: 66% in target.  After breakfast: 100% in target.  After lunch: 0% in target.  After dinner: 0% in target.    Plan/Response:  Recommend increase to insulin - increase NPH from 25-0-0-40 --> 30-0-0-40. (20% increase to morning dose)   Follow-up on Monday.    CDE reply:  Hi Tosin,   Looks like things are nearly there! A little better with the insulin increase ;)  Better in the morning so let s stick with 40 at bedtime, but please go up to 30 in the morning to help the numbers after lunch and dinner.     Can you please send in again on Monday?  Have a good weekend!     Ana Olivo RD, LD, CDE      Any diabetes medication dose changes were made via the CDE Protocol and Collaborative Practice Agreement with the patient's OB/GYN provider. A copy of this encounter was shared with the provider.

## 2018-08-23 NOTE — PROGRESS NOTES
Increased insulin per protocol. See patient outreach note of today's date for detail.  Ana Olivo RD, LD, CDE

## 2018-08-24 ENCOUNTER — HOSPITAL ENCOUNTER (OUTPATIENT)
Dept: ULTRASOUND IMAGING | Facility: CLINIC | Age: 33
Discharge: HOME | End: 2018-08-24
Attending: OBSTETRICS & GYNECOLOGY | Admitting: OBSTETRICS & GYNECOLOGY
Payer: COMMERCIAL

## 2018-08-24 ENCOUNTER — TELEPHONE (OUTPATIENT)
Dept: OBGYN | Facility: CLINIC | Age: 33
End: 2018-08-24

## 2018-08-24 ENCOUNTER — OFFICE VISIT (OUTPATIENT)
Dept: MATERNAL FETAL MEDICINE | Facility: CLINIC | Age: 33
End: 2018-08-24
Attending: OBSTETRICS & GYNECOLOGY
Payer: COMMERCIAL

## 2018-08-24 DIAGNOSIS — O24.419 GESTATIONAL DIABETES MELLITUS (GDM) IN THIRD TRIMESTER, GESTATIONAL DIABETES METHOD OF CONTROL UNSPECIFIED: ICD-10-CM

## 2018-08-24 DIAGNOSIS — O24.414 INSULIN CONTROLLED GESTATIONAL DIABETES MELLITUS (GDM) DURING PREGNANCY, ANTEPARTUM: Primary | ICD-10-CM

## 2018-08-24 PROCEDURE — 76819 FETAL BIOPHYS PROFIL W/O NST: CPT

## 2018-08-24 NOTE — TELEPHONE ENCOUNTER
This is Dr. Ramirez patient, not Dr. Owusu's. Error.    Reason for Call:  Form, our goal is to have forms completed with 72 hours, however, some forms may require a visit or additional information.    Type of letter, form or note:  FMLA    Who is the form from?: Patient    Where did the form come from: Patient or family brought in       What clinic location was the form placed at?: [facility]    Where the form was placed: [employer] desk     What number is listed as a contact on the form?: [phone] Patient       Additional comments: please fax and leave a copy for patient to      Call taken on 8/24/2018 at 8:48 AM by Krystal Lima

## 2018-08-24 NOTE — PROGRESS NOTES
Please see ultrasound report under imaging tab for details on ultrasound performed today.    Lyn Kahn MD  , OB/GYN  Maternal-Fetal Medicine  [email or web address]  [phone] (Academic office)  [phone] (Pager)

## 2018-08-24 NOTE — MR AVS SNAPSHOT
After Visit Summary   8/24/2018    Tosin Pfeiffer    MRN: [id number]           Patient Information     Date Of Birth          1985        Visit Information        Provider Department      8/24/2018 4:00 PM Lyn Kahn MD [facility] Maternal Fetal Medicine [facility]        Today's Diagnoses     Insulin controlled gestational diabetes mellitus (GDM) during pregnancy, antepartum    -  1       Follow-ups after your visit        Your next 10 appointments already scheduled     Aug 28, 2018  1:30 PM CDT   MFM BPP SINGLE with [id number]   [facility] Maternal Fetal Medicine Ultrasound - [facility] ([facility])    [address]  [facility] [address]   [phone]            Aug 28, 2018  2:00 PM CDT   Radiology MD with JOSEPH MOODY MD   [facility] Maternal Fetal Medicine [facility] ([facility])    [address]  [facility] [address]   [phone]           Please arrive at the time given for your first appointment. This visit is used internally to schedule the physician's time during your ultrasound.            Aug 30, 2018 11:45 AM CDT   ESTABLISHED PRENATAL with Radha Brown CNM   [facility] ([facility])    [address]  [address]  [facility] [address]   [phone]            Aug 31, 2018  3:30 PM CDT   MFM BPP SINGLE with [id number]   [facility] Maternal Fetal Medicine Ultrasound - [facility] ([facility])    [address]  [facility] [address]   [phone]            Aug 31, 2018  4:00 PM CDT   Radiology MD with RH HEIDY PANTOJA   [facility] Maternal Fetal Medicine [facility] ([facility])    [address]  [facility] [address]   [phone]           Please arrive at the time given for your first appointment. This visit is used internally to schedule the physician's time during your ultrasound.            Sep 04, 2018 11:00 AM CDT   MFM US  RICHELLE SINGLE F/U with [id number]   [facility] Maternal Fetal Medicine Ultrasound - [facility] ([facility])    [address]  [place] [address] [phone]           Wear comfortable clothes and leave your valuables at home.            Sep 04, 2018 11:30 AM CDT   Radiology MD with  HEIDY PANTOJA   [facility] Fetal Medicine [facility] ([facility])    [address]  [place] [address] [phone]           Please arrive at the time given for your first appointment. This visit is used internally to schedule the physician's time during your ultrasound.            Sep 04, 2018  3:30 PM CDT   ESTABLISHED PRENATAL with Tari Ramirez DO   [facility] ([facility])    [address] [place] [address] [phone]            Sep 11, 2018  2:00 PM CDT   ESTABLISHED PRENATAL with Tari Ramirez DO   [facility] ([facility])    [address]  [address]  [place] [address] [phone]            Sep 18, 2018  2:00 PM CDT   ESTABLISHED PRENATAL with Tari Ramirez DO   [facility] ([facility])    [address] [place] [id number] [phone]              Who to contact     If you have questions or need follow up information about today's clinic visit or your schedule please contact [facility] MATERNAL FETAL MEDICINE [facility] directly at [phone].  Normal or non-critical lab and imaging results will be communicated to you by MyChart, letter or phone within 4 business days after the clinic has received the results. If you do not hear from us within 7 days, please contact the clinic through MyChart or phone. If you have a critical or abnormal lab result, we will notify you by phone as soon as possible.  Submit refill requests through [employer] or call your pharmacy and they will forward the refill request to  us. Please allow 3 business days for your refill to be completed.          Additional Information About Your Visit        MyChart Information     [email or web address] gives you secure access to your electronic health record. If you see a primary care provider, you can also send messages to your care team and make appointments. If you have questions, please call your primary care clinic.  If you do not have a primary care provider, please call [phone] and they will assist you.        Care EveryWhere ID     This is your Care EveryWhere ID. This could be used by other organizations to access your [facility] medical records  [id number]        Your Vitals Were     Last Period                   12/20/2017 (Exact Date)            Blood Pressure from Last 3 Encounters:   08/10/18 118/70   08/06/18 116/70   08/02/18 113/71    Weight from Last 3 Encounters:   08/06/18 104.1 kg (229 lb 9.6 oz)   07/17/18 106.3 kg (234 lb 4.8 oz)   06/28/18 104.3 kg (230 lb)              Today, you had the following     No orders found for display       Primary Care Provider Office Phone # Fax #    Meron Ann Aaseby-Aguilera, PA-C [phone] [address] [id number]        Equal Access to Services     FARIHA SOLIS : Hadii aad ku hadasho Soomaali, waaxda luqadaha, qaybta kaalmada adeegyada, waxay neida renteria. So [facility] [phone].    ATENCIÓN: Si habla español, tiene a albright disposición servicios gratuitos de asistencia lingüística. Llame al [phone].    We comply with applicable federal civil rights laws and [place] laws. We do not discriminate on the basis of race, color, national origin, age, disability, sex, sexual orientation, or gender identity.            Thank you!     Thank you for choosing [facility]  for your care. Our goal is always to provide you with excellent care. Hearing back from our patients is one way we can continue to improve our services.  "Please take a few minutes to complete the written survey that you may receive in the mail after your visit with us. Thank you!             Your Updated Medication List - Protect others around you: Learn how to safely use, store and throw away your medicines at www.disposemymeds.org.          This list is accurate as of 8/24/18  4:04 PM.  Always use your most recent med list.                   Brand Name Dispense Instructions for use Diagnosis    acetone (Urine) test Strp     25 each    Test once daily x1 week, then reduce to once weekly once consistently negative    Gestational diabetes       blood glucose lancets standard    no brand specified    100 each    Use to test blood sugar 6 times daily or as directed. OneTouch delica lancets.    Gestational diabetes mellitus (GDM)       blood glucose monitoring meter device kit    no brand specified    1 kit    Use to test blood sugar 4 times daily or as directed.    Gestational diabetes mellitus (GDM)       blood glucose monitoring test strip    no brand specified    200 strip    Use to test blood sugars 6 times daily or as directed. OneTouch Verio test strips.    Gestational diabetes        MG Caps           ferrous sulfate 325 (65 Fe) MG tablet    IRON    30 tablet    Take 1 tablet (325 mg) by mouth daily (with breakfast)    Other iron deficiency anemia       insulin  UNIT/ML injection    NovoLIN N VIAL    30 mL    30 units before breakfast  40 units at bedtime    Insulin controlled gestational diabetes mellitus (GDM) in third trimester       insulin pen needle 32G X 4 MM    BD CHRIS U/F    100 each    Use 2 daily as directed.    Gestational diabetes mellitus (GDM) requiring insulin       insulin syringe-needle U-100 30G X 1/2\" 1 ML    BD insulin syringe ultrafine    100 each    Use one syringe Twice daily subcutaneously as directed.    Insulin controlled gestational diabetes mellitus (GDM) in third trimester       prenatal multivitamin plus iron 27-0.8 MG " Tabs per tablet      Take by mouth daily        ranitidine 300 MG tablet    ZANTAC    30 tablet    TAKE 1 TABLET (300 MG) BY MOUTH AT BEDTIME    Prenatal care in second trimester

## 2018-08-27 ENCOUNTER — PATIENT OUTREACH (OUTPATIENT)
Dept: EDUCATION SERVICES | Facility: CLINIC | Age: 33
End: 2018-08-27
Payer: COMMERCIAL

## 2018-08-27 DIAGNOSIS — O24.414 INSULIN CONTROLLED GESTATIONAL DIABETES MELLITUS (GDM) IN THIRD TRIMESTER: Primary | ICD-10-CM

## 2018-08-27 NOTE — PROGRESS NOTES
Gestational Diabetes Follow-up    Subjective/Objective:    Tosin Pfeiffer sent in blood glucose log for review. Last date of communication was: 8/23/18.    Gestational diabetes is being managed with diet, activity and medications    Taking diabetes medications:   yes:     Diabetes Medication(s)     Insulin Sig    insulin NPH (NOVOLIN N VIAL) 100 UNIT/ML injection 30 units before breakfast  40 units at bedtime          Estimated Date of Delivery: Sep 26, 2018    BG/Food Log:   Update on glucose levels - 40 Units at night and 30 units morning     date     before breakfast     after breakfast     after lunch     after supper  8/24         94                            148                      154                 163  8/25         98                            116                      129                  150  8/26         89                            137                                              157  8/27         102                          114  Thank you    Assessment:  Blood sugars improved since insulin increase but remain elevated after dinner.  Unable to fully assess impact of increased insulin on post lunch readings.     Ketones: -.   Fasting blood glucoses: 50% in target.  After breakfast: 75% in target.  After lunch: 50% in target.  After dinner: 0% in target.    Plan/Response:  Recommend increase to insulin - NPH 30-0-0-40 --> 35-0-0-45 (15% increase).  Follow up Thursday 8/30/18.    Mercedes Morillo MS, RD, LD, CDE      Any diabetes medication dose changes were made via the CDE Protocol and Collaborative Practice Agreement with the patient's OB/GYN provider. A copy of this encounter was shared with the provider.    E-mail reply to patient:  Te Leahy,    Thanks for sending in your readings.  It looks like the boost definitely helped.  Let s do a slight increase again and see how things are again on Tuesday.    Please increase to 35 units in the morning and 45 units at bedtime.    Take care!      Mercedes Morillo  MS, RD, LD, CDE

## 2018-08-28 ENCOUNTER — OFFICE VISIT (OUTPATIENT)
Dept: MATERNAL FETAL MEDICINE | Facility: CLINIC | Age: 33
End: 2018-08-28
Attending: OBSTETRICS & GYNECOLOGY
Payer: COMMERCIAL

## 2018-08-28 ENCOUNTER — HOSPITAL ENCOUNTER (OUTPATIENT)
Dept: ULTRASOUND IMAGING | Facility: CLINIC | Age: 33
Discharge: HOME | End: 2018-08-28
Attending: OBSTETRICS & GYNECOLOGY | Admitting: OBSTETRICS & GYNECOLOGY
Payer: COMMERCIAL

## 2018-08-28 DIAGNOSIS — O24.419 GESTATIONAL DIABETES MELLITUS (GDM) IN THIRD TRIMESTER, GESTATIONAL DIABETES METHOD OF CONTROL UNSPECIFIED: ICD-10-CM

## 2018-08-28 DIAGNOSIS — O24.414 INSULIN CONTROLLED GESTATIONAL DIABETES MELLITUS (GDM) DURING PREGNANCY, ANTEPARTUM: Primary | ICD-10-CM

## 2018-08-28 PROCEDURE — 76819 FETAL BIOPHYS PROFIL W/O NST: CPT

## 2018-08-28 NOTE — MR AVS SNAPSHOT
After Visit Summary   8/28/2018    Tosin Pfeiffer    MRN: [id number]           Patient Information     Date Of Birth          1985        Visit Information        Provider Department      8/28/2018 2:00 PM Ifrah Frey MD [facility] Maternal Fetal Medicine [facility]        Today's Diagnoses     Insulin controlled gestational diabetes mellitus (GDM) during pregnancy, antepartum    -  1       Follow-ups after your visit        Your next 10 appointments already scheduled     Aug 28, 2018  2:00 PM CDT   Radiology MD with Ifrah Frey MD   [facility] Maternal Fetal Medicine [facility])    [address]  [facility] [address]   [phone]           Please arrive at the time given for your first appointment. This visit is used internally to schedule the physician's time during your ultrasound.            Aug 30, 2018 11:45 AM CDT   ESTABLISHED PRENATAL with Radha Brown CNM   [facility])    [address]  [facility] [address]   [phone]            Aug 31, 2018  3:30 PM CDT   MFM BPP SINGLE with [id number]   [facility] Maternal Fetal Medicine Ultrasound - [facility] ([facility])    [address]  [facility] [address]   [phone]            Aug 31, 2018  4:00 PM CDT   Radiology MD with  MFSHAHLA PANTOJA   [facility] Maternal Fetal Medicine [facility] ([facility])    [address]  [facility] [address]   [phone]           Please arrive at the time given for your first appointment. This visit is used internally to schedule the physician's time during your ultrasound.            Sep 04, 2018 11:00 AM CDT   MFM US COMPRE SINGLE F/U with [id number]   [facility] Maternal Fetal Medicine Ultrasound - [facility])    [address]  [facility] [address]   [phone]           Wear comfortable  clothes and leave your valuables at home.            Sep 04, 2018 11:30 AM CDT   Radiology MD with  HEIDY PANTOJA   [facility] Maternal Fetal [facility] ([facility])    [address] [facility] [address]  [facility] [address]   [phone]           Please arrive at the time given for your first appointment. This visit is used internally to schedule the physician's time during your ultrasound.            Sep 04, 2018  3:30 PM CDT   ESTABLISHED PRENATAL with Tari Ramirez,    [facility] ([facility])    [address] [facility] [address]   [phone]            Sep 11, 2018  2:00 PM CDT   ESTABLISHED PRENATAL with Tari Ramirez DO   [facility] ([facility])    [address]  [facility] [address]   [phone]            Sep 18, 2018  2:00 PM CDT   ESTABLISHED PRENATAL with Tari Ramirez DO   [facility] ([facility])    [address] [facility] [address] [phone]            Sep 24, 2018  4:00 PM CDT   ESTABLISHED PRENATAL with Tari Ramirez DO   [facility] ([facility])    [address] [facility] [address] [phone]              Who to contact     If you have questions or need follow up information about today's clinic visit or your schedule please contact [facility] MATERNAL FETAL [facility] directly at [phone].  Normal or non-critical lab and imaging results will be communicated to you by MyChart, letter or phone within 4 business days after the clinic has received the results. If you do not hear from us within 7 days, please contact the clinic through MyChart or phone. If you have a critical or abnormal lab result, we will notify you by phone as soon as possible.  Submit refill requests through [employer] or call your pharmacy and they will forward the refill request to us.  Please allow 3 business days for your refill to be completed.          Additional Information About Your Visit        MyChart Information     [facility] gives you secure access to your electronic health record. If you see a primary care provider, you can also send messages to your care team and make appointments. If you have questions, please call your primary care clinic.  If you do not have a primary care provider, please call [phone] and they will assist you.        Care EveryWhere ID     This is your Care EveryWhere ID. This could be used by other organizations to access your [facility] medical records  [id number]        Your Vitals Were     Last Period                   12/20/2017 (Exact Date)            Blood Pressure from Last 3 Encounters:   08/10/18 118/70   08/06/18 116/70   08/02/18 113/71    Weight from Last 3 Encounters:   08/06/18 104.1 kg (229 lb 9.6 oz)   07/17/18 106.3 kg (234 lb 4.8 oz)   06/28/18 104.3 kg (230 lb)              Today, you had the following     No orders found for display       Primary Care Provider Office Phone # Fax #    Meron Ann Aaseby-Aguilera, PA-C [phone] [address] [id number]        Equal Access to Services     FARIHA SOLIS : Hadii aad ku hadasho Soomaali, waaxda luqadaha, qaybta kaalmada adeegyada, waxay neida avila . So [facility] [phone].    ATENCIÓN: Si habla español, tiene a albright disposición servicios gratuitos de asistencia lingüística. Llame al [phone].    We comply with applicable federal civil rights laws and [place] laws. We do not discriminate on the basis of race, color, national origin, age, disability, sex, sexual orientation, or gender identity.            Thank you!     Thank you for choosing [facility]  for your care. Our goal is always to provide you with excellent care. Hearing back from our patients is one way we can continue to improve our services. Please  "take a few minutes to complete the written survey that you may receive in the mail after your visit with us. Thank you!             Your Updated Medication List - Protect others around you: Learn how to safely use, store and throw away your medicines at www.disposemymeds.org.          This list is accurate as of 8/28/18  1:54 PM.  Always use your most recent med list.                   Brand Name Dispense Instructions for use Diagnosis    acetone (Urine) test Strp     25 each    Test once daily x1 week, then reduce to once weekly once consistently negative    Gestational diabetes       blood glucose lancets standard    no brand specified    100 each    Use to test blood sugar 6 times daily or as directed. OneTouch delica lancets.    Gestational diabetes mellitus (GDM)       blood glucose monitoring meter device kit    no brand specified    1 kit    Use to test blood sugar 4 times daily or as directed.    Gestational diabetes mellitus (GDM)       blood glucose monitoring test strip    no brand specified    200 strip    Use to test blood sugars 6 times daily or as directed. OneTouch Verio test strips.    Gestational diabetes        MG Caps           ferrous sulfate 325 (65 Fe) MG tablet    IRON    30 tablet    Take 1 tablet (325 mg) by mouth daily (with breakfast)    Other iron deficiency anemia       insulin  UNIT/ML injection    NovoLIN N VIAL    30 mL    35 units before breakfast  45 units at bedtime    Insulin controlled gestational diabetes mellitus (GDM) in third trimester       insulin pen needle 32G X 4 MM    BD CHRIS U/F    100 each    Use 2 daily as directed.    Gestational diabetes mellitus (GDM) requiring insulin       insulin syringe-needle U-100 30G X 1/2\" 1 ML    BD insulin syringe ultrafine    100 each    Use one syringe Twice daily subcutaneously as directed.    Insulin controlled gestational diabetes mellitus (GDM) in third trimester       prenatal multivitamin plus iron 27-0.8 MG Tabs " per tablet      Take by mouth daily        ranitidine 300 MG tablet    ZANTAC    30 tablet    TAKE 1 TABLET (300 MG) BY MOUTH AT BEDTIME    Prenatal care in second trimester

## 2018-08-29 ENCOUNTER — PATIENT OUTREACH (OUTPATIENT)
Dept: EDUCATION SERVICES | Facility: CLINIC | Age: 33
End: 2018-08-29

## 2018-08-29 NOTE — PROGRESS NOTES
Gestational Diabetes Follow-up    Subjective/Objective:    Tosincedrick Pfeiffer sent in blood glucose log for review. Last date of communication was: 8/27/18.    Gestational diabetes is being managed with medications    Taking diabetes medications:   yes:     Diabetes Medication(s)     Insulin Sig    insulin NPH (NOVOLIN N VIAL) 100 UNIT/ML injection 35 units before breakfast  45 units at bedtime          Estimated Date of Delivery: Sep 26, 2018    BG/Food Log:   Update on glucose levels - 45 Units at night and 35 units morning  date     before breakfast     after breakfast     after lunch     after supper  8/28         88                            133                     107                 123  8/29         99                            144                                         Thank you    Assessment:    Ketones: n/a.   Fasting blood glucoses: 50% in target. - only two readings  After breakfast: 50% in target.  After lunch: 100% in target.  After dinner: 100% in target.    Plan/Response:  No changes today, however if fasting number is >95 for the next 2 days, would recommend increasing bedtime NPH to 48 units, and emailing us on Tuesday for review.   Follow-up in 1 week.    Te Leahy,    Thanks for the updates! I don t necessarily want to make a change today on your insulin (since we just made a change Monday), however I do want to have a good game plan ready for you heading into this long weekend. If the majority of your morning (before breakfast) numbers are >95 by Friday, you are ok to increase to 48 units of NPH at bedtime. Regardless, check in with us Tuesday morning so we can see how things are going. I don t think you will need any changes to your morning NPH at this time.    Enjoy your weekend, and we ll talk again Tuesday!    Clari Harvey RD LD CDE    Any diabetes medication dose changes were made via the CDE Protocol and Collaborative Practice Agreement with the patient's OB/GYN provider. A copy of this  encounter was shared with the provider.

## 2018-08-30 ENCOUNTER — PRENATAL OFFICE VISIT (OUTPATIENT)
Dept: OBGYN | Facility: CLINIC | Age: 33
End: 2018-08-30
Payer: COMMERCIAL

## 2018-08-30 VITALS
HEIGHT: 63 IN | BODY MASS INDEX: 42.35 KG/M2 | DIASTOLIC BLOOD PRESSURE: 76 MMHG | WEIGHT: 239 LBS | SYSTOLIC BLOOD PRESSURE: 118 MMHG

## 2018-08-30 DIAGNOSIS — Z34.83 ENCOUNTER FOR SUPERVISION OF OTHER NORMAL PREGNANCY IN THIRD TRIMESTER: Primary | ICD-10-CM

## 2018-08-30 PROCEDURE — 99207 ZZC PRENATAL VISIT: CPT | Performed by: ADVANCED PRACTICE MIDWIFE

## 2018-08-30 PROCEDURE — 87653 STREP B DNA AMP PROBE: CPT | Performed by: ADVANCED PRACTICE MIDWIFE

## 2018-08-30 NOTE — NURSING NOTE
"Chief Complaint   Patient presents with     Prenatal Care       Initial /76  Ht 5' 3\" (1.6 m)  Wt 239 lb (108.4 kg)  LMP 2017 (Exact Date)  BMI 42.34 kg/m2 Estimated body mass index is 42.34 kg/(m^2) as calculated from the following:    Height as of this encounter: 5' 3\" (1.6 m).    Weight as of this encounter: 239 lb (108.4 kg).  BP completed using cuff size: large      36w1d    Anali Fischer CMA             "

## 2018-08-30 NOTE — PROGRESS NOTES
"S: Feels well and has no concerns.  Baby active.  Denies uterine cramping, vaginal bleeding or leaking of fluid. No headache, increase in edema, no epigastric pain.   O: Vitals: /76  Ht 5' 3\" (1.6 m)  Wt 239 lb (108.4 kg)  LMP 12/20/2017 (Exact Date)  BMI 42.34 kg/m2  BMI= Body mass index is 42.34 kg/(m^2).  Exam:  Constitutional: healthy, alert and no distress  Respiratory: respirations even and unlabored  Gastrointestinal: Abdomen soft, non-tender. Fundus measures appropriate for gestational age. Fetal heart tones hear without difficulty and within normal limits  : Normal external genitalia without lesions, cervix, very posterior, 0cm/0%/-2, membranes intact  Psychiatric: mentation appears normal and affect normal/bright  A:     ICD-10-CM    1. Encounter for supervision of other normal pregnancy in third trimester Z34.83 Group B strep PCR     P: Labor signs and symptoms discussed, aware of numbers to call  Discussed warning signs of PIH/preeclampsia and patient will monitor.  GBS screen completed. Discussed plans for labor.   Encouraged patient to call with any questions or concerns.  Return to clinic 1 weeks    BILLIE Teresa CNM          "

## 2018-08-30 NOTE — MR AVS SNAPSHOT
After Visit Summary   8/30/2018    Tosin Pfeiffer    MRN: [id number]           Patient Information     Date Of Birth          1985        Visit Information        Provider Department      8/30/2018 11:45 AM Radha Brown CNM [facility]        Today's Diagnoses     Encounter for supervision of other normal pregnancy in third trimester    -  1      Care Instructions    GROUP B STREP    Group B Strep (GBS) is a common bacteria that is sometimes found in the vagina, urinary tract or rectum.  It is not harmful typically to adults but can cause serious illness in newborns.  It occasionally is passed from mother to baby during birth.   It is important to test in pregnancy.  When a woman is found to be positive for GBS, either at the first prenatal visit or by taking a culture at 36 weeks, treatment will be offered to reduce the chance of spreading the bacteria to the baby.       Treatment consists of either oral antibiotics early in pregnancy or antibiotics given by IV during labor if testing is positive at 36 weeks.      Even without treatment the baby rarely (1-2% of the time) gets infected.  With treatment the baby almost never gets infected.       There really isn't anything you can do to keep from getting or being positive for GBS.  It isn't sexually transmitted and there are no symptoms if you are positive.       Your midwife will discuss your results with you and make recommendations for treatment.            Follow-ups after your visit        Follow-up notes from your care team     Return in about 1 week (around 9/6/2018) for Next prenatal exam.      Your next 10 appointments already scheduled     Aug 31, 2018  3:30 PM ROXANNET   HEIDY LOVELACE SINGLE with [id number]   [facility]aternal [facility]etal [facility] Ultrasound - [facility])    [address] [facility] [address]  [facility] [address]   [phone]            Aug 31, 2018  4:00 PM CDT   Radiology MD with RH HEIDY PANTOJA    [facility] Maternal Fetal Medicine [place] [facility] ([facility])    [address]  [facility] [id number] [phone]           Please arrive at the time given for your first appointment. This visit is used internally to schedule the physician's time during your ultrasound.            Sep 04, 2018 11:00 AM CDT   MFM US COMPRE SINGLE F/U with RHMFMUSR2   [facility] Maternal Fetal Medicine Ultrasound - [facility])    [address]  [facility] [id number] [phone]           Wear comfortable clothes and leave your valuables at home.            Sep 04, 2018 11:30 AM CDT   [facility]   [facility])    [address]  [facility] [id number] [phone]           Please arrive at the time given for your first appointment. This visit is used internally to schedule the physician's time during your ultrasound.            Sep 04, 2018  3:30 PM CDT   ESTABLISHED PRENATAL with Tari Ramirez DO   [facility] ([facility])    [address] [facility] [address]   [phone]            Sep 11, 2018  2:00 PM CDT   ESTABLISHED PRENATAL with Tari Ramirez DO   [facility] ([facility])    [address] [place]  [address]  [facility] [address]   [phone]            Sep 18, 2018  2:00 PM CDT   ESTABLISHED PRENATAL with Tari Ramirez DO   [facility] ([facility])    [address] [facility] [address]   [phone]            Sep 24, 2018  4:00 PM CDT   ESTABLISHED PRENATAL with Tari Ramirez DO   [facility] ([facility])    [address] [facility] [address]   [phone]              Who to contact     If you have questions or need follow up information about today's clinic visit or  "your schedule please contact [facility] directly at [phone].  Normal or non-critical lab and imaging results will be communicated to you by MyChart, letter or phone within 4 business days after the clinic has received the results. If you do not hear from us within 7 days, please contact the clinic through [facility] or phone. If you have a critical or abnormal lab result, we will notify you by phone as soon as possible.  Submit refill requests through [employer] or call your pharmacy and they will forward the refill request to us. Please allow 3 business days for your refill to be completed.          Additional Information About Your Visit        [facility] Information     [employer] gives you secure access to your electronic health record. If you see a primary care provider, you can also send messages to your care team and make appointments. If you have questions, please call your primary care clinic.  If you do not have a primary care provider, please call [phone] and they will assist you.        Care EveryWhere ID     This is your Care EveryWhere ID. This could be used by other organizations to access your [place] medical records  [id number]        Your Vitals Were     Height Last Period BMI (Body Mass Index)             5' 3\" (1.6 m) 12/20/2017 (Exact Date) 42.34 kg/m2          Blood Pressure from Last 3 Encounters:   08/30/18 118/76   08/10/18 118/70   08/06/18 116/70    Weight from Last 3 Encounters:   08/30/18 239 lb (108.4 kg)   08/06/18 229 lb 9.6 oz (104.1 kg)   07/17/18 234 lb 4.8 oz (106.3 kg)              We Performed the Following     Group B strep PCR        Primary Care Provider Office Phone # Fax #    Ramona Ann Aaseby-Aguilera, PA-C [phone] [address]  [place] [address]        Equal Access to Services     FARIHA SOLIS AH: Moira henaoo Solyle, waaxda luqadaha, qaybta kaalmada adeegyada, molly renteria. So wa " [phone].    ATENCIÓN: Si ada perez, tiene a albright disposición servicios gratuitos de asistencia lingüística. Joelle arguello [phone].    We comply with applicable federal civil rights laws and [place] laws. We do not discriminate on the basis of race, color, national origin, age, disability, sex, sexual orientation, or gender identity.            Thank you!     Thank you for choosing [facility]  for your care. Our goal is always to provide you with excellent care. Hearing back from our patients is one way we can continue to improve our services. Please take a few minutes to complete the written survey that you may receive in the mail after your visit with us. Thank you!             Your Updated Medication List - Protect others around you: Learn how to safely use, store and throw away your medicines at www.disposemymeds.org.          This list is accurate as of 8/30/18 11:58 AM.  Always use your most recent med list.                   Brand Name Dispense Instructions for use Diagnosis    acetone (Urine) test Strp     25 each    Test once daily x1 week, then reduce to once weekly once consistently negative    Gestational diabetes       blood glucose lancets standard    no brand specified    100 each    Use to test blood sugar 6 times daily or as directed. OneTouch delica lancets.    Gestational diabetes mellitus (GDM)       blood glucose monitoring meter device kit    no brand specified    1 kit    Use to test blood sugar 4 times daily or as directed.    Gestational diabetes mellitus (GDM)       blood glucose monitoring test strip    no brand specified    200 strip    Use to test blood sugars 6 times daily or as directed. OneTouch Verio test strips.    Gestational diabetes        MG Caps           ferrous sulfate 325 (65 Fe) MG tablet    IRON    30 tablet    Take 1 tablet (325 mg) by mouth daily (with breakfast)    Other iron deficiency anemia       insulin  UNIT/ML injection     "NovoLIN N VIAL    30 mL    35 units before breakfast  45 units at bedtime    Insulin controlled gestational diabetes mellitus (GDM) in third trimester       insulin pen needle 32G X 4 MM    BD CHRIS U/F    100 each    Use 2 daily as directed.    Gestational diabetes mellitus (GDM) requiring insulin       insulin syringe-needle U-100 30G X 1/2\" 1 ML    BD insulin syringe ultrafine    100 each    Use one syringe Twice daily subcutaneously as directed.    Insulin controlled gestational diabetes mellitus (GDM) in third trimester       prenatal multivitamin plus iron 27-0.8 MG Tabs per tablet      Take by mouth daily        ranitidine 300 MG tablet    ZANTAC    30 tablet    TAKE 1 TABLET (300 MG) BY MOUTH AT BEDTIME    Prenatal care in second trimester         "

## 2018-08-30 NOTE — TELEPHONE ENCOUNTER
Form completed and faxed to Amisha Stern per patient request.  Copy sent to scan and patient seeing Radha today and can  her copy of form at today's visit.  Anali Fischer, CMA

## 2018-08-31 ENCOUNTER — HOSPITAL ENCOUNTER (OUTPATIENT)
Dept: ULTRASOUND IMAGING | Facility: CLINIC | Age: 33
Discharge: HOME | End: 2018-08-31
Attending: OBSTETRICS & GYNECOLOGY | Admitting: OBSTETRICS & GYNECOLOGY
Payer: COMMERCIAL

## 2018-08-31 ENCOUNTER — OFFICE VISIT (OUTPATIENT)
Dept: MATERNAL FETAL MEDICINE | Facility: CLINIC | Age: 33
End: 2018-08-31
Attending: OBSTETRICS & GYNECOLOGY
Payer: COMMERCIAL

## 2018-08-31 DIAGNOSIS — O24.419 GESTATIONAL DIABETES MELLITUS (GDM) IN THIRD TRIMESTER, GESTATIONAL DIABETES METHOD OF CONTROL UNSPECIFIED: Primary | ICD-10-CM

## 2018-08-31 DIAGNOSIS — O24.419 GESTATIONAL DIABETES MELLITUS (GDM) IN THIRD TRIMESTER, GESTATIONAL DIABETES METHOD OF CONTROL UNSPECIFIED: ICD-10-CM

## 2018-08-31 LAB
GP B STREP DNA SPEC QL NAA+PROBE: NEGATIVE
SPECIMEN SOURCE: NORMAL

## 2018-08-31 PROCEDURE — 76819 FETAL BIOPHYS PROFIL W/O NST: CPT

## 2018-08-31 NOTE — NURSING NOTE
Pt at [facility] for BPP- 8/8.  Pt reporting occasional cramping.  Reviewed with Dr. Richey and encouraged pt to contact PCP for evaluation.  Pt agrees with plan.

## 2018-08-31 NOTE — MR AVS SNAPSHOT
After Visit Summary   8/31/2018    Tosin Pfeiffer    MRN: [id number]           Patient Information     Date Of Birth          1985        Visit Information        Provider Department      8/31/2018 4:00 PM Tanna Richey MD [facility] Maternal Fetal Medicine [facility]        Today's Diagnoses     Gestational diabetes mellitus (GDM) in third trimester, gestational diabetes method of control unspecified    -  1       Follow-ups after your visit        Your next 10 appointments already scheduled     Aug 31, 2018  4:00 PM CDT   Radiology MD with Tanna Richey MD   [facility] Maternal Fetal Medicine [facility])    [address] [place] [address] [phone]           Please arrive at the time given for your first appointment. This visit is used internally to schedule the physician's time during your ultrasound.            Sep 04, 2018 11:00 AM CDT   MFM US COMPRE SINGLE F/U with RHMFMUSR2   [facility] Maternal Fetal Medicine Ultrasound - [facility])    [address] [place] [address] [phone]           Wear comfortable clothes and leave your valuables at home.            Sep 04, 2018 11:30 AM CDT   Radiology [facility]   [facility] Maternal Fetal Medicine [facility] ([facility])    [address] [place] [address] [phone]           Please arrive at the time given for your first appointment. This visit is used internally to schedule the physician's time during your ultrasound.            Sep 04, 2018  3:30 PM CDT   ESTABLISHED PRENATAL with Tari Ramirez DO   [facility] ([facility])    [address] [facility] [address]   [phone]            Sep 11, 2018  2:00 PM CDT   ESTABLISHED PRENATAL with Tari Ramirez DO   [facility] ([facility])    [address]  [address]  [facility] [address]   [phone]            Sep 18, 2018  2:00 PM CDT   ESTABLISHED PRENATAL with Tari Montano Kevins, DO   [facility] ([facility])    [address] [facility] [address] [phone]            Sep 24, 2018  4:00 PM CDT   ESTABLISHED PRENATAL with Tari Sandsleonardas, DO   [facility] ([facility])    [address] [facility] [address] [phone]              Who to contact     If you have questions or need follow up information about today's clinic visit or your schedule please contact [facility] directly at [phone].  Normal or non-critical lab and imaging results will be communicated to you by MyChart, letter or phone within 4 business days after the clinic has received the results. If you do not hear from us within 7 days, please contact the clinic through MyChart or phone. If you have a critical or abnormal lab result, we will notify you by phone as soon as possible.  Submit refill requests through [email or web address] or call your pharmacy and they will forward the refill request to us. Please allow 3 business days for your refill to be completed.          Additional Information About Your Visit        [facility] Information     [email or web address] gives you secure access to your electronic health record. If you see a primary care provider, you can also send messages to your care team and make appointments. If you have questions, please call your primary care clinic.  If you do not have a primary care provider, please call [phone] and they will assist you.        Care EveryWhere ID     This is your Care EveryWhere ID. This could be used by other organizations to access your [place] medical records  [id number]        Your Vitals Were     Last Period                   12/20/2017 (Exact Date)            Blood Pressure from Last 3 Encounters:   08/30/18 118/76   08/10/18 118/70    08/06/18 116/70    Weight from Last 3 Encounters:   08/30/18 108.4 kg (239 lb)   08/06/18 104.1 kg (229 lb 9.6 oz)   07/17/18 106.3 kg (234 lb 4.8 oz)              Today, you had the following     No orders found for display       Primary Care Provider Office Phone # Fax #    Meron Ann Aaseby-Aguilera, PA-C [phone] [address]  [facility] [address]        Equal Access to Services     [facility]: Hadii aad ku hadasho Soomaali, waaxda luqadaha, qaybta kaalmada adeegyada, waxay neida avila . So [facility] [phone].    ATENCIÓN: Si habla español, tiene a albright disposición servicios gratuitos de asistencia lingüística. [facility] [phone].    We comply with applicable federal civil rights laws and [place] laws. We do not discriminate on the basis of race, color, national origin, age, disability, sex, sexual orientation, or gender identity.            Thank you!     Thank you for choosing [facility]  for your care. Our goal is always to provide you with excellent care. Hearing back from our patients is one way we can continue to improve our services. Please take a few minutes to complete the written survey that you may receive in the mail after your visit with us. Thank you!             Your Updated Medication List - Protect others around you: Learn how to safely use, store and throw away your medicines at www.disposemymeds.org.          This list is accurate as of 8/31/18  3:59 PM.  Always use your most recent med list.                   Brand Name Dispense Instructions for use Diagnosis    acetone (Urine) test Strp     25 each    Test once daily x1 week, then reduce to once weekly once consistently negative    Gestational diabetes       blood glucose lancets standard    no brand specified    100 each    Use to test blood sugar 6 times daily or as directed. OneTouch delica lancets.    Gestational diabetes mellitus (GDM)       blood  "glucose monitoring meter device kit    no brand specified    1 kit    Use to test blood sugar 4 times daily or as directed.    Gestational diabetes mellitus (GDM)       blood glucose monitoring test strip    no brand specified    200 strip    Use to test blood sugars 6 times daily or as directed. OneTouch Verio test strips.    Gestational diabetes        MG Caps           ferrous sulfate 325 (65 Fe) MG tablet    IRON    30 tablet    Take 1 tablet (325 mg) by mouth daily (with breakfast)    Other iron deficiency anemia       insulin  UNIT/ML injection    NovoLIN N VIAL    30 mL    35 units before breakfast  45 units at bedtime    Insulin controlled gestational diabetes mellitus (GDM) in third trimester       insulin pen needle 32G X 4 MM    BD CHRIS U/F    100 each    Use 2 daily as directed.    Gestational diabetes mellitus (GDM) requiring insulin       insulin syringe-needle U-100 30G X 1/2\" 1 ML    BD insulin syringe ultrafine    100 each    Use one syringe Twice daily subcutaneously as directed.    Insulin controlled gestational diabetes mellitus (GDM) in third trimester       prenatal multivitamin plus iron 27-0.8 MG Tabs per tablet      Take by mouth daily        ranitidine 300 MG tablet    ZANTAC    30 tablet    TAKE 1 TABLET (300 MG) BY MOUTH AT BEDTIME    Prenatal care in second trimester         "

## 2018-08-31 NOTE — PROGRESS NOTES
"Please see \"Imaging\" tab under Chart Review for full details.    BPP is 8/8.    Tanna Richey MD  Maternal Fetal Medicine    "
none

## 2018-09-03 ENCOUNTER — MYC MEDICAL ADVICE (OUTPATIENT)
Dept: OBGYN | Facility: CLINIC | Age: 33
End: 2018-09-03

## 2018-09-03 DIAGNOSIS — O24.419 GESTATIONAL DIABETES: Primary | ICD-10-CM

## 2018-09-04 ENCOUNTER — OFFICE VISIT (OUTPATIENT)
Dept: MATERNAL FETAL MEDICINE | Facility: CLINIC | Age: 33
End: 2018-09-04
Attending: OBSTETRICS & GYNECOLOGY
Payer: COMMERCIAL

## 2018-09-04 ENCOUNTER — PRENATAL OFFICE VISIT (OUTPATIENT)
Dept: OBGYN | Facility: CLINIC | Age: 33
End: 2018-09-04
Payer: COMMERCIAL

## 2018-09-04 ENCOUNTER — HOSPITAL ENCOUNTER (OUTPATIENT)
Dept: ULTRASOUND IMAGING | Facility: CLINIC | Age: 33
Discharge: HOME | End: 2018-09-04
Attending: OBSTETRICS & GYNECOLOGY | Admitting: OBSTETRICS & GYNECOLOGY
Payer: COMMERCIAL

## 2018-09-04 ENCOUNTER — PATIENT OUTREACH (OUTPATIENT)
Dept: EDUCATION SERVICES | Facility: CLINIC | Age: 33
End: 2018-09-04
Payer: COMMERCIAL

## 2018-09-04 VITALS
BODY MASS INDEX: 42.66 KG/M2 | DIASTOLIC BLOOD PRESSURE: 74 MMHG | HEIGHT: 63 IN | SYSTOLIC BLOOD PRESSURE: 114 MMHG | WEIGHT: 240.8 LBS

## 2018-09-04 DIAGNOSIS — Z34.93 PRENATAL CARE IN THIRD TRIMESTER: Primary | ICD-10-CM

## 2018-09-04 DIAGNOSIS — O24.419 GESTATIONAL DIABETES MELLITUS (GDM) IN THIRD TRIMESTER, GESTATIONAL DIABETES METHOD OF CONTROL UNSPECIFIED: Primary | ICD-10-CM

## 2018-09-04 DIAGNOSIS — O24.419 GESTATIONAL DIABETES MELLITUS (GDM) IN THIRD TRIMESTER, GESTATIONAL DIABETES METHOD OF CONTROL UNSPECIFIED: ICD-10-CM

## 2018-09-04 DIAGNOSIS — O24.414 INSULIN CONTROLLED GESTATIONAL DIABETES MELLITUS (GDM) IN THIRD TRIMESTER: ICD-10-CM

## 2018-09-04 PROCEDURE — 76816 OB US FOLLOW-UP PER FETUS: CPT

## 2018-09-04 PROCEDURE — 99207 ZZC PRENATAL VISIT: CPT | Performed by: FAMILY MEDICINE

## 2018-09-04 PROCEDURE — 76819 FETAL BIOPHYS PROFIL W/O NST: CPT | Performed by: OBSTETRICS & GYNECOLOGY

## 2018-09-04 NOTE — PROGRESS NOTES
"CC: Here for routine prenatal visit   32 year old y/o  @ 36w6d with Estimated Date of Delivery: Sep 26, 2018   HPI: Doing well. + fetal movement. No vaginal bleeding, LOF, contractions.     This document serves as a record of the services and decisions personally performed and made by Tari Ramirez DO. It was created on her behalf by Jimena Farr, a trained medical scribe. The creation of this document is based on the provider's statements to the medical scribe.  Jimena Farr 3:47 PM 2018    /74  Ht 1.6 m (5' 3\")  Wt 109.2 kg (240 lb 12.8 oz)  LMP 2017 (Exact Date)  BMI 42.66 kg/m2  See OB flowsheet    1) concerns: none today   2) Routine Care: Boy; Declines genetic screening; GC - negative; GTT - abnormal; Tdap given; GBS-negative; baby weighing in at 7Ib 10 oz at [facility] today   3) Gestational Diabetes: on insulin [48 U PM and 38 U AM], followed by MFM, BPP & Growth US @ 36w  4) Delivery: Patient is considering  [calculator 35%], discussed risks.  I am concerned with CPD due to history of 8# 11 oz baby @ 38 weeks and arrest of descent, would not specifically recommend  but would be ok for MELANY if the patient desires and the fetal weight is less than previous baby. Information given and will carefully proceed.                          - Plan for CS if no labor by 40-41 weeks                         - Consent signed 2018  5) Return to clinic weekly     The information in this document, created by the medical scribe for me, accurately reflects the services I personally performed and the decisions made by me. I have reviewed and approved this document for accuracy prior to leaving the patient care area.    Tari Ramirez DO  2018 3:56 PM      "

## 2018-09-04 NOTE — PATIENT INSTRUCTIONS
Return   Return weekly   Return to clinic:  every 4 weeks till 30 weeks, then every 2 weeks till 36 weeks, then weekly till delivery      Phone numbers [place]:  Day/ night [phone] ask for ob triage  Emergency:  Call labor and delivery:  [phone]    What should I call about??    Contraction every 5 minutes for 1 hour 1 minute long (511), bleeding, loss of fluid, headache that doesn't resolve with tylenol, and decreased fetal movement     Start kick counts @ 26-28 weeks   Keep track of movement and discover your normal baby movement pattern   guideline is listed below  Please call if you do not feel the baby move!  We will have you come in for fetal heart rate monitoring:   Perception of at least 10 FMs during 12 hours of normal maternal activity   Perception of least 10 FMs over two hours when the mother is at rest and focused on [facility] Address   [address], [place], [place] [address] [phone]    Dr. Tari Ramirez, DO    OB/GYN   [facility]                                Dr. Tari Ramirez, DO    Obstetrics and Gynecology  [facility] [place] [facility] [place]

## 2018-09-04 NOTE — PROGRESS NOTES
Gestational Diabetes Follow-up    Subjective/Objective:    Tosin Pfeiffer sent in blood glucose log for review. Last date of communication was: 8/29/18.    Gestational diabetes is being managed with diet, activity and medications    Taking diabetes medications:   yes:     Diabetes Medication(s)     Insulin Sig    insulin NPH (NOVOLIN N VIAL) 100 UNIT/ML injection 35 units before breakfast  45 units at bedtime          Estimated Date of Delivery: Sep 26, 2018    BG/Food Log:       Assessment:    Ketones: NA.   Fasting blood glucoses: 83% in target.  After breakfast: 83% in target.  After lunch: 40% in target.  After dinner: 40% in target.    Plan/Response:  Recommend increase to insulin - increase NPH 35-0-0-48 --> 38-0-0-48.  Follow up in 3 days on 9/7.   Routed to OB as FRANCO.     Good morning Tosin,    Thank you for sending in your blood sugars for review.     Looks like your morning numbers are doing well with that slight increase at night to 48 units.  Since your after lunch numbers are now a bit higher, let s increase your morning dose to 38 units. Continue the 48 units at night.     Let us know if you have any questions and send in your numbers again this Friday on 9/7 please. Thanks!    ROBB Catherine CDE      Any diabetes medication dose changes were made via the CDE Protocol and Collaborative Practice Agreement with the patient's OB/GYN provider. A copy of this encounter was shared with the provider.

## 2018-09-04 NOTE — NURSING NOTE
"36w6d    Chief Complaint   Patient presents with     Prenatal Care     had US today--baby is weighing in at 7lb 10oz at MFM       Initial /74  Ht 5' 3\" (1.6 m)  Wt 240 lb 12.8 oz (109.2 kg)  LMP 2017 (Exact Date)  BMI 42.66 kg/m2 Estimated body mass index is 42.66 kg/(m^2) as calculated from the following:    Height as of this encounter: 5' 3\" (1.6 m).    Weight as of this encounter: 240 lb 12.8 oz (109.2 kg).  BP completed using cuff size: large        The following HM Due: NONE      "

## 2018-09-04 NOTE — MR AVS SNAPSHOT
After Visit Summary   9/4/2018    Tosin Pfeiffer    MRN: [id number]           Patient Information     Date Of Birth          1985        Visit Information        Provider Department      9/4/2018 11:30 AM Ifrah Frey MD [facility] Maternal Fetal Medicine - [facility]        Today's Diagnoses     Gestational diabetes mellitus (GDM) in third trimester, gestational diabetes method of control unspecified    -  1       Follow-ups after your visit        Your next 10 appointments already scheduled     Sep 04, 2018  3:30 PM CDT   ESTABLISHED PRENATAL with Tari Ramirez, DO   [facility]    [address] [facility] [address]   [phone]            Sep 07, 2018  3:30 PM CDT   MFM BPP SINGLE with [id number]   [facility] Maternal Fetal Medicine Ultrasound - [facility])    [address]  [facility] [address]   [phone]            Sep 07, 2018  4:00 PM CDT   Radiology MD with JOSEPH MOODY MD   [facility] Maternal Fetal Medicine [facility] ([facility])    [address]  [facility] [address]   [phone]           Please arrive at the time given for your first appointment. This visit is used internally to schedule the physician's time during your ultrasound.            Sep 11, 2018  2:00 PM CDT   ESTABLISHED PRENATAL with Tari Ramirez,    [facility] ([facility])    [address] [place]  [address]  [facility] [address]   [phone]            Sep 11, 2018  3:00 PM CDT   MFM BPP SINGLE with [id number]   [facility] Maternal Fetal Medicine Ultrasound - [facility] ([facility])    [address]  [facility] [address]   [phone]            Sep 11, 2018  3:30 PM CDT   Radiology MD with JOSEPH MOODY MD   [facility] Maternal Fetal Medicine [facility] ([facility])    [address]  [facility]  [address]   [phone]           Please arrive at the time given for your first appointment. This visit is used internally to schedule the physician's time during your ultrasound.            Sep 14, 2018  3:30 PM CDT   MFM BPP SINGLE with [id number]   [facility] Maternal Fetal Medicine Ultrasound - [facility])    [address]  [facility] [address]   [phone]            Sep 14, 2018  4:00 PM CDT   Radiology MD with RH HEIDY PANTOJA   [facility] Maternal Fetal Medicine [facility] ([facility])    [address]  [facility] [address]   [phone]           Please arrive at the time given for your first appointment. This visit is used internally to schedule the physician's time during your ultrasound.            Sep 18, 2018  2:00 PM CDT   ESTABLISHED PRENATAL with Tari Ramirez DO   [facility])    [address] [facility] [address] [phone]            Sep 24, 2018  4:00 PM CDT   ESTABLISHED PRENATAL with Tari Ramirez DO   [facility] ([facility])    [address] [facility] [address] [phone]              Future tests that were ordered for you today     Open Future Orders        Priority Expected Expires Ordered    MFM BPP Single Routine  7/4/2019 9/4/2018    MFM BPP Single Routine  7/4/2019 9/4/2018    MFM BPP Single Routine  7/4/2019 9/4/2018            Who to contact     If you have questions or need follow up information about today's clinic visit or your schedule please contact [facility] MATERNAL FETAL MEDICINE [facility] directly at [phone].  Normal or non-critical lab and imaging results will be communicated to you by MyChart, letter or phone within 4 business days after the clinic has received the results. If you do not hear from us within 7 days, please contact the clinic through MyChart or phone. If you have a critical  or abnormal lab result, we will notify you by phone as soon as possible.  Submit refill requests through [employer] or call your pharmacy and they will forward the refill request to us. Please allow 3 business days for your refill to be completed.          Additional Information About Your Visit        [employer] Information     [employer] gives you secure access to your electronic health record. If you see a primary care provider, you can also send messages to your care team and make appointments. If you have questions, please call your primary care clinic.  If you do not have a primary care provider, please call [phone] and they will assist you.        Care EveryWhere ID     This is your Care EveryWhere ID. This could be used by other organizations to access your [place] medical records  [id number]        Your Vitals Were     Last Period                   12/20/2017 (Exact Date)            Blood Pressure from Last 3 Encounters:   08/30/18 118/76   08/10/18 118/70   08/06/18 116/70    Weight from Last 3 Encounters:   08/30/18 108.4 kg (239 lb)   08/06/18 104.1 kg (229 lb 9.6 oz)   07/17/18 106.3 kg (234 lb 4.8 oz)                 Today's Medication Changes          These changes are accurate as of 9/4/18 12:27 PM.  If you have any questions, ask your nurse or doctor.               These medicines have changed or have updated prescriptions.        Dose/Directions    insulin  UNIT/ML injection   Commonly known as:  NovoLIN N VIAL   This may have changed:  additional instructions   Used for:  Insulin controlled gestational diabetes mellitus (GDM) in third trimester        38 units before breakfast  48 units at bedtime   Quantity:  30 mL   Refills:  3            Where to get your medicines      These medications were sent to [facility]/pharmacy #[id number] - [place], [place] - [address] [address] [facility]  [address] [address] [address]    Hours:  [facility] drug converted to [employer] Phone:  [phone]     insulin  UNIT/ML  injection                Primary Care Provider Office Phone # Fax #    Meron Ann Aaseby-Aguilera, PA-C [phone] [address]  [facility] [address]        Equal Access to Services     FARIHA SOLIS : Hadii aad ku hadradhao Soomaali, waaxda luqadaha, qaybta kaalmada adeegyada, molly wallsn adeedwar ramires laLeximichael renteria. So [facility] [phone].    ATENCIÓN: Si habla español, tiene a albright disposición servicios gratuitos de asistencia lingüística. Llame al [phone].    We comply with applicable federal civil rights laws and [place] laws. We do not discriminate on the basis of race, color, national origin, age, disability, sex, sexual orientation, or gender identity.            Thank you!     Thank you for choosing [facility]  for your care. Our goal is always to provide you with excellent care. Hearing back from our patients is one way we can continue to improve our services. Please take a few minutes to complete the written survey that you may receive in the mail after your visit with us. Thank you!             Your Updated Medication List - Protect others around you: Learn how to safely use, store and throw away your medicines at www.disposemymeds.org.          This list is accurate as of 9/4/18 12:27 PM.  Always use your most recent med list.                   Brand Name Dispense Instructions for use Diagnosis    acetone (Urine) test Strp     25 each    Test once daily x1 week, then reduce to once weekly once consistently negative    Gestational diabetes       blood glucose lancets standard    no brand specified    100 each    Use to test blood sugar 6 times daily or as directed. OneTouch delica lancets.    Gestational diabetes mellitus (GDM)       blood glucose monitoring meter device kit    no brand specified    1 kit    Use to test blood sugar 4 times daily or as directed.    Gestational diabetes mellitus (GDM)       * blood glucose monitoring test strip    no  "brand specified    200 strip    Use to test blood sugars 6 times daily or as directed. OneTouch Verio test strips.    Gestational diabetes       * blood glucose monitoring test strip    ONETOUCH VERIO IQ    100 strip    Use to test blood sugars 4 times daily or as directed.    Gestational diabetes        MG Caps           ferrous sulfate 325 (65 Fe) MG tablet    IRON    30 tablet    Take 1 tablet (325 mg) by mouth daily (with breakfast)    Other iron deficiency anemia       insulin  UNIT/ML injection    NovoLIN N VIAL    30 mL    38 units before breakfast  48 units at bedtime    Insulin controlled gestational diabetes mellitus (GDM) in third trimester       insulin pen needle 32G X 4 MM    BD CHRIS U/F    100 each    Use 2 daily as directed.    Gestational diabetes mellitus (GDM) requiring insulin       insulin syringe-needle U-100 30G X 1/2\" 1 ML    BD insulin syringe ultrafine    100 each    Use one syringe Twice daily subcutaneously as directed.    Insulin controlled gestational diabetes mellitus (GDM) in third trimester       prenatal multivitamin plus iron 27-0.8 MG Tabs per tablet      Take by mouth daily        ranitidine 300 MG tablet    ZANTAC    30 tablet    TAKE 1 TABLET (300 MG) BY MOUTH AT BEDTIME    Prenatal care in second trimester       * Notice:  This list has 2 medication(s) that are the same as other medications prescribed for you. Read the directions carefully, and ask your doctor or other care provider to review them with you.      "

## 2018-09-04 NOTE — MR AVS SNAPSHOT
After Visit Summary   9/4/2018    Tosin Pfeiffer    MRN: [id number]           Patient Information     Date Of Birth          1985        Visit Information        Provider Department      9/4/2018 3:30 PM Tari Ramirez, DO [facility]        Today's Diagnoses     Prenatal care in third trimester    -  1      Care Instructions    Return   Return weekly   Return to clinic:  every 4 weeks till 30 weeks, then every 2 weeks till 36 weeks, then weekly till delivery      Phone numbers [place]:  Day/ night [phone] ask for ob triage  Emergency:  Call labor and delivery:  [phone]    What should I call about??    Contraction every 5 minutes for 1 hour 1 minute long (511), bleeding, loss of fluid, headache that doesn't resolve with tylenol, and decreased fetal movement     Start kick counts @ 26-28 weeks   Keep track of movement and discover your normal baby movement pattern   guideline is listed below  Please call if you do not feel the baby move!  We will have you come in for fetal heart rate monitoring:   Perception of at least 10 FMs during 12 hours of normal maternal activity   Perception of least 10 FMs over two hours when the mother is at rest and focused on counting       [facility] Address   [address] [facility], [place] [address] [phone]    Dr. Tari Ramirez, DO    OB/GYN   [facility]                                Dr. Tari Ramirez, DO    Obstetrics and Gynecology  [facility] [place] [facility] [place]                 Follow-ups after your visit        Your next 10 appointments already scheduled     Sep 07, 2018  3:30 PM CDT   HEIDY BPP SINGLE with [id number]   [facility] Maternal Fetal [facility] Ultrasound - [facility] ([facility] [place])    [address]  [facility] [address] [phone]            Sep 07, 2018  4:00 PM CDT   Radiology MD with RH HEIDY PANTOJA   [facility] M[facility]etal  [facility] ([facility])    [address]  [facility] [address]   [phone]           Please arrive at the time given for your first appointment. This visit is used internally to schedule the physician's time during your ultrasound.            Sep 11, 2018  2:00 PM CDT   ESTABLISHED PRENATAL with Tari Ramirez, DO   [facility]    [address] [place]  [address]  [facility] [address]   [phone]            Sep 11, 2018  3:00 PM CDT   MFM BPP SINGLE with [id number]   [facility] Maternal Fetal Medicine Ultrasound - [facility])    [address]  [facility] [address]   [phone]            Sep 11, 2018  3:30 PM CDT   Radiology MD with RH HEIDY PANTOJA   [facility])    [address]  [facility] [address]   [phone]           Please arrive at the time given for your first appointment. This visit is used internally to schedule the physician's time during your ultrasound.            Sep 14, 2018  3:30 PM CDT   MFM BPP SINGLE with [id number]   [facility] Maternal Fetal Medicine Ultrasound - [facility])    [address]  [facility] [address]   [phone]            Sep 14, 2018  4:00 PM CDT   Radiology MD with RH HEIDY PANTOJA   [facility] Maternal Fetal Medicine [facility])    [address]  [facility] [address]   [phone]           Please arrive at the time given for your first appointment. This visit is used internally to schedule the physician's time during your ultrasound.            Sep 18, 2018  2:00 PM CDT   ESTABLISHED PRENATAL with Tari Ramirez, DO   [facility]    [address] [place] [address]   [phone]            Sep 24, 2018  4:00 PM CDT  "  ESTABLISHED PRENATAL with Tari Ramirez, DO   [facility]    [address] [facility] [address] [phone]              Future tests that were ordered for you today     Open Future Orders        Priority Expected Expires Ordered    MFM BPP Single Routine  7/4/2019 9/4/2018    MFM BPP Single Routine  7/4/2019 9/4/2018    MFM BPP Single Routine  7/4/2019 9/4/2018            Who to contact     If you have questions or need follow up information about today's clinic visit or your schedule please contact [facility] directly at [phone].  Normal or non-critical lab and imaging results will be communicated to you by [employer].hart, letter or phone within 4 business days after the clinic has received the results. If you do not hear from us within 7 days, please contact the clinic through [employer] or phone. If you have a critical or abnormal lab result, we will notify you by phone as soon as possible.  Submit refill requests through [employer] or call your pharmacy and they will forward the refill request to us. Please allow 3 business days for your refill to be completed.          Additional Information About Your Visit        [employer].harMedCenterDisplay Information     [employer] gives you secure access to your electronic health record. If you see a primary care provider, you can also send messages to your care team and make appointments. If you have questions, please call your primary care clinic.  If you do not have a primary care provider, please call [phone] and they will assist you.        Care EveryWhere ID     This is your Care EveryWhere ID. This could be used by other organizations to access your [place] medical records  [id number]        Your Vitals Were     Height Last Period BMI (Body Mass Index)             5' 3\" (1.6 m) 12/20/2017 (Exact Date) 42.66 kg/m2          Blood Pressure from Last 3 Encounters:   09/04/18 114/74   08/30/18 118/76   08/10/18 " 118/70    Weight from Last 3 Encounters:   09/04/18 240 lb 12.8 oz (109.2 kg)   08/30/18 239 lb (108.4 kg)   08/06/18 229 lb 9.6 oz (104.1 kg)              Today, you had the following     No orders found for display         Today's Medication Changes          These changes are accurate as of 9/4/18  3:54 PM.  If you have any questions, ask your nurse or doctor.               These medicines have changed or have updated prescriptions.        Dose/Directions    insulin  UNIT/ML injection   Commonly known as:  NovoLIN N VIAL   This may have changed:  additional instructions   Used for:  Insulin controlled gestational diabetes mellitus (GDM) in third trimester        38 units before breakfast  48 units at bedtime   Quantity:  30 mL   Refills:  3            Where to get your medicines      These medications were sent to [facility]/pharmacy #[id number] - [place], [place] - [address] [facility]  [address] [facility] [address]    Hours:  Old [facility] drug converted to [facility] Phone:  [phone]     insulin  UNIT/ML injection                Primary Care Provider Office Phone # Fax #    Ramona Ann Aaseby-Aguilera, PA-C [phone] [address] [facility] [address]        Equal Access to Services     FARIHA OSLIS AH: Hadii yadira ku hadasho Soomaali, waaxda luqadaha, qaybta kaalmada adeegyada, molly renteria. So [facility] [phone].    ATENCIÓN: Si habla español, tiene a albright disposición servicios gratuitos de asistencia lingüística. Llame al [phone].    We comply with applicable federal civil rights laws and [place] laws. We do not discriminate on the basis of race, color, national origin, age, disability, sex, sexual orientation, or gender identity.            Thank you!     Thank you for choosing [facility]  for your care. Our goal is always to provide you with excellent care. Hearing back from our patients is one way we can continue to improve our services.  "Please take a few minutes to complete the written survey that you may receive in the mail after your visit with us. Thank you!             Your Updated Medication List - Protect others around you: Learn how to safely use, store and throw away your medicines at www.disposemymeds.org.          This list is accurate as of 9/4/18  3:54 PM.  Always use your most recent med list.                   Brand Name Dispense Instructions for use Diagnosis    acetone (Urine) test Strp     25 each    Test once daily x1 week, then reduce to once weekly once consistently negative    Gestational diabetes       blood glucose lancets standard    no brand specified    100 each    Use to test blood sugar 6 times daily or as directed. OneTouch delica lancets.    Gestational diabetes mellitus (GDM)       blood glucose monitoring meter device kit    no brand specified    1 kit    Use to test blood sugar 4 times daily or as directed.    Gestational diabetes mellitus (GDM)       * blood glucose monitoring test strip    no brand specified    200 strip    Use to test blood sugars 6 times daily or as directed. OneTouch Verio test strips.    Gestational diabetes       * blood glucose monitoring test strip    ONETOUCH VERIO IQ    100 strip    Use to test blood sugars 4 times daily or as directed.    Gestational diabetes        MG Caps           ferrous sulfate 325 (65 Fe) MG tablet    IRON    30 tablet    Take 1 tablet (325 mg) by mouth daily (with breakfast)    Other iron deficiency anemia       insulin  UNIT/ML injection    NovoLIN N VIAL    30 mL    38 units before breakfast  48 units at bedtime    Insulin controlled gestational diabetes mellitus (GDM) in third trimester       insulin pen needle 32G X 4 MM    BD CHRIS U/F    100 each    Use 2 daily as directed.    Gestational diabetes mellitus (GDM) requiring insulin       insulin syringe-needle U-100 30G X 1/2\" 1 ML    BD insulin syringe ultrafine    100 each    Use one syringe " Twice daily subcutaneously as directed.    Insulin controlled gestational diabetes mellitus (GDM) in third trimester       prenatal multivitamin plus iron 27-0.8 MG Tabs per tablet      Take by mouth daily        ranitidine 300 MG tablet    ZANTAC    30 tablet    TAKE 1 TABLET (300 MG) BY MOUTH AT BEDTIME    Prenatal care in second trimester       * Notice:  This list has 2 medication(s) that are the same as other medications prescribed for you. Read the directions carefully, and ask your doctor or other care provider to review them with you.

## 2018-09-07 ENCOUNTER — HOSPITAL ENCOUNTER (OUTPATIENT)
Dept: ULTRASOUND IMAGING | Facility: CLINIC | Age: 33
Discharge: HOME | End: 2018-09-07
Attending: OBSTETRICS & GYNECOLOGY | Admitting: OBSTETRICS & GYNECOLOGY
Payer: COMMERCIAL

## 2018-09-07 ENCOUNTER — OFFICE VISIT (OUTPATIENT)
Dept: MATERNAL FETAL MEDICINE | Facility: CLINIC | Age: 33
End: 2018-09-07
Attending: OBSTETRICS & GYNECOLOGY
Payer: COMMERCIAL

## 2018-09-07 ENCOUNTER — PATIENT OUTREACH (OUTPATIENT)
Dept: EDUCATION SERVICES | Facility: CLINIC | Age: 33
End: 2018-09-07

## 2018-09-07 DIAGNOSIS — O24.414 INSULIN CONTROLLED GESTATIONAL DIABETES MELLITUS (GDM) DURING PREGNANCY, ANTEPARTUM: Primary | ICD-10-CM

## 2018-09-07 DIAGNOSIS — O24.419 GESTATIONAL DIABETES MELLITUS (GDM) IN THIRD TRIMESTER, GESTATIONAL DIABETES METHOD OF CONTROL UNSPECIFIED: ICD-10-CM

## 2018-09-07 PROCEDURE — 76819 FETAL BIOPHYS PROFIL W/O NST: CPT

## 2018-09-07 NOTE — PROGRESS NOTES
"Please see \"Imaging\" tab under \"Chart Review\" for details of today's US at the [facility].    Ricardo Zaldivar MD  Maternal-Fetal Medicine    "

## 2018-09-07 NOTE — PROGRESS NOTES
Gestational Diabetes Follow-up    Subjective/Objective:    Tosin Pfeiffer sent in blood glucose log for review. Last date of communication was: 9/4/18.    Gestational diabetes is being managed with medications    Taking diabetes medications:   yes:     Diabetes Medication(s)     Insulin Sig    insulin NPH (NOVOLIN N VIAL) 100 UNIT/ML injection 38 units before breakfast  48 units at bedtime        Estimated Date of Delivery: Sep 26, 2018    BG/Food Log:       Assessment:    Ketones: NA.   Fasting blood glucoses: 100% in target.  After breakfast: 66% in target.  After lunch: 66% in target.  After dinner: 100% in target.    Plan/Response:  No changes in the patient's current treatment plan.  Follow-up in 1 week.    Hi Tosin,   Thanks again for sending in numbers today! Your morning numbers are looking just perfect and your numbers after meals are much improved with that increase in insulin you made earlier in the week. I m glad to hear that you have a meter you re comfortable with now too! Let s leave your  insulin doses where they re at for now and if you could send in numbers again in a week, 9/14, we ll see how it continues to work for you.  Of course, if your numbers do start creeping up, don t hesitate to send them in sooner.     Keep up the great work! Only a few more weeks to go! EVELYN Serna RD, LD     Any diabetes medication dose changes were made via the CDE Protocol and Collaborative Practice Agreement with the patient's OB/GYN provider. A copy of this encounter was shared with the provider.

## 2018-09-07 NOTE — MR AVS SNAPSHOT
After Visit Summary   9/7/2018    Tosin Pfeiffer    MRN: [id number]           Patient Information     Date Of Birth          1985        Visit Information        Provider Department      9/7/2018 4:00 PM JOSEPH MOODY MD [facility] Maternal Fetal Medicine [facility]        Today's Diagnoses     Insulin controlled gestational diabetes mellitus (GDM) during pregnancy, antepartum    -  1       Follow-ups after your visit        Your next 10 appointments already scheduled     Sep 07, 2018  4:00 PM CDT   Radiology MD with JOSEPH MOODY MD   [facility] Fetal Medicine [facility] ([facility])    [address]  [facility] [address]   [phone]           Please arrive at the time given for your first appointment. This visit is used internally to schedule the physician's time during your ultrasound.            Sep 11, 2018  2:00 PM CDT   ESTABLISHED PRENATAL with Tari Ramirez,    [facility] ([facility])    [address]  [address]  [facility] [address]   [phone]            Sep 11, 2018  3:00 PM CDT   MFM BPP SINGLE with [id number]   [facility] Maternal Fetal Medicine Ultrasound [facility])    [address]  [facility] [address]   [phone]            Sep 11, 2018  3:30 PM CDT   Radiology MD with JOSEPH MOODY MD   [facility] Maternal Fetal Medicine [facility] ([facility])    [address]  [facility] [address]   [phone]           Please arrive at the time given for your first appointment. This visit is used internally to schedule the physician's time during your ultrasound.            Sep 14, 2018  3:30 PM CDT   MFM BPP SINGLE with [id number]   [facility] Maternal Fetal Medicine Ultrasound - [facility] ([facility])    [address]  [facility] [address]   [phone]            Sep 14, 2018  4:00 PM CDT   Radiology MD with JOSEPH  HEIDY PANTOJA   [facility] Maternal Fetal Medicine [facility] ([facility])    [address]  [facility] [address] [phone]           Please arrive at the time given for your first appointment. This visit is used internally to schedule the physician's time during your ultrasound.            Sep 18, 2018  2:00 PM CDT   ESTABLISHED PRENATAL with Tari Ramirez, DO   [facility]    [address] [facility] [address] [phone]            Sep 24, 2018  4:00 PM CDT   ESTABLISHED PRENATAL with Tari Ramirez, DO   [facility] ([facility]    [address] [facility] [address] [phone]              Who to contact     If you have questions or need follow up information about today's clinic visit or your schedule please contact [facility] MATERNAL FETAL MEDIC[facility] directly at [phone].  Normal or non-critical lab and imaging results will be communicated to you by [employer]hart, letter or phone within 4 business days after the clinic has received the results. If you do not hear from us within 7 days, please contact the clinic through [employer] or phone. If you have a critical or abnormal lab result, we will notify you by phone as soon as possible.  Submit refill requests through Moat or call your pharmacy and they will forward the refill request to us. Please allow 3 business days for your refill to be completed.          Additional Information About Your Visit        Moat Information     Moat gives you secure access to your electronic health record. If you see a primary care provider, you can also send messages to your care team and make appointments. If you have questions, please call your primary care clinic.  If you do not have a primary care provider, please call [phone] and they will assist you.        Care EveryWhere ID     This is your Care EveryWhere ID. This  could be used by other organizations to access your [place] medical records  [id number]        Your Vitals Were     Last Period                   12/20/2017 (Exact Date)            Blood Pressure from Last 3 Encounters:   09/04/18 114/74   08/30/18 118/76   08/10/18 118/70    Weight from Last 3 Encounters:   09/04/18 109.2 kg (240 lb 12.8 oz)   08/30/18 108.4 kg (239 lb)   08/06/18 104.1 kg (229 lb 9.6 oz)              Today, you had the following     No orders found for display       Primary Care Provider Office Phone # Fax #    Meron Ann Aaseby-Aguilera, PA-C [phone] [address]  [facility] [address]        Equal Access to Services     FARIHA SOLIS : Hadii aad ku hadasho Solyle, waaxda luqadaha, qaybta kaalmada adeegyada, molly avila . So [facility] [phone].    ATENCIÓN: Si habla español, tiene a albright disposición servicios gratuitos de asistencia lingüística. Llame al [phone].    We comply with applicable federal civil rights laws and [place] laws. We do not discriminate on the basis of race, color, national origin, age, disability, sex, sexual orientation, or gender identity.            Thank you!     Thank you for choosing [facility]  for your care. Our goal is always to provide you with excellent care. Hearing back from our patients is one way we can continue to improve our services. Please take a few minutes to complete the written survey that you may receive in the mail after your visit with us. Thank you!             Your Updated Medication List - Protect others around you: Learn how to safely use, store and throw away your medicines at www.disposemymeds.org.          This list is accurate as of 9/7/18  3:46 PM.  Always use your most recent med list.                   Brand Name Dispense Instructions for use Diagnosis    acetone (Urine) test Strp     25 each    Test once daily x1 week, then reduce to once weekly  "once consistently negative    Gestational diabetes       blood glucose lancets standard    no brand specified    100 each    Use to test blood sugar 6 times daily or as directed. OneTouch delica lancets.    Gestational diabetes mellitus (GDM)       blood glucose monitoring meter device kit    no brand specified    1 kit    Use to test blood sugar 4 times daily or as directed.    Gestational diabetes mellitus (GDM)       * blood glucose monitoring test strip    no brand specified    200 strip    Use to test blood sugars 6 times daily or as directed. OneTouch Verio test strips.    Gestational diabetes       * blood glucose monitoring test strip    ONETOUCH VERIO IQ    100 strip    Use to test blood sugars 4 times daily or as directed.    Gestational diabetes        MG Caps           ferrous sulfate 325 (65 Fe) MG tablet    IRON    30 tablet    Take 1 tablet (325 mg) by mouth daily (with breakfast)    Other iron deficiency anemia       insulin  UNIT/ML injection    NovoLIN N VIAL    30 mL    38 units before breakfast  48 units at bedtime    Insulin controlled gestational diabetes mellitus (GDM) in third trimester       insulin pen needle 32G X 4 MM    BD CHRIS U/F    100 each    Use 2 daily as directed.    Gestational diabetes mellitus (GDM) requiring insulin       insulin syringe-needle U-100 30G X 1/2\" 1 ML    BD insulin syringe ultrafine    100 each    Use one syringe Twice daily subcutaneously as directed.    Insulin controlled gestational diabetes mellitus (GDM) in third trimester       prenatal multivitamin plus iron 27-0.8 MG Tabs per tablet      Take by mouth daily        ranitidine 300 MG tablet    ZANTAC    30 tablet    TAKE 1 TABLET (300 MG) BY MOUTH AT BEDTIME    Prenatal care in second trimester       * Notice:  This list has 2 medication(s) that are the same as other medications prescribed for you. Read the directions carefully, and ask your doctor or other care provider to review them with " you.

## 2018-09-11 ENCOUNTER — PRENATAL OFFICE VISIT (OUTPATIENT)
Dept: OBGYN | Facility: CLINIC | Age: 33
End: 2018-09-11
Payer: COMMERCIAL

## 2018-09-11 ENCOUNTER — PATIENT OUTREACH (OUTPATIENT)
Dept: EDUCATION SERVICES | Facility: CLINIC | Age: 33
End: 2018-09-11

## 2018-09-11 ENCOUNTER — OFFICE VISIT (OUTPATIENT)
Dept: MATERNAL FETAL MEDICINE | Facility: CLINIC | Age: 33
End: 2018-09-11
Attending: OBSTETRICS & GYNECOLOGY
Payer: COMMERCIAL

## 2018-09-11 ENCOUNTER — TELEPHONE (OUTPATIENT)
Dept: OBGYN | Facility: CLINIC | Age: 33
End: 2018-09-11

## 2018-09-11 ENCOUNTER — HOSPITAL ENCOUNTER (OUTPATIENT)
Dept: ULTRASOUND IMAGING | Facility: CLINIC | Age: 33
Discharge: HOME | End: 2018-09-11
Attending: OBSTETRICS & GYNECOLOGY | Admitting: OBSTETRICS & GYNECOLOGY
Payer: COMMERCIAL

## 2018-09-11 VITALS
HEIGHT: 63 IN | BODY MASS INDEX: 43.43 KG/M2 | DIASTOLIC BLOOD PRESSURE: 70 MMHG | WEIGHT: 245.1 LBS | SYSTOLIC BLOOD PRESSURE: 120 MMHG

## 2018-09-11 DIAGNOSIS — O24.414 INSULIN CONTROLLED GESTATIONAL DIABETES MELLITUS (GDM) IN THIRD TRIMESTER: ICD-10-CM

## 2018-09-11 DIAGNOSIS — O24.419 GESTATIONAL DIABETES MELLITUS (GDM) IN THIRD TRIMESTER, GESTATIONAL DIABETES METHOD OF CONTROL UNSPECIFIED: ICD-10-CM

## 2018-09-11 DIAGNOSIS — Z98.891 HISTORY OF CESAREAN DELIVERY: ICD-10-CM

## 2018-09-11 DIAGNOSIS — O24.414 INSULIN CONTROLLED GESTATIONAL DIABETES MELLITUS (GDM) DURING PREGNANCY, ANTEPARTUM: Primary | ICD-10-CM

## 2018-09-11 DIAGNOSIS — Z34.93 PRENATAL CARE IN THIRD TRIMESTER: Primary | ICD-10-CM

## 2018-09-11 PROCEDURE — 99207 ZZC PRENATAL VISIT: CPT | Performed by: FAMILY MEDICINE

## 2018-09-11 PROCEDURE — 76819 FETAL BIOPHYS PROFIL W/O NST: CPT

## 2018-09-11 NOTE — NURSING NOTE
"37w6d    Chief Complaint   Patient presents with     Prenatal Care       Initial /70  Ht 5' 3\" (1.6 m)  Wt 245 lb 1.6 oz (111.2 kg)  LMP 2017 (Exact Date)  BMI 43.42 kg/m2 Estimated body mass index is 43.42 kg/(m^2) as calculated from the following:    Height as of this encounter: 5' 3\" (1.6 m).    Weight as of this encounter: 245 lb 1.6 oz (111.2 kg).  BP completed using cuff size: large        The following HM Due: NONE      "

## 2018-09-11 NOTE — PATIENT INSTRUCTIONS
Return weekly and for CS  Return to clinic:  every 4 weeks till 30 weeks, then every 2 weeks till 36 weeks, then weekly till delivery      Phone numbers [place]:  Day/ night [phone] ask for ob triage  Emergency:  Call labor and delivery:  [phone]    What should I call about??    Contraction every 5 minutes for 1 hour 1 minute long (511), bleeding, loss of fluid, headache that doesn't resolve with tylenol, and decreased fetal movement     Start kick counts @ 26-28 weeks   Keep track of movement and discover your normal baby movement pattern   guideline is listed below  Please call if you do not feel the baby move!  We will have you come in for fetal heart rate monitoring:   Perception of at least 10 FMs during 12 hours of normal maternal activity   Perception of least 10 FMs over two hours when the mother is at rest and focused on [facility] Address   [address], [place], [place] [address] [phone]    Dr. Tari Ramirez, DO    OB/GYN   [facility]

## 2018-09-11 NOTE — PROGRESS NOTES
Gestational Diabetes Follow-up    Subjective/Objective:    Tosin Pfeiffer sent in blood glucose log for review. Last date of communication was: 9/7/18.    Gestational diabetes is being managed with diet, activity and medications    Taking diabetes medications:   yes:     Diabetes Medication(s)     Insulin Sig    insulin NPH (NOVOLIN N VIAL) 100 UNIT/ML injection 38 units before breakfast  48 units at bedtime          Estimated Date of Delivery: Sep 26, 2018    BG/Food Log:       Assessment:  Fasting blood sugars could be trending up.    Ketones: none noted.   Fasting blood glucoses: 100% in target.  After breakfast: 75% in target.  After lunch: 67% in target.  After dinner: 33% in target.    Plan/Response:  Recommend increase to insulin - NPH 38-0-0-48 --> 43-0-0-48.    Follow-up in 3-4 days if elevated or 1 week if in target.     Mercedes Morillo MS, RD, LD, CDE      Any diabetes medication dose changes were made via the CDE Protocol and Collaborative Practice Agreement with the patient's OB/GYN provider. A copy of this encounter was shared with the provider.    E-mail reply to patient:  Te Leahy,    Thanks for sending in  your readings.  It looks like you are more consistently high later in the day.  Let s have you increase your breakfast dose to 43 units and keep the bedtime dose the same.  Since your after breakfast readings except for yesterday were well within target I don t want to cause a post breakfast low blood sugar!  Can you send in again on Friday or Tuesday depending on how the readings are looking?    Have a great day!      Mercedes Morillo MS, RD, LD, CDE

## 2018-09-11 NOTE — MR AVS SNAPSHOT
After Visit Summary   9/11/2018    Tosin Pfeiffer    MRN: [id number]           Patient Information     Date Of Birth          1985        Visit Information        Provider Department      9/11/2018 2:00 PM Tari Ramirez, DO [facility]        Today's Diagnoses     Prenatal care in third trimester    -  1      Care Instructions    Return weekly and for CS  Return to clinic:  every 4 weeks till 30 weeks, then every 2 weeks till 36 weeks, then weekly till delivery      Phone numbers [place]:  Day/ night [phone] ask for ob triage  Emergency:  Call labor and delivery:  [phone]    What should I call about??    Contraction every 5 minutes for 1 hour 1 minute long (511), bleeding, loss of fluid, headache that doesn't resolve with tylenol, and decreased fetal movement     Start kick counts @ 26-28 weeks   Keep track of movement and discover your normal baby movement pattern   guideline is listed below  Please call if you do not feel the baby move!  We will have you come in for fetal heart rate monitoring:   Perception of at least 10 FMs during 12 hours of normal maternal activity   Perception of least 10 FMs over two hours when the mother is at rest and focused on counting       [facility] Address   [address], [place], [place] [address] [phone]    Dr. Tari Ramirez, DO    OB/GYN   [facility]                                      Follow-ups after your visit        Your next 10 appointments already scheduled     Sep 11, 2018  3:00 PM CDT   HEIDY BPP SINGLE with [id number]   [facility] Maternal Fetal Medicine Ultrasound - [facility])    [address]  [facility] [address] [phone]            Sep 11, 2018  3:30 PM CDT   Radiology MD with RH HEIDY PANTOJA   [facility] Maternal Fetal Medicine - [facility] ([facility])    [address]  [facility] [address]    [phone]           Please arrive at the time given for your first appointment. This visit is used internally to schedule the physician's time during your ultrasound.            Sep 14, 2018  3:30 PM CDT   HEIDY PARKP SINGLE with [id number]   [facility] Maternal Fetal Medicine Ultrasound - [facility])    [address]  [facility] [address]   [phone]            Sep 14, 2018  4:00 PM CDT   Radiology MD with RH HEIDY PANTOJA   [facility] Maternal Fetal Medicine [facility] ([facility])    [address]  [facility] [address] [phone]           Please arrive at the time given for your first appointment. This visit is used internally to schedule the physician's time during your ultrasound.            Sep 18, 2018  2:00 PM CDT   ESTABLISHED PRENATAL with Tari Ramirez DO   [facility]    [address] [facility] [address] [phone]            Sep 24, 2018  4:00 PM CDT   ESTABLISHED PRENATAL with Tari [facility] DO   [facility]    [address] [facility] [address] [phone]              Who to contact     If you have questions or need follow up information about today's clinic visit or your schedule please contact [facility] directly at [phone].  Normal or non-critical lab and imaging results will be communicated to you by MyChart, letter or phone within 4 business days after the clinic has received the results. If you do not hear from us within 7 days, please contact the clinic through MyChart or phone. If you have a critical or abnormal lab result, we will notify you by phone as soon as possible.  Submit refill requests through [employer] Nile or call your pharmacy and they will forward the refill request to us. Please allow 3 business days for your refill to be completed.          Additional Information  "About Your Visit        [employer] Information     [employer] gives you secure access to your electronic health record. If you see a primary care provider, you can also send messages to your care team and make appointments. If you have questions, please call your primary care clinic.  If you do not have a primary care provider, please call [phone] and they will assist you.        Care EveryWhere ID     This is your Care EveryWhere ID. This could be used by other organizations to access your [place] medical records  [id number]        Your Vitals Were     Height Last Period BMI (Body Mass Index)             5' 3\" (1.6 m) 12/20/2017 (Exact Date) 43.42 kg/m2          Blood Pressure from Last 3 Encounters:   09/11/18 120/70   09/04/18 114/74   08/30/18 118/76    Weight from Last 3 Encounters:   09/11/18 245 lb 1.6 oz (111.2 kg)   09/04/18 240 lb 12.8 oz (109.2 kg)   08/30/18 239 lb (108.4 kg)              Today, you had the following     No orders found for display         Today's Medication Changes          These changes are accurate as of 9/11/18  2:25 PM.  If you have any questions, ask your nurse or doctor.               These medicines have changed or have updated prescriptions.        Dose/Directions    insulin  UNIT/ML injection   Commonly known as:  NovoLIN N VIAL   This may have changed:  additional instructions   Used for:  Insulin controlled gestational diabetes mellitus (GDM) in third trimester   Changed by:  Mercedes Morillo, RD        43 units before breakfast  48 units at bedtime   Quantity:  30 mL   Refills:  3            Where to get your medicines      These medications were sent to [facility]/pharmacy #[id number] - [place], [place] - [address] [address]  [address] [facility] [address]    Hours:  [facility] Phone:  [phone]     insulin  UNIT/ML injection    ranitidine 300 MG tablet                Primary Care Provider Office Phone # Fax #    Ramona Ann Aaseby-Aguilera, PA-C " [phone]       [address]  [facility] [address]        Equal Access to Services     FARIHA SOLIS : Hadii yadira jaimes olinda Solyle, waaxda luqadaha, qaybta kaalmada fanny, molly kellyrah myra. So [facility] [phone].    ATENCIÓN: Si habla español, tiene a albright disposición servicios gratuitos de asistencia lingüística. Llame al [phone].    We comply with applicable federal civil rights laws and [place] laws. We do not discriminate on the basis of race, color, national origin, age, disability, sex, sexual orientation, or gender identity.            Thank you!     Thank you for choosing [facility]  for your care. Our goal is always to provide you with excellent care. Hearing back from our patients is one way we can continue to improve our services. Please take a few minutes to complete the written survey that you may receive in the mail after your visit with us. Thank you!             Your Updated Medication List - Protect others around you: Learn how to safely use, store and throw away your medicines at www.disposemymeds.org.          This list is accurate as of 9/11/18  2:25 PM.  Always use your most recent med list.                   Brand Name Dispense Instructions for use Diagnosis    acetone (Urine) test Strp     25 each    Test once daily x1 week, then reduce to once weekly once consistently negative    Gestational diabetes       blood glucose lancets standard    no brand specified    100 each    Use to test blood sugar 6 times daily or as directed. OneTouch delica lancets.    Gestational diabetes mellitus (GDM)       blood glucose monitoring meter device kit    no brand specified    1 kit    Use to test blood sugar 4 times daily or as directed.    Gestational diabetes mellitus (GDM)       * blood glucose monitoring test strip    no brand specified    200 strip    Use to test blood sugars 6 times daily or as directed. OneTouch Verio test strips.     "Gestational diabetes       * blood glucose monitoring test strip    ONETOUCH VERIO IQ    100 strip    Use to test blood sugars 4 times daily or as directed.    Gestational diabetes        MG Caps           ferrous sulfate 325 (65 Fe) MG tablet    IRON    30 tablet    Take 1 tablet (325 mg) by mouth daily (with breakfast)    Other iron deficiency anemia       insulin  UNIT/ML injection    NovoLIN N VIAL    30 mL    43 units before breakfast  48 units at bedtime    Insulin controlled gestational diabetes mellitus (GDM) in third trimester       insulin pen needle 32G X 4 MM    BD CHRIS U/F    100 each    Use 2 daily as directed.    Gestational diabetes mellitus (GDM) requiring insulin       insulin syringe-needle U-100 30G X 1/2\" 1 ML    BD insulin syringe ultrafine    100 each    Use one syringe Twice daily subcutaneously as directed.    Insulin controlled gestational diabetes mellitus (GDM) in third trimester       prenatal multivitamin plus iron 27-0.8 MG Tabs per tablet      Take by mouth daily        ranitidine 300 MG tablet    ZANTAC    90 tablet    TAKE 1 TABLET (300 MG) BY MOUTH AT BEDTIME    Prenatal care in second trimester       * Notice:  This list has 2 medication(s) that are the same as other medications prescribed for you. Read the directions carefully, and ask your doctor or other care provider to review them with you.      "

## 2018-09-11 NOTE — PROGRESS NOTES
"CC: Here for routine prenatal visit   32 year old y/o  @ 37w6d with Estimated Date of Delivery: Sep 26, 2018   HPI: Pt is doing well, expresses no concerns today.         This document serves as a record of the services and decisions personally performed and made by Tari Ramirez DO. It was created on her behalf by Kailey Beltre, a trained medical scribe. The creation of this document is based the provider's statements to the medical scribe.  Scribe Kailey Beltre 2:21 PM, 2018    /70  Ht 1.6 m (5' 3\")  Wt 111.2 kg (245 lb 1.6 oz)  LMP 2017 (Exact Date)  BMI 43.42 kg/m2  See OB flowsheet  + fetal movement, no contractions, no bleeding, no loss of fluid   Discussed monitoring fetal movement - aware of kick counts, reports good movement today      1) concerns: Membranes stripped today  2) Routine care: Boy; Declines genetic screening; GC - negative; GTT - 1 hr & 3 hr abnormal; Tdap given; GBS - negative;  3) Gestational Diabetes: on insulin [50 U PM and 40 U AM], followed by MFM, BPP & Growth US @ 36w  4) Delivery: Patient is considering  [calculator 35%], discussed risks.  I am concerned with CPD due to history of 8# 11 oz baby @ 38 weeks and arrest of descent, would not specifically recommend  but would be ok for MELANY if the patient desires and the fetal weight is less than previous baby. Information given and will carefully proceed.                          - Plan for CS if no labor by 39 weeks, to be scheduled  or                          - MELANY consent signed 2018  5) Return: Weekly        The information in this document, created by the medical scribe for me, accurately reflects the services I personally performed and the decisions made by me. I have reviewed and approved this document for accuracy prior to leaving the patient care area.  2:21 PM, 18    James    "

## 2018-09-11 NOTE — TELEPHONE ENCOUNTER
Procedure name(s) - multi select repeat  section       Reason for procedure history of  section      Surgeon: James      Is this a multi surgeon case? No      Laterality N/A      Request for additional equipment Other (see comments) None     Anesthesia Spinal      Initiate Pre-op orders for above procedure: Yes, as ordered in Epic Additional orders noted there also     Location of Case: [facility]      Surgeon Procedure Time (incision to closure) in minutes (per procedure as applicable) 60      Note:  Surgical Case Time Needed (in minutes)     Date of Surgery (Requested): 2018 or thursday     Patient Class (for admit prior to surgery, specify number of days in comments): Surgery admit      Length of Stay: 3 days      H&P To Be Completed By: Surgeon      Post-Op Appointment 1.5 week      Bowel Prep: No      Vendor Needed? No

## 2018-09-11 NOTE — PROGRESS NOTES
Please see full imaging report from ViewPoint program under imaging tab.    BPP 8/8    Gennaro Irizarry MD  Maternal Fetal Medicine

## 2018-09-12 NOTE — TELEPHONE ENCOUNTER
Type of surgery: repeat  section  Location of surgery: [facility]  Date and time of surgery: 18 @ 1:00 pm  Surgeon: Dr. Ramirez  Pre-Op Appt Date: @ prenatal appointment  Post-Op Appt Date: Patient advised to schedule.   Packet sent out: Yes  Pre-cert/Authorization completed:  No  Date: 18

## 2018-09-13 ENCOUNTER — SURGERY (OUTPATIENT)
Age: 33
End: 2018-09-13

## 2018-09-13 ENCOUNTER — HOSPITAL ENCOUNTER (OUTPATIENT)
Facility: CLINIC | Age: 33
LOS: 1 days | Discharge: HOME | End: 2018-09-13
Attending: OBSTETRICS & GYNECOLOGY | Admitting: OBSTETRICS & GYNECOLOGY
Payer: COMMERCIAL

## 2018-09-13 VITALS — TEMPERATURE: 99 F | DIASTOLIC BLOOD PRESSURE: 75 MMHG | SYSTOLIC BLOOD PRESSURE: 126 MMHG | RESPIRATION RATE: 18 BRPM

## 2018-09-13 LAB
ALBUMIN SERPL-MCNC: 3 G/DL (ref 3.4–5)
ALBUMIN UR-MCNC: NEGATIVE MG/DL
ALP SERPL-CCNC: 148 U/L (ref 40–150)
ALT SERPL W P-5'-P-CCNC: 17 U/L (ref 0–50)
ANION GAP SERPL CALCULATED.3IONS-SCNC: 10 MMOL/L (ref 3–14)
APPEARANCE UR: CLEAR
AST SERPL W P-5'-P-CCNC: 16 U/L (ref 0–45)
BILIRUB SERPL-MCNC: 0.6 MG/DL (ref 0.2–1.3)
BILIRUB UR QL STRIP: NEGATIVE
BUN SERPL-MCNC: 11 MG/DL (ref 7–30)
CALCIUM SERPL-MCNC: 8.7 MG/DL (ref 8.5–10.1)
CHLORIDE SERPL-SCNC: 108 MMOL/L (ref 94–109)
CO2 SERPL-SCNC: 21 MMOL/L (ref 20–32)
COLOR UR AUTO: YELLOW
CREAT SERPL-MCNC: 0.71 MG/DL (ref 0.52–1.04)
CREAT UR-MCNC: 56 MG/DL
ERYTHROCYTE [DISTWIDTH] IN BLOOD BY AUTOMATED COUNT: 14.3 % (ref 10–15)
GFR SERPL CREATININE-BSD FRML MDRD: >90 ML/MIN/1.7M2
GLUCOSE SERPL-MCNC: 63 MG/DL (ref 70–99)
GLUCOSE UR STRIP-MCNC: NEGATIVE MG/DL
HCT VFR BLD AUTO: 38.6 % (ref 35–47)
HGB BLD-MCNC: 12.8 G/DL (ref 11.7–15.7)
HGB UR QL STRIP: NEGATIVE
KETONES UR STRIP-MCNC: NEGATIVE MG/DL
LEUKOCYTE ESTERASE UR QL STRIP: NEGATIVE
MCH RBC QN AUTO: 30.5 PG (ref 26.5–33)
MCHC RBC AUTO-ENTMCNC: 33.2 G/DL (ref 31.5–36.5)
MCV RBC AUTO: 92 FL (ref 78–100)
MUCOUS THREADS #/AREA URNS LPF: PRESENT /LPF
NITRATE UR QL: NEGATIVE
PH UR STRIP: 6 PH (ref 5–7)
PLATELET # BLD AUTO: 209 10E9/L (ref 150–450)
POTASSIUM SERPL-SCNC: 4.1 MMOL/L (ref 3.4–5.3)
PROT SERPL-MCNC: 6.6 G/DL (ref 6.8–8.8)
PROT UR-MCNC: 0.08 G/L
PROT/CREAT 24H UR: 0.14 G/G CR (ref 0–0.2)
RBC # BLD AUTO: 4.19 10E12/L (ref 3.8–5.2)
RBC #/AREA URNS AUTO: 1 /HPF (ref 0–2)
SODIUM SERPL-SCNC: 139 MMOL/L (ref 133–144)
SOURCE: ABNORMAL
SP GR UR STRIP: 1.01 (ref 1–1.03)
SQUAMOUS #/AREA URNS AUTO: 1 /HPF (ref 0–1)
UROBILINOGEN UR STRIP-MCNC: 0 MG/DL (ref 0–2)
WBC # BLD AUTO: 11.2 10E9/L (ref 4–11)
WBC #/AREA URNS AUTO: 1 /HPF (ref 0–5)

## 2018-09-13 PROCEDURE — G0463 HOSPITAL OUTPT CLINIC VISIT: HCPCS | Mod: 25

## 2018-09-13 PROCEDURE — 59025 FETAL NON-STRESS TEST: CPT | Mod: 26 | Performed by: OBSTETRICS & GYNECOLOGY

## 2018-09-13 PROCEDURE — 59025 FETAL NON-STRESS TEST: CPT

## 2018-09-13 PROCEDURE — 84156 ASSAY OF PROTEIN URINE: CPT | Performed by: OBSTETRICS & GYNECOLOGY

## 2018-09-13 PROCEDURE — 27210794 ZZH OR GENERAL SUPPLY STERILE: Performed by: FAMILY MEDICINE

## 2018-09-13 PROCEDURE — 36415 COLL VENOUS BLD VENIPUNCTURE: CPT | Performed by: OBSTETRICS & GYNECOLOGY

## 2018-09-13 PROCEDURE — 99213 OFFICE O/P EST LOW 20 MIN: CPT | Mod: 25 | Performed by: OBSTETRICS & GYNECOLOGY

## 2018-09-13 PROCEDURE — C1765 ADHESION BARRIER: HCPCS | Performed by: FAMILY MEDICINE

## 2018-09-13 PROCEDURE — 81001 URINALYSIS AUTO W/SCOPE: CPT | Performed by: OBSTETRICS & GYNECOLOGY

## 2018-09-13 PROCEDURE — 85027 COMPLETE CBC AUTOMATED: CPT | Performed by: OBSTETRICS & GYNECOLOGY

## 2018-09-13 PROCEDURE — 80053 COMPREHEN METABOLIC PANEL: CPT | Performed by: OBSTETRICS & GYNECOLOGY

## 2018-09-13 RX ORDER — ONDANSETRON 2 MG/ML
4 INJECTION INTRAMUSCULAR; INTRAVENOUS EVERY 6 HOURS PRN
Status: DISCONTINUED | OUTPATIENT
Start: 2018-09-13 | End: 2018-09-13 | Stop reason: HOSPADM

## 2018-09-13 NOTE — PROVIDER NOTIFICATION
09/13/18 1236   Provider Notification   Provider Name/Title Dr. Stringer    Method of Notification At Bedside   Request Evaluate in Person   Notification Reason Status Update;Lab/Diagnostic Study   OK for discharge if labs OK and cervix unchanged. Pending labs at this time.

## 2018-09-13 NOTE — PROGRESS NOTES
Tosin Pfeiffer is a 32 year old female  presents for rule out labor, noted to have elevated BP on admission. Considering  if in labor, otherwise has scheduled c/s next week.     Vitals:    18 1043 18 1057 18 1114 18 1128   BP: 129/85 130/84 127/85 126/75   Resp:       Temp:       TempSrc:        Gen: resting comfortably, in NAD  Pulm: CTAB, no wheezing/cough  Neuro: DTRs 1+  NST: , mod shaun, +accels, no decels  [place]: Q 3-4 min    Results for orders placed or performed during the hospital encounter of 18   UA with Microscopic reflex to Culture   Result Value Ref Range    Color Urine Yellow     Appearance Urine Clear     Glucose Urine Negative NEG^Negative mg/dL    Bilirubin Urine Negative NEG^Negative    Ketones Urine Negative NEG^Negative mg/dL    Specific Gravity Urine 1.011 1.003 - 1.035    Blood Urine Negative NEG^Negative    pH Urine 6.0 5.0 - 7.0 pH    Protein Albumin Urine Negative NEG^Negative mg/dL    Urobilinogen mg/dL 0.0 0.0 - 2.0 mg/dL    Nitrite Urine Negative NEG^Negative    Leukocyte Esterase Urine Negative NEG^Negative    Source Unspecified Urine     WBC Urine 1 0 - 5 /HPF    RBC Urine 1 0 - 2 /HPF    Squamous Epithelial /HPF Urine 1 0 - 1 /HPF    Mucous Urine Present (A) NEG^Negative /LPF   Protein  random urine with Creat Ratio   Result Value Ref Range    Protein Random Urine 0.08 g/L    Protein Total Urine g/gr Creatinine 0.14 0 - 0.2 g/g Cr   CBC with platelets   Result Value Ref Range    WBC 11.2 (H) 4.0 - 11.0 10e9/L    RBC Count 4.19 3.8 - 5.2 10e12/L    Hemoglobin 12.8 11.7 - 15.7 g/dL    Hematocrit 38.6 35.0 - 47.0 %    MCV 92 78 - 100 fl    MCH 30.5 26.5 - 33.0 pg    MCHC 33.2 31.5 - 36.5 g/dL    RDW 14.3 10.0 - 15.0 %    Platelet Count 209 150 - 450 10e9/L   Creatinine urine calculation only   Result Value Ref Range    Creatinine Urine 56 mg/dL      A/P: Tosin Pfeiffer is a 32 year old female  at 38w1d here for r/o labor, planning TOLAC.  Elevated pressures on presentation, HELLP labs wnl  FWB: Cat I tracing, reactive.   Now normotensive with reassuring HELLP labs. Reviewed return to care precautions.   F/u next week in clinic.     Ayde Stringer MD

## 2018-09-13 NOTE — PLAN OF CARE
Lab results within normal limits. SVE, cervix unchanged. Will discharge to home. Instructions reviewed with patient, understanding verbalized. Discharged to home ambulatory.

## 2018-09-13 NOTE — PLAN OF CARE
Data: [facility]: 2018 10:10 AM.  Reason for maternal/fetal assessment is rule out labor. Patient reports some contractions since 0830, losing her mucous plug and some bloody show.  Patient is a .  Prenatal record reviewed. Pregnancy  has been complicated by gestational diabetes, insulin controlled.  Gestational Age 38w1d. VSS. Fetal movement present. Patient denies leaking of vaginal fluid/rupture of membranes, abdominal pain, pelvic pressure, nausea, vomiting, headache, visual disturbances, epigastric or URQ pain, significant edema. Support person is present.   Action: Verbal consent for EFM. Triage assessment completed. Bill of rights reviewed.  Response: Patient verbalized agreement with plan. Will contact Dr Ayde Stringer with update and further orders.

## 2018-09-13 NOTE — IP AVS SNAPSHOT
[facility] Labor and Delivery    [address]    [facility] [address]    Phone:  [phone]    Fax:  [phone]                                       After Visit Summary   9/13/2018    Tosin Pfeiffer    MRN: [id number]           After Visit Summary Signature Page     I have received my discharge instructions, and my questions have been answered. I have discussed any challenges I see with this plan with the nurse or doctor.    ..........................................................................................................................................  Patient/Patient Representative Signature      ..........................................................................................................................................  Patient Representative Print Name and Relationship to Patient    ..................................................               ................................................  Date                                   Time    ..........................................................................................................................................  Reviewed by Signature/Title    ...................................................              ..............................................  Date                                               Time          22EPIC Rev 08/18

## 2018-09-13 NOTE — IP AVS SNAPSHOT
MRN:[id number]                      After Visit Summary   9/13/2018    Tosin Pfeiffer    MRN: [id number]           Thank you!     Thank you for choosing [facility] for your care. Our goal is always to provide you with excellent care. Hearing back from our patients is one way we can continue to improve our services. Please take a few minutes to complete the written survey that you may receive in the mail after you visit. If you would like to speak to someone directly about your visit please contact Patient Relations at [phone]. Thank you!          Patient Information     Date Of Birth          1985        About your hospital stay     You were admitted on:  September 13, 2018 You last received care in the:  [facility] Labor and Delivery    You were discharged on:  September 13, 2018       Who to Call     For medical emergencies, please call 911.  For non-urgent questions about your medical care, please call your primary care provider or clinic, [phone]  For questions related to your surgery, please call your surgery clinic        Attending Provider     Provider Specialty    Ayde Stringer MD OB/Gyn       Primary Care Provider Office Phone # Fax #    Meron Ann Aaseby-Aguilera, PA-C [phone]      Your next 10 appointments already scheduled     Sep 14, 2018  3:30 PM CDT   HEIDY PARKP SINGLE with [id number]   [facility] Ultrasound - [facility])    [address]  [facility] [id number] [phone]            Sep 14, 2018  4:00 PM CDT   Radiology MD with  HEIDY PANTOJA   [facility]etal Medicine - [facility])    [address]  [facility] [id number] [phone]           Please arrive at the time given for your first appointment. This visit is used internally to schedule the physician's time during your ultrasound.            Sep 18, 2018 11:00 AM  CDT   HEIDY PARKP SINGLE with [email or web address]   Health system Maternal Fetal Medicine Ultrasound - [facility])    [address]  [facility] [id number] [phone]            Sep 18, 2018 11:30 AM CDT   Radiology MD with RH HEIDY PANTOJA   Health system Maternal Fetal Medicine - [facility])    [address]  [facility] [id number] [phone]           Please arrive at the time given for your first appointment. This visit is used internally to schedule the physician's time during your ultrasound.            Sep 18, 2018  2:00 PM CDT   [facility]ABL[facility] Tari Ramirez DO   [facility]    [address] [id number] [phone]            Sep 24, 2018  4:00 PM CDT   ESTABLISHED PRENATAL with Tari Ramirez DO   [facility] ([facility]    [address] [id number] [phone]              Further instructions from your care team       Discharge Instruction for Undelivered Patients      You were seen for: Labor Assessment  We Consulted: Dr. Stringer  You had (Test or Medicine):SVE, fetal and uterine monitoring, and pregnancy induced hypertension  Evaluation.     Diet:   Drink 8 to 12 glasses of liquids (milk, juice, water) every day.  You may eat meals and snacks.     Activity:  Call your doctor or nurse midwife if your baby is moving less than usual.     Call your provider if you notice:  Swelling in your face or increased swelling in your hands or legs.  Headaches that are not relieved by Tylenol (acetaminophen).  Changes in your vision (blurring: seeing spots or stars.)  Nausea (sick to your stomach) and vomiting (throwing up).   Weight gain of 5 pounds or more per week.  Heartburn that doesn't go away.  Signs of bladder infection: pain when you urinate (use the toilet), need to go more often and more urgently.  The bag of  moscoso (rupture of membranes) breaks, or you notice leaking in your underwear.  Bright red blood in your underwear.  Abdominal (lower belly) or stomach pain.  For first baby: Contractions (tightening) less than 5 minutes apart for one hour or more.  Second (plus) baby: Contractions (tightening) less than 10 minutes apart and getting stronger.  *If less than 34 weeks: Contractions (tightenings) more than 6 times in one hour.  Increase or change in vaginal discharge (note the color and amount)  Other: call with any questions or concerns.    Follow-up:  As scheduled in the clinic          Pending Results     No orders found from 9/11/2018 to 9/14/2018.            Admission Information     Date & Time Provider Department Dept. Phone    9/13/2018 Ayde Stringer MD [facility] Labor and Delivery [phone]      Your Vitals Were     Blood Pressure Temperature Respirations Last Period          126/75 99  F (37.2  C) (Oral) 18 12/20/2017 (Exact Date)        MyChart Information     The [facility] gives you secure access to your electronic health record. If you see a primary care provider, you can also send messages to your care team and make appointments. If you have questions, please call your primary care clinic.  If you do not have a primary care provider, please call [phone] and they will assist you.        Care EveryWhere ID     This is your Care EveryWhere ID. This could be used by other organizations to access your [place] medical records  [id number]        Equal Access to Services     FARIHA SOLIS : Hadii yadira Islas, waaxda luqadaha, qaybta kaalmada neyada, molly renteria. So [facility] [phone].    ATENCIÓN: Si habla español, tiene a albright disposición servicios gratuitos de asistencia lingüística. Llame al [phone].    We comply with applicable federal civil rights laws and [place] laws. We do not discriminate on the basis of race, color, national origin, age,  disability, sex, sexual orientation, or gender identity.               Review of your medicines      UNREVIEWED medicines. Ask your doctor about these medicines        Dose / Directions     MG Caps        Refills:  0       ferrous sulfate 325 (65 Fe) MG tablet   Commonly known as:  IRON   Used for:  Other iron deficiency anemia        Dose:  325 mg   Take 1 tablet (325 mg) by mouth daily (with breakfast)   Quantity:  30 tablet   Refills:  2       insulin  UNIT/ML injection   Commonly known as:  NovoLIN N VIAL   Used for:  Insulin controlled gestational diabetes mellitus (GDM) in third trimester        43 units before breakfast  48 units at bedtime   Quantity:  30 mL   Refills:  3       prenatal multivitamin plus iron 27-0.8 MG Tabs per tablet        Take by mouth daily   Refills:  0       ranitidine 300 MG tablet   Commonly known as:  ZANTAC   Used for:  Prenatal care in second trimester        TAKE 1 TABLET (300 MG) BY MOUTH AT BEDTIME   Quantity:  90 tablet   Refills:  1         CONTINUE these medicines which have NOT CHANGED        Dose / Directions    acetone (Urine) test Strp   Used for:  Gestational diabetes        Test once daily x1 week, then reduce to once weekly once consistently negative   Quantity:  25 each   Refills:  1       blood glucose lancets standard   Commonly known as:  no brand specified   Used for:  Gestational diabetes mellitus (GDM)        Use to test blood sugar 6 times daily or as directed. OneTouch delica lancets.   Quantity:  100 each   Refills:  PRN       blood glucose monitoring meter device kit   Commonly known as:  no brand specified   Used for:  Gestational diabetes mellitus (GDM)        Use to test blood sugar 4 times daily or as directed.   Quantity:  1 kit   Refills:  0       * blood glucose monitoring test strip   Commonly known as:  no brand specified   Used for:  Gestational diabetes        Use to test blood sugars 6 times daily or as directed. OneTouch Verio  "test strips.   Quantity:  200 strip   Refills:  3       * blood glucose monitoring test strip   Commonly known as:  ONETOUCH VERIO IQ   Used for:  Gestational diabetes        Use to test blood sugars 4 times daily or as directed.   Quantity:  100 strip   Refills:  1       insulin pen needle 32G X 4 MM   Commonly known as:  BD CHRIS U/F   Used for:  Gestational diabetes mellitus (GDM) requiring insulin        Use 2 daily as directed.   Quantity:  100 each   Refills:  3       insulin syringe-needle U-100 30G X 1/2\" 1 ML   Commonly known as:  BD insulin syringe ultrafine   Used for:  Insulin controlled gestational diabetes mellitus (GDM) in third trimester        Use one syringe Twice daily subcutaneously as directed.   Quantity:  100 each   Refills:  3       * Notice:  This list has 2 medication(s) that are the same as other medications prescribed for you. Read the directions carefully, and ask your doctor or other care provider to review them with you.             Protect others around you: Learn how to safely use, store and throw away your medicines at www.disposemymeds.org.             Medication List: This is a list of all your medications and when to take them. Check marks below indicate your daily home schedule. Keep this list as a reference.      Medications           Morning Afternoon Evening Bedtime As Needed    acetone (Urine) test Strp   Test once daily x1 week, then reduce to once weekly once consistently negative                                blood glucose lancets standard   Commonly known as:  no brand specified   Use to test blood sugar 6 times daily or as directed. OneTouch delica lancets.                                blood glucose monitoring meter device kit   Commonly known as:  no brand specified   Use to test blood sugar 4 times daily or as directed.                                * blood glucose monitoring test strip   Commonly known as:  no brand specified   Use to test blood sugars 6 times " "daily or as directed. OneTouch Verio test strips.                                * blood glucose monitoring test strip   Commonly known as:  ONETOUCH VERIO IQ   Use to test blood sugars 4 times daily or as directed.                                 MG Caps                                ferrous sulfate 325 (65 Fe) MG tablet   Commonly known as:  IRON   Take 1 tablet (325 mg) by mouth daily (with breakfast)                                insulin  UNIT/ML injection   Commonly known as:  NovoLIN N VIAL   43 units before breakfast  48 units at bedtime                                insulin pen needle 32G X 4 MM   Commonly known as:  BD CHRIS U/F   Use 2 daily as directed.                                insulin syringe-needle U-100 30G X 1/2\" 1 ML   Commonly known as:  BD insulin syringe ultrafine   Use one syringe Twice daily subcutaneously as directed.                                prenatal multivitamin plus iron 27-0.8 MG Tabs per tablet   Take by mouth daily                                ranitidine 300 MG tablet   Commonly known as:  ZANTAC   TAKE 1 TABLET (300 MG) BY MOUTH AT BEDTIME                                * Notice:  This list has 2 medication(s) that are the same as other medications prescribed for you. Read the directions carefully, and ask your doctor or other care provider to review them with you.      "

## 2018-09-13 NOTE — DISCHARGE INSTRUCTIONS
Discharge Instruction for Undelivered Patients      You were seen for: Labor Assessment  We Consulted: Dr. Stringer  You had (Test or Medicine):SVE, fetal and uterine monitoring, and pregnancy induced hypertension  Evaluation.     Diet:   Drink 8 to 12 glasses of liquids (milk, juice, water) every day.  You may eat meals and snacks.     Activity:  Call your doctor or nurse midwife if your baby is moving less than usual.     Call your provider if you notice:  Swelling in your face or increased swelling in your hands or legs.  Headaches that are not relieved by Tylenol (acetaminophen).  Changes in your vision (blurring: seeing spots or stars.)  Nausea (sick to your stomach) and vomiting (throwing up).   Weight gain of 5 pounds or more per week.  Heartburn that doesn't go away.  Signs of bladder infection: pain when you urinate (use the toilet), need to go more often and more urgently.  The bag of moscoso (rupture of membranes) breaks, or you notice leaking in your underwear.  Bright red blood in your underwear.  Abdominal (lower belly) or stomach pain.  For first baby: Contractions (tightening) less than 5 minutes apart for one hour or more.  Second (plus) baby: Contractions (tightening) less than 10 minutes apart and getting stronger.  *If less than 34 weeks: Contractions (tightenings) more than 6 times in one hour.  Increase or change in vaginal discharge (note the color and amount)  Other: call with any questions or concerns.    Follow-up:  As scheduled in the clinic

## 2018-09-13 NOTE — PLAN OF CARE
Assumed cares, handoff received from EMILY Cedeno RN. Patient having contractions every 2-4.5 minutes, rates pain at 2. Will observe.

## 2018-09-14 ENCOUNTER — OFFICE VISIT (OUTPATIENT)
Dept: MATERNAL FETAL MEDICINE | Facility: CLINIC | Age: 33
End: 2018-09-14
Attending: OBSTETRICS & GYNECOLOGY
Payer: COMMERCIAL

## 2018-09-14 ENCOUNTER — HOSPITAL ENCOUNTER (OUTPATIENT)
Dept: ULTRASOUND IMAGING | Facility: CLINIC | Age: 33
Discharge: HOME | End: 2018-09-14
Attending: OBSTETRICS & GYNECOLOGY | Admitting: OBSTETRICS & GYNECOLOGY
Payer: COMMERCIAL

## 2018-09-14 ENCOUNTER — NURSE TRIAGE (OUTPATIENT)
Dept: NURSING | Facility: CLINIC | Age: 33
End: 2018-09-14

## 2018-09-14 DIAGNOSIS — O24.419 GESTATIONAL DIABETES MELLITUS (GDM) IN THIRD TRIMESTER, GESTATIONAL DIABETES METHOD OF CONTROL UNSPECIFIED: ICD-10-CM

## 2018-09-14 DIAGNOSIS — O24.414 INSULIN CONTROLLED GESTATIONAL DIABETES MELLITUS (GDM) IN THIRD TRIMESTER: Primary | ICD-10-CM

## 2018-09-14 PROCEDURE — 76819 FETAL BIOPHYS PROFIL W/O NST: CPT

## 2018-09-14 NOTE — MR AVS SNAPSHOT
After Visit Summary   9/14/2018    Tosin Pfeiffer    MRN: [id number]           Patient Information     Date Of Birth          1985        Visit Information        Provider Department      9/14/2018 4:00 PM Gennaro Irizarry MD [facility]        Today's Diagnoses     Insulin controlled gestational diabetes mellitus (GDM) in third trimester    -  1       Follow-ups after your visit        Your next 10 appointments already scheduled     Sep 18, 2018 11:00 AM CDT   MFM BPP SINGLE with [id number]   [facility] Ultrasound - [facility])    [address]  [place] [address]   [phone]            Sep 18, 2018 11:30 AM CDT   Radiology MD with  HEIDY PANTOJA   [facility] Medicine [facility])    [address] [facility] [facility] [address]  [facility] [id number] [phone]           Please arrive at the time given for your first appointment. This visit is used internally to schedule the physician's time during your ultrasound.            Sep 18, 2018  2:00 PM CDT   ESTABLISHED PRENATAL with Tari Ramirez DO   [facility]    [id number] [place] [address] [phone]            Sep 24, 2018  4:00 PM CDT   ESTABLISHED PRENATAL with Tari Ramirez DO   [facility] ([facility])    [address] [place] [address] [phone]              Who to contact     If you have questions or need follow up information about today's clinic visit or your schedule please contact [facility] directly at [phone].  Normal or non-critical lab and imaging results will be communicated to you by MyChart, letter or phone within 4 business days after the clinic has received the results. If you do not hear from us within 7 days, please contact the clinic through  [employer] or phone. If you have a critical or abnormal lab result, we will notify you by phone as soon as possible.  Submit refill requests through [employer] or call your pharmacy and they will forward the refill request to us. Please allow 3 business days for your refill to be completed.          Additional Information About Your Visit        [employer] Information     [employer] gives you secure access to your electronic health record. If you see a primary care provider, you can also send messages to your care team and make appointments. If you have questions, please call your primary care clinic.  If you do not have a primary care provider, please call [phone] and they will assist you.        Care EveryWhere ID     This is your Care EveryWhere ID. This could be used by other organizations to access your [place] medical records  [id number]        Your Vitals Were     Last Period                   12/20/2017 (Exact Date)            Blood Pressure from Last 3 Encounters:   09/13/18 126/75   09/11/18 120/70   09/04/18 114/74    Weight from Last 3 Encounters:   09/11/18 111.2 kg (245 lb 1.6 oz)   09/04/18 109.2 kg (240 lb 12.8 oz)   08/30/18 108.4 kg (239 lb)              Today, you had the following     No orders found for display       Primary Care Provider Office Phone # Fax #    Meron Ann Aaseby-Aguilera, PA-C [phone] [address] [facility] [address]        Equal Access to Services     TIFFANY SOLIS : Hadii aad ku hadasho Soomaali, waaxda luqadaha, qaybta kaalmada adeegyada, molly avila . So [facility] [phone].    ATENCIÓN: Si habla español, tiene a albright disposición servicios gratuitos de asistencia lingüística. Joelle al [phone].    We comply with applicable federal civil rights laws and [place] laws. We do not discriminate on the basis of race, color, national origin, age, disability, sex, sexual orientation, or gender identity.            Thank you!      Thank you for choosing [facility]  for your care. Our goal is always to provide you with excellent care. Hearing back from our patients is one way we can continue to improve our services. Please take a few minutes to complete the written survey that you may receive in the mail after your visit with us. Thank you!             Your Updated Medication List - Protect others around you: Learn how to safely use, store and throw away your medicines at www.disposemymeds.org.          This list is accurate as of 9/14/18  4:03 PM.  Always use your most recent med list.                   Brand Name Dispense Instructions for use Diagnosis    acetone (Urine) test Strp     25 each    Test once daily x1 week, then reduce to once weekly once consistently negative    Gestational diabetes       blood glucose lancets standard    no brand specified    100 each    Use to test blood sugar 6 times daily or as directed. OneTouch delica lancets.    Gestational diabetes mellitus (GDM)       blood glucose monitoring meter device kit    no brand specified    1 kit    Use to test blood sugar 4 times daily or as directed.    Gestational diabetes mellitus (GDM)       * blood glucose monitoring test strip    no brand specified    200 strip    Use to test blood sugars 6 times daily or as directed. OneTouch Verio test strips.    Gestational diabetes       * blood glucose monitoring test strip    ONETOUCH VERIO IQ    100 strip    Use to test blood sugars 4 times daily or as directed.    Gestational diabetes        MG Caps           ferrous sulfate 325 (65 Fe) MG tablet    IRON    30 tablet    Take 1 tablet (325 mg) by mouth daily (with breakfast)    Other iron deficiency anemia       insulin  UNIT/ML injection    NovoLIN N VIAL    30 mL    43 units before breakfast  48 units at bedtime    Insulin controlled gestational diabetes mellitus (GDM) in third trimester       insulin pen needle 32G X 4 MM    Novel SuperTV CHRIS  "U/F    100 each    Use 2 daily as directed.    Gestational diabetes mellitus (GDM) requiring insulin       insulin syringe-needle U-100 30G X 1/2\" 1 ML    BD insulin syringe ultrafine    100 each    Use one syringe Twice daily subcutaneously as directed.    Insulin controlled gestational diabetes mellitus (GDM) in third trimester       prenatal multivitamin plus iron 27-0.8 MG Tabs per tablet      Take by mouth daily        ranitidine 300 MG tablet    ZANTAC    90 tablet    TAKE 1 TABLET (300 MG) BY MOUTH AT BEDTIME    Prenatal care in second trimester       * Notice:  This list has 2 medication(s) that are the same as other medications prescribed for you. Read the directions carefully, and ask your doctor or other care provider to review them with you.      "

## 2018-09-15 ENCOUNTER — ANESTHESIA EVENT (OUTPATIENT)
Dept: OBGYN | Facility: CLINIC | Age: 33
End: 2018-09-15
Payer: COMMERCIAL

## 2018-09-15 ENCOUNTER — HOSPITAL ENCOUNTER (INPATIENT)
Facility: CLINIC | Age: 33
LOS: 2 days | Discharge: HOME | End: 2018-09-17
Attending: OBSTETRICS & GYNECOLOGY | Admitting: OBSTETRICS & GYNECOLOGY
Payer: COMMERCIAL

## 2018-09-15 ENCOUNTER — ANESTHESIA (OUTPATIENT)
Dept: OBGYN | Facility: CLINIC | Age: 33
End: 2018-09-15
Payer: COMMERCIAL

## 2018-09-15 PROBLEM — Z36.89 ENCOUNTER FOR TRIAGE IN PREGNANT PATIENT: Status: ACTIVE | Noted: 2018-09-15

## 2018-09-15 PROBLEM — Z34.90 PREGNANCY: Status: ACTIVE | Noted: 2018-09-15

## 2018-09-15 LAB
ALT SERPL W P-5'-P-CCNC: 17 U/L (ref 0–50)
AST SERPL W P-5'-P-CCNC: 19 U/L (ref 0–45)
BLOOD BANK CMNT PATIENT-IMP: NORMAL
CREAT SERPL-MCNC: 0.67 MG/DL (ref 0.52–1.04)
CREAT UR-MCNC: 76 MG/DL
ERYTHROCYTE [DISTWIDTH] IN BLOOD BY AUTOMATED COUNT: 14.1 % (ref 10–15)
GFR SERPL CREATININE-BSD FRML MDRD: >90 ML/MIN/1.7M2
GLUCOSE BLDC GLUCOMTR-MCNC: 100 MG/DL (ref 70–99)
GLUCOSE BLDC GLUCOMTR-MCNC: 110 MG/DL (ref 70–99)
GLUCOSE BLDC GLUCOMTR-MCNC: 72 MG/DL (ref 70–99)
GLUCOSE BLDC GLUCOMTR-MCNC: 76 MG/DL (ref 70–99)
GLUCOSE BLDC GLUCOMTR-MCNC: 81 MG/DL (ref 70–99)
GLUCOSE BLDC GLUCOMTR-MCNC: 83 MG/DL (ref 70–99)
GLUCOSE BLDC GLUCOMTR-MCNC: 83 MG/DL (ref 70–99)
GLUCOSE BLDC GLUCOMTR-MCNC: 86 MG/DL (ref 70–99)
GLUCOSE BLDC GLUCOMTR-MCNC: 91 MG/DL (ref 70–99)
HCT VFR BLD AUTO: 36.7 % (ref 35–47)
HGB BLD-MCNC: 12.2 G/DL (ref 11.7–15.7)
MCH RBC QN AUTO: 30.3 PG (ref 26.5–33)
MCHC RBC AUTO-ENTMCNC: 33.2 G/DL (ref 31.5–36.5)
MCV RBC AUTO: 91 FL (ref 78–100)
PLATELET # BLD AUTO: 197 10E9/L (ref 150–450)
PROT UR-MCNC: 0.11 G/L
PROT/CREAT 24H UR: 0.14 G/G CR (ref 0–0.2)
RBC # BLD AUTO: 4.02 10E12/L (ref 3.8–5.2)
RUPTURE OF FETAL MEMBRANES BY ROM PLUS: POSITIVE
WBC # BLD AUTO: 9.8 10E9/L (ref 4–11)

## 2018-09-15 PROCEDURE — 72200001 ZZH LABOR CARE VAGINAL DELIVERY SINGLE

## 2018-09-15 PROCEDURE — 85027 COMPLETE CBC AUTOMATED: CPT | Performed by: OBSTETRICS & GYNECOLOGY

## 2018-09-15 PROCEDURE — 86900 BLOOD TYPING SEROLOGIC ABO: CPT | Performed by: OBSTETRICS & GYNECOLOGY

## 2018-09-15 PROCEDURE — 00HU33Z INSERTION OF INFUSION DEVICE INTO SPINAL CANAL, PERCUTANEOUS APPROACH: ICD-10-PCS | Performed by: ANESTHESIOLOGY

## 2018-09-15 PROCEDURE — 82565 ASSAY OF CREATININE: CPT | Performed by: OBSTETRICS & GYNECOLOGY

## 2018-09-15 PROCEDURE — 84460 ALANINE AMINO (ALT) (SGPT): CPT | Performed by: OBSTETRICS & GYNECOLOGY

## 2018-09-15 PROCEDURE — 25000125 ZZHC RX 250: Performed by: OBSTETRICS & GYNECOLOGY

## 2018-09-15 PROCEDURE — 84450 TRANSFERASE (AST) (SGOT): CPT | Performed by: OBSTETRICS & GYNECOLOGY

## 2018-09-15 PROCEDURE — 86780 TREPONEMA PALLIDUM: CPT | Performed by: OBSTETRICS & GYNECOLOGY

## 2018-09-15 PROCEDURE — 40000084 ZZH STATISTIC IP LACTATION SERVICES 16-30 MIN

## 2018-09-15 PROCEDURE — 25000128 H RX IP 250 OP 636: Performed by: ANESTHESIOLOGY

## 2018-09-15 PROCEDURE — 84156 ASSAY OF PROTEIN URINE: CPT | Performed by: OBSTETRICS & GYNECOLOGY

## 2018-09-15 PROCEDURE — 40000671 ZZH STATISTIC ANESTHESIA CASE

## 2018-09-15 PROCEDURE — 25000125 ZZHC RX 250

## 2018-09-15 PROCEDURE — 86901 BLOOD TYPING SEROLOGIC RH(D): CPT | Performed by: OBSTETRICS & GYNECOLOGY

## 2018-09-15 PROCEDURE — 00000146 ZZHCL STATISTIC GLUCOSE BY METER IP

## 2018-09-15 PROCEDURE — 25000132 ZZH RX MED GY IP 250 OP 250 PS 637: Performed by: OBSTETRICS & GYNECOLOGY

## 2018-09-15 PROCEDURE — 0HQ9XZZ REPAIR PERINEUM SKIN, EXTERNAL APPROACH: ICD-10-PCS | Performed by: OBSTETRICS & GYNECOLOGY

## 2018-09-15 PROCEDURE — 86870 RBC ANTIBODY IDENTIFICATION: CPT | Performed by: OBSTETRICS & GYNECOLOGY

## 2018-09-15 PROCEDURE — 27110038 ZZH RX 271

## 2018-09-15 PROCEDURE — 3E0R3BZ INTRODUCTION OF ANESTHETIC AGENT INTO SPINAL CANAL, PERCUTANEOUS APPROACH: ICD-10-PCS | Performed by: ANESTHESIOLOGY

## 2018-09-15 PROCEDURE — 84112 EVAL AMNIOTIC FLUID PROTEIN: CPT | Performed by: OBSTETRICS & GYNECOLOGY

## 2018-09-15 PROCEDURE — 25000128 H RX IP 250 OP 636

## 2018-09-15 PROCEDURE — 37000011 ZZH ANESTHESIA WARD SERVICE

## 2018-09-15 PROCEDURE — 12000027 ZZH R&B OB

## 2018-09-15 PROCEDURE — 86850 RBC ANTIBODY SCREEN: CPT | Performed by: OBSTETRICS & GYNECOLOGY

## 2018-09-15 PROCEDURE — 86920 COMPATIBILITY TEST SPIN: CPT | Performed by: OBSTETRICS & GYNECOLOGY

## 2018-09-15 PROCEDURE — 25000128 H RX IP 250 OP 636: Performed by: OBSTETRICS & GYNECOLOGY

## 2018-09-15 RX ORDER — OXYTOCIN/0.9 % SODIUM CHLORIDE 30/500 ML
100-340 PLASTIC BAG, INJECTION (ML) INTRAVENOUS CONTINUOUS PRN
Status: COMPLETED | OUTPATIENT
Start: 2018-09-15 | End: 2018-09-15

## 2018-09-15 RX ORDER — OXYTOCIN/0.9 % SODIUM CHLORIDE 30/500 ML
340 PLASTIC BAG, INJECTION (ML) INTRAVENOUS CONTINUOUS PRN
Status: DISCONTINUED | OUTPATIENT
Start: 2018-09-15 | End: 2018-09-17 | Stop reason: HOSPADM

## 2018-09-15 RX ORDER — DEXTROSE, SODIUM CHLORIDE, SODIUM LACTATE, POTASSIUM CHLORIDE, AND CALCIUM CHLORIDE 5; .6; .31; .03; .02 G/100ML; G/100ML; G/100ML; G/100ML; G/100ML
INJECTION, SOLUTION INTRAVENOUS CONTINUOUS
Status: DISCONTINUED | OUTPATIENT
Start: 2018-09-15 | End: 2018-09-15

## 2018-09-15 RX ORDER — CARBOPROST TROMETHAMINE 250 UG/ML
250 INJECTION, SOLUTION INTRAMUSCULAR
Status: DISCONTINUED | OUTPATIENT
Start: 2018-09-15 | End: 2018-09-15

## 2018-09-15 RX ORDER — NALOXONE HYDROCHLORIDE 0.4 MG/ML
.1-.4 INJECTION, SOLUTION INTRAMUSCULAR; INTRAVENOUS; SUBCUTANEOUS
Status: DISCONTINUED | OUTPATIENT
Start: 2018-09-15 | End: 2018-09-17 | Stop reason: HOSPADM

## 2018-09-15 RX ORDER — ACETAMINOPHEN 325 MG/1
650 TABLET ORAL EVERY 4 HOURS PRN
Status: DISCONTINUED | OUTPATIENT
Start: 2018-09-15 | End: 2018-09-17 | Stop reason: HOSPADM

## 2018-09-15 RX ORDER — OXYCODONE AND ACETAMINOPHEN 5; 325 MG/1; MG/1
1 TABLET ORAL
Status: DISCONTINUED | OUTPATIENT
Start: 2018-09-15 | End: 2018-09-15

## 2018-09-15 RX ORDER — EPHEDRINE SULFATE 50 MG/ML
5 INJECTION, SOLUTION INTRAMUSCULAR; INTRAVENOUS; SUBCUTANEOUS
Status: DISCONTINUED | OUTPATIENT
Start: 2018-09-15 | End: 2018-09-15

## 2018-09-15 RX ORDER — HYDROCORTISONE 2.5 %
CREAM (GRAM) TOPICAL 3 TIMES DAILY PRN
Status: DISCONTINUED | OUTPATIENT
Start: 2018-09-15 | End: 2018-09-17 | Stop reason: HOSPADM

## 2018-09-15 RX ORDER — NICOTINE POLACRILEX 4 MG
15-30 LOZENGE BUCCAL
Status: DISCONTINUED | OUTPATIENT
Start: 2018-09-15 | End: 2018-09-17 | Stop reason: HOSPADM

## 2018-09-15 RX ORDER — BISACODYL 10 MG
10 SUPPOSITORY, RECTAL RECTAL DAILY PRN
Status: DISCONTINUED | OUTPATIENT
Start: 2018-09-17 | End: 2018-09-17 | Stop reason: HOSPADM

## 2018-09-15 RX ORDER — ONDANSETRON 2 MG/ML
4 INJECTION INTRAMUSCULAR; INTRAVENOUS EVERY 6 HOURS PRN
Status: DISCONTINUED | OUTPATIENT
Start: 2018-09-15 | End: 2018-09-15

## 2018-09-15 RX ORDER — DEXTROSE MONOHYDRATE 25 G/50ML
25-50 INJECTION, SOLUTION INTRAVENOUS
Status: DISCONTINUED | OUTPATIENT
Start: 2018-09-15 | End: 2018-09-15

## 2018-09-15 RX ORDER — IBUPROFEN 800 MG/1
800 TABLET, FILM COATED ORAL
Status: COMPLETED | OUTPATIENT
Start: 2018-09-15 | End: 2018-09-15

## 2018-09-15 RX ORDER — DEXTROSE MONOHYDRATE 25 G/50ML
25-50 INJECTION, SOLUTION INTRAVENOUS
Status: DISCONTINUED | OUTPATIENT
Start: 2018-09-15 | End: 2018-09-17 | Stop reason: HOSPADM

## 2018-09-15 RX ORDER — ACETAMINOPHEN 325 MG/1
650 TABLET ORAL EVERY 4 HOURS PRN
Status: DISCONTINUED | OUTPATIENT
Start: 2018-09-15 | End: 2018-09-15

## 2018-09-15 RX ORDER — OXYTOCIN/0.9 % SODIUM CHLORIDE 30/500 ML
100 PLASTIC BAG, INJECTION (ML) INTRAVENOUS CONTINUOUS
Status: DISCONTINUED | OUTPATIENT
Start: 2018-09-15 | End: 2018-09-17 | Stop reason: HOSPADM

## 2018-09-15 RX ORDER — METHYLERGONOVINE MALEATE 0.2 MG/ML
200 INJECTION INTRAVENOUS
Status: DISCONTINUED | OUTPATIENT
Start: 2018-09-15 | End: 2018-09-15

## 2018-09-15 RX ORDER — NALOXONE HYDROCHLORIDE 0.4 MG/ML
.1-.4 INJECTION, SOLUTION INTRAMUSCULAR; INTRAVENOUS; SUBCUTANEOUS
Status: DISCONTINUED | OUTPATIENT
Start: 2018-09-15 | End: 2018-09-15

## 2018-09-15 RX ORDER — BUPIVACAINE HCL/0.9 % NACL/PF 0.125 %
PLASTIC BAG, INJECTION (ML) EPIDURAL CONTINUOUS
Status: DISCONTINUED | OUTPATIENT
Start: 2018-09-15 | End: 2018-09-15

## 2018-09-15 RX ORDER — SODIUM CHLORIDE, SODIUM LACTATE, POTASSIUM CHLORIDE, CALCIUM CHLORIDE 600; 310; 30; 20 MG/100ML; MG/100ML; MG/100ML; MG/100ML
INJECTION, SOLUTION INTRAVENOUS CONTINUOUS
Status: DISCONTINUED | OUTPATIENT
Start: 2018-09-15 | End: 2018-09-15

## 2018-09-15 RX ORDER — LANOLIN 100 %
OINTMENT (GRAM) TOPICAL
Status: DISCONTINUED | OUTPATIENT
Start: 2018-09-15 | End: 2018-09-17 | Stop reason: HOSPADM

## 2018-09-15 RX ORDER — NALBUPHINE HYDROCHLORIDE 10 MG/ML
2.5-5 INJECTION, SOLUTION INTRAMUSCULAR; INTRAVENOUS; SUBCUTANEOUS EVERY 6 HOURS PRN
Status: DISCONTINUED | OUTPATIENT
Start: 2018-09-15 | End: 2018-09-15

## 2018-09-15 RX ORDER — AMOXICILLIN 250 MG
1 CAPSULE ORAL 2 TIMES DAILY
Status: DISCONTINUED | OUTPATIENT
Start: 2018-09-15 | End: 2018-09-17 | Stop reason: HOSPADM

## 2018-09-15 RX ORDER — NICOTINE POLACRILEX 4 MG
15-30 LOZENGE BUCCAL
Status: DISCONTINUED | OUTPATIENT
Start: 2018-09-15 | End: 2018-09-15

## 2018-09-15 RX ORDER — PRENATAL VIT/IRON FUM/FOLIC AC 27MG-0.8MG
1 TABLET ORAL DAILY
Status: DISCONTINUED | OUTPATIENT
Start: 2018-09-15 | End: 2018-09-17 | Stop reason: HOSPADM

## 2018-09-15 RX ORDER — HYDROMORPHONE HYDROCHLORIDE 1 MG/ML
0.3 INJECTION, SOLUTION INTRAMUSCULAR; INTRAVENOUS; SUBCUTANEOUS
Status: DISCONTINUED | OUTPATIENT
Start: 2018-09-15 | End: 2018-09-17 | Stop reason: HOSPADM

## 2018-09-15 RX ORDER — BUPIVACAINE HCL/0.9 % NACL/PF 0.125 %
PLASTIC BAG, INJECTION (ML) EPIDURAL
Status: COMPLETED
Start: 2018-09-15 | End: 2018-09-15

## 2018-09-15 RX ORDER — OXYTOCIN 10 [USP'U]/ML
10 INJECTION, SOLUTION INTRAMUSCULAR; INTRAVENOUS
Status: DISCONTINUED | OUTPATIENT
Start: 2018-09-15 | End: 2018-09-17 | Stop reason: HOSPADM

## 2018-09-15 RX ORDER — AMOXICILLIN 250 MG
2 CAPSULE ORAL 2 TIMES DAILY
Status: DISCONTINUED | OUTPATIENT
Start: 2018-09-15 | End: 2018-09-17 | Stop reason: HOSPADM

## 2018-09-15 RX ORDER — IBUPROFEN 600 MG/1
600 TABLET, FILM COATED ORAL EVERY 6 HOURS PRN
Status: DISCONTINUED | OUTPATIENT
Start: 2018-09-15 | End: 2018-09-17 | Stop reason: HOSPADM

## 2018-09-15 RX ORDER — FENTANYL CITRATE 50 UG/ML
50-100 INJECTION, SOLUTION INTRAMUSCULAR; INTRAVENOUS
Status: DISCONTINUED | OUTPATIENT
Start: 2018-09-15 | End: 2018-09-15

## 2018-09-15 RX ORDER — SODIUM CHLORIDE 9 MG/ML
INJECTION, SOLUTION INTRAVENOUS CONTINUOUS
Status: DISCONTINUED | OUTPATIENT
Start: 2018-09-15 | End: 2018-09-15

## 2018-09-15 RX ORDER — OXYTOCIN 10 [USP'U]/ML
10 INJECTION, SOLUTION INTRAMUSCULAR; INTRAVENOUS
Status: DISCONTINUED | OUTPATIENT
Start: 2018-09-15 | End: 2018-09-15

## 2018-09-15 RX ADMIN — SENNOSIDES AND DOCUSATE SODIUM 2 TABLET: 8.6; 5 TABLET ORAL at 21:20

## 2018-09-15 RX ADMIN — PRENATAL VIT W/ FE FUMARATE-FA TAB 27-0.8 MG 1 TABLET: 27-0.8 TAB at 17:07

## 2018-09-15 RX ADMIN — ACETAMINOPHEN 650 MG: 325 TABLET, FILM COATED ORAL at 21:40

## 2018-09-15 RX ADMIN — IBUPROFEN 600 MG: 600 TABLET ORAL at 17:48

## 2018-09-15 RX ADMIN — Medication: at 02:54

## 2018-09-15 RX ADMIN — SODIUM CHLORIDE, POTASSIUM CHLORIDE, SODIUM LACTATE AND CALCIUM CHLORIDE 1000 ML: 600; 310; 30; 20 INJECTION, SOLUTION INTRAVENOUS at 01:40

## 2018-09-15 RX ADMIN — OXYTOCIN-SODIUM CHLORIDE 0.9% IV SOLN 30 UNIT/500ML 340 ML/HR: 30-0.9/5 SOLUTION at 09:19

## 2018-09-15 RX ADMIN — ACETAMINOPHEN 650 MG: 325 TABLET, FILM COATED ORAL at 17:48

## 2018-09-15 RX ADMIN — IBUPROFEN 800 MG: 800 TABLET ORAL at 09:48

## 2018-09-15 RX ADMIN — FENTANYL CITRATE 100 MCG: 50 INJECTION INTRAMUSCULAR; INTRAVENOUS at 02:25

## 2018-09-15 NOTE — IP AVS SNAPSHOT
MRN:[id number]                      After Visit Summary   9/15/2018    Tosin Pfeiffer    MRN: [id number]           Thank you!     Thank you for choosing [facility] for your care. Our goal is always to provide you with excellent care. Hearing back from our patients is one way we can continue to improve our services. Please take a few minutes to complete the written survey that you may receive in the mail after you visit. If you would like to speak to someone directly about your visit please contact Patient Relations at [phone]. Thank you!          Patient Information     Date Of Birth          1985        Designated Caregiver       Most Recent Value    Caregiver    Will someone help with your care after discharge? yes    Name of designated caregiver Jesus Pfeiffer    Phone number of caregiver [phone]    Caregiver address same as patient      About your hospital stay     You were admitted on:  September 15, 2018 You last received care in the:  [facility] Postpartum    You were discharged on:  September 17, 2018       Who to Call     For medical emergencies, please call 911.  For non-urgent questions about your medical care, please call your primary care provider or clinic, [phone]          Attending Provider     Provider Specialty    Anika Owusu MD OB/Gyn    [facility]       Primary Care Provider Office Phone # Fax #    Ramona Ann Aaseby-Aguilera, PA-C [phone]      After Care Instructions     Activity       Review discharge instructions            Diet       Resume previous diet            Discharge Instructions - Postpartum visit       Schedule postpartum visit with your provider and return to clinic in 6 weeks.                  Your next 10 appointments already scheduled     Sep 18, 2018  2:00 PM CDT   ESTABLISHED PRENATAL with Tari Ramirez,    [facility]    [address]  [facility] [address] [phone]            Sep 24, 2018  4:00 PM CDT   ESTABLISHED PRENATAL with Tari Ramirez, DO   [facility]    [address] [facility] [address] [phone]              Further instructions from your care team       Postpartum Vaginal Delivery Instructions    Activity       Ask family and friends for help when you need it.    Do not place anything in your vagina for 6 weeks.    You are not restricted on other activities, but take it easy for a few weeks to allow your body to recover from delivery.  You are able to do any activities you feel up to that point.    No driving until you have stopped taking your pain medications (usually two weeks after delivery).     Call your health care provider if you have any of these symptoms:       Increased pain, swelling, redness, or fluid around your stiches from an episiotomy or perineal tear.    A fever above 100.4 F (38 C) with or without chills when placing a thermometer under your tongue.    You soak a sanitary pad with blood within 1 hour, or you see blood clots larger than a golf ball.    Bleeding that lasts more than 6 weeks.    Vaginal discharge that smells bad.    Severe pain, cramping or tenderness in your lower belly area.    A need to urinate more frequently (use the toilet more often), more urgently (use the toilet very quickly), or it burns when you urinate.    Nausea and vomiting.    Redness, swelling or pain around a vein in your leg.    Problems breastfeeding or a red or painful area on your breast.    Chest pain and cough or are gasping for air.    Problems coping with sadness, anxiety, or depression.  If you have any concerns about hurting yourself or the baby, call your provider immediately.     You have questions or concerns after you return home.     Keep your hands clean:  Always wash your hands before touching your perineal area and stitches.  This  "helps reduce your risk of infection.  If your hands aren't dirty, you may use an alcohol hand-rub to clean your hands. Keep your nails clean and short.        Ibuprofen last 9/17 1pm    Pending Results     No orders found from 9/13/2018 to 9/16/2018.            Statement of Approval     Ordered          09/17/18 0935  I have reviewed and agree with all the recommendations and orders detailed in this document.  EFFECTIVE NOW     Approved and electronically signed by:  Greyson Cadet MD             Admission Information     Date & Time Provider Department Dept. Phone    9/15/2018 Greyson Cadet MD [facility] Postpartum [phone]      Your Vitals Were     Blood Pressure Pulse Temperature Respirations Height Weight    140/89 93 98.6  F (37  C) (Oral) 18 1.6 m (5' 3\") 111.2 kg (245 lb 1.6 oz)    Last Period BMI (Body Mass Index)                12/20/2017 (Exact Date) 43.42 kg/m2          MyChart Information     [employer] gives you secure access to your electronic health record. If you see a primary care provider, you can also send messages to your care team and make appointments. If you have questions, please call your primary care clinic.  If you do not have a primary care provider, please call [phone] and they will assist you.        Care EveryWhere ID     This is your Care EveryWhere ID. This could be used by other organizations to access your [place] medical records  [id number]        Equal Access to Services     FARIHA SOLIS AH: Hadii yadira henaoo Solyle, waaxda luqadaha, qaybta kaalmada adeedwaryada, molly renteria. So [facility] [phone].    ATENCIÓN: Si habla español, tiene a albright disposición servicios gratuitos de asistencia lingüística. Llame al [phone].    We comply with applicable federal civil rights laws and [place] laws. We do not discriminate on the basis of race, color, national origin, age, disability, sex, sexual orientation, or gender " "identity.               Review of your medicines      CONTINUE these medicines which have NOT CHANGED        Dose / Directions    acetone (Urine) test Strp   Used for:  Gestational diabetes        Test once daily x1 week, then reduce to once weekly once consistently negative   Quantity:  25 each   Refills:  1       blood glucose lancets standard   Commonly known as:  no brand specified   Used for:  Gestational diabetes mellitus (GDM)        Use to test blood sugar 6 times daily or as directed. OneTouch delica lancets.   Quantity:  100 each   Refills:  PRN       blood glucose monitoring meter device kit   Commonly known as:  no brand specified   Used for:  Gestational diabetes mellitus (GDM)        Use to test blood sugar 4 times daily or as directed.   Quantity:  1 kit   Refills:  0       * blood glucose monitoring test strip   Commonly known as:  no brand specified   Used for:  Gestational diabetes        Use to test blood sugars 6 times daily or as directed. OneTouch Verio test strips.   Quantity:  200 strip   Refills:  3       * blood glucose monitoring test strip   Commonly known as:  ONETOUCH VERIO IQ   Used for:  Gestational diabetes        Use to test blood sugars 4 times daily or as directed.   Quantity:  100 strip   Refills:  1        MG Caps        Refills:  0       insulin pen needle 32G X 4 MM   Commonly known as:  BD CHRIS U/F   Used for:  Gestational diabetes mellitus (GDM) requiring insulin        Use 2 daily as directed.   Quantity:  100 each   Refills:  3       insulin syringe-needle U-100 30G X 1/2\" 1 ML   Commonly known as:  BD insulin syringe ultrafine   Used for:  Insulin controlled gestational diabetes mellitus (GDM) in third trimester        Use one syringe Twice daily subcutaneously as directed.   Quantity:  100 each   Refills:  3       prenatal multivitamin plus iron 27-0.8 MG Tabs per tablet        Take by mouth daily   Refills:  0       ranitidine 300 MG tablet   Commonly known as:  " ZANTAC   Used for:  Prenatal care in second trimester        TAKE 1 TABLET (300 MG) BY MOUTH AT BEDTIME   Quantity:  90 tablet   Refills:  1       * Notice:  This list has 2 medication(s) that are the same as other medications prescribed for you. Read the directions carefully, and ask your doctor or other care provider to review them with you.      STOP taking     ferrous sulfate 325 (65 Fe) MG tablet   Commonly known as:  IRON           insulin  UNIT/ML injection   Commonly known as:  NovoLIN N VIAL                    Protect others around you: Learn how to safely use, store and throw away your medicines at www.disposemymeds.org.             Medication List: This is a list of all your medications and when to take them. Check marks below indicate your daily home schedule. Keep this list as a reference.      Medications           Morning Afternoon Evening Bedtime As Needed    acetone (Urine) test Strp   Test once daily x1 week, then reduce to once weekly once consistently negative                                blood glucose lancets standard   Commonly known as:  no brand specified   Use to test blood sugar 6 times daily or as directed. OneTouch delica lancets.                                blood glucose monitoring meter device kit   Commonly known as:  no brand specified   Use to test blood sugar 4 times daily or as directed.                                * blood glucose monitoring test strip   Commonly known as:  no brand specified   Use to test blood sugars 6 times daily or as directed. OneTouch Verio test strips.                                * blood glucose monitoring test strip   Commonly known as:  ONETOUCH VERIO IQ   Use to test blood sugars 4 times daily or as directed.                                 MG Caps                                insulin pen needle 32G X 4 MM   Commonly known as:  BD CHRIS U/F   Use 2 daily as directed.                                insulin syringe-needle U-100 30G  "X 1/2\" 1 ML   Commonly known as:  BD insulin syringe ultrafine   Use one syringe Twice daily subcutaneously as directed.                                prenatal multivitamin plus iron 27-0.8 MG Tabs per tablet   Take by mouth daily   Last time this was given:  1 tablet on 9/17/2018  9:09 AM                                   ranitidine 300 MG tablet   Commonly known as:  ZANTAC   TAKE 1 TABLET (300 MG) BY MOUTH AT BEDTIME                                   * Notice:  This list has 2 medication(s) that are the same as other medications prescribed for you. Read the directions carefully, and ask your doctor or other care provider to review them with you.      "

## 2018-09-15 NOTE — ADDENDUM NOTE
Addendum  created 09/15/18 0402 by Ye Bender MD    Anesthesia Event edited, Procedure Event Log accessed

## 2018-09-15 NOTE — IP AVS SNAPSHOT
[facility] Postpartum    [address]    [facility] [address]    Phone:  [phone]    Fax:  [phone]                                       After Visit Summary   9/15/2018    Tosin Pfeiffer    MRN: [id number]           After Visit Summary Signature Page     I have received my discharge instructions, and my questions have been answered. I have discussed any challenges I see with this plan with the nurse or doctor.    ..........................................................................................................................................  Patient/Patient Representative Signature      ..........................................................................................................................................  Patient Representative Print Name and Relationship to Patient    ..................................................               ................................................  Date                                   Time    ..........................................................................................................................................  Reviewed by Signature/Title    ...................................................              ..............................................  Date                                               Time          22EPIC Rev 08/18

## 2018-09-15 NOTE — ANESTHESIA PROCEDURE NOTES
Peripheral nerve/Neuraxial procedure note : epidural catheter  Pre-Procedure  Performed by YE GUILLAUME  Referred by EZEQUIEL  Location: OB    Procedure Times:9/15/2018 2:38 AM and 9/15/2018 2:52 AM  Pre-Anesthestic Checklist: patient identified, IV checked, site marked, risks and benefits discussed, informed consent, monitors and equipment checked, pre-op evaluation and at physician/surgeon's request    Timeout  Correct Patient: Yes   Correct Procedure: Yes   Correct Site: Yes   Correct Laterality: Yes and N/A   Correct Position: Yes   Site Marked: Yes, N/A   .   Procedure Documentation    .    Procedure:    Epidural catheter.     .  .       Assessment/Narrative  .  .  . Comments:  Pre-Procedure  Performed by Ye Guillaume MD  Location: OB.      PreAnesthestic Checklist: patient identified, IV checked, risks and benefits discussed, informed consent obtained, monitors and equipment checked, pre-op evaluation and at physician/surgeon's request.    Timeout   Correct Patient: Yes  Correct Procedure: Epidural catheter placement  Correct Site: Yes   Correct Position: Yes    Procedure Documentation  Procedure:   Epidural catheter block for Labor    Patient currently in labor and she and OBMD request a labor epidural to control her labor pains. Patient was interviewed and examined. Procedure and risks including but not limited to bleeding, infection, nerve injury, paralysis, PDPH, and inadequate block requiring intervention discussed with patient. Questions answered. This epidural is to be placed in anticipation of vaginal delivery.  She consents to the epidural procedure.  Time-out was performed.  I or my partners remain immediately available for management of any issues or complications and will monitor at appropriate intervals.  Procedure: Patient sitting. Betadine prep x 3. Sterile drape applied.  Mask and gloves used.  Lidocaine 1%  local infiltration at L 3-4.  17 G. Tuohy needle at L3-4 by loss of resistance  into epidural space.  No CSF, paresthesia or blood. 1.5 % Lidocaine with 1:200,000 Epinephrine 5cc test dose. Epidural catheter inserted w/o resistance to 5 cm in epidural space. Then 0.25% bupivicaine 10 cc with NS 5 cc.  Aspiration negative for blood and CSF.   Negative for neuro change, paresthesia or symptoms of intravascular injection or intrathecal injection.  Infusion orders written and infusion of 0.125% bupivicaine 15cc per hour started.    Ye Bender MD

## 2018-09-15 NOTE — L&D DELIVERY NOTE
Delivery Date:  09/15/2018      PREDELIVERY DIAGNOSES:   1.  Intrauterine pregnancy at 38-3/7 weeks.   2.  Previous  section, desires trial of labor after  birth   3.  Insulin-dependent gestational diabetes.        POSTDELIVERY DIAGNOSIS:  Vaginal birth after .      DELIVERING OBSTETRICIAN:  Greyson Cadet MD       QUANTITY BLOOD LOSS:  100 mL      FINDINGS:  A living male, weight is yet to be determined, Apgars of 9 and 9.      NARRATIVE SUMMARY:  The patient is a 32-year-old  2, para 1 patient who presents to labor and delivery in labor with ruptured membranes.  She has previously had a  delivery, but requests a trial of labor after .  After explorations of risks, benefits, she labors spontaneously with epidural, delivering over an intact perineum a living male.  With delivery of the fetal head, the nasal oropharynx was suctioned with bulb suction.  Baby then was delivered without shoulder dystocia, placed on the maternal abdomen where after a 1-minute delay, the cord was clamped and cut.  The weight is yet to be determined with Apgars of 9 and 9, a living male.  The placenta delivered spontaneously with a 3-vessel cord.        Inspection of the cervix, vagina and the perineum reveals a 1 cm tear at the 6-o'clock position vaginally that was repaired in standard fashion with 2-0 chromic suture.  QBL was 100 mL.  Mother and  went to recovery and  nursery respectively.         GREYSON CADET MD             D: 09/15/2018   T: 09/15/2018   MT: NTS      Name:     JACOB AVERY   MRN:      [id number]        Account:        [id number]   :      1985        Delivery Date:  09/15/2018               Document: [id number]

## 2018-09-15 NOTE — PROVIDER NOTIFICATION
09/15/18 0120   Provider Notification   Provider Name/Title Dr Owusu   Method of Notification In Department   Notification Reason Patient Arrived;Membrane Status;Uterine Activity;Maternal Vital Sign Change;SVE    , previous  who has been considering TOLAC. GBS negative. Blood type A negative. Here with leaking of fluid and contractions. EFM explained and applied. Initial BP and 15 minute BP elevated. GDM on insulin. Denies headache or visual disturbance. Scant amount fluid seen. ROM + sent. SVE 2/-2. Dr Owusu notified of all of the above and orders received.

## 2018-09-15 NOTE — H&P
"[facility]    History and Physical  Obstetrics and Gynecology     Date of Admission:  9/15/2018    Assessment & Plan   Tosin Pfeiffer is a 32 year old female who presents with SROM and regular contractions at 38w3d     ASSESSMENT:   IUP @ 38w3d with SROM and contractions   .  Prior , desires TOLAC  Gestational diabetes on insulin (40 am, 50 pm)  Gestational hypertension    PLAN:   Admit - see IP orders  Preeclampsia labs ordered and pending. Monitor closely for signs and symptoms of preeclampsia. Close monitoring of blood pressures.  Antepartum gestational diabetes orders.    Risks and benefits of trial of labor after  section versus repeat  section were explained. This includes an approximate 0.5% risk of uterine rupture with a subsequent risk of fetal death or brain damage or significant maternal morbidity or death. I also explained risks of bleeding, infection, and potential damage to other organs associated with a repeat .The highest risk is for women who plan a TOLAC but then require  in labor. If a trial of labor is chosen, we discussed the need for continuous monitoring and IV access in active labor.  Currently she is planning on TOLAC, but states \"I don't want to push 3 hours this time.\"    All questions answered.    History of Present Illness   Tosin Pfeiffer is a 32 year old female  38w3d  Estimated Date of Delivery: 18 is calculated from Patient's last menstrual period was 2017 (exact date). here with leaking of fluid per vagina. Grossly appears ruptured. ROM plus positive.    Blood pressure elevated but denies headache, abdominal pain, visual changes. Admits to contractions.    PRENATAL COURSE  Prenatal course was complicated by GDMA2, on Novolog 40/50 am/pm.  Also obesity and prior low transverse  for arrest of descent in the second stage after 3 hours of pushing.    Recent Labs   Lab Test  18   1302  18   0845 "   ABO   --   A   RH   --   Neg   AS  Neg  Pos*       Recent Labs   Lab Test  18   1150  18   0846   HEPBANG   --   Nonreactive   HIAGAB   --   Nonreactive   GBS  Negative   --    RUQIGG   --   23       Past Medical History    I have reviewed this patient's medical history and updated it with pertinent information if needed.   Past Medical History:   Diagnosis Date     Anemia     on Iron     Asthma     exercise induced     Diabetes (H)     Gestational diabetes     Rh incompatibility     Received Rhogam at 28weeks       Past Surgical History   I have reviewed this patient's surgical history and updated it with pertinent information if needed.  Past Surgical History:   Procedure Laterality Date      SECTION  2014    Procedure:  SECTION;  Surgeon: Cam Armendariz MD;  Location: [facility] OR      SECTION N/A 2018    Procedure:  SECTION;  Repeat  section;  Surgeon: Tari Ramirez DO;  Location: [facility] L+D     wisdom teeth removed  age 15       Prior to Admission Medications   Prior to Admission Medications   Prescriptions Last Dose Informant Patient Reported? Taking?   Docosahexaenoic Acid (DHA) 200 MG CAPS 2018 at Unknown time  Yes Yes   Prenatal Vit-Fe Fumarate-FA (PRENATAL MULTIVITAMIN PLUS IRON) 27-0.8 MG TABS per tablet 2018 at Unknown time  Yes Yes   Sig: Take by mouth daily   acetone, Urine, test STRP Past Week at Unknown time  No Yes   Sig: Test once daily x1 week, then reduce to once weekly once consistently negative   blood glucose (NO BRAND SPECIFIED) lancets standard 2018 at Unknown time  No Yes   Sig: Use to test blood sugar 6 times daily or as directed. OneTouch delica lancets.   blood glucose monitoring (NO BRAND SPECIFIED) meter device kit 2018 at Unknown time  No Yes   Sig: Use to test blood sugar 4 times daily or as directed.   blood glucose monitoring (NO BRAND SPECIFIED) test strip More than a month at Unknown time  No No  "  Sig: Use to test blood sugars 6 times daily or as directed. OneTouch Verio test strips.   blood glucose monitoring (ONETOUCH VERIO IQ) test strip 2018 at Unknown time  No Yes   Sig: Use to test blood sugars 4 times daily or as directed.   ferrous sulfate (IRON) 325 (65 Fe) MG tablet 2018 at Unknown time  No Yes   Sig: Take 1 tablet (325 mg) by mouth daily (with breakfast)   insulin NPH (NOVOLIN N VIAL) 100 UNIT/ML injection 2018 at 2000  No Yes   Si units before breakfast  48 units at bedtime   insulin pen needle (BD CHRIS U/F) 32G X 4 MM 2018 at Unknown time  No Yes   Sig: Use 2 daily as directed.   insulin syringe-needle U-100 (BD INSULIN SYRINGE ULTRAFINE) 30G X 1/2\" 1 ML 2018 at Unknown time  No Yes   Sig: Use one syringe Twice daily subcutaneously as directed.   ranitidine (ZANTAC) 300 MG tablet 2018 at Unknown time  No Yes   Sig: TAKE 1 TABLET (300 MG) BY MOUTH AT BEDTIME      Facility-Administered Medications: None     Allergies   Allergies   Allergen Reactions     Sulfa Drugs        Social History   I have reviewed this patient's social history and updated it with pertinent information if needed. Tosin Pfeiffer  reports that she quit smoking about 8 years ago. Her smoking use included Cigarettes. She started smoking about 18 years ago. She smoked 1.00 pack per day. She has never used smokeless tobacco. She reports that she does not drink alcohol or use illicit drugs.    Family History   I have reviewed this patient's family history and updated it with pertinent information if needed.   Family History   Problem Relation Age of Onset     Psychotic Disorder Mother      bipolar     Mental Illness Mother      Bipolar     Hypertension Father      Myocardial Infarction Paternal Grandfather 50     Family History Negative Brother      1     Family History Negative Sister      2     Breast Cancer Other      paternal aunt ,dxed at age of early to mid 40's     Breast Cancer Other      " Mental Illness Other      schizophrenia     Cancer - colorectal No family hx of        Immunization History   Immunizations are up to date    Physical Exam   Temp: 98.2  F (36.8  C) Temp src: Oral       Resp: 20        Vital Signs with Ranges  Temp:  [98.2  F (36.8  C)] 98.2  F (36.8  C)  Resp:  [20] 20    Abdomen: gravid, single vertex fetus, non-tender, EFW 8-8.5 lb  Cervical Exam: 2/70/-2 per RN  Fetal Heart Tones: normal baseline, moderate variablility, + accels, no decels and Category I  TOCO:   frequency q 2-3 minutes  Constitutional: healthy, alert and active   Respiratory: No increased work of breathing, good air exchange, clear to auscultation bilaterally, no crackles or wheezing  Cardiovascular: Normal apical impulse, regular rate and rhythm, normal S1 and S2, no S3 or S4, and no murmur noted  Skin/Extremites: no bruising or bleeding and normal skin color, texture, turgor  Neurologic: Awake, alert, oriented to name, place and time.  Cranial nerves II-XII are grossly intact.  Motor is 5 out of 5 bilaterally.  Cerebellar finger to nose, heel to shin intact.  Sensory is intact.  Babinski down going, Romberg negative, and gait is normal.  Neuropsychiatric: Affect: normal and pleasant

## 2018-09-15 NOTE — TELEPHONE ENCOUNTER
"Pt is 38 w2d pregnant (KHAI 9/26/18) with her second baby. PCP is Dr. Ramirez at [facility]. She had a \"gushing of fluids\" from her water breaking, denies any current contractions, bleeding, abdominal pain, or urge to push.     Protocol and care advice reviewed.  Per guidelines for [facility] pt will have someone drive her to [facility] to go to L & D, called and notified Allyn at [facility], as charge was unavailable.   Caller states understanding of the recommended disposition.   Advised to call back if further questions or concerns.      Reason for Disposition    Leakage of fluid from vagina    Additional Information    Negative: [1] SEVERE abdominal pain (e.g., excruciating) AND [2] constant AND [3] present > 1 hour    Negative: Severe bleeding (e.g., continuous red blood from vagina, or large blood clots)    Negative: Umbilical cord hanging out of the vagina (shiny, white, curled appearance, \"like telephone cord\")    Negative: Uncontrollable urge to push (i.e., feels like baby is coming out now)    Negative: Can see baby    Negative: Sounds like a life-threatening emergency to the triager    Negative: < 20 weeks pregnant    Negative: Vaginal bleeding    Protocols used: PREGNANCY - RUPTURE OF MEMBRANES-ADULT-AH      "

## 2018-09-15 NOTE — ANESTHESIA PREPROCEDURE EVALUATION
PAC NOTE:       ANESTHESIA PRE EVALUATION:  Anesthesia Evaluation       history and physical reviewed .      No history of anesthetic complications          ROS/MED HX    ENT/Pulmonary:     (+)asthma , . .   (-) recent URI   Neurologic:  - neg neurologic ROS     Cardiovascular:  - neg cardiovascular ROS       METS/Exercise Tolerance:     Hematologic:         Musculoskeletal:         GI/Hepatic:  - neg GI/hepatic ROS       Renal/Genitourinary:         Endo:         Psychiatric:         Infectious Disease:         Malignancy:         Other:                     Physical Exam  Normal systems: cardiovascular and pulmonary    Airway   Mallampati: II    Dental     Cardiovascular       Pulmonary           (+) gestational diabetes(-) no pre-eclampsia    obese      Anesthesia Plan      History & Physical Review      ASA Status:  .  OB Epidural Asa: 2            Postoperative Care      Consents  Anesthetic plan, risks, benefits and alternatives discussed with:  Patient..                            .

## 2018-09-16 LAB
ABO + RH BLD: ABNORMAL
ABO + RH BLD: ABNORMAL
ABO + RH BLD: NORMAL
ABO + RH BLD: NORMAL
BLD GP AB INVEST PLASRBC-IMP: ABNORMAL
BLD GP AB SCN SERPL QL: ABNORMAL
BLD PROD TYP BPU: ABNORMAL
BLOOD BANK CMNT PATIENT-IMP: ABNORMAL
BLOOD BANK CMNT PATIENT-IMP: NORMAL
DATE RH IMM GL GVN: NORMAL
FETAL CELL SCN BLD QL ROSETTE: NORMAL
GLUCOSE SERPL-MCNC: 89 MG/DL (ref 70–99)
HGB BLD-MCNC: 11.2 G/DL (ref 11.7–15.7)
NUM BPU REQUESTED: 3
RH IG VIALS RECOM PATIENT: NORMAL
SPECIMEN EXP DATE BLD: ABNORMAL
T PALLIDUM AB SER QL: NONREACTIVE

## 2018-09-16 PROCEDURE — 40000083 ZZH STATISTIC IP LACTATION SERVICES 1-15 MIN

## 2018-09-16 PROCEDURE — 82947 ASSAY GLUCOSE BLOOD QUANT: CPT | Performed by: OBSTETRICS & GYNECOLOGY

## 2018-09-16 PROCEDURE — 25000128 H RX IP 250 OP 636: Performed by: OBSTETRICS & GYNECOLOGY

## 2018-09-16 PROCEDURE — 85461 HEMOGLOBIN FETAL: CPT | Performed by: OBSTETRICS & GYNECOLOGY

## 2018-09-16 PROCEDURE — 12000027 ZZH R&B OB

## 2018-09-16 PROCEDURE — 86901 BLOOD TYPING SEROLOGIC RH(D): CPT | Performed by: OBSTETRICS & GYNECOLOGY

## 2018-09-16 PROCEDURE — 86900 BLOOD TYPING SEROLOGIC ABO: CPT | Performed by: OBSTETRICS & GYNECOLOGY

## 2018-09-16 PROCEDURE — 25000132 ZZH RX MED GY IP 250 OP 250 PS 637: Performed by: OBSTETRICS & GYNECOLOGY

## 2018-09-16 PROCEDURE — 85018 HEMOGLOBIN: CPT | Performed by: OBSTETRICS & GYNECOLOGY

## 2018-09-16 PROCEDURE — 36415 COLL VENOUS BLD VENIPUNCTURE: CPT | Performed by: OBSTETRICS & GYNECOLOGY

## 2018-09-16 RX ADMIN — HUMAN RHO(D) IMMUNE GLOBULIN 300 MCG: 300 INJECTION, SOLUTION INTRAMUSCULAR at 14:32

## 2018-09-16 RX ADMIN — ACETAMINOPHEN 650 MG: 325 TABLET, FILM COATED ORAL at 02:23

## 2018-09-16 RX ADMIN — SENNOSIDES AND DOCUSATE SODIUM 1 TABLET: 8.6; 5 TABLET ORAL at 10:04

## 2018-09-16 RX ADMIN — SENNOSIDES AND DOCUSATE SODIUM 2 TABLET: 8.6; 5 TABLET ORAL at 20:45

## 2018-09-16 RX ADMIN — PRENATAL VIT W/ FE FUMARATE-FA TAB 27-0.8 MG 1 TABLET: 27-0.8 TAB at 10:04

## 2018-09-16 RX ADMIN — IBUPROFEN 600 MG: 600 TABLET ORAL at 10:04

## 2018-09-16 RX ADMIN — IBUPROFEN 600 MG: 600 TABLET ORAL at 04:13

## 2018-09-16 RX ADMIN — IBUPROFEN 600 MG: 600 TABLET ORAL at 17:56

## 2018-09-16 ASSESSMENT — ACTIVITIES OF DAILY LIVING (ADL)
RETIRED_COMMUNICATION: 0-->UNDERSTANDS/COMMUNICATES WITHOUT DIFFICULTY
TOILETING: 0-->INDEPENDENT
DRESS: 0-->INDEPENDENT
AMBULATION: 0-->INDEPENDENT
BATHING: 0-->INDEPENDENT
FALL_HISTORY_WITHIN_LAST_SIX_MONTHS: NO
COGNITION: 0 - NO COGNITION ISSUES REPORTED
RETIRED_EATING: 0-->INDEPENDENT
TRANSFERRING: 0-->INDEPENDENT
SWALLOWING: 0-->SWALLOWS FOODS/LIQUIDS WITHOUT DIFFICULTY

## 2018-09-16 NOTE — PLAN OF CARE
Problem: Patient Care Overview  Goal: Plan of Care/Patient Progress Review  Outcome: Improving  Data: Vital signs within normal limits. Postpartum checks within normal limits - see flow record. Patient eating and drinking normally. Patient able to empty bladder independently and is up ambulating. Patient performing self cares and is able to care for infant.  Action: Patient medicated during the shift for cramping pain. See MAR. Patient reassessed within 1 hour after each medication and pain was improved - patient stated she was comfortable.   Response: Positive attachment behaviors observed with infant.  was present during the day.   Plan: Continue to monitor.

## 2018-09-16 NOTE — PLAN OF CARE
Problem: Patient Care Overview  Goal: Plan of Care/Patient Progress Review  Outcome: Improving  Patient doing well today. Pain minimal and controlled with ibuprofen. FOB present and supportive. Breastfeeding with moderate amount of assistance. Encouraged hand expression and/or pumping after feedings d/t history of low milk supply and GDM with insulin control. Patient receptive but has not wanted to do either at this time.

## 2018-09-16 NOTE — PROGRESS NOTES
[facility] Obstetrics Post-Partum Progress Note          Assessment and Plan:    Assessment:   Post-partum day #1  Vaginal delivery after   L&D complications: None      Doing well.  Clean wound without signs of infection.  Normal healing wound.  No excessive bleeding  Pain well-controlled.      Plan:   Ambulation encouraged  Breast feeding strategies discussed  Monitor wound for signs of infection  Pain control measures as needed  Reportable signs and symptoms dicussed with the patient  Anticipate discharge tomorrow           Interval History:   Doing well.  Pain is well-controlled.  No fevers.  No history of foul-smelling vaginal discharge.  Good appetite.  Denies chest pain, shortness of breath, nausea or vomiting.  Vaginal bleeding is similar to a heavy menstrual flow.  Ambulatory.  Breastfeeding well.          Significant Problems:      Past Medical History:   Diagnosis Date     Anemia     on Iron     Asthma     exercise induced     Diabetes (H)     Gestational diabetes     Rh incompatibility     Received Rhogam at 28weeks             Review of Systems:    The patient denies any chest pain, shortness of breath, excessive pain, fever, chills, purulent drainage from the wound, nausea or vomiting.          Medications:     All medications related to the patient's surgery have been reviewed  Current Facility-Administered Medications   Medication     acetaminophen (TYLENOL) tablet 650 mg     [START ON 2018] bisacodyl (DULCOLAX) Suppository 10 mg     Blood Bank will determine if patient is eligible for and the proper dosage of Rho (D) immune globulin (RhoGam)     glucose gel 15-30 g    Or     dextrose 50 % injection 25-50 mL    Or     glucagon injection 1 mg     hydrocortisone 2.5 % cream     HYDROmorphone (PF) (DILAUDID) injection 0.3 mg     ibuprofen (ADVIL/MOTRIN) tablet 600 mg     lactated ringers BOLUS 1,000 mL     lanolin ointment     measles, mumps and rubella vaccine (MMR) injection 0.5 mL      naloxone (NARCAN) injection 0.1-0.4 mg     oxytocin (PITOCIN) 30 units in 500 mL 0.9% NaCl infusion     oxytocin (PITOCIN) 30 units in 500 mL 0.9% NaCl infusion     oxytocin (PITOCIN) injection 10 Units     prenatal multivitamin plus iron per tablet 1 tablet     ranitidine (ZANTAC) tablet 300 mg     rho(D) immune globulin (HYPERRHO/RHOGAM) injection 300 mcg     senna-docusate (SENOKOT-S;PERICOLACE) 8.6-50 MG per tablet 1 tablet    Or     senna-docusate (SENOKOT-S;PERICOLACE) 8.6-50 MG per tablet 2 tablet     [START ON 9/17/2018] sodium phosphate (FLEET ENEMA) 1 enema     Tdap (tetanus-diphtheria-acell pertussis) (ADACEL) injection 0.5 mL     tranexamic acid (CYKLOKAPRON) infusion 1 g             Physical Exam:   Vitals were reviewed  All vitals stable  Temp: 98  F (36.7  C) Temp src: Oral BP: 135/75 Pulse: 96   Resp: 16        Uterine fundus is firm, non-tender and at the level of the umbilicus          Data:     All laboratory data related to this surgery reviewed  Hemoglobin   Date Value Ref Range Status   09/16/2018 11.2 (L) 11.7 - 15.7 g/dL Final   09/15/2018 12.2 11.7 - 15.7 g/dL Final   09/13/2018 12.8 11.7 - 15.7 g/dL Final   07/31/2018 12.2 11.7 - 15.7 g/dL Final   07/17/2018 10.8 (L) 11.7 - 15.7 g/dL Final     No imaging studies have been ordered    Arya Plaza MD

## 2018-09-16 NOTE — LACTATION NOTE
Lactation in to see patient. Patient had problems nursing her first baby. This baby latched well with lots of swallows heard. Patient was concerned about having to start supplementing right away. Baby's sugars stable. Encouraged frequent feeds. All questions answered at this time. Encouraged to call for assistance.

## 2018-09-16 NOTE — PROVIDER NOTIFICATION
09/16/18 1045   Provider Notification   Provider Name/Title Dr. Plaza   Method of Notification In Department   Request Evaluate in Person   Notification Reason Other   MD on unit. Updated with elevated HS blood sugar of 110, but all others WNL. Verbal order to discontinue blood sugar checks on patient.

## 2018-09-16 NOTE — PLAN OF CARE
Problem: Patient Care Overview  Goal: Plan of Care/Patient Progress Review  Outcome: Improving  Patient vital signs stable and meeting expected outcomes.  Breastfeeding well with minimal assistance from staff.  Finger feeding infant after each breastfeed due to infants blood sugars.  Up independently and voiding adequately.  Able to perform all cares for self and infant.  Bonding well with baby.  BS at bedtime was 110, blood sugar check this morning pending.  Will continue to monitor.

## 2018-09-16 NOTE — CONSULTS
NUTRITION ASSESSMENT    REASON FOR ASSESSMENT:    Admission Nutrition Risk Screen for  New/Uncontrolled Diabetes     CURRENT DIET AND NOURISHMENT ORDER:  Information obtained from chart review    Diet: Regular    Current Intake/Tolerance: No documented s/sx intolerance. Eating and drinking normally per RN documentation.     NUTRITION STATUS VALIDATION:  Weight status: Obesity Grade III BMI >40    MALNUTRITION:  Patient does not meet two of the following criteria necessary for diagnosing malnutrition: significant weight loss, reduced intake, subcutaneous fat loss, muscle loss or fluid retention    NUTRITION DIAGNOSIS:  No nutrition diagnosis at this time.    INTERVENTION:    Nutrition Prescription:     Gestational diabetes intervention not indicated as patient has already delivered.    Anticipate patient will discharge on regular diet    Implementation:      Diet education - per MD order or as appropriate    Follow Up/Monitoring:      No need for further follow up unless another consult is received.    Stephanie Whitehead RD, LD  3rd floor/ICU: [phone]  All other floors: [phone]  Weekend/holiday: [phone]  Office: [phone]

## 2018-09-16 NOTE — ANESTHESIA POSTPROCEDURE EVALUATION
Patient: Tosin Pfeiffer    * No procedures listed *    Diagnosis:* No pre-op diagnosis entered *  Diagnosis Additional Information: labor    Anesthesia Type:  Epidural    Note:  Anesthesia Post Evaluation         Comments:     S/P epidural for labor.   I or my partner was immediately available for management of this patient during epidural analgesia infusion.  VSS.  Doing well. Block resolved.  Neuro at baseline. Denies positional headache. Minimal side effects easily managed w/ PRN meds. No apparent anesthetic complications. No follow-up required.    Ye Bender MD        Last vitals:  Vitals:    09/15/18 1700 09/15/18 2100 09/16/18 0223   BP: 134/77 129/68 95/60   Pulse: 86 92 86   Resp: 20 18 16   Temp: 98.4  F (36.9  C) 98.1  F (36.7  C) 97.5  F (36.4  C)         Electronically Signed By: Ye Bender MD  September 16, 2018  7:19 AM

## 2018-09-17 VITALS
WEIGHT: 245.1 LBS | SYSTOLIC BLOOD PRESSURE: 140 MMHG | BODY MASS INDEX: 43.43 KG/M2 | HEART RATE: 93 BPM | TEMPERATURE: 98.6 F | RESPIRATION RATE: 18 BRPM | DIASTOLIC BLOOD PRESSURE: 89 MMHG | HEIGHT: 63 IN

## 2018-09-17 PROCEDURE — 40000084 ZZH STATISTIC IP LACTATION SERVICES 16-30 MIN

## 2018-09-17 PROCEDURE — 25000132 ZZH RX MED GY IP 250 OP 250 PS 637: Performed by: OBSTETRICS & GYNECOLOGY

## 2018-09-17 RX ADMIN — IBUPROFEN 600 MG: 600 TABLET ORAL at 00:04

## 2018-09-17 RX ADMIN — SENNOSIDES AND DOCUSATE SODIUM 2 TABLET: 8.6; 5 TABLET ORAL at 09:09

## 2018-09-17 RX ADMIN — PRENATAL VIT W/ FE FUMARATE-FA TAB 27-0.8 MG 1 TABLET: 27-0.8 TAB at 09:09

## 2018-09-17 RX ADMIN — IBUPROFEN 600 MG: 600 TABLET ORAL at 06:37

## 2018-09-17 RX ADMIN — IBUPROFEN 600 MG: 600 TABLET ORAL at 12:47

## 2018-09-17 NOTE — PROGRESS NOTES
Discharge instructions complete and verbal understanding given. Discharged to home accompanied by Spouse and .

## 2018-09-17 NOTE — LACTATION NOTE
Lactation in to see patient. States baby nursing well. Observed a latch. Baby got on well, with swallows heard. Encouraged patient to call for assistance.

## 2018-09-17 NOTE — DISCHARGE INSTRUCTIONS

## 2018-09-17 NOTE — DISCHARGE SUMMARY
"[facility] Discharge Summary    Tosin Pfeiffer MRN# [id number]   Age: 32 year old YOB: 1985     Date of Admission:  9/15/2018  Date of Discharge::  9/17/2018  Admitting Physician:  Greyson Cadet MD  Discharge Physician:  Greyson Cadet MD     Home clinic: [facility]          Admission Diagnoses:   Encounter for triage in pregnant patient  Pregnancy  Vaginal delivery          Discharge Diagnosis:   Normal spontaneous vaginal delivery  Intrauterine pregnancy at term          Procedures:   Procedure(s): No additional procedures performed       No other procedures performed during this admission           Medications Prior to Admission:     Prescriptions Prior to Admission   Medication Sig Dispense Refill Last Dose     acetone, Urine, test STRP Test once daily x1 week, then reduce to once weekly once consistently negative 25 each 1 Past Week at Unknown time     blood glucose (NO BRAND SPECIFIED) lancets standard Use to test blood sugar 6 times daily or as directed. OneTouch delica lancets. 100 each PRN 9/14/2018 at Unknown time     blood glucose monitoring (NO BRAND SPECIFIED) meter device kit Use to test blood sugar 4 times daily or as directed. 1 kit 0 9/14/2018 at Unknown time     blood glucose monitoring (ONETOUCH VERIO IQ) test strip Use to test blood sugars 4 times daily or as directed. 100 strip 1 9/14/2018 at Unknown time     Docosahexaenoic Acid (DHA) 200 MG CAPS    9/14/2018 at Unknown time     insulin pen needle (BD CHRIS U/F) 32G X 4 MM Use 2 daily as directed. 100 each 3 9/14/2018 at Unknown time     insulin syringe-needle U-100 (BD INSULIN SYRINGE ULTRAFINE) 30G X 1/2\" 1 ML Use one syringe Twice daily subcutaneously as directed. 100 each 3 9/14/2018 at Unknown time     Prenatal Vit-Fe Fumarate-FA (PRENATAL MULTIVITAMIN PLUS IRON) 27-0.8 MG TABS per tablet Take by mouth daily   9/14/2018 at Unknown time     ranitidine (ZANTAC) 300 MG tablet TAKE 1 TABLET (300 " "MG) BY MOUTH AT BEDTIME 90 tablet 1 9/14/2018 at Unknown time     blood glucose monitoring (NO BRAND SPECIFIED) test strip Use to test blood sugars 6 times daily or as directed. OneTouch Verio test strips. 200 strip 3 More than a month at Unknown time     [DISCONTINUED] ferrous sulfate (IRON) 325 (65 Fe) MG tablet Take 1 tablet (325 mg) by mouth daily (with breakfast) 30 tablet 2 9/14/2018 at Unknown time     [DISCONTINUED] insulin NPH (NOVOLIN N VIAL) 100 UNIT/ML injection 43 units before breakfast  48 units at bedtime 30 mL 3 9/14/2018 at 2000             Discharge Medications:     Current Discharge Medication List      CONTINUE these medications which have NOT CHANGED    Details   acetone, Urine, test STRP Test once daily x1 week, then reduce to once weekly once consistently negative  Qty: 25 each, Refills: 1    Associated Diagnoses: Gestational diabetes      blood glucose (NO BRAND SPECIFIED) lancets standard Use to test blood sugar 6 times daily or as directed. OneTouch delica lancets.  Qty: 100 each, Refills: PRN    Comments: Dose updated.  Do not fill today.  Patient to call when refill needed.  Associated Diagnoses: Gestational diabetes mellitus (GDM)      blood glucose monitoring (NO BRAND SPECIFIED) meter device kit Use to test blood sugar 4 times daily or as directed.  Qty: 1 kit, Refills: 0    Associated Diagnoses: Gestational diabetes mellitus (GDM)      !! blood glucose monitoring (ONETOUCH VERIO IQ) test strip Use to test blood sugars 4 times daily or as directed.  Qty: 100 strip, Refills: 1    Associated Diagnoses: Gestational diabetes      Docosahexaenoic Acid (DHA) 200 MG CAPS       insulin pen needle (BD CHRIS U/F) 32G X 4 MM Use 2 daily as directed.  Qty: 100 each, Refills: 3    Associated Diagnoses: Gestational diabetes mellitus (GDM) requiring insulin      insulin syringe-needle U-100 (BD INSULIN SYRINGE ULTRAFINE) 30G X 1/2\" 1 ML Use one syringe Twice daily subcutaneously as directed.  Qty: 100 " each, Refills: 3    Associated Diagnoses: Insulin controlled gestational diabetes mellitus (GDM) in third trimester      Prenatal Vit-Fe Fumarate-FA (PRENATAL MULTIVITAMIN PLUS IRON) 27-0.8 MG TABS per tablet Take by mouth daily      ranitidine (ZANTAC) 300 MG tablet TAKE 1 TABLET (300 MG) BY MOUTH AT BEDTIME  Qty: 90 tablet, Refills: 1    Associated Diagnoses: Prenatal care in second trimester      !! blood glucose monitoring (NO BRAND SPECIFIED) test strip Use to test blood sugars 6 times daily or as directed. OneTouch Verio test strips.  Qty: 200 strip, Refills: 3    Comments: Dose updated.  Do not fill today.  Patient to call when refill needed.  Associated Diagnoses: Gestational diabetes       !! - Potential duplicate medications found. Please discuss with provider.      STOP taking these medications       ferrous sulfate (IRON) 325 (65 Fe) MG tablet Comments:   Reason for Stopping:         insulin NPH (NOVOLIN N VIAL) 100 UNIT/ML injection Comments:   Reason for Stopping:                     Consultations:   No consultations were requested during this admission          Brief History of Labor:   See note           Hospital Course:   The patient's hospital course was unremarkable.  On discharge, her pain was well controlled. Vaginal bleeding is similar to peak menstrual flow.  Voiding without difficulty.  Ambulating well and tolerating a normal diet.  No fever.  Breastfeeding well.  Infant is stable.  No bowel movement yet.*  She was discharged on post-partum day #2.    Post-partum hemoglobin:   Hemoglobin   Date Value Ref Range Status   09/16/2018 11.2 (L) 11.7 - 15.7 g/dL Final             Discharge Instructions and Follow-Up:   Discharge diet: Regular   Discharge activity: Activity as tolerated   Discharge follow-up: Follow up with primary care provider in 6 weeks   Wound care: Drink plenty of fluids           Discharge Disposition:   Discharged to home      Attestation:  I have reviewed today's vital signs,  notes, medications, labs and imaging.    Greyson Cadet MD

## 2018-09-17 NOTE — PLAN OF CARE
Problem: Patient Care Overview  Goal: Plan of Care/Patient Progress Review  Outcome: Improving  Pt vital signs stable and meeting expected outcomes.  Breastfeeding well with minimal assistance from staff.  Up independently and voiding adequately.  Pain well controlled with ibuprofen.  Able to perform all cares for self and infant.  Bonding well with baby.  Will continue to monitor.

## 2018-09-17 NOTE — LACTATION NOTE
WINSTON to see patient.  Her baby was held and sucking on a pacifier when LC entered the room.  LC offered a feeding assessment, performed hand expression and good drops noted on both sides, with the left side more than the right.  He latches well and had a nutritive suck pattern with gulping noted frequently.  Suck to swallow ratio about 4-5:1.  Baby has had an elevated temperature, so WINSTON advised her to feed him often today and avoid pacifier use.  If needed, could initiate pumping post feeds, however, WINSTON would prefer that he nurse frequently instead.  She reports a low milk supply with her first baby, but had several doses of lasix following delivery and that baby did not latch well and required early supplementation.

## 2018-09-17 NOTE — PLAN OF CARE
Problem: Postpartum (Vaginal Delivery) (Adult,Obstetrics,Pediatric)  Goal: Signs and Symptoms of Listed Potential Problems Will be Absent, Minimized or Managed (Postpartum)  Signs and symptoms of listed potential problems will be absent, minimized or managed by discharge/transition of care (reference Postpartum (Vaginal Delivery) (Adult,Obstetrics,Pediatric) CPG).   Patient independent with infant and self care.  Meeting expected discharge goals

## 2018-09-18 LAB
BLD PROD TYP BPU: NORMAL
BLD UNIT ID BPU: 0
BLOOD PRODUCT CODE: NORMAL
BPU ID: NORMAL
TRANSFUSION STATUS PATIENT QL: NORMAL

## 2018-11-17 ENCOUNTER — OFFICE VISIT (OUTPATIENT)
Dept: FAMILY MEDICINE | Facility: CLINIC | Age: 33
End: 2018-11-17
Payer: COMMERCIAL

## 2018-11-17 VITALS
OXYGEN SATURATION: 97 % | SYSTOLIC BLOOD PRESSURE: 128 MMHG | WEIGHT: 227 LBS | BODY MASS INDEX: 40.22 KG/M2 | TEMPERATURE: 98.6 F | RESPIRATION RATE: 16 BRPM | HEIGHT: 63 IN | HEART RATE: 77 BPM | DIASTOLIC BLOOD PRESSURE: 80 MMHG

## 2018-11-17 DIAGNOSIS — Z30.09 BIRTH CONTROL COUNSELING: ICD-10-CM

## 2018-11-17 DIAGNOSIS — O24.419 GESTATIONAL DIABETES MELLITUS (GDM), ANTEPARTUM, GESTATIONAL DIABETES METHOD OF CONTROL UNSPECIFIED: ICD-10-CM

## 2018-11-17 PROBLEM — Z34.90 PREGNANCY: Status: RESOLVED | Noted: 2018-09-15 | Resolved: 2018-11-17

## 2018-11-17 PROBLEM — Z36.89 ENCOUNTER FOR TRIAGE IN PREGNANT PATIENT: Status: RESOLVED | Noted: 2018-09-15 | Resolved: 2018-11-17

## 2018-11-17 PROCEDURE — 99214 OFFICE O/P EST MOD 30 MIN: CPT | Performed by: FAMILY MEDICINE

## 2018-11-17 ASSESSMENT — ANXIETY QUESTIONNAIRES
3. WORRYING TOO MUCH ABOUT DIFFERENT THINGS: NOT AT ALL
1. FEELING NERVOUS, ANXIOUS, OR ON EDGE: NOT AT ALL
2. NOT BEING ABLE TO STOP OR CONTROL WORRYING: NOT AT ALL
GAD7 TOTAL SCORE: 0
5. BEING SO RESTLESS THAT IT IS HARD TO SIT STILL: NOT AT ALL
7. FEELING AFRAID AS IF SOMETHING AWFUL MIGHT HAPPEN: NOT AT ALL
6. BECOMING EASILY ANNOYED OR IRRITABLE: NOT AT ALL
IF YOU CHECKED OFF ANY PROBLEMS ON THIS QUESTIONNAIRE, HOW DIFFICULT HAVE THESE PROBLEMS MADE IT FOR YOU TO DO YOUR WORK, TAKE CARE OF THINGS AT HOME, OR GET ALONG WITH OTHER PEOPLE: NOT DIFFICULT AT ALL

## 2018-11-17 ASSESSMENT — PATIENT HEALTH QUESTIONNAIRE - PHQ9
5. POOR APPETITE OR OVEREATING: NOT AT ALL
SUM OF ALL RESPONSES TO PHQ QUESTIONS 1-9: 2

## 2018-11-17 NOTE — MR AVS SNAPSHOT
After Visit Summary   11/17/2018    Tosin Pfeiffer    MRN: [id number]           Patient Information     Date Of Birth          1985        Visit Information        Provider Department      11/17/2018 10:00 AM Anali Salazar MD [facility]        Today's Diagnoses     Gestational diabetes mellitus (GDM), antepartum, gestational diabetes method of control unspecified          Care Instructions    Check blood sugar twice weekly for next month. If normal, can stop.     Work on weight loss and carb counting.     Return to clinic in 6-8 months for diabetes screen.     Paragard IUD - 10 year IUD, non hormonal            Follow-ups after your visit        Who to contact     If you have questions or need follow up information about today's clinic visit or your schedule please contact [facility] directly at [phone].  Normal or non-critical lab and imaging results will be communicated to you by Kiiohart, letter or phone within 4 business days after the clinic has received the results. If you do not hear from us within 7 days, please contact the clinic through Kiiohart or phone. If you have a critical or abnormal lab result, we will notify you by phone as soon as possible.  Submit refill requests through Misohoni or call your pharmacy and they will forward the refill request to us. Please allow 3 business days for your refill to be completed.          Additional Information About Your Visit        Kiiohart Information     Misohoni gives you secure access to your electronic health record. If you see a primary care provider, you can also send messages to your care team and make appointments. If you have questions, please call your primary care clinic.  If you do not have a primary care provider, please call [phone] and they will assist you.        Care EveryWhere ID     This is your Care EveryWhere ID. This could be used by other organizations to access your  "[place] medical records  [id number]        Your Vitals Were     Pulse Temperature Respirations Height Last Period Pulse Oximetry    77 98.6  F (37  C) (Oral) 16 5' 3\" (1.6 m) 11/06/2018 (Exact Date) 97%    Breastfeeding? BMI (Body Mass Index)                Yes 40.21 kg/m2           Blood Pressure from Last 3 Encounters:   11/17/18 128/80   09/17/18 140/89   09/13/18 126/75    Weight from Last 3 Encounters:   11/17/18 227 lb (103 kg)   09/15/18 245 lb 1.6 oz (111.2 kg)   09/11/18 245 lb 1.6 oz (111.2 kg)              Today, you had the following     No orders found for display         Today's Medication Changes          These changes are accurate as of 11/17/18 10:32 AM.  If you have any questions, ask your nurse or doctor.               These medicines have changed or have updated prescriptions.        Dose/Directions    blood glucose lancets standard   Commonly known as:  no brand specified   This may have changed:  additional instructions   Used for:  Gestational diabetes mellitus (GDM), antepartum, gestational diabetes method of control unspecified   Changed by:  Anali Salazar MD        Use to test blood sugar 1 times daily or as directed. OneTouch delica lancets.   Quantity:  100 each   Refills:  PRN       * blood glucose monitoring test strip   Commonly known as:  ONETOUCH VERIO IQ   This may have changed:  Another medication with the same name was changed. Make sure you understand how and when to take each.   Used for:  Gestational diabetes   Changed by:  Anali Salazar MD        Use to test blood sugars 4 times daily or as directed.   Quantity:  100 strip   Refills:  1       * blood glucose monitoring test strip   Commonly known as:  no brand specified   This may have changed:  additional instructions   Used for:  Gestational diabetes mellitus (GDM), antepartum, gestational diabetes method of control unspecified   Changed by:  Anali Salazar MD        Use to test blood sugars 1 times " daily or as directed. OneTouch Verio test strips.   Quantity:  100 strip   Refills:  3       * Notice:  This list has 2 medication(s) that are the same as other medications prescribed for you. Read the directions carefully, and ask your doctor or other care provider to review them with you.         Where to get your medicines      These medications were sent to [facility]/pharmacy #[id number] - [place], [place] - [address]  [address], [facility] [address]    Hours:  [facility] Phone:  [phone]     blood glucose lancets standard    blood glucose monitoring test strip                Primary Care Provider Office Phone # Fax #    Meron Ann Aaseby-Aguilera, PA-C [phone] [address]  [facility] [address]        Equal Access to Services     FARIHA SOLIS : Hadii aad ku hadasho Solyle, waaxda luqadaha, qaybta kaalmada adeegyada, molly avila . So [facility] [phone].    ATENCIÓN: Si habla español, tiene a albright disposición servicios gratuitos de asistencia lingüística. Joelle al [phone].    We comply with applicable federal civil rights laws and [place] laws. We do not discriminate on the basis of race, color, national origin, age, disability, sex, sexual orientation, or gender identity.            Thank you!     Thank you for choosing [facility]  for your care. Our goal is always to provide you with excellent care. Hearing back from our patients is one way we can continue to improve our services. Please take a few minutes to complete the written survey that you may receive in the mail after your visit with us. Thank you!             Your Updated Medication List - Protect others around you: Learn how to safely use, store and throw away your medicines at www.disposemymeds.org.          This list is accurate as of 11/17/18 10:32 AM.  Always use your most recent med list.                   Brand Name Dispense Instructions for  "use Diagnosis    acetone urine test strip    KETOSTIX    25 each    Test once daily x1 week, then reduce to once weekly once consistently negative    Gestational diabetes       blood glucose lancets standard    no brand specified    100 each    Use to test blood sugar 1 times daily or as directed. OneTouch delica lancets.    Gestational diabetes mellitus (GDM), antepartum, gestational diabetes method of control unspecified       blood glucose monitoring meter device kit    no brand specified    1 kit    Use to test blood sugar 4 times daily or as directed.    Gestational diabetes mellitus (GDM)       * blood glucose monitoring test strip    ONETOUCH VERIO IQ    100 strip    Use to test blood sugars 4 times daily or as directed.    Gestational diabetes       * blood glucose monitoring test strip    no brand specified    100 strip    Use to test blood sugars 1 times daily or as directed. OneTouch Verio test strips.    Gestational diabetes mellitus (GDM), antepartum, gestational diabetes method of control unspecified       breast pump Misc     1 each    1 each every 2 hours as needed    Pregnancy        MG Caps           insulin pen needle 32G X 4 MM miscellaneous    BD CHRIS U/F    100 each    Use 2 daily as directed.    Gestational diabetes mellitus (GDM) requiring insulin       insulin syringe-needle U-100 30G X 1/2\" 1 ML miscellaneous    BD insulin syringe ultrafine    100 each    Use one syringe Twice daily subcutaneously as directed.    Insulin controlled gestational diabetes mellitus (GDM) in third trimester       prenatal multivitamin plus iron 27-0.8 MG Tabs per tablet      Take by mouth daily        * Notice:  This list has 2 medication(s) that are the same as other medications prescribed for you. Read the directions carefully, and ask your doctor or other care provider to review them with you.      "

## 2018-11-17 NOTE — PATIENT INSTRUCTIONS
Check blood sugar twice weekly for next month. If normal, can stop.     Work on weight loss and carb counting.     Return to clinic in 6-8 months for diabetes screen.     Paragard IUD - 10 year IUD, non hormonal

## 2018-11-17 NOTE — PROGRESS NOTES
SUBJECTIVE:   Tosin Pfeiffer is a 33 year old female who presents to clinic today for the following health issues:      6 week post delivery    Vaginal delivery with no complications.   Insulin dependent gestational diabetes with both pregnancies. No longer on insulin, not checking BS.   Mood is good, tired but otherwise doing well.   Has another child at home.   Pumping and bottle feeding. Pumping every 4 hours, getting 3 oz.   No breast issues.   Having regular periods. Not using contraception, not having intercourse.   Unsure if she wants more children, doesn't want to use hormonal contraception.       Problem list and histories reviewed & adjusted, as indicated.  Additional history: none    Patient Active Problem List   Diagnosis     Hyperlipidemia LDL goal <160     Blood type A-     BMI 38.0-38.9,adult     Exercise-induced asthma     Past Surgical History:   Procedure Laterality Date      SECTION  2014    Procedure:  SECTION;  Surgeon: Cam Armendariz MD;  Location: [facility] OR      SECTION N/A 2018    Procedure:  SECTION;  Repeat  section;  Surgeon: Tari Ramirez DO;  Location: [facility] L+D     wisdom teeth removed  age 15       Social History   Substance Use Topics     Smoking status: Former Smoker     Packs/day: 1.00     Types: Cigarettes     Start date:      Quit date:      Smokeless tobacco: Never Used     Alcohol use No     Family History   Problem Relation Age of Onset     Psychotic Disorder Mother      bipolar     Mental Illness Mother      Bipolar     Hypertension Father      Myocardial Infarction Paternal Grandfather 50     Family History Negative Brother      1     Family History Negative Sister      2     Breast Cancer Other      paternal aunt ,dxed at age of early to mid 40's     Breast Cancer Other      Mental Illness Other      schizophrenia     Cancer - colorectal No family hx of            Reviewed and updated as needed this visit by  "clinical staff  Tobacco  Meds  Soc Hx      Reviewed and updated as needed this visit by Provider         ROS:  Constitutional, HEENT, cardiovascular, pulmonary, gi and gu systems are negative, except as otherwise noted.    OBJECTIVE:     /80 (BP Location: Right arm, Patient Position: Chair, Cuff Size: Adult Large)  Pulse 77  Temp 98.6  F (37  C) (Oral)  Resp 16  Ht 5' 3\" (1.6 m)  Wt 227 lb (103 kg)  LMP 11/06/2018 (Exact Date)  SpO2 97%  Breastfeeding? Yes  BMI 40.21 kg/m2  Body mass index is 40.21 kg/(m^2).  GENERAL: healthy, alert and no distress  RESP: lungs clear to auscultation - no rales, rhonchi or wheezes  CV: regular rate and rhythm, normal S1 S2, no S3 or S4, no murmur, click or rub, no peripheral edema and peripheral pulses strong  PSYCH: mentation appears normal, affect normal/bright    Diagnostic Test Results:  PHQ9 - 2  GAD7 - 0    ASSESSMENT/PLAN:     1. Postpartum care and examination - doing well    2. Gestational diabetes mellitus (GDM), antepartum, gestational diabetes method of control unspecified   Patient Instructions   Check blood sugar twice weekly for next month. If normal, can stop.     Work on weight loss and carb counting.     Return to clinic in 6-8 months for diabetes screen.     Paragard IUD - 10 year IUD, non hormonal      - blood glucose (NO BRAND SPECIFIED) lancets standard; Use to test blood sugar 1 times daily or as directed. OneTouch delica lancets.  Dispense: 100 each; Refill: PRN  - blood glucose monitoring (NO BRAND SPECIFIED) test strip; Use to test blood sugars 1 times daily or as directed. OneTouch Verio test strips.  Dispense: 100 strip; Refill: 3    3. Birth control counseling - discussed condoms versus paragard IUD, will consider.       Anali Salazar MD  [facility]"

## 2018-11-18 ASSESSMENT — ASTHMA QUESTIONNAIRES: ACT_TOTALSCORE: 25

## 2018-11-18 ASSESSMENT — ANXIETY QUESTIONNAIRES: GAD7 TOTAL SCORE: 0

## 2018-12-03 ENCOUNTER — MYC MEDICAL ADVICE (OUTPATIENT)
Dept: OBGYN | Facility: CLINIC | Age: 33
End: 2018-12-03

## 2018-12-03 DIAGNOSIS — Z34.92 PRENATAL CARE IN SECOND TRIMESTER: ICD-10-CM

## 2018-12-03 NOTE — TELEPHONE ENCOUNTER
ranitidine      Last Written Prescription Date:  9/11/18  Last Fill Quantity: 90,   # refills: 1  Last Office Visit: 9/11/18  Future Office visit:

## 2019-02-26 ENCOUNTER — TELEPHONE (OUTPATIENT)
Dept: FAMILY MEDICINE | Facility: CLINIC | Age: 34
End: 2019-02-26

## 2019-02-26 NOTE — TELEPHONE ENCOUNTER
Patient having upper abdominal pain with dirrhea and bleeding since 7am this morning. In past 3 hours has had loose stools x3 and abdominal cramping 5-6x. Cramping is a 7/10. Sharp pain. Pain comes and goes.   Has a hx of hemorrhoids.  Has had bleeding with stools in the past.   She ate chinese food last night.     Gave home care instructions from triage book about food poisoning and diarrhea.   [place] diet, clear liquids, take an antidiarrheal medication, take Tylenol if needed. Etc.    Call back if symptoms worsen or do not subside in 48 hrs.     Pt expressed understanding and acceptance of the plan.  Pt had no further questions at this time.  Advised can call back to clinic at any time with concerns.     Kamryn Hatch RN Flex

## 2019-09-24 ENCOUNTER — TELEPHONE (OUTPATIENT)
Dept: FAMILY MEDICINE | Facility: CLINIC | Age: 34
End: 2019-09-24

## 2019-09-24 NOTE — TELEPHONE ENCOUNTER
Patient calling to schedule first OB appointment.     Patient states she was transferred from [facility] and told to schedule first appointment in clinic. However, we do not schedule this    Please advise    Paula Fleming

## 2019-10-15 ENCOUNTER — PRENATAL OFFICE VISIT (OUTPATIENT)
Dept: NURSING | Facility: CLINIC | Age: 34
End: 2019-10-15
Payer: COMMERCIAL

## 2019-10-15 VITALS
WEIGHT: 219 LBS | HEART RATE: 68 BPM | SYSTOLIC BLOOD PRESSURE: 116 MMHG | HEIGHT: 63 IN | BODY MASS INDEX: 38.8 KG/M2 | DIASTOLIC BLOOD PRESSURE: 60 MMHG

## 2019-10-15 DIAGNOSIS — O09.529 SUPERVISION OF HIGH-RISK PREGNANCY OF ELDERLY MULTIGRAVIDA: Primary | ICD-10-CM

## 2019-10-15 DIAGNOSIS — Z86.32 HISTORY OF GESTATIONAL DIABETES MELLITUS: ICD-10-CM

## 2019-10-15 LAB
ABO + RH BLD: NORMAL
ABO + RH BLD: NORMAL
BLD GP AB SCN SERPL QL: NORMAL
BLOOD BANK CMNT PATIENT-IMP: NORMAL
ERYTHROCYTE [DISTWIDTH] IN BLOOD BY AUTOMATED COUNT: 12.4 % (ref 10–15)
HBA1C MFR BLD: 4.9 % (ref 0–5.6)
HCT VFR BLD AUTO: 41.6 % (ref 35–47)
HGB BLD-MCNC: 13.8 G/DL (ref 11.7–15.7)
MCH RBC QN AUTO: 30.7 PG (ref 26.5–33)
MCHC RBC AUTO-ENTMCNC: 33.2 G/DL (ref 31.5–36.5)
MCV RBC AUTO: 93 FL (ref 78–100)
PLATELET # BLD AUTO: 275 10E9/L (ref 150–450)
RBC # BLD AUTO: 4.49 10E12/L (ref 3.8–5.2)
SPECIMEN EXP DATE BLD: NORMAL
WBC # BLD AUTO: 9.6 10E9/L (ref 4–11)

## 2019-10-15 PROCEDURE — 87389 HIV-1 AG W/HIV-1&-2 AB AG IA: CPT | Performed by: FAMILY MEDICINE

## 2019-10-15 PROCEDURE — 86850 RBC ANTIBODY SCREEN: CPT | Performed by: FAMILY MEDICINE

## 2019-10-15 PROCEDURE — 87491 CHLMYD TRACH DNA AMP PROBE: CPT | Performed by: FAMILY MEDICINE

## 2019-10-15 PROCEDURE — 86762 RUBELLA ANTIBODY: CPT | Performed by: FAMILY MEDICINE

## 2019-10-15 PROCEDURE — 86900 BLOOD TYPING SEROLOGIC ABO: CPT | Performed by: FAMILY MEDICINE

## 2019-10-15 PROCEDURE — 83036 HEMOGLOBIN GLYCOSYLATED A1C: CPT | Performed by: FAMILY MEDICINE

## 2019-10-15 PROCEDURE — 36415 COLL VENOUS BLD VENIPUNCTURE: CPT | Performed by: FAMILY MEDICINE

## 2019-10-15 PROCEDURE — 86780 TREPONEMA PALLIDUM: CPT | Performed by: FAMILY MEDICINE

## 2019-10-15 PROCEDURE — 87591 N.GONORRHOEAE DNA AMP PROB: CPT | Performed by: FAMILY MEDICINE

## 2019-10-15 PROCEDURE — 85027 COMPLETE CBC AUTOMATED: CPT | Performed by: FAMILY MEDICINE

## 2019-10-15 PROCEDURE — 87086 URINE CULTURE/COLONY COUNT: CPT | Performed by: FAMILY MEDICINE

## 2019-10-15 PROCEDURE — 87340 HEPATITIS B SURFACE AG IA: CPT | Performed by: FAMILY MEDICINE

## 2019-10-15 PROCEDURE — 86901 BLOOD TYPING SEROLOGIC RH(D): CPT | Performed by: FAMILY MEDICINE

## 2019-10-15 PROCEDURE — 99207 ZZC NO CHARGE NURSE ONLY: CPT

## 2019-10-15 SDOH — ECONOMIC STABILITY: TRANSPORTATION INSECURITY
IN THE PAST 12 MONTHS, HAS THE LACK OF TRANSPORTATION KEPT YOU FROM MEDICAL APPOINTMENTS OR FROM GETTING MEDICATIONS?: NO

## 2019-10-15 SDOH — ECONOMIC STABILITY: TRANSPORTATION INSECURITY
IN THE PAST 12 MONTHS, HAS LACK OF TRANSPORTATION KEPT YOU FROM MEETINGS, WORK, OR FROM GETTING THINGS NEEDED FOR DAILY LIVING?: NO

## 2019-10-15 SDOH — ECONOMIC STABILITY: INCOME INSECURITY: HOW HARD IS IT FOR YOU TO PAY FOR THE VERY BASICS LIKE FOOD, HOUSING, MEDICAL CARE, AND HEATING?: NOT HARD AT ALL

## 2019-10-15 SDOH — ECONOMIC STABILITY: FOOD INSECURITY: WITHIN THE PAST 12 MONTHS, THE FOOD YOU BOUGHT JUST DIDN'T LAST AND YOU DIDN'T HAVE MONEY TO GET MORE.: NEVER TRUE

## 2019-10-15 ASSESSMENT — MIFFLIN-ST. JEOR: SCORE: 1649.57

## 2019-10-15 NOTE — PROGRESS NOTES
"Chief Complaint   Patient presents with     Prenatal Care     New Prenatal Nurse Visit   7w4d  Estimated Date of Delivery: May 29, 2020         Initial /60 (BP Location: Right arm, Cuff Size: Adult Regular)   Pulse 68   Ht 1.588 m (5' 2.5\")   Wt 99.3 kg (219 lb)   LMP 2019 (Exact Date)   Breastfeeding? No   BMI 39.42 kg/m   Estimated body mass index is 39.42 kg/m  as calculated from the following:    Height as of this encounter: 1.588 m (5' 2.5\").    Weight as of this encounter: 99.3 kg (219 lb).  BP completed using cuff size: regular    Questioned patient about current smoking habits.  Pt. quit smoking some time ago.     Prenatal book and folder (containing standard educational hand-outs and brochures) given to patient. Information in folder reviewed. Questions answered. Brochure given on optional screening available to assess chromosomal anomalies. Pt advised to call the clinic if she has any questions or concerns related to her pregnancy. Prenatal labs obtained. New prenatal visit scheduled on 19 with Dr Ramirez.    Patient would like to have First Trimester Screen done with MFM.  Referral entered.      Lab Results   Component Value Date    PAP NIL 2017           Patient supplied answers from flow sheet for:  Prenatal OB Questionnaire.  Past Medical History  Diabetes?: (!) Yes(ID GDM 1st 2 pregnancies)  Hypertension : No  Heart disease, mitral valve prolapse or rheumatic fever?: No  An autoimmune disease such as lupus or rheumatoid arthritis?: No  Kidney disease or urinary tract infection?: No  Epilepsy, seizures or spells?: No  Migraine headaches?: No  A stroke or loss of function or sensation?: No  Any other neurological problems?: No  Have you ever been treated for depression?: No  Are you having problems with crying spells or loss of self-esteem?: No  Have you ever required psychiatric care?: No  Have you ever had hepatitis, liver disease or jaundice?: No  Have you been " treated for blood clots in your veins, deep vein thromosis, inflammation in the veins, thrombosis, phlebitis, pulmonary embolism or varicosities?: No  Have you had excessive bleeding after surgery or dental work?: No  Do you bleed more than other women after a cut or scratch?: No  Do you have a history of anemia?: (!) Yes  Have you ever had thyroid problems or taken thyroid medication?: No   Do you have any endocrine problems?: No  Have you ever been in a major accident or suffered serious trauma?: No  Within the last year, has anyone hit, slapped, kicked or otherwise hurt you?: No  In the last year, has anyone forced you to have sex when you didn't want to?: No    Past Medical History 2   Have you ever received a blood transfusion?: No  Would you refuse a blood transfusion if a doctor judged it to be medically necessary?: No   If you answered Yes, would you rather die than receive a blood transfusion?: Unknown  If you answered Yes, is this for Nondenominational reasons?: Unknown  Does anyone in your home smoke?: No  Do you use tobacco products?: No  Do you drink beer, wine or hard liquor?: No  Do you use any of the following: marijuana, speed, cocaine, heroin, hallucinogens or other drugs?: No   Is your blood type Rh negative?: (!) Yes  Have you ever had abnormal antibodies in your blood?: (!) Yes  Have you ever had asthma?: No  Have you ever had tuberculosis?: No  Do you have any allergies to drugs or over-the-counter medications?: No  Allergies: Dust Mites, Aspartame, Ethanol, Venlafaxine, Hydrochloride, Sertraline: No  Have you had any breast problems?: No  Have you ever ?: (!) Yes  Have you had any gynecological surgical procedures such as cervical conization, a LEEP procedure, laser treatment, cryosurgery of the cervix or a dilation and curettage, etc?: No  Have you ever had any other surgical procedures?: (!) Yes(see surgical history)  Have you been hospitalized for a nonsurgical reason excluding normal  delivery?: No  Have you ever had any anesthetic complications?: No  Have you ever had an abnormal pap smear?: No    Past Medical History (Continued)  Do you have a history of abnormalities of the uterus?: No  Did your mother take SHILPI or any other hormones when she was pregnant with you?: No  Did it take you more than a year to become pregnant?: No  Have you ever been evaluated or treated for infertility?: No  Is there a history of medical problems in your family, which you feel may be important to this pregnancy?: No  Do you have any other problems we have not asked about which you feel may be important to this pregnancy?: No    Symptoms since last menstrual period  Do you have any of the following symptoms: abdominal pain, blood in stools or urine, chest pain, shortness of breath, coughing or vomiting up blood, your heart racing or skipping beats, nausea and vomiting, pain on urination or vaginal discharge or bleed: No  Will the patient be 35 years old or older at the time of delivery?: (!) Yes    Has the patient, baby's father or anyone in either family had:  Thalassemia (Italian, Greek, Mediterranean or  background only) and an MCV result less than 80?: No  Neural tube defect such as meningomyelocele, spina bifida or anencephaly?: No  Congenital heart defect?: No  Down's Syndrome?: No  Ray-Sachs disease (Hindu, Cajun, [place] Islander[place])?: No  Sickle cell disease or trait ()?: No  Hemophilia or other inherited problems of blood?: No  Muscular dystrophy?: No  Cystic fibrosis?: No  [place]'s chorea?: No  Mental retardation/autism?: No  If yes, was the person tested for fragile X?: No  Any other inherited genetic or chromosomal disorder?: No  Maternal metabolic disorder (e.g Insulin-dependent diabetes, PKU)?: No  A child with birth defects not listed above?: No  Recurrent pregnancy loss or stillbirth?: No   Has the patient had any medications/street drugs/alcohol since her last menstrual period?:  No  Does the patient or baby's father have any other genetic risks?: No    Infection History   Do you object to being tested for Hepatitis B?: No  Do you object to being tested for HIV?: No   Do you feel that you are at high risk for coming in contact with the AIDS virus?: No  Have you ever been treated for tuberculosis?: No  Have you ever had a positive skin test for tuberculosis?: No  Do you live with someone who has tuberculosis?: No  Have you ever been exposed to tuberculosis?: No  Do you have genital herpes?: No  Does your partner have genital herpes?: No  Have you had a viral illness since your last period?: No  Have you ever had gonorrhea, chlamydia, syphilis, venereal warts, trichomoniasis, pelvic inflammatory disease or any other sexually transmitted disease?: No  Do you know if you are a genital group B streptococcus carrier?: No  Have you had chicken pox/varicella?: (!) Yes   Have you been vaccinated against chicken Pox?: No  Have you had any other infectious diseases?: No    Amisha Neives RN

## 2019-10-16 ENCOUNTER — TRANSCRIBE ORDERS (OUTPATIENT)
Dept: MATERNAL FETAL MEDICINE | Facility: CLINIC | Age: 34
End: 2019-10-16

## 2019-10-16 DIAGNOSIS — O26.90 PREGNANCY RELATED CONDITION, ANTEPARTUM: Primary | ICD-10-CM

## 2019-10-16 LAB
BACTERIA SPEC CULT: NO GROWTH
C TRACH DNA SPEC QL NAA+PROBE: NEGATIVE
HBV SURFACE AG SERPL QL IA: NONREACTIVE
HIV 1+2 AB+HIV1 P24 AG SERPL QL IA: NONREACTIVE
N GONORRHOEA DNA SPEC QL NAA+PROBE: NEGATIVE
RUBV IGG SERPL IA-ACNC: 21 IU/ML
SPECIMEN SOURCE: NORMAL
T PALLIDUM AB SER QL: NONREACTIVE

## 2019-10-22 DIAGNOSIS — O09.529 SUPERVISION OF HIGH-RISK PREGNANCY OF ELDERLY MULTIGRAVIDA: ICD-10-CM

## 2019-11-04 ENCOUNTER — PRENATAL OFFICE VISIT (OUTPATIENT)
Dept: OBGYN | Facility: CLINIC | Age: 34
End: 2019-11-04
Payer: COMMERCIAL

## 2019-11-04 VITALS — BODY MASS INDEX: 39.44 KG/M2 | SYSTOLIC BLOOD PRESSURE: 120 MMHG | DIASTOLIC BLOOD PRESSURE: 78 MMHG | WEIGHT: 219.1 LBS

## 2019-11-04 DIAGNOSIS — Z34.91 PRENATAL CARE IN FIRST TRIMESTER: ICD-10-CM

## 2019-11-04 DIAGNOSIS — Z67.11 BLOOD TYPE A-: ICD-10-CM

## 2019-11-04 PROCEDURE — 99207 ZZC PRENATAL VISIT: CPT | Performed by: FAMILY MEDICINE

## 2019-11-04 NOTE — PATIENT INSTRUCTIONS
Return in 4 weeks for ob visit     For innatal fetal dna test:  Schedule nurse appt [place] location   [phone]      Dr. Tari Ramirez, DO    Obstetrics and Gynecology  [facility] [place] [facility] [place]

## 2019-11-04 NOTE — PROGRESS NOTES
Tosin Pfeiffer is a 33 year old  @ 10w3d wks EGA with May 29, 2020 who presents to the clinic for an new ob visit.         Estimated Date of Delivery: May 29, 2020  Reviewed nurse intake visit on 10/15/19  Concerns: None Today     Patient Active Problem List   Diagnosis     Hyperlipidemia LDL goal <160     Blood type A-     BMI 38.0-38.9,adult     Exercise-induced asthma     Past Medical History:   Diagnosis Date     Anemia     on Iron     Asthma     exercise induced     Diabetes (H)     Gestational diabetes     Rh incompatibility     Received Rhogam at 28weeks     Past Surgical History:   Procedure Laterality Date      SECTION  2014    Procedure:  SECTION;  Surgeon: Cam Armendariz MD;  Location: [facility] OR      SECTION N/A 2018    did not have this c-sec.      wisdom teeth removed  age 15     Current Outpatient Medications   Medication Sig Dispense Refill     Docosahexaenoic Acid (DHA) 200 MG CAPS        Prenatal Vit-Fe Fumarate-FA (PRENATAL MULTIVITAMIN PLUS IRON) 27-0.8 MG TABS per tablet Take by mouth daily         ====================================================  PERSONAL/SOCIAL HISTORY  Social History     Socioeconomic History     Marital status:      Spouse name: Juarez     Number of children: 2     Years of education: None     Highest education level: Bachelor's degree (e.g., BA, AB, BS)   Occupational History     None   Social Needs     Financial resource strain: Not hard at all     Food insecurity:     Worry: None     Inability: Never true     Transportation needs:     Medical: No     Non-medical: No   Tobacco Use     Smoking status: Former Smoker     Packs/day: 1.00     Types: Cigarettes     Start date:      Last attempt to quit: 2010     Years since quittin.8     Smokeless tobacco: Never Used   Substance and Sexual Activity     Alcohol use: No     Drug use: No     Sexual activity: Yes     Partners: Male   Lifestyle     Physical activity:      Days per week: None     Minutes per session: None     Stress: None   Relationships     Social connections:     Talks on phone: None     Gets together: None     Attends [employer] service: None     Active member of club or organization: None     Attends meetings of clubs or organizations: None     Relationship status: None     Intimate partner violence:     Fear of current or ex partner: None     Emotionally abused: None     Physically abused: None     Forced sexual activity: None   Other Topics Concern     Parent/sibling w/ CABG, MI or angioplasty before 65F 55M? No   Social History Narrative     since 2013 and has a 10 month old infant    Working full time, doing desk work, accounting,         =====================================================   REVIEW OF SYSTEMS  ENT: NEGATIVE for ear, mouth and throat problems  CV: NEGATIVE for chest pain, palpitations or peripheral edema  GI: NEGATIVE for nausea, abdominal pain, heartburn, or change in bowel habits  : NEGATIVE for unusual urinary or vaginal symptoms. Periods are regular.  C: NEGATIVE for fever, chills, change in weight  I: NEGATIVE for worrisome rashes, moles or lesions  E: NEGATIVE for vision changes or irritation  R: NEGATIVE for significant cough or SOB  B: NEGATIVE for masses, tenderness or discharge  M: NEGATIVE for significant arthralgias or myalgia  N: NEGATIVE for weakness, dizziness or paresthesias  P: NEGATIVE for changes in mood or affect    This document serves as a record of the services and decisions personally performed and made by Tari Ramirez DO. It was created on her behalf by Jimena Farr, a trained medical scribe. The creation of this document is based on the provider's statements to the medical scribe.  Jimena Farr 3:26 PM November 4, 2019  ====================================================  PHYSICAL EXAM:  /78 (BP Location: Right arm, Patient Position: Chair, Cuff Size: Adult Large)   Wt 99.4 kg (219 lb 1.6 oz)    LMP 2019 (Exact Date)   Breastfeeding? No   BMI 39.44 kg/m          GENERAL:  Pleasant pregnant female, alert, well groomed.  SKIN:  Warm and dry, without lesions or rashes  HEAD: Symmetrical features.  EYES:  PERRLA,   MOUTH:  Buccal mucosa pink, moist without lesions.    NECK:  Thyroid without enlargement and nodules.  Lymph nodes not palpable.   LUNGS:  Clear to auscultation.  BREAST:  Symmetrical.  No dominant, fixed or suspicious masses are noted.  No skin or nipple changes or axillary nodes.  Self exam is taught and encouraged.  Nipples everted.      HEART:  RRR without murmur.  ABDOMEN: Soft without masses , tenderness or organomegaly.  No CVA tenderness. Well healed scar from  section.   MUSCULOSKELETAL:  Full range of motion  EXTREMITIES:  No edema. No significant varicosities.   GENITALIA:  BUS WNL, no lesions noted   VAGINA:  Pink, normal rugae and discharge normal and physiologic,   CERVIX:  smooth, without discharge or CMT and parous os,   firm/ closed 4 cm long.  UTERUS: Anteverted, nontender 12 weeks in size.  ADNEXA: Without masses or tenderness  PELVIS:   Adequate, Pelvis proven to -pelvis not tested.    =========================================  ICSI Routine Prenatal Carfe Guideline  ASSESSMENT/PLAN:  Tosin Pfeiffer is a 33 year old  @ 10w3d  wks EGA with EDC Estimated Date of Delivery: May 29, 2020 who presents to the clinic for an new ob visit.        1) Concerns: None today. No Nausea.   2) Routine: desires gs,   3) Risk factors:   A: Hx of CS with : desires    B: BMI 39.44: MFM us   C: AMA:  MFM us   4) Problem list   Patient Active Problem List   Diagnosis     Hyperlipidemia LDL goal <160     Blood type A-     BMI 38.0-38.9,adult     Exercise-induced asthma     5) EDUCATION :    RECOMMENDED WEIGHT GAIN: 25-35 lbs.  Instructed on best evidence for: weight gain for her BMI for pregnancy; healthy diet and foods to avoid; exercise and activity during  pregnancy;avoiding exposure to toxoplasmosis; and maintenance of a generally healthy lifestyle.   Discussed the harms, benefits, side effects and alternative therapies for current prescribed and OTC medications: patient previously given list of recommended medications.  6) Counseled about genetic screening tests (Innatal, preparent with options, discussed standard panel and other options, 1st trimester screen and targeted anatomy scan and AFP and quad screen if first trimester screening not done) and invasive definitive testing (chorionic villus sampling and genetic amniocentesis).  Patient wishes to undergo NIPT. She is considering Preparent as well     7) Return: in four weeks     The information in this document, created by the medical scribe for me, accurately reflects the services I personally performed and the decisions made by me. I have reviewed and approved this document for accuracy prior to leaving the patient care area.  November 4, 2019 3:35 PM    Dr. Tari Ramirez, DO    OB/GYN   [facility]

## 2019-11-07 ENCOUNTER — ALLIED HEALTH/NURSE VISIT (OUTPATIENT)
Dept: NURSING | Facility: CLINIC | Age: 34
End: 2019-11-07
Payer: COMMERCIAL

## 2019-11-07 DIAGNOSIS — O09.521 SUPERVISION OF ELDERLY MULTIGRAVIDA IN FIRST TRIMESTER: Primary | ICD-10-CM

## 2019-11-07 PROCEDURE — 40001065 ZZHCL STATISTIC PREPARENT STANDARD PANEL: Mod: 90 | Performed by: FAMILY MEDICINE

## 2019-11-07 PROCEDURE — 40000791 ZZHCL STATISTIC VERIFI PRENATAL TRISOMY 21,18,13: Mod: 90 | Performed by: FAMILY MEDICINE

## 2019-11-07 PROCEDURE — 99207 ZZC NO CHARGE NURSE ONLY: CPT

## 2019-11-07 PROCEDURE — 99000 SPECIMEN HANDLING OFFICE-LAB: CPT | Performed by: FAMILY MEDICINE

## 2019-11-07 PROCEDURE — 36415 COLL VENOUS BLD VENIPUNCTURE: CPT | Performed by: FAMILY MEDICINE

## 2019-11-08 ENCOUNTER — PRE VISIT (OUTPATIENT)
Dept: MATERNAL FETAL MEDICINE | Facility: CLINIC | Age: 34
End: 2019-11-08

## 2019-11-13 LAB — LAB SCANNED RESULT: NORMAL

## 2019-11-18 LAB — LAB SCANNED RESULT: NORMAL

## 2019-12-05 ENCOUNTER — TRANSCRIBE ORDERS (OUTPATIENT)
Dept: MATERNAL FETAL MEDICINE | Facility: CLINIC | Age: 34
End: 2019-12-05

## 2019-12-05 ENCOUNTER — PRENATAL OFFICE VISIT (OUTPATIENT)
Dept: OBGYN | Facility: CLINIC | Age: 34
End: 2019-12-05
Payer: COMMERCIAL

## 2019-12-05 ENCOUNTER — TELEPHONE (OUTPATIENT)
Dept: MATERNAL FETAL MEDICINE | Facility: CLINIC | Age: 34
End: 2019-12-05

## 2019-12-05 VITALS
BODY MASS INDEX: 39 KG/M2 | HEIGHT: 63 IN | SYSTOLIC BLOOD PRESSURE: 114 MMHG | WEIGHT: 220.1 LBS | DIASTOLIC BLOOD PRESSURE: 60 MMHG

## 2019-12-05 DIAGNOSIS — O09.522 MULTIGRAVIDA OF ADVANCED MATERNAL AGE IN SECOND TRIMESTER: ICD-10-CM

## 2019-12-05 DIAGNOSIS — O26.90 PREGNANCY RELATED CONDITION: Primary | ICD-10-CM

## 2019-12-05 DIAGNOSIS — Z67.11 BLOOD TYPE A-: ICD-10-CM

## 2019-12-05 DIAGNOSIS — Z34.92 PRENATAL CARE IN SECOND TRIMESTER: ICD-10-CM

## 2019-12-05 PROCEDURE — 99207 ZZC PRENATAL VISIT: CPT | Performed by: FAMILY MEDICINE

## 2019-12-05 RX ORDER — ACETAMINOPHEN 325 MG/1
325-650 TABLET ORAL EVERY 6 HOURS PRN
COMMUNITY
End: 2020-08-21

## 2019-12-05 ASSESSMENT — MIFFLIN-ST. JEOR: SCORE: 1649.56

## 2019-12-05 NOTE — PATIENT INSTRUCTIONS
"Return 4 weeks   Return to clinic:  every 4 weeks till 28 weeks, then every 2 weeks till 36 weeks, then weekly till delivery      Phone numbers [place]:  Day/ night [phone] ask for ob triage  Emergency:  Call labor and delivery:  [phone]    What should I call about??    Contraction every 5 minutes for 1 hour 1 minute long (511), bleeding, loss of fluid, headache that doesn't resolve with tylenol, and decreased fetal movement     Start kick counts @ 26-28 weeks   There is an reggie for this!  It is called \"count the kicks\"  Keep track of movement and discover your normal baby movement pattern   guideline is listed below  Please call if you do not feel the baby move!  We will have you come in for fetal heart rate monitoring:   Perception of at least 10 FMs during 12 hours of normal maternal activity   Perception of least 10 FMs over two hours when the mother is at rest and focused on [facility] Address   [address], [place], [place] [address] [phone]    Dr. Tari Ramirez, DO    OB/GYN   [facility]                                                      "

## 2019-12-05 NOTE — PROGRESS NOTES
"CC: Here for routine prenatal visit   34 year old y/o  @ 14w6d with Estimated Date of Delivery: May 29, 2020     /60   Ht 1.588 m (5' 2.5\")   Wt 99.8 kg (220 lb 1.6 oz)   LMP 2019 (Exact Date)   BMI 39.62 kg/m    See OB flowsheet  + fetal movement, no contractions, no bleeding, no loss of fluid   Discussed monitoring fetal movement     This document serves as a record of the services and decisions personally performed and made by Tari Ramirez DO. It was created on her behalf by Jimena Farr, a trained medical scribe. The creation of this document is based on the provider's statements to the medical scribe.  Jimena Farr 1:08 PM 2019    1) concerns: Patient reports low back pain and is taking tylenol once per day. Discussed physical therapy, patient declines at this time.   2) Routine: innatal and preparent normal, boy, A-, RI   3) Risk factors:   A: Hx of CS with : desires    B: BMI 39.44: MFM us   C: AMA:  MFM us   D: Hx of GDM previously test @ 20 weeks   4) Problem list   Patient Active Problem List   Diagnosis     Hyperlipidemia LDL goal <160     Blood type A-     BMI 38.0-38.9,adult     Exercise-induced asthma       5) Return: in four weeks     The information in this document, created by the medical scribe for me, accurately reflects the services I personally performed and the decisions made by me. I have reviewed and approved this document for accuracy prior to leaving the patient care area.  2019 1:13 PM    James    "

## 2019-12-05 NOTE — NURSING NOTE
"14w6d    Chief Complaint   Patient presents with     Prenatal Care     lower back pain--pain in groin--taking tylenol       Initial /60   Ht 1.588 m (5' 2.5\")   Wt 99.8 kg (220 lb 1.6 oz)   LMP 2019 (Exact Date)   BMI 39.62 kg/m   Estimated body mass index is 39.62 kg/m  as calculated from the following:    Height as of this encounter: 1.588 m (5' 2.5\").    Weight as of this encounter: 99.8 kg (220 lb 1.6 oz).  BP completed using cuff size: regular    Questioned patient about current smoking habits.  Pt. quit smoking some time ago.          The following HM Due: NONE      "

## 2019-12-31 ENCOUNTER — HOSPITAL ENCOUNTER (OUTPATIENT)
Dept: ULTRASOUND IMAGING | Facility: CLINIC | Age: 34
Discharge: HOME | End: 2019-12-31
Attending: FAMILY MEDICINE | Admitting: FAMILY MEDICINE
Payer: COMMERCIAL

## 2019-12-31 ENCOUNTER — OFFICE VISIT (OUTPATIENT)
Dept: MATERNAL FETAL MEDICINE | Facility: CLINIC | Age: 34
End: 2019-12-31
Attending: FAMILY MEDICINE
Payer: COMMERCIAL

## 2019-12-31 DIAGNOSIS — O99.212 OBESITY AFFECTING PREGNANCY IN SECOND TRIMESTER: ICD-10-CM

## 2019-12-31 DIAGNOSIS — O09.529 AMA (ADVANCED MATERNAL AGE) MULTIGRAVIDA 35+, UNSPECIFIED TRIMESTER: Primary | ICD-10-CM

## 2019-12-31 DIAGNOSIS — O43.192 MARGINAL INSERTION OF UMBILICAL CORD AFFECTING MANAGEMENT OF MOTHER IN SECOND TRIMESTER: ICD-10-CM

## 2019-12-31 DIAGNOSIS — O26.90 PREGNANCY RELATED CONDITION: ICD-10-CM

## 2019-12-31 PROCEDURE — 76811 OB US DETAILED SNGL FETUS: CPT

## 2019-12-31 NOTE — PROGRESS NOTES
Please see ultrasound report under imaging tab for details on ultrasound performed today.    Lyn Kahn MD  , OB/GYN  Maternal-Fetal Medicine  [email or web address]  [phone] (Academic office)  [phone] (Pager)

## 2020-01-02 ENCOUNTER — PRENATAL OFFICE VISIT (OUTPATIENT)
Dept: OBGYN | Facility: CLINIC | Age: 35
End: 2020-01-02
Payer: COMMERCIAL

## 2020-01-02 VITALS
BODY MASS INDEX: 39.69 KG/M2 | HEIGHT: 63 IN | SYSTOLIC BLOOD PRESSURE: 124 MMHG | WEIGHT: 224 LBS | DIASTOLIC BLOOD PRESSURE: 80 MMHG

## 2020-01-02 DIAGNOSIS — K21.00 GASTROESOPHAGEAL REFLUX DISEASE WITH ESOPHAGITIS: Primary | ICD-10-CM

## 2020-01-02 DIAGNOSIS — Z83.3 FAMILY HISTORY OF GESTATIONAL DIABETES MELLITUS (GDM): ICD-10-CM

## 2020-01-02 DIAGNOSIS — R73.09 ELEVATED GLUCOSE: ICD-10-CM

## 2020-01-02 PROCEDURE — 99207 ZZC PRENATAL VISIT: CPT | Performed by: FAMILY MEDICINE

## 2020-01-02 RX ORDER — OMEPRAZOLE 40 MG/1
40 CAPSULE, DELAYED RELEASE ORAL DAILY
Qty: 90 CAPSULE | Refills: 3 | Status: SHIPPED | OUTPATIENT
Start: 2020-01-02 | End: 2020-08-21

## 2020-01-02 ASSESSMENT — MIFFLIN-ST. JEOR: SCORE: 1667.25

## 2020-01-02 NOTE — PATIENT INSTRUCTIONS
"Return for 1 hour glucose @ 20 weeks,   And in 4 weeks for OB visit   Return to clinic:  every 4 weeks till 28 weeks, then every 2 weeks till 36 weeks, then weekly till delivery      Phone numbers [place]:  Day/ night [phone] ask for ob triage  Emergency:  Call labor and delivery:  [phone]    What should I call about??    Contraction every 5 minutes for 1 hour 1 minute long (511), bleeding, loss of fluid, headache that doesn't resolve with tylenol, and decreased fetal movement     Start kick counts @ 26-28 weeks   There is an reggie for this!  It is called \"count the kicks\"  Keep track of movement and discover your normal baby movement pattern   guideline is listed below  Please call if you do not feel the baby move!  We will have you come in for fetal heart rate monitoring:   Perception of at least 10 FMs during 12 hours of normal maternal activity   Perception of least 10 FMs over two hours when the mother is at rest and focused on [facility] Address   [address], [place] [address]  [phone]    Dr. Tari Ramirez, DO    OB/GYN   [facility]                                                      "

## 2020-01-02 NOTE — NURSING NOTE
"18w6d    Chief Complaint   Patient presents with     Prenatal Care     having heartburn       Initial /80   Ht 1.588 m (5' 2.5\")   Wt 101.6 kg (224 lb)   LMP 2019 (Exact Date)   BMI 40.32 kg/m   Estimated body mass index is 40.32 kg/m  as calculated from the following:    Height as of this encounter: 1.588 m (5' 2.5\").    Weight as of this encounter: 101.6 kg (224 lb).  BP completed using cuff size: regular    Questioned patient about current smoking habits.  Pt. quit smoking some time ago.          The following HM Due: NONE           "

## 2020-01-07 DIAGNOSIS — Z83.3 FAMILY HISTORY OF GESTATIONAL DIABETES MELLITUS (GDM): ICD-10-CM

## 2020-01-07 PROCEDURE — 82950 GLUCOSE TEST: CPT | Performed by: FAMILY MEDICINE

## 2020-01-07 PROCEDURE — 36415 COLL VENOUS BLD VENIPUNCTURE: CPT | Performed by: FAMILY MEDICINE

## 2020-01-08 LAB — GLUCOSE 1H P 50 G GLC PO SERPL-MCNC: 154 MG/DL (ref 60–129)

## 2020-01-14 DIAGNOSIS — R73.09 ELEVATED GLUCOSE: ICD-10-CM

## 2020-01-14 PROCEDURE — 82952 GTT-ADDED SAMPLES: CPT | Performed by: FAMILY MEDICINE

## 2020-01-14 PROCEDURE — 36415 COLL VENOUS BLD VENIPUNCTURE: CPT | Performed by: FAMILY MEDICINE

## 2020-01-14 PROCEDURE — 82951 GLUCOSE TOLERANCE TEST (GTT): CPT | Performed by: FAMILY MEDICINE

## 2020-01-15 LAB
GLUCOSE 1H P 100 G GLC PO SERPL-MCNC: 194 MG/DL (ref 60–179)
GLUCOSE 2H P 100 G GLC PO SERPL-MCNC: 169 MG/DL (ref 60–154)
GLUCOSE 3H P 100 G GLC PO SERPL-MCNC: 110 MG/DL (ref 60–139)
GLUCOSE P FAST SERPL-MCNC: 97 MG/DL (ref 60–94)

## 2020-01-23 ENCOUNTER — ALLIED HEALTH/NURSE VISIT (OUTPATIENT)
Dept: EDUCATION SERVICES | Facility: CLINIC | Age: 35
End: 2020-01-23
Payer: COMMERCIAL

## 2020-01-23 DIAGNOSIS — O24.419 GESTATIONAL DIABETES: Primary | ICD-10-CM

## 2020-01-23 PROCEDURE — G0108 DIAB MANAGE TRN  PER INDIV: HCPCS

## 2020-01-23 NOTE — PROGRESS NOTES
Diabetes Self-Management Education & Support    Gestational Diabetes Self-Management Education & Support    SUBJECTIVE/OBJECTIVE:  Presents for education related to gestational diabetes.    Accompanied by: Self    Cultural Influences/Ethnic Background:  American    Estimated Date of Delivery: May 29, 2020    1 hour OGTT  Lab Results   Component Value Date    GLU1 154 (H) 2020       3 hour OGTT    Fasting  Lab Results   Component Value Date    GLF 97 (H) 2020       1 hour  Lab Results   Component Value Date    GL1 194 (H) 2020       2 hour  Lab Results   Component Value Date    GL2 169 (H) 2020       3 hour  Lab Results   Component Value Date    GL3 110 2020       Lifestyle and Health Behaviors:  Pre-pregnancy weight (lbs): 218  Exercise:: Currently not exercising  Barrier to exercise: Time  Meals include: Breakfast, Lunch, Dinner(Breakfast:egg Or yogurt with granola, Lunch: soup, 1/2 sandwich, Dinner: protein, salad, potato)  Beverages: Water, Coffee  Cultural/Catholic diet restrictions?: No  Biggest challenges to healthy eating: Portion control  Pre- vitamin?: Yes  Supplements?: No  Experiencing nausea?: No    Healthy Coping:  Emotional response to diabetes: Ready to learn  Informal Support system:: Family, Friends, Spouse  Stage of change: PREPARATION (Decided to change - considering how)    Current Management:  Taking medications for gestational diabetes?: No  Difficulty affording diabetes management supplies?: No    ASSESSMENT:  Patient with previous history of GDM x 2 requiring insulin. She remembers a lot of the information and would just like quick review today. She has a once touch verio meter and test strips. She did test her glucose today, fasting at 97mg/dl and 1 hour  post breakfast at 94mg/dl.  Even though fasting glucose above goal she has not started the GDM meal plan. Recommend she monitor, test ketones and start meal plan.       INTERVENTION:  Educational topics  covered today:  Review of - GDM diagnosis, pathophysiology, Risks and Complications of GDM, Means of controlling GDM, Using a Blood Glucose Monitor, Blood Glucose Goals, Logging and Interpreting Glucose Results, Ketone Testing, When to Call a Diabetes Educator or OB Provider, Healthy Eating During Pregnancy, Counting Carbohydrates, Meal Planning for GDM, and Physical Activity    Educational materials provided today:   Rex Delaney Gestational Diabetes  GDM Log Book  Sharps Disposal  Care After Delivery    Pt verbalized understanding of concepts discussed and recommendations provided today.     PLAN:  Check glucose 4 times daily, before breakfast and 1 hour after each meal.     Check Ketones daily for one week, if negative, reduce testing to once a week.     Physical activity recommended: recommend 30 minutes daily .    Meal plan: 2-3 carbs at breakfast, 3-4 carbs at lunch, 3-4 carbs at supper, 1-2 carbs at 3 snacks a day.  Follow consistent CHO meal plan, eat CHO and protein/fat at all meals/snacks.    Call/e-mail/MyChart message diabetes educator if 3 or more blood sugars are above the goal in 1 week, if ketones are positive, or with questions/concerns.    Sabrina Cho RN,CDE   Time Spent: 45 minutes  Encounter Type: Individual    Any diabetes medication dose changes were made via the CDE Protocol and Collaborative Practice Agreement with the patient's referring provider. A copy of this encounter was shared with the provider.

## 2020-01-23 NOTE — PATIENT INSTRUCTIONS
1. Check glucose 4 times daily, before breakfast daily and 1 hour after each meal, as recommended.    2. Check ketones daily or once a week after they have been negative for 7 days in a row. If ketones are positive, let your diabetes educator know and continue to check daily until they are negative for 7 days in a row.    3. Continue with recommended physical activity.    4. Continue to follow recommended meal plan: 2-3 carbs at breakfast, 3-4 carbs at lunch, 3-4 carbs at supper, 1-2 carbs at snacks.  Follow consistent CHO meal plan, eat CHO and protein/fat at all meals/snacks.    5. Follow-up with OB doctor as recommended.    6. Call/e-mail diabetes educator if 3 or more blood sugars are above the goal in 1 week or if ketones are positive.

## 2020-01-23 NOTE — LETTER
2020         RE: Tosin Pfeiffer  [id number]  [facility] [id number]        Dear Colleague,    Thank you for referring your patient, Tosin Pfeiffer, to the [facility]. Please see a copy of my visit note below.    Diabetes Self-Management Education & Support    Gestational Diabetes Self-Management Education & Support    SUBJECTIVE/OBJECTIVE:  Presents for education related to gestational diabetes.    Accompanied by: Self    Cultural Influences/Ethnic Background:  American    Estimated Date of Delivery: May 29, 2020    1 hour OGTT  Lab Results   Component Value Date    GLU1 154 (H) 2020       3 hour OGTT    Fasting  Lab Results   Component Value Date    GLF 97 (H) 2020       1 hour  Lab Results   Component Value Date    GL1 194 (H) 2020       2 hour  Lab Results   Component Value Date    GL2 169 (H) 2020       3 hour  Lab Results   Component Value Date    GL3 110 2020       Lifestyle and Health Behaviors:  Pre-pregnancy weight (lbs): 218  Exercise:: Currently not exercising  Barrier to exercise: Time  Meals include: Breakfast, Lunch, Dinner(Breakfast:egg Or yogurt with granola, Lunch: soup, 1/2 sandwich, Dinner: protein, salad, potato)  Beverages: Water, Coffee  Cultural/Episcopal diet restrictions?: No  Biggest challenges to healthy eating: Portion control  Pre-pranav vitamin?: Yes  Supplements?: No  Experiencing nausea?: No    Healthy Coping:  Emotional response to diabetes: Ready to learn  Informal Support system:: Family, Friends, Spouse  Stage of change: PREPARATION (Decided to change - considering how)    Current Management:  Taking medications for gestational diabetes?: No  Difficulty affording diabetes management supplies?: No    ASSESSMENT:  Patient with previous history of GDM x 2 requiring insulin. She remembers a lot of the information and would just like quick review today. She has a once touch verio meter and test strips. She did  test her glucose today, fasting at 97mg/dl and 1 hour  post breakfast at 94mg/dl.  Even though fasting glucose above goal she has not started the GDM meal plan. Recommend she monitor, test ketones and start meal plan.       INTERVENTION:  Educational topics covered today:  Review of - GDM diagnosis, pathophysiology, Risks and Complications of GDM, Means of controlling GDM, Using a Blood Glucose Monitor, Blood Glucose Goals, Logging and Interpreting Glucose Results, Ketone Testing, When to Call a Diabetes Educator or OB Provider, Healthy Eating During Pregnancy, Counting Carbohydrates, Meal Planning for GDM, and Physical Activity    Educational materials provided today:   Rex Understanding Gestational Diabetes  GDM Log Book  Sharps Disposal  Care After Delivery    Pt verbalized understanding of concepts discussed and recommendations provided today.     PLAN:  Check glucose 4 times daily, before breakfast and 1 hour after each meal.     Check Ketones daily for one week, if negative, reduce testing to once a week.     Physical activity recommended: recommend 30 minutes daily .    Meal plan: 2-3 carbs at breakfast, 3-4 carbs at lunch, 3-4 carbs at supper, 1-2 carbs at 3 snacks a day.  Follow consistent CHO meal plan, eat CHO and protein/fat at all meals/snacks.    Call/e-mail/MyChart message diabetes educator if 3 or more blood sugars are above the goal in 1 week, if ketones are positive, or with questions/concerns.    Sabrina Cho RN,CDE   Time Spent: 45 minutes  Encounter Type: Individual    Any diabetes medication dose changes were made via the CDE Protocol and Collaborative Practice Agreement with the patient's referring provider. A copy of this encounter was shared with the provider.

## 2020-01-30 ENCOUNTER — ALLIED HEALTH/NURSE VISIT (OUTPATIENT)
Dept: EDUCATION SERVICES | Facility: CLINIC | Age: 35
End: 2020-01-30
Payer: COMMERCIAL

## 2020-01-30 ENCOUNTER — PRENATAL OFFICE VISIT (OUTPATIENT)
Dept: OBGYN | Facility: CLINIC | Age: 35
End: 2020-01-30
Payer: COMMERCIAL

## 2020-01-30 ENCOUNTER — TRANSCRIBE ORDERS (OUTPATIENT)
Dept: MATERNAL FETAL MEDICINE | Facility: CLINIC | Age: 35
End: 2020-01-30

## 2020-01-30 VITALS — BODY MASS INDEX: 41.22 KG/M2 | WEIGHT: 229 LBS | SYSTOLIC BLOOD PRESSURE: 126 MMHG | DIASTOLIC BLOOD PRESSURE: 70 MMHG

## 2020-01-30 DIAGNOSIS — O24.419 GDM, CLASS A2: Primary | ICD-10-CM

## 2020-01-30 DIAGNOSIS — O24.419 GESTATIONAL DIABETES: Primary | ICD-10-CM

## 2020-01-30 DIAGNOSIS — O26.90 PREGNANCY RELATED CONDITION, ANTEPARTUM: Primary | ICD-10-CM

## 2020-01-30 DIAGNOSIS — R10.31 RIGHT GROIN PAIN: ICD-10-CM

## 2020-01-30 DIAGNOSIS — M54.50 LOW BACK PAIN, UNSPECIFIED BACK PAIN LATERALITY, UNSPECIFIED CHRONICITY, UNSPECIFIED WHETHER SCIATICA PRESENT: ICD-10-CM

## 2020-01-30 PROCEDURE — G0108 DIAB MANAGE TRN  PER INDIV: HCPCS | Performed by: DIETITIAN, REGISTERED

## 2020-01-30 PROCEDURE — 99207 ZZC PRENATAL VISIT: CPT | Performed by: FAMILY MEDICINE

## 2020-01-30 NOTE — PROGRESS NOTES
"Gestational Diabetes Follow-up Visit    SUBJECTIVE/OBJECTIVE:  Tosin Pfeiffer presents today for education and evaluation of glucose control related to gestational diabetes    She is accompanied by self    Patient's gestational diabetes management related comments/concerns:  had questions about ketone prevention, feels like \"she is eating a lot\"    LMP 08/23/2019 (Exact Date)       Weight trend- currently 22 weeks gestation.  Wt Readings from Last 3 Encounters:   01/02/20 101.6 kg (224 lb)   12/05/19 99.8 kg (220 lb 1.6 oz)   11/04/19 99.4 kg (219 lb 1.6 oz)     Blood Glucose/Ketone Log:            Current gestational diabetes management:    Taking medications for gestational diabetes? No    Physical Activity: no regular exercise program    Nutrition:  Patient eats 3 meals and 3 snacks per day and is following recommended meal plan.          ASSESSMENT:  Ketones: Neg- Small.   Fasting blood glucoses: 12% in target.  After breakfast: 88% in target.  After lunch: 100% in target.  After dinner: 86% in target.    Due to elevated fasting BG's, recommend NPH insulin start 0.1 unit/kg/d = 10 units at bedtime.     Health Beliefs and Attitudes:   Stage of Change: ACTION (Actively working towards change)    INTERVENTION:  Educational topics covered today:  Action of medication and when to take NPH injection- pt used NPH vial and syringe with previous pregnancy, would like to see if insulin pens are covered. Pt is comfortable with insulin technique.     Educational Materials provided today:  No new materials provided at today's visit    PLAN:  Check glucose 4 times daily.  Check ketones once a week when readings are consistently negative.  Continue with recommended physical activity.  Continue to follow recommended meal plan: 2 carbs at breakfast, 3 carbs at lunch, 3 carbs at supper, 1-2 carbs at snacks. Pt prefers to aim for lower end of carb recommendations per meal.   Follow consistent CHO meal plan, eat CHO and protein/fat " at all meals/snacks.    Call/e-mail/MyChart message diabetes educator if 3 or more blood sugars are above the goal in 1 week or if ketones are positive.     FOLLOW-UP:  Call or e-mail educator if 3 or more blood sugars are above goal in 1 week. Send BG update in  3-4 days.     Routing a separate phone encounter with pended rx for NPH insulin  (10 units at bedtime) to Dr. Ramirez for signature, if agreeable with plan.     Call/e-mail/MyChart message diabetes educator if 3 or more blood sugars are above the goal in 1 week or if ketones are positive.     Radha Ramirez RD, CDE  Diabetes     Time spent was 30 minutes  Encounter type: Individual    Any diabetes medication dose changes were made via the CDE Protocol and Collaborative Practice Agreement with the patient's OB/GYN provider. A copy of this encounter was shared with the provider.

## 2020-01-30 NOTE — LETTER
"    1/30/2020         RE: Tosin Pfeiffer  [id number]  [facility] [id number]        Dear Colleague,    Thank you for referring your patient, Tosin Pfeiffer, to the [facility]. Please see a copy of my visit note below.    Gestational Diabetes Follow-up Visit    SUBJECTIVE/OBJECTIVE:  Tosin Pfeiffer presents today for education and evaluation of glucose control related to gestational diabetes    She is accompanied by self    Patient's gestational diabetes management related comments/concerns:  had questions about ketone prevention, feels like \"she is eating a lot\"    LMP 08/23/2019 (Exact Date)       Weight trend- currently 22 weeks gestation.  Wt Readings from Last 3 Encounters:   01/02/20 101.6 kg (224 lb)   12/05/19 99.8 kg (220 lb 1.6 oz)   11/04/19 99.4 kg (219 lb 1.6 oz)     Blood Glucose/Ketone Log:            Current gestational diabetes management:    Taking medications for gestational diabetes? No    Physical Activity: no regular exercise program    Nutrition:  Patient eats 3 meals and 3 snacks per day and is following recommended meal plan.          ASSESSMENT:  Ketones: Neg- Small.   Fasting blood glucoses: 12% in target.  After breakfast: 88% in target.  After lunch: 100% in target.  After dinner: 86% in target.    Due to elevated fasting BG's, recommend NPH insulin start 0.1 unit/kg/d = 10 units at bedtime.     Health Beliefs and Attitudes:   Stage of Change: ACTION (Actively working towards change)    INTERVENTION:  Educational topics covered today:  Action of medication and when to take NPH injection- pt used NPH vial and syringe with previous pregnancy, would like to see if insulin pens are covered. Pt is comfortable with insulin technique.     Educational Materials provided today:  No new materials provided at today's visit    PLAN:  Check glucose 4 times daily.  Check ketones once a week when readings are consistently negative.  Continue with recommended physical " activity.  Continue to follow recommended meal plan: 2 carbs at breakfast, 3 carbs at lunch, 3 carbs at supper, 1-2 carbs at snacks. Pt prefers to aim for lower end of carb recommendations per meal.   Follow consistent CHO meal plan, eat CHO and protein/fat at all meals/snacks.    Call/e-mail/MyChart message diabetes educator if 3 or more blood sugars are above the goal in 1 week or if ketones are positive.     FOLLOW-UP:  Call or e-mail educator if 3 or more blood sugars are above goal in 1 week. Send BG update in  3-4 days.     Routing a separate phone encounter with pended rx for NPH insulin  (10 units at bedtime) to Dr. Ramirez for signature, if agreeable with plan.     Call/e-mail/MyChart message diabetes educator if 3 or more blood sugars are above the goal in 1 week or if ketones are positive.     Radha Ramirez RD, CDE  Diabetes     Time spent was 30 minutes  Encounter type: Individual    Any diabetes medication dose changes were made via the CDE Protocol and Collaborative Practice Agreement with the patient's OB/GYN provider. A copy of this encounter was shared with the provider.

## 2020-01-30 NOTE — PROGRESS NOTES
CC: Here for routine prenatal visit   34 year old y/o  @ 22w6d with Estimated Date of Delivery: May 29, 2020     /70 (BP Location: Right arm, Cuff Size: Adult Regular)   Wt 103.9 kg (229 lb)   LMP 2019 (Exact Date)   BMI 41.22 kg/m    See OB flowsheet  + fetal movement, no contractions, no bleeding, no loss of fluid   Discussed monitoring fetal movement     This document serves as a record of the services and decisions personally performed and made by Tari Ramirez DO. It was created on her behalf by Jimena Farr, a trained medical scribe. The creation of this document is based on the provider's statements to the medical scribe.  Jimena Farr 1:27 PM 2020    1) concerns: Right groin & back pain - referred to chiropractic   2) Routine: innatal and preparent normal, boy, A-, RI   3) Risk factors:   A: GDM: insulin, will do growth and bpp testing   B: BMI 39.44: MFM us   C: AMA:  MFM us               D: marginal cord insertion: repeat us @ 28 weeks    E: Hx of CS with : desires               F: GERD: rx prilosec  4) Return: in four weeks     The information in this document, created by the medical scribe for me, accurately reflects the services I personally performed and the decisions made by me. I have reviewed and approved this document for accuracy prior to leaving the patient care area.  2020 1:35 PM    James

## 2020-01-30 NOTE — Clinical Note
Hi Dr. Ramirez,Fasting blood sugars ~90% above target. Recommend NPH insulin start 10 unit(s) at hs. Will route a separate phone encounter with pended rx for signature, if agreeable with plan.Thanks!Radha Ramirez RD, CDEDiabetes

## 2020-01-30 NOTE — PATIENT INSTRUCTIONS
"Return 4 weeks   Return to clinic:  every 4 weeks till 28 weeks, then every 2 weeks till 36 weeks, then weekly till delivery      Phone numbers [place]:  Day/ night [phone] ask for ob triage  Emergency:  Call labor and delivery:  [phone]    What should I call about??    Contraction every 5 minutes for 1 hour 1 minute long (511), bleeding, loss of fluid, headache that doesn't resolve with tylenol, and decreased fetal movement     Start kick counts @ 26-28 weeks   There is an reggie for this!  It is called \"count the kicks\"  Keep track of movement and discover your normal baby movement pattern   guideline is listed below  Please call if you do not feel the baby move!  We will have you come in for fetal heart rate monitoring:   Perception of at least 10 FMs during 12 hours of normal maternal activity   Perception of least 10 FMs over two hours when the mother is at rest and focused on [facility] Address   [address], [place], [place] [address] [phone]    Dr. Tari Ramirez, DO    OB/GYN   [facility]                                                      "

## 2020-01-30 NOTE — NURSING NOTE
"Chief Complaint   Patient presents with     Prenatal Care       Initial /70 (BP Location: Right arm, Cuff Size: Adult Regular)   Wt 103.9 kg (229 lb)   LMP 2019 (Exact Date)   BMI 41.22 kg/m   Estimated body mass index is 41.22 kg/m  as calculated from the following:    Height as of 20: 1.588 m (5' 2.5\").    Weight as of this encounter: 103.9 kg (229 lb).  BP completed using cuff size: regular    Questioned patient about current smoking habits.  Pt. has never smoked.          Right groin/thigh pain    Discuss starting insulin.    Jayden Linn, CMA               "

## 2020-01-30 NOTE — PATIENT INSTRUCTIONS
"Taking Insulin:    1. Take NPH (Humulin-N or Novolin-N) - 10 units at bedtime.     - Do a 2 unit \"prime\" before each injection, be sure a stream of insulin comes out of the needle before you give your injection.    - After you inject, hold the needle under the skin to the count of 10 to be sure all of the insulin goes in.     - Rotate injection sites, keeping at least 1 inch apart from last injection site and 2 inches away from belly button or surgical scars.    2. Vial and Syringe - use a new syringe for each injection. Do not reuse syringes.     3. Store insulin you are not using in the refrigerator (do not freeze). Take new insulin out of the refrigerator a few hours prior to use to bring to room temperature.     4. Once opened NPH Pens (Humulin N or Novolin N) can be kept at room temperature for 14 days after opened.. Do not use the opened insulin after this time has passed, even if there is still medicine inside.     5. Always carry your blood sugar meter and a sugar source like glucose tablets with you in case of a low blood sugar. Treat a low blood sugar (less than 70) with 15 grams of carbohydrate (1 carb choice). Wait 15 minutes, recheck blood sugar. If blood sugar is still below 70, repeat the treatment.    6. Wear Medical ID or carry a wallet card stating you have Diabetes.    7. Call your doctor or diabetes educator if you begin having low blood sugars or if you have questions or concerns.     8. Complete and mail in the form to inform the [place] [employer] that you have started taking insulin.    9. Follow-up: Call ([phone]), e-mail ([email or web address]), or send [email or web address] message to educator with blood sugar readings in 3-4 days.    [facility] for the [facility]:  For Your Diabetes Education or Nutrition Appointments Call:  [phone]   For Nutrition Related Questions Call:   Phone: [phone]  E-mail: " [email or web address]  Fax: [phone]   If you need a medication refill please contact your pharmacy. Please allow 3 business days for your refills to be completed.       1. Check glucose 4 times daily, before breakfast daily and 1 hour after each meal, as recommended.    2. Check ketones daily or once a week after they have been negative for 7 days in a row. If ketones are positive, let your diabetes educator know and continue to check daily until they are negative for 7 days in a row.    3. Continue with recommended physical activity.    4. Continue to follow recommended meal plan: 2 carbs at breakfast, 3 carbs at lunch, 3 carbs at supper, 1-2 carbs at snacks.  Follow consistent CHO meal plan, eat CHO and protein/fat at all meals/snacks.    5. Follow-up with OB doctor as recommended.    6. Call/e-mail diabetes educator if 3 or more blood sugars are above the goal in 1 week or if ketones are positive.       AFTER YOU DELIVER:  - Continue with healthy eating and physical activity to get back to your pre-pregnancy weight.   - Have a follow-up 2-hour Glucose Tolerance Test at your 6-week post-partum check-up.   - Have your fasting blood sugar checked once a year.  - Plan ahead for future pregnancies - eat healthy, keep active, work with your doctor to check for gestational diabetes early on in the pregnancy and check blood sugars as recommended by your doctor.     [facility] for the [facility]:  For Your Diabetes Education and Nutrition Appointments Call:  [phone]   For Diabetes Education or Nutrition Related Questions:   Phone: [phone]  E-mail: [email or web address]  Fax: [phone]   If you need a medication refill please contact your pharmacy. Please allow 3 business days for your refills to be completed.    Instructions for emailing the Diabetes Educators    If you need to communicate a non-urgent message to a Diabetes Educator via email, please  send to [email or web address].    Please follow the following email guidelines:    Subject line: Secure: your clinic name (example: Secure: [facility])  In the email please include: First name, middle initial, last name and date of birth.    We will be in touch with you within one (1) business day.

## 2020-01-30 NOTE — TELEPHONE ENCOUNTER
Please see DM Ed encounter from today for additional details. Fasting BG's only 12%  in target. Pt with previous GDM on insulin x2.     Recommend start NPH insulin 10 units at bedtime (0.1 unit(s)/kg/d).    Routing pended rx for NPH insulin pens and needles to Dr. Ramirez for signature, if agreeable to plan. Note to pharmacy: if insurance does not cover insulin pens and needles, dispense vials and syringes.     Thank you,  Radha Ramirez RD, CDE  Diabetes

## 2020-01-31 ENCOUNTER — TELEPHONE (OUTPATIENT)
Dept: EDUCATION SERVICES | Facility: CLINIC | Age: 35
End: 2020-01-31

## 2020-01-31 DIAGNOSIS — O24.419 GDM, CLASS A2: Primary | ICD-10-CM

## 2020-01-31 NOTE — TELEPHONE ENCOUNTER
Fax from [facility] that Humulin needs PA.  Humulin preferred.  Patient is Dr. Ramirez.  Please advise.  Shreya Cordova RN

## 2020-01-31 NOTE — TELEPHONE ENCOUNTER
Call out to patient. Informed that a prior authorization is required for the Humulin N pen. Discussed potential length of Prior Auth process, and no guarantee of approval. Pt with GDM, prefer not to delay insulin start.     Re-sending rx for NPH (Novolin/Humulin) vial and syringes. Patient is agreeable to plan.     Thank you,  Radha Ramirez RD, CDE  Diabetes

## 2020-02-04 ENCOUNTER — PATIENT OUTREACH (OUTPATIENT)
Dept: EDUCATION SERVICES | Facility: CLINIC | Age: 35
End: 2020-02-04
Payer: COMMERCIAL

## 2020-02-04 DIAGNOSIS — O24.419 GDM, CLASS A2: ICD-10-CM

## 2020-02-04 DIAGNOSIS — O24.419 GESTATIONAL DIABETES: Primary | ICD-10-CM

## 2020-02-04 NOTE — PROGRESS NOTES
Gestational Diabetes Follow-up    Subjective/Objective:    Tosin Pfeiffer sent in blood glucose log for review. Last date of communication was: 1/31/20.    Gestational diabetes is being managed with diet, activity and medications    Taking diabetes medications:   yes:     Diabetes Medication(s)     Insulin       insulin NPH (HUMULIN N/NOVOLIN N VIAL) 100 UNIT/ML vial    Take 10 units at bedtime, titrate as directed. Up to 50 units daily.          Estimated Date of Delivery: May 29, 2020    BG/Food Log:   Update on glucose levels - 10 Units of insulin at night - started Friday night    date     before breakfast     after breakfast     after lunch     after supper  1/31        108                            127                     123                 133  2/1          102                            133                     95                   114- first dose of insulin  2/2           99                              134                    115                 108  2/3           108                            165                    165                  116  2/4            93                              150    Thank you    Tosin Hernandez       Assessment:  Fasting blood sugar improved today, but post breakfast now elevated. Will recommend insulin increase to keep fasting in the 80-95 range and hopefully provide enough to cover post breakfast.  If meals continue to give high readings would recommend starting mealtime insulin.      Ketones: none reported.   Fasting blood glucoses: 6% in target.  After breakfast: 40% in target.  After lunch: 75% in target.  After dinner: 100% in target.    Plan/Response:  Recommend increase to insulin - NPH 0-0-0-10 --> 0-0-0-12.  Follow up 2/7/20 to re-assess post meal readings.    Mercedes Morillo MS, RD, LD, CDE      Any diabetes medication dose changes were made via the CDE Protocol and Collaborative Practice Agreement with the patient's OB/GYN provider. A copy of this encounter was shared  with the provider.    E-mail reply to patient:  Te Leahy,    Thanks for the update.  It looks like today the insulin finally got you to target, but now after breakfast is coming up high.  We should keep an eye on it unless you know you definitely ate differently that day.    Let s have you do an increase to 12 units and see if that will keep you more between 80-95, then help lower the after meal blood sugar.  With the long duration of action sometimes Novolin is enough to cover after breakfast.    Can you send in an update again on Friday, definitely in the morning if you re seeing high after meal readings.    Have a great day      Mercedes Morillo, MS, RD, LD, CDE  Diabetes

## 2020-02-07 ENCOUNTER — PATIENT OUTREACH (OUTPATIENT)
Dept: EDUCATION SERVICES | Facility: CLINIC | Age: 35
End: 2020-02-07

## 2020-02-07 ENCOUNTER — TELEPHONE (OUTPATIENT)
Dept: EDUCATION SERVICES | Facility: CLINIC | Age: 35
End: 2020-02-07

## 2020-02-07 DIAGNOSIS — O24.419 GDM, CLASS A2: ICD-10-CM

## 2020-02-07 NOTE — PROGRESS NOTES
Gestational Diabetes Follow-up    Subjective/Objective:    Tosin Pfeiffer sent in blood glucose log for review. Last date of communication was: 2/4/2020.    Gestational diabetes is being managed with diet, activity and medications    Taking diabetes medications:   yes:     Diabetes Medication(s)     Insulin       insulin NPH (HUMULIN N/NOVOLIN N VIAL) 100 UNIT/ML vial    Take 12 units at bedtime, titrate as directed. Up to 50 units daily.          Estimated Date of Delivery: May 29, 2020    BG/Food Log:   Update on glucose levels - 12 Units of insulin at night - started Tuesday night    date   before breakfast   after breakfast   after lunch   after supper  2/5     98               107            143        170  2/6     101              145            165          122  2/7     104               103                  Thank you,    Tosin Hernandez   1985    Assessment:    Ketones: no data.   Fasting blood glucoses: 0% in target.  After breakfast: 66% in target.  After lunch: 0% in target.  After dinner: 50% in target.    Considering after meal readings were high majority of the time, would recommend starting a small dose of mealtime insulin. Recommend starting with 2 units before each meal (0.05 units/kg) considering she did have some readings in the low 100's after meals.     Plan/Response:  Recommend starting mealtime insulin: Novolog 2-2-2-0  Increase NPH: 0-0-0-12 --> 0-0-0-14    Email response to patient:     Vandana Leahy!    Thanks so much for the update.     Since we are still seeing higher numbers after your meals, I have recommended to start mealtime insulin.  I am just waiting to hear back from your doctor for approval.     This is a different type (rapid acting) insulin that you will take 5-15 minutes before each of your meals. We are going to start with a really small dose, just 2 units before your meals.     A couple things that will be different with this insulin:  1) You don t need to mix this  insulin  2) You will start testing your blood sugar TWO hours after you start eating, instead of 1 hour.     Please let me know if you have any questions.  I will let you know when we hear back from your doctor!    Thanks,       Malgorzata Donaldson, ROBB, LD, CDE    Any diabetes medication dose changes were made via the CDE Protocol and Collaborative Practice Agreement with the patient's referring provider. A copy of this encounter was shared with the provider.

## 2020-02-07 NOTE — TELEPHONE ENCOUNTER
Routed to md on-call today since Dr. Ramirez is out of the office until Monday.     There are pended orders that need to be approved by an md.     Dinora SMALL RN

## 2020-02-07 NOTE — TELEPHONE ENCOUNTER
Dr. Ramirez,     Please let me know if you agree with plan and sign pended orders, or indicate alternate plan.     date   before breakfast   after breakfast   after lunch   after supper       98               107            143        170       101              145            165          122       104               103                    Considering after meal readings were high majority of the time, would recommend starting a small dose of mealtime insulin. Recommend starting with 2 units before each meal (0.05 units/kg) considering she did have some readings in the low 100's after meals.     Recommend starting Novolo units before breakfast, 2 units before lunch and 2 units before dinner.   Recommend increase in NPH: 0-0-0-12 --> 0-0-0-14    Malgorzata Donaldson, ROBB, LD, CDE

## 2020-02-10 ENCOUNTER — PATIENT OUTREACH (OUTPATIENT)
Dept: EDUCATION SERVICES | Facility: CLINIC | Age: 35
End: 2020-02-10

## 2020-02-10 DIAGNOSIS — O24.419 GDM, CLASS A2: ICD-10-CM

## 2020-02-10 NOTE — PROGRESS NOTES
E-mail to patient:  Te Leahy,    Sorry about the delay! I think the approval came in after Malgorzata was gone for the day. How did the weekend go? Sometimes when mealtime insulin is added it also helps the fasting readings come down. If your fasting readings are still high today with the novolog added in, then you can go up to 14 units of the long acting insulin at bedtime. Send us an update tomorrow so we can see how the weekend went.      Mercedes Morillo, MS, RD, LD, CDE  Diabetes     E-mail reply from patient:  Update on glucose levels - 12 Units of insulin at night and the 2 units at mealtime - started Saturday     date   before breakfast   after breakfast   after lunch   after supper  2/8     94               76            111        140  2/9     101              95            104         134  2/10     108              118                  I will start taking 14 units at bedtime tonight.     Thank you,     Tosin Hernandez     Gestational Diabetes Follow-up    Subjective/Objective:    Tosin Pfeiffer sent in blood glucose log for review. Last date of communication was: 2/7/20.    Gestational diabetes is being managed with diet, activity and medications    Taking diabetes medications:   yes:     Diabetes Medication(s)     Insulin       insulin aspart (NOVOLOG PEN) 100 UNIT/ML pen    Inject 2 Units Subcutaneous 3 times daily (with meals) Plus 6 units/day for priming. TDD 12 units     insulin NPH (HUMULIN N/NOVOLIN N VIAL) 100 UNIT/ML vial    Take 14 units at bedtime, titrate as directed. Up to 50 units daily.          Estimated Date of Delivery: May 29, 2020      Assessment:  Fasting blood sugars elevated beyond what a 2 unit Humulin increase will touch.  Recommend indrease to Humulin and Novolog at bedtime.    Ketones: none reported.   Fasting blood glucoses: 67% in target.  After breakfast: 100% in target.  After lunch: 100% in target.  After dinner: 0% in target.    Plan/Response:  Recommend  increase to insulin - Novolog 2-2-2-0 --> 2-2-3-0, Humulin 0-0-0-12 --> 0-0-0-16.  Follow up 2/13/20.    Mercedes Morillo MS, RD, LD, CDE      Any diabetes medication dose changes were made via the CDE Protocol and Collaborative Practice Agreement with the patient's OB/GYN provider. A copy of this encounter was shared with the provider.    E-mail reply to patient:  Hi Tosin,    Thanks for the update.  Ok assuming all your after meal checks are now 2 hours, your dinner readings are still a bit high.  Please stay with 2 units at breakfast and lunch, but go up to 3 units at dinner.     I don t think the 14 units will fully get your fasting readings down, so please to up to 16 units at bedtime.  Then follow up on Thursday instead of tomorrow.  That way we can see how you are doing after 3 full days on the higher doses.    Let me know if you have questions!      Mercedes Morillo, MS, RD, LD, CDE  Diabetes

## 2020-02-13 ENCOUNTER — PATIENT OUTREACH (OUTPATIENT)
Dept: EDUCATION SERVICES | Facility: CLINIC | Age: 35
End: 2020-02-13
Payer: COMMERCIAL

## 2020-02-13 DIAGNOSIS — O24.419 GDM, CLASS A2: ICD-10-CM

## 2020-02-13 NOTE — PROGRESS NOTES
Gestational Diabetes Follow-up    Subjective/Objective:    Tosin Pfeiffer sent in blood glucose log for review. Last date of communication was: 2/10/2020    Gestational diabetes is being managed with medications    Taking diabetes medications:   yes:     Diabetes Medication(s)     Insulin       insulin aspart (NOVOLOG PEN) 100 UNIT/ML pen    Take 2 units with breakfast, 2 units with lunch, and 3 units at dinner. Plus 6 units/day for priming. TDD 13 units     insulin NPH (HUMULIN N/NOVOLIN N VIAL) 100 UNIT/ML vial    Take 16 units at bedtime, titrate as directed. Up to 50 units daily.          Estimated Date of Delivery: May 29, 2020    BG/Food Log:   Update on glucose levels - 16 Units of insulin at night and the 2 units at breakfast and lunch mealtime and 3 units at dinner- started 16 units Monday night     date   before breakfast   after breakfast   after lunch   after supper  2/11     105               94           131        128  2/12     108               92            145         117  2/13     115               108              Assessment:    Ketones: na.   Fasting blood glucoses: 0% in target.  After breakfast: 100% in target.  After lunch: 0% in target.  After dinner: 50%% in target.    Plan/Response:  Recommend increase to insulin - NPH 0-0-0-16 -->0-0-0-20 AND Novolog 2-2-3-0 --> 2-4-3-0  Follow up MOnday    Te Leahy,    Thanks for the updates! Let s make a few changes here:  1. Increase NPH to 20 units to help tackle the morning numbers  2. Increase lunch insulin to 4 units (all other meal time insulin doses stay the same)    Can you email us again Monday morning, and we ll see how things are looking?  Thanks!    ROBB Toribio CDE    Any diabetes medication dose changes were made via the CDE Protocol and Collaborative Practice Agreement with the patient's OB/GYN provider. A copy of this encounter was shared with the provider.

## 2020-02-14 ENCOUNTER — HOSPITAL ENCOUNTER (OUTPATIENT)
Dept: ULTRASOUND IMAGING | Facility: CLINIC | Age: 35
Discharge: HOME | End: 2020-02-14
Attending: FAMILY MEDICINE | Admitting: FAMILY MEDICINE
Payer: COMMERCIAL

## 2020-02-14 ENCOUNTER — OFFICE VISIT (OUTPATIENT)
Dept: MATERNAL FETAL MEDICINE | Facility: CLINIC | Age: 35
End: 2020-02-14
Attending: FAMILY MEDICINE
Payer: COMMERCIAL

## 2020-02-14 DIAGNOSIS — O26.90 PREGNANCY RELATED CONDITION, ANTEPARTUM: ICD-10-CM

## 2020-02-14 DIAGNOSIS — O43.192 MARGINAL INSERTION OF UMBILICAL CORD AFFECTING MANAGEMENT OF MOTHER IN SECOND TRIMESTER: ICD-10-CM

## 2020-02-14 DIAGNOSIS — O24.419 GESTATIONAL DIABETES MELLITUS (GDM) IN THIRD TRIMESTER, GESTATIONAL DIABETES METHOD OF CONTROL UNSPECIFIED: Primary | ICD-10-CM

## 2020-02-14 PROCEDURE — 76816 OB US FOLLOW-UP PER FETUS: CPT

## 2020-02-14 NOTE — PROGRESS NOTES
"Please see \"Imaging\" tab under \"Chart Review\" for details of today's US at the [facility].    Ricardo Zaldivar MD  Maternal-Fetal Medicine    "

## 2020-02-17 ENCOUNTER — TELEPHONE (OUTPATIENT)
Dept: OBGYN | Facility: CLINIC | Age: 35
End: 2020-02-17

## 2020-02-17 DIAGNOSIS — O24.419 GESTATIONAL DIABETES: Primary | ICD-10-CM

## 2020-02-17 NOTE — TELEPHONE ENCOUNTER
Pharmacy notified us that pts insurance only covers accuchek products.     Please send a new rx for meter, lancets and strips.    Thanks.  Dinora SMALL RN

## 2020-02-17 NOTE — TELEPHONE ENCOUNTER
Great can you pend them and I will sign?    Dr. Tari Ramirez, DO    Obstetrics and Gynecology  [facility] [place] [facility] [place]

## 2020-02-18 ENCOUNTER — PATIENT OUTREACH (OUTPATIENT)
Dept: EDUCATION SERVICES | Facility: CLINIC | Age: 35
End: 2020-02-18
Payer: COMMERCIAL

## 2020-02-18 DIAGNOSIS — O24.419 GDM, CLASS A2: Primary | ICD-10-CM

## 2020-02-18 NOTE — PROGRESS NOTES
Gestational Diabetes Follow-up    Subjective/Objective:    Tosin Pfeiffer sent in blood glucose log for review. Last date of communication was: 2/13/20.    Gestational diabetes is being managed with diet, activity and medications    Taking diabetes medications:   yes:     Diabetes Medication(s)     Insulin       insulin aspart (NOVOLOG PEN) 100 UNIT/ML pen    Take 2 units with breakfast, 4 units with lunch, and 3 units at dinner. Plus 6 units/day for priming. TDD 15 units     insulin NPH (HUMULIN N/NOVOLIN N VIAL) 100 UNIT/ML vial    Take 20 units at bedtime, titrate as directed. Up to 50 units daily.          Estimated Date of Delivery: May 29, 2020    BG/Food Log:   Update on glucose levels - 20 Units of insulin at night and the 2 units at breakfast, 4 units at lunch, and 3 units at dinner- started 20 units Thursday night      Date Fasting Post Breakfast Post Lunch Post Supper   2/14 98 122 103 135   2/15 100 100 130 123   2/16 102 110 96 110   2/17 93 100 109 122   2/18 102        I have been sick since Friday night, so not sure if that would skew my numbers at all.     Thank you,      Assessment:  Fasting blood sugars remain elevated, post dinner is also inconsistently elevated.  Recommend increase to bedtime and dinner insulin doses.    Ketones: none reported.   Fasting blood glucoses: 20% in target.  After breakfast: 75% in target.  After lunch: 75% in target.  After dinner: 25% in target.    Plan/Response:  Recommend increase to insulin - NPH 0-0-0-20 --> 0-0-0-24, Novolog 2-4-3-0 --> 2-4-4-0.  Follow up 2/21/20.    Mercedes Morillo MS, RD, LD, CDE      Any diabetes medication dose changes were made via the CDE Protocol and Collaborative Practice Agreement with the patient's OB/GYN provider. A copy of this encounter was shared with the provider.    E-mail reply to patient:  Te Leahy,    Thanks for the update. Please go up to 24 units at bedtime with the Humulin/Novolin.  For your meals with Novolog: stay at 2  units with breakfast, 4 units at lunch, but go up to 4 units at dinner.    Then I just wanted to confirm you are checking 2 hours after eating right?  I ll make a permanent note in your chart so we don t keep asking!    Please send in again on Friday.    Have a great day,      Mercedes Morillo, MS, RD, LD, CDE  Diabetes

## 2020-02-20 ENCOUNTER — THERAPY VISIT (OUTPATIENT)
Dept: CHIROPRACTIC MEDICINE | Facility: CLINIC | Age: 35
End: 2020-02-20
Attending: FAMILY MEDICINE
Payer: COMMERCIAL

## 2020-02-20 DIAGNOSIS — M99.03 SOMATIC DYSFUNCTION OF LUMBAR REGION: Primary | ICD-10-CM

## 2020-02-20 DIAGNOSIS — M25.551 HIP PAIN, RIGHT: ICD-10-CM

## 2020-02-20 DIAGNOSIS — M99.04 SOMATIC DYSFUNCTION OF SACRAL REGION: ICD-10-CM

## 2020-02-20 DIAGNOSIS — M53.3 SACRAL PAIN: ICD-10-CM

## 2020-02-20 DIAGNOSIS — M54.50 ACUTE RIGHT-SIDED LOW BACK PAIN WITHOUT SCIATICA: ICD-10-CM

## 2020-02-20 PROCEDURE — 97112 NEUROMUSCULAR REEDUCATION: CPT | Mod: 59 | Performed by: CHIROPRACTOR

## 2020-02-20 PROCEDURE — 98941 CHIROPRACT MANJ 3-4 REGIONS: CPT | Mod: AT | Performed by: CHIROPRACTOR

## 2020-02-20 PROCEDURE — 99202 OFFICE O/P NEW SF 15 MIN: CPT | Mod: 25 | Performed by: CHIROPRACTOR

## 2020-02-21 ENCOUNTER — PATIENT OUTREACH (OUTPATIENT)
Dept: EDUCATION SERVICES | Facility: CLINIC | Age: 35
End: 2020-02-21

## 2020-02-21 NOTE — PROGRESS NOTES
Gestational Diabetes Follow-up    Subjective/Objective:    Tosin Pfeiffer sent in blood glucose log for review. Last date of communication was: 2/18.    Gestational diabetes is being managed with diet, activity and medications    Taking diabetes medications:   yes:     Diabetes Medication(s)     Insulin       insulin aspart (NOVOLOG PEN) 100 UNIT/ML pen    Take 2 units with breakfast, 4 units with lunch, and 4 units at dinner. Plus 6 units/day for priming. TDD 16 units     insulin NPH (HUMULIN N/NOVOLIN N VIAL) 100 UNIT/ML vial    Take 24 units at bedtime, titrate as directed. Up to 50 units daily.          Estimated Date of Delivery: May 29, 2020    BG/Food Log:   Blood Glucose/Ketone Log:    Date Ketones Fasting Post Breakfast Post Lunch Post Supper   2/19  97 110 111 101   2/20  102 91 95 117   2/21  107 89 114          Assessment:    Ketones: no data.   Fasting blood glucoses: 0% in target.  After breakfast: 100% in target.  After lunch: 100% in target.  After dinner: 100% in target.    Plan/Response:  Recommend increase to insulin - NPH: 0-0-0-24 --> 0-0-0-28.  Follow-up 2/24    Email response to patient:  Vandana Leahy!    Thanks for the update, your after meal readings are looking great! It looks like we are still seeing some higher before breakfast readings.  Let s increase your NPH (bedtime) insulin to 28 units.     Send us another update on Monday.     Thanks!      Malgorzata Donaldson, RD, LD, CDE    Any diabetes medication dose changes were made via the CDE Protocol and Collaborative Practice Agreement with the patient's referring provider. A copy of this encounter was shared with the provider.

## 2020-02-24 ENCOUNTER — PATIENT OUTREACH (OUTPATIENT)
Dept: EDUCATION SERVICES | Facility: CLINIC | Age: 35
End: 2020-02-24
Payer: COMMERCIAL

## 2020-02-24 DIAGNOSIS — O24.419 GDM, CLASS A2: Primary | ICD-10-CM

## 2020-02-24 NOTE — PROGRESS NOTES
Gestational Diabetes Follow-up    Subjective/Objective:    Tosin Pfeiffer sent in blood glucose log for review. Last date of communication was: 2/21/20.    Gestational diabetes is being managed with diet, activity and medications    Taking diabetes medications:   yes:     Diabetes Medication(s)     Insulin       insulin aspart (NOVOLOG PEN) 100 UNIT/ML pen    Take 2 units with breakfast, 4 units with lunch, and 4 units at dinner. Plus 6 units/day for priming. TDD 16 units     insulin NPH (HUMULIN N/NOVOLIN N VIAL) 100 UNIT/ML vial    Take 24 units at bedtime, titrate as directed. Up to 50 units daily.          Estimated Date of Delivery: May 29, 2020    BG/Food Log:   Update on glucose levels - 28 Units of insulin at night and the 2 units at breakfast, 4 units at lunch, and 4 units at dinner- started 28 units Friday night     date   before breakfast   after breakfast   after lunch   after supper  2/22     88               94           119        122  2/23     98           116   110         129  2/24      104    110       Thank you,    Tosin Hernandez      Assessment:  Fasting blood sugars remain elevated, could be related to elevated post dinner in addition to hormones.     Ketones: none reported.   Fasting blood glucoses: 33% in target.  After breakfast: 100% in target.  After lunch: 100% in target.  After dinner: 0% in target.    Plan/Response:  Recommend increase to insulin - NPH 0-0-0-28 --> 0-0-0-32, Novolog 2-4-5-0.  Follow up Thursday 2/27.    Mercedes Morillo MS, RD, LD, CDE      Any diabetes medication dose changes were made via the CDE Protocol and Collaborative Practice Agreement with the patient's OB/GYN provider. A copy of this encounter was shared with the provider.    E-mail reply to patient:  Te Leahy,    Thanks for the update.  Please to up to 32 units of Novolin at bedtime, and 5 units at dinner.  No change to the Novolog with breakfast or lunch.    Please send in again on Thursday.    Have a great  nura,      Mercedes Morillo, MS, RD, LD, CDE  Diabetes

## 2020-02-25 PROBLEM — M99.03 SOMATIC DYSFUNCTION OF LUMBAR REGION: Status: ACTIVE | Noted: 2020-02-25

## 2020-02-25 PROBLEM — M53.3 SACRAL PAIN: Status: ACTIVE | Noted: 2020-02-25

## 2020-02-25 PROBLEM — M25.551 HIP PAIN, RIGHT: Status: ACTIVE | Noted: 2020-02-25

## 2020-02-25 PROBLEM — M54.50 ACUTE RIGHT-SIDED LOW BACK PAIN WITHOUT SCIATICA: Status: ACTIVE | Noted: 2020-02-25

## 2020-02-25 PROBLEM — M99.04 SOMATIC DYSFUNCTION OF SACRAL REGION: Status: ACTIVE | Noted: 2020-02-25

## 2020-02-25 NOTE — PROGRESS NOTES
Initial Chiropractic Clinic Visit    PCP: Aaseby-Aguilera, Ramona Ann    Tosin Pfeiffer is a 34 year old female who is seen  in consultation at the request of  Tari Ramirez M.D. presenting with Acute low back pain and R hip/groin pain . Patient reports that the onset was 2019/2020.  When asked, patient denies:, falling, slipping, bending and reaching or sleeping awkwardly. The pt reports an acute onset of low back pain and pelvic pain that she relates to pregnancy. The pain is across the low back and in the R hip. She has also been lifting her 30 lb son and she feels it is also contributing. She has been experiencing R hip flexor pain when lifting her leg. Sitting will increase the px. The pt denies weakness in the extremities or other unusual sx. She grades the px a 1-8/10 on VAS. The pt denies weakness in the extremities or other unusual sx.  Prior to onset, the patient was able to sit for 8 hours. Patient notes that due to symptoms, they can only sit for 2 hours due to pain. Tosin Pfeiffer notes   sitting rated at a 4/10 painful, difficult and prior to this incident it was 0/10.        Injury: There is no injury related to this episode     Location of Pain: bilateral low back and R hip at the following level(s) T6 , T10, L5 , Sacrum  and PSIS Right   Duration of Pain: 2 months    Rating of Pain at worst: 8/10  Rating of Pain Currently: 1/10  Symptoms are better with: Nothing  Symptoms are worse with: sitting  Additional Features: none      Health History  as reported by the patient:    How does the patient rate their own health:   Good    Current or past medical history:   Diabetes and Overweight    Medical allergies  Other: sulfa     Past Traumas/Surgeries  Other:      Family History  Family History   Problem Relation Age of Onset     Psychotic Disorder Mother         bipolar     Mental Illness Mother         Bipolar     Hypertension Father      Myocardial Infarction Paternal  "Grandfather 50     Family History Negative Brother         1     Family History Negative Sister         2     Breast Cancer Other         paternal aunt ,dxed at age of early to mid 40's     Breast Cancer Other      Mental Illness Other         schizophrenia     Cancer - colorectal No family hx of        Medications:  other:  Insulin, Tamaflu     Occupation:       Primary job tasks:   Computer work and Prolonged sitting    Barriers as home/work:   Other:      Additional health Issues:     None             Tosin was asked to complete the Oswestry Low Back Disability Index and Kirsten Start Back screening tool, today in the office.  Disability score: 2%. Keel Start Total Score:0 Sub Score: 0     Review of Systems  Musculoskeletal: as above  Remainder of review of systems is negative including constitutional, CV, pulmonary, GI, Skin and Neurologic except as noted in HPI or medical history.    Past Medical History:   Diagnosis Date     Anemia     on Iron     Asthma     exercise induced     Diabetes (H)     Gestational diabetes     Rh incompatibility     Received Rhogam at 28weeks     Past Surgical History:   Procedure Laterality Date      SECTION  2014    Procedure:  SECTION;  Surgeon: Cam Armendariz MD;  Location: [facility] OR      SECTION N/A 2018    did not have this c-sec.      wisdom teeth removed  age 15     Objective  LMP 2019 (Exact Date)       GENERAL APPEARANCE: healthy, alert and no distress   GAIT: NORMAL  SKIN: no suspicious lesions or rashes  NEURO: Normal strength and tone, mentation intact and speech normal  PSYCH:  mentation appears normal and affect normal/bright    Low back exam:    Inspection:  \"     no visible deformity in the low back       normal skin\",    ROM:       full flexion       limited extension due to pain    Tender:       paraspinal muscles       B QL     Non Tender:       remainder of lumbar " spine    Strength:       hip flexion 5/5 Normal       knee extension 5/5 Normal       ankle dorsiflexion 5/5 Normal       ankle plantarflexion 5/5 Normal       dorsiflexion of the great toe 5/5 Normal    Reflexes:       patellar (L3, L4) 2 bilaterally       achilles tendons (S1) 2 bilaterally    Sensation:      grossly intact throughout lower extremities    Special tests:  Kemps - Right positive, low back pain and Left negative, SLR - Right negative and Left negative, Gaenslen's - Right negative and Left negative and Fabere - Right positive, hip pain and Left negative    Segmental spinal dysfunction/restrictions found at:  T6 , T10, L5 , Sacrum  and PSIS Right     The following soft tissue hypotonicities were observed:Gluteal: right, referred pain: no  Quad lumb: bilateral, referred pain: no    Trigger points were found in: none     Muscle spasm found in:Gluteal and Quad lumb      Radiology:  None     Assessment:    1. Somatic dysfunction of lumbar region    2. Acute right-sided low back pain without sciatica    3. Somatic dysfunction of sacral region    4. Sacral pain    5. Hip pain, right        RX ordered/plan of care  Anticipated outcomes  Possible risks and side effects    After discussing the risk and benefits of care, patient consented to treatment    Prognosis: Excellent      Patient's condition:  Patient had restrictions pre-manipulation    Treatment effectiveness:  Post manipulation there is better intersegmental movement and Patient claims to feel looser post manipulation      Plan:    Procedures:  Evaluation and Management:  54113 Low to moderate level exam 20 min    CMT:  26874 Chiropractic manipulative treatment 3-4 regions performed   Thoracic: Diversified, T6, T10, Prone  Lumbar: Diversified, L5, Side posture  Pelvis: Drop Table, Sacrum , PSIS Right , Prone     Modalities:  None     Therapeutic procedures:  81238: Neurological re-education/proprioception training and proper long term sitting posture:  Corrected patient's seated posture when sitting for longer than 20 minutes or seated at the computer related to work duties for over 8 hours per day. Fit patient with Jeanette lumbar support for postural re-training with demonstration. Showed patient how to place the support correctly when seated and to increase usage by 2-3 hour increments per day until they are able to sit full time without spinal irritation. Related improper vs. proper sitting to optimal spinal biomechanics using the spine model with demonstration with the purpose of PREVENTING premature spinal degeneration from cumulative static motion. Demonstrated the increase in load and shearing forces on the spine in addition to the cumulative degenerative effects of axial compression on a spine that is chronically slumped and in an 'unlocked' position vs a 'locked' position. Demonstrated the use of a lap top table for lap top computers to prevent excessive cervical and thoracic flexion when seated.  Demonstrated 'hip hinging' to access the computer when seated rather than thoracic flexion. Gave cervical retraction for proper cervical alignment, anterior deep cervical flexor strengthening and cervical proprioception training with demonstration. Per 10-12 minutes total        Response to Treatment  No increase in sx, pt stable       Treatment plan and goals:  Goals:  SITTING: the patient specific goal is to attain pre-injury status of  8 hours comfortably    Frequency of care  Duration of care is estimated to be 2-4 weeks, from the initial treatment.  It is estimated that the patient will need a total of 2-4 visits to resolve this episode.  For the initial therapeutic trial of care, the frequency is recommended at 1 X week, once daily.  A reevaluation would be clinically appropriate in 2-4  visits, to determine progress and further course of care.    In-Office Treatment  Evaluation  Spinal Chiropractic Manipulative Therapy  Postural  correction        Recommendations:    Instructions:  use lumbar support when seated for extended periods     Follow-up:    Return to care if sx persist in 2 weeks .       Discussed the assessment with the patient.      Disclaimer: This note consists of symbols derived from keyboarding, dictation and/or voice recognition software. As a result, there may be errors in the script that have gone undetected. Please consider this when interpreting information found in this chart.

## 2020-02-27 ENCOUNTER — PRENATAL OFFICE VISIT (OUTPATIENT)
Dept: OBGYN | Facility: CLINIC | Age: 35
End: 2020-02-27
Payer: COMMERCIAL

## 2020-02-27 ENCOUNTER — PATIENT OUTREACH (OUTPATIENT)
Dept: EDUCATION SERVICES | Facility: CLINIC | Age: 35
End: 2020-02-27
Payer: COMMERCIAL

## 2020-02-27 VITALS
DIASTOLIC BLOOD PRESSURE: 64 MMHG | BODY MASS INDEX: 41.39 KG/M2 | WEIGHT: 233.6 LBS | SYSTOLIC BLOOD PRESSURE: 124 MMHG | HEIGHT: 63 IN

## 2020-02-27 DIAGNOSIS — Z34.92 PRENATAL CARE IN SECOND TRIMESTER: ICD-10-CM

## 2020-02-27 DIAGNOSIS — O24.419 GDM, CLASS A2: ICD-10-CM

## 2020-02-27 DIAGNOSIS — O24.419 GDM, CLASS A2: Primary | ICD-10-CM

## 2020-02-27 PROCEDURE — 99207 ZZC PRENATAL VISIT: CPT | Performed by: FAMILY MEDICINE

## 2020-02-27 ASSESSMENT — MIFFLIN-ST. JEOR: SCORE: 1710.79

## 2020-02-27 NOTE — PROGRESS NOTES
Gestational Diabetes Follow-up    Subjective/Objective:  Tosin Pfeiffer sent in blood glucose log for review. Last date of communication was: 2/24.    Gestational diabetes is being managed with medications    Taking diabetes medications:   yes:     Diabetes Medication(s)     Insulin       insulin aspart (NOVOLOG PEN) 100 UNIT/ML pen    Take 2 units with breakfast, 4 units with lunch, and 5 units at dinner. Plus 6 units/day for priming. TDD 17 units     insulin  UNIT/ML vial    Take 32 units at bedtime, titrate as directed. Up to 50 units daily.          Estimated Date of Delivery: May 29, 2020    BG/Food Log:   Update on glucose levels - 32 Units of insulin at night and the 2 units at breakfast, 4 units at lunch, and 5 units at dinner- started 32 units Monday night.     date   before breakfast   after breakfast   after lunch   after supper  2/25     89            95          119       120  2/26    101            95          102       121  2/27     97            115            Assessment:    Ketones: not noted.   Fasting blood glucoses: 33% in target.  After breakfast: 100% in target.  After lunch: 100% in target.  After dinner: 50% in target with one at 120    Plan/Response:  Recommend increase to NPH insulin from 32 --> 37 units (15%) and Novolog from 2-4-5-0 --> 2-4-6-0    CDE reply:  Te Leahy,   Thank you for the update!  You're doing great ;)    Looks like we can give your body a little more help overnight to keep those fasting numbers down.   Please go ahead and increase the NPH to 37 units at bedtime.     Readings look healthy after meals! Dinner is being a little stubborn, so please increase to 6 units Novolog before dinner, keep other doses the same.     Can you please send in again Monday and we'll see how that works?   Have a great weekend!     Ana Olivo RD, LD, CDE    Any diabetes medication dose changes were made via the CDE Protocol and Collaborative Practice Agreement with the patient's  OB/GYN provider

## 2020-02-27 NOTE — NURSING NOTE
"26w6d    Chief Complaint   Patient presents with     Prenatal Care       Initial /64   Ht 1.588 m (5' 2.5\")   Wt 106 kg (233 lb 9.6 oz)   LMP 2019 (Exact Date)   BMI 42.05 kg/m   Estimated body mass index is 42.05 kg/m  as calculated from the following:    Height as of this encounter: 1.588 m (5' 2.5\").    Weight as of this encounter: 106 kg (233 lb 9.6 oz).  BP completed using cuff size: regular    Questioned patient about current smoking habits.  Pt. quit smoking some time ago.          The following HM Due: NONE           "

## 2020-02-27 NOTE — PATIENT INSTRUCTIONS
"Return 4 weeks   Return to clinic:  every 4 weeks till 28 weeks, then every 2 weeks till 36 weeks, then weekly till delivery      Phone numbers [place]:  Day/ night [phone] ask for ob triage  Emergency:  Call labor and delivery:  [phone]    What should I call about??    Contraction every 5 minutes for 1 hour 1 minute long (511), bleeding, loss of fluid, headache that doesn't resolve with tylenol, and decreased fetal movement     Start kick counts @ 26-28 weeks   There is an reggie for this!  It is called \"count the kicks\"  Keep track of movement and discover your normal baby movement pattern   guideline is listed below  Please call if you do not feel the baby move!  We will have you come in for fetal heart rate monitoring:   Perception of at least 10 FMs during 12 hours of normal maternal activity   Perception of least 10 FMs over two hours when the mother is at rest and focused on [facility] Address   [address], [place], [place] [address] [phone]    Dr. Tari Ramirez, DO    OB/GYN   [facility]                                                      "

## 2020-02-27 NOTE — PROGRESS NOTES
"CC: Here for routine prenatal visit   34 year old y/o  @ 26w6d with Estimated Date of Delivery: May 29, 2020     /64   Ht 1.588 m (5' 2.5\")   Wt 106 kg (233 lb 9.6 oz)   LMP 2019 (Exact Date)   BMI 42.05 kg/m    See OB flowsheet  + fetal movement, no contractions, no bleeding, no loss of fluid   Discussed monitoring fetal movement     This document serves as a record of the services and decisions personally performed and made by Tari Ramirez DO. It was created on her behalf by Jimena Farr, a trained medical scribe. The creation of this document is based on the provider's statements to the medical scribe.  Jimena Farr 10:56 AM 2020    1) concerns: None Today   2) Routine: innatal and preparent normal, boy, A-, RI   3) Risk factors:   A: GDM: insulin, will do growth and bpp testing, NPH 34, Asparte 2, 3, 5, IOL  @ 39 weeks   B: BMI 39.44: MFM us   C: AMA:  MFM us               D: marginal cord insertion: repeat us @ 28 weeks               E: Hx of CS with : desires               F: GERD: rx prilosec   G: PPBC: vas Mirena IUD   4) Return: in four weeks     The information in this document, created by the medical scribe for me, accurately reflects the services I personally performed and the decisions made by me. I have reviewed and approved this document for accuracy prior to leaving the patient care area.  2020 11:00 AM    James    "

## 2020-03-02 ENCOUNTER — HEALTH MAINTENANCE LETTER (OUTPATIENT)
Age: 35
End: 2020-03-02

## 2020-03-02 ENCOUNTER — PATIENT OUTREACH (OUTPATIENT)
Dept: EDUCATION SERVICES | Facility: CLINIC | Age: 35
End: 2020-03-02
Payer: COMMERCIAL

## 2020-03-02 ENCOUNTER — PATIENT OUTREACH (OUTPATIENT)
Dept: EDUCATION SERVICES | Facility: CLINIC | Age: 35
End: 2020-03-02

## 2020-03-02 DIAGNOSIS — O24.419 GDM, CLASS A2: ICD-10-CM

## 2020-03-02 NOTE — PROGRESS NOTES
"Gestational Diabetes Follow-up    Subjective/Objective:    Tosin Pfeiffer sent in blood glucose log for review. Last date of communication was: 2020.    Gestational diabetes is being managed with diet, activity and medications    Taking diabetes medications:   yes:     Diabetes Medication(s)     Insulin       insulin aspart (NOVOLOG PEN) 100 UNIT/ML pen    Take 2 units with breakfast, 4 units with lunch, and 6 units at dinner. Plus 6 units/day for priming. TDD 18 units     insulin  UNIT/ML vial    Take 37 units at bedtime, titrate as directed. Up to 50 units daily.          Estimated Date of Delivery: May 29, 2020    BG/Food Log:   \"Update on glucose levels - 37 Units of insulin at night and the 2 units at breakfast, 4 units at lunch, and 6 units at dinner- started 37 units Thursday night.\"     date   before breakfast   after breakfast   after lunch   after supper   103 103 108 128     99            89          120       111  3/1    98           117         119      125  3/2     107          85           Thank you,    Tosin Hernandez  1985      Assessment:    Ketones: no data.   Fasting blood glucoses: 0% in target.  After breakfast: 100% in target.  After lunch: 100% in target.  After dinner: 33% in target.    Plan/Response:  Recommend increase to insulin - NPH: 0-0-0-37 --> 0-0-0-42 (14%)  Novolo-4-6-0 --> 2-4-7-0    Email response to patient:    Vandana Leahy!    Thanks so much for the update. It looks like those before breakfast readings are still being quite stubborn.  Let s increase your NPH (bedtime) insulin dose to 42 units.      Let s also increase your dinner dose of Novolog to 7 units, hopefully that will get us to goal after dinner, we are so close!    Send us another update on Thursday. Let me know if you have any questions!    Thanks,      Malgorzata Donaldson, RD, LD, CDE      Any diabetes medication dose changes were made via the CDE Protocol and Collaborative Practice Agreement " with the patient's referring provider. A copy of this encounter was shared with the provider.

## 2020-03-05 ENCOUNTER — PATIENT OUTREACH (OUTPATIENT)
Dept: EDUCATION SERVICES | Facility: CLINIC | Age: 35
End: 2020-03-05
Payer: COMMERCIAL

## 2020-03-05 DIAGNOSIS — O24.419 GDM, CLASS A2: ICD-10-CM

## 2020-03-05 NOTE — PROGRESS NOTES
Gestational Diabetes Follow-up    Subjective/Objective:    Tosin Pfeiffer sent in blood glucose log for review. Last date of communication was: 3/2/2020.    Gestational diabetes is being managed with medications    Taking diabetes medications:   yes:     Diabetes Medication(s)     Insulin       insulin aspart (NOVOLOG PEN) 100 UNIT/ML pen    Take 2 units with breakfast, 4 units with lunch, and 7 units at dinner. Plus 6 units/day for priming. TDD 19 units     insulin  UNIT/ML vial    Take 42 units at bedtime, titrate as directed. Up to 50 units daily.          Estimated Date of Delivery: May 29, 2020    BG/Food Log:   Update on glucose levels - 42 Units of insulin at night and the 2 units at breakfast, 6 units at lunch, and 7 units at dinner- started 42 units Monday night.     3/3 - Morning 93; Breakfast 108; Lunch 125; Dinner 108  3/4 - Morning 117; Breakfast 112; Lunch 87; Dinner 102  3/5 - Morning 104; Breakfast 118    Thank you,    Assessment:    Ketones: na.   Fasting blood glucoses: 33% in target.  After breakfast: 100% in target.  After lunch: 50% in target.  After dinner: 100% in target.    Plan/Response:  Recommend increase to insulin - NPH 0-0-0-42 --> 0-0-0-50 (20% increase). No Novolog changes  Follow-up on Monday.    Te Leahy,    Thanks for the updates! Let s focus on that morning number for now, and increase your bedtime NPH dose to 50 units. We ll continue the same Novolog doses for now. Can you send us another update on Monday morning, and we ll see how things are looking at that point?    Thank you!    ROBB Toribio CDE    Any diabetes medication dose changes were made via the CDE Protocol and Collaborative Practice Agreement with the patient's OB/GYN provider. A copy of this encounter was shared with the provider.

## 2020-03-09 ENCOUNTER — PATIENT OUTREACH (OUTPATIENT)
Dept: EDUCATION SERVICES | Facility: CLINIC | Age: 35
End: 2020-03-09
Payer: COMMERCIAL

## 2020-03-09 NOTE — PROGRESS NOTES
Gestational Diabetes Follow-up    Subjective/Objective:    Tosin Pfeiffer sent in blood glucose log for review. Last date of communication was: 3/5/20.    Gestational diabetes is being managed with diet, activity and medications    Taking diabetes medications:   yes:     Diabetes Medication(s)     Insulin       insulin aspart (NOVOLOG PEN) 100 UNIT/ML pen    Take 2 units with breakfast, 4 units with lunch, and 7 units at dinner. Plus 6 units/day for priming. TDD 19 units     insulin  UNIT/ML vial    Take 50 units at bedtime, titrate as directed. Up to 50 units daily.          Estimated Date of Delivery: May 29, 2020       BG/Food Log:   Update on glucose levels - 50 Units of insulin at night and the 2 units at breakfast, 6 units at lunch, and 7 units at dinner- started 50 units Thursday night.     3/6 - Morning 92; Breakfast 111; Lunch 119; Dinner 120  3/7 - Morning 91; Breakfast 86; Lunch 89; Dinner 129  3/8 - Morning 82; Breakfast 87; Lunch 123; Dinner 117  3/9 - Morning 103; Breakfast 131      Assessment:  Blood sugar in target with inconsistent elevations.  Overall much improved from last week.  No change recommended today as increase could cause lows given some days blood sugars are well within target.    Ketones: negative.   Fasting blood glucoses: 75% in target.  After breakfast: 75% in target.  After lunch: 67% in target.  After dinner: 67% in target.    Plan/Response:  Follow-up in 3-4 days OR Wednesday If consistently elevated.  OK for patient to increase mealtime at lunch and/or dinner if post meals are elevated today.    Mercedes Morillo MS, RD, LD, CDE      Any diabetes medication dose changes were made via the CDE Protocol and Collaborative Practice Agreement with the patient's OB/GYN provider. A copy of this encounter was shared with the provider.    E-mail reply to patient:  Te Leahy,    Thanks for the update.  Overall the increase worked well.  No changes today because I don t think things are  consistently elevated enough.  If your after lunch or after dinner is elevated today, then go up 1 unit at lunch and/or dinner.  Send us an update by Wednesday if you re still having highs OR again on Thursday if blood sugars are in range.    Have a great day!      Mercedes Morillo, MS, RD, LD, CDE  Diabetes

## 2020-03-10 ENCOUNTER — OFFICE VISIT (OUTPATIENT)
Dept: MATERNAL FETAL MEDICINE | Facility: CLINIC | Age: 35
End: 2020-03-10
Attending: OBSTETRICS & GYNECOLOGY
Payer: COMMERCIAL

## 2020-03-10 ENCOUNTER — HOSPITAL ENCOUNTER (OUTPATIENT)
Dept: ULTRASOUND IMAGING | Facility: CLINIC | Age: 35
End: 2020-03-10
Attending: OBSTETRICS & GYNECOLOGY
Payer: COMMERCIAL

## 2020-03-10 DIAGNOSIS — O43.192 MARGINAL INSERTION OF UMBILICAL CORD AFFECTING MANAGEMENT OF MOTHER IN SECOND TRIMESTER: ICD-10-CM

## 2020-03-10 DIAGNOSIS — O99.212 OBESITY AFFECTING PREGNANCY IN SECOND TRIMESTER: ICD-10-CM

## 2020-03-10 DIAGNOSIS — O24.414 INSULIN CONTROLLED GESTATIONAL DIABETES MELLITUS (GDM) IN THIRD TRIMESTER: Primary | ICD-10-CM

## 2020-03-10 PROCEDURE — 76816 OB US FOLLOW-UP PER FETUS: CPT

## 2020-03-10 NOTE — PROGRESS NOTES
"Please see \"Imaging\" tab under \"Chart Review\" for details of today's US at the [facility].    Ricardo Zaldivar MD  Maternal-Fetal Medicine    "

## 2020-03-12 ENCOUNTER — PATIENT OUTREACH (OUTPATIENT)
Dept: EDUCATION SERVICES | Facility: CLINIC | Age: 35
End: 2020-03-12
Payer: COMMERCIAL

## 2020-03-12 DIAGNOSIS — O24.419 GDM, CLASS A2: ICD-10-CM

## 2020-03-12 NOTE — PROGRESS NOTES
Gestational Diabetes Follow-up    Subjective/Objective:    Tosin Pfeiffer sent in blood glucose log for review. Last date of communication was: 3/09.    Gestational diabetes is being managed with medications    Taking diabetes medications:   yes:     Diabetes Medication(s)     Insulin       insulin aspart (NOVOLOG PEN) 100 UNIT/ML pen    Take 2 units with breakfast, 4 units with lunch, and 7 units at dinner. Plus 6 units/day for priming. TDD 19 units     insulin  UNIT/ML vial    Take 50 units at bedtime, titrate as directed. Up to 50 units daily.          Estimated Date of Delivery: May 29, 2020    BG/Food Log:   Update on glucose levels - 50 Units of insulin at night and the 2 units at breakfast, 6 units at lunch, and 7 units at dinner     3/10 - Morning 101; Breakfast 113; Lunch 108; Dinner 118  3/11 - Morning 98; Breakfast 97; Lunch 111; Dinner 106  3/12 - Morning 96; Breakfast 95    Assessment:  Ketones: not noted  Fasting blood glucoses: 0% in target.  After breakfast: 100% in target.  After lunch: 100% in target.  After dinner: 100% in target.    Plan/Response:  Recommend increase to NPH insulin - from 50 --> 58 (<20%)    CDE reply:  Thanks Tosin,   Post-meal numbers are right on track so no need to change the  mealtime doses  of 2, 6, 7 units!  Keep that just as it is.     Still need to help overnight with those powerful mom hormones making those morning readings stubborn!  Please go up to 58 NPH before bed and we ll see if that can scoot morning numbers under 95.     If you can send in again Monday we ll see how that works 12  Best, Jyotsna Olivo RD, LD, CDE    Any diabetes medication dose changes were made via the CDE Protocol and Collaborative Practice Agreement with the patient's OB/GYN provider.

## 2020-03-24 ENCOUNTER — PRENATAL OFFICE VISIT (OUTPATIENT)
Dept: OBGYN | Facility: CLINIC | Age: 35
End: 2020-03-24
Payer: COMMERCIAL

## 2020-03-24 VITALS
HEIGHT: 63 IN | BODY MASS INDEX: 42.1 KG/M2 | WEIGHT: 237.6 LBS | DIASTOLIC BLOOD PRESSURE: 70 MMHG | SYSTOLIC BLOOD PRESSURE: 120 MMHG

## 2020-03-24 DIAGNOSIS — O24.419 GDM, CLASS A2: ICD-10-CM

## 2020-03-24 DIAGNOSIS — Z34.93 PRENATAL CARE IN THIRD TRIMESTER: ICD-10-CM

## 2020-03-24 PROCEDURE — 90471 IMMUNIZATION ADMIN: CPT | Performed by: FAMILY MEDICINE

## 2020-03-24 PROCEDURE — 90715 TDAP VACCINE 7 YRS/> IM: CPT | Performed by: FAMILY MEDICINE

## 2020-03-24 PROCEDURE — 99207 ZZC PRENATAL VISIT: CPT | Performed by: FAMILY MEDICINE

## 2020-03-24 PROCEDURE — 96372 THER/PROPH/DIAG INJ SC/IM: CPT | Performed by: FAMILY MEDICINE

## 2020-03-24 ASSESSMENT — MIFFLIN-ST. JEOR: SCORE: 1728.94

## 2020-03-24 NOTE — PROGRESS NOTES
"CC: Here for routine prenatal visit   34 year old y/o  @ 30w4d with Estimated Date of Delivery: May 29, 2020     /70   Ht 1.588 m (5' 2.5\")   Wt 107.8 kg (237 lb 9.6 oz)   LMP 2019 (Exact Date)   BMI 42.77 kg/m    See OB flowsheet  + fetal movement, no contractions, no bleeding, no loss of fluid   Discussed monitoring fetal movement     1) concerns: covid discussed, is able to stay at home   2) Routine: innatal and preparent normal, boy, A-, RI   3) Risk factors:   A: GDM: insulin, will do growth and bpp testing, NPH 58, Asparte 2, 7, 8, IOL  @ 39 weeks   B: BMI 39.44: MFM us   C: AMA:  MFM us               D: marginal cord insertion: repeat us @ 28 weeks               E: Hx of CS with : desires               F: GERD: rx prilosec              G: PPBC: vas Mirena IUD   4) Return: Telephone visit in 2 weeks, then office visit, every other visit unless problems will be telephone vs office,   Then weekly at 36 weeks, in office visits.       James    "

## 2020-03-24 NOTE — LETTER
Tosin Kentrell  [address] [address]      2020    To whom it may concern,     The above patient is pregnant with Estimated Date of Delivery: May 29, 2020.   She will be off 6-8 weeks for maternity leave. 6 weeks for vaginal delivery, 8 weeks for  section.         Sincerely,          Dr. Tari Ramirez, DO    Obstetrics and Gynecology  [facility] [place] [facility] [place]

## 2020-03-24 NOTE — NURSING NOTE
"30w4d    Chief Complaint   Patient presents with     Prenatal Care     needs letter for work     Imm/Inj     tdap and rhogam       Initial /70   Ht 1.588 m (5' 2.5\")   Wt 107.8 kg (237 lb 9.6 oz)   LMP 2019 (Exact Date)   BMI 42.77 kg/m   Estimated body mass index is 42.77 kg/m  as calculated from the following:    Height as of this encounter: 1.588 m (5' 2.5\").    Weight as of this encounter: 107.8 kg (237 lb 9.6 oz).  BP completed using cuff size: regular    Questioned patient about current smoking habits.  Pt. quit smoking some time ago.          The following HM Due: NONE             "

## 2020-03-24 NOTE — PROGRESS NOTES
The following medication was given:     MEDICATION: RhoGam 300 ug  ROUTE: IM  SITE: [facility]  DOSE: 1 dose  LOT #: [id number]  :  [employer]  EXPIRATION DATE:  2/7/2022  NDC#: 5622-1853-34  Denise Thomason CMA

## 2020-03-24 NOTE — PATIENT INSTRUCTIONS
"Telephone visit in 2 weeks, then office visit, every other visit unless problems will be alternating telephone vs office, 4 weeks from 8-28 weeks, 2 weeks from 28-36 weeks,  Then weekly at 36 weeks, in office visits, until delivery     Phone numbers [place]:  Day/ night [phone] ask for ob triage  Emergency:  Call labor and delivery:  [phone]    What should I call about??    Contraction every 5 minutes for 1 hour 1 minute long (511), bleeding, loss of fluid, headache that doesn't resolve with tylenol, and decreased fetal movement     Start kick counts @ 26-28 weeks   There is an reggie for this!  It is called \"count the kicks\"  Keep track of movement and discover your normal baby movement pattern   guideline is listed below  Please call if you do not feel the baby move!  We will have you come in for fetal heart rate monitoring:   Perception of at least 10 FMs during 12 hours of normal maternal activity   Perception of least 10 FMs over two hours when the mother is at rest and focused on [facility] Address   [address] [place], [place] [address] [phone]    Dr. Tari Ramirez, DO    OB/GYN   [facility]                                                          "

## 2020-03-25 ENCOUNTER — PATIENT OUTREACH (OUTPATIENT)
Dept: EDUCATION SERVICES | Facility: CLINIC | Age: 35
End: 2020-03-25
Payer: COMMERCIAL

## 2020-03-25 DIAGNOSIS — O24.419 GDM, CLASS A2: ICD-10-CM

## 2020-03-25 NOTE — PROGRESS NOTES
Gestational Diabetes Follow-up    Subjective/Objective:    Tosin Kentrell sent in blood glucose log for review. Last date of communication was: 3/12/2020.    Gestational diabetes is being managed with medications    Taking diabetes medications:   yes:     Diabetes Medication(s)     Insulin       insulin aspart (NOVOLOG PEN) 100 UNIT/ML pen    Take 2 units with breakfast, 4 units with lunch, and 7 units at dinner. Plus 6 units/day for priming. TDD 19 units     insulin  UNIT/ML vial    Take 58 units at bedtime, titrate as directed. Up to 70 units daily.          Estimated Date of Delivery: May 29, 2020    BG/Food Log:   Sorry for the delay in the update of the glucose levels, just trying to find my new normal.     Update on glucose levels - 58 Units of insulin at night and the 2 units at breakfast, 6 units at lunch, and 7 units at dinner    3/20 - Morning 95; Breakfast 101; Lunch 98; Dinner 116  3/21 - Morning 85; Breakfast 106; Lunch 116; Dinner 112  3/21 - Morning 108; Breakfast 83; Lunch 121; Dinner 118  3/22 - Morning 89; Breakfast 86; Lunch 112; Dinner 105  3/23 - Morning 95; Breakfast 88; Lunch 117; Dinner 107  3/24 - Morning 96; Breakfast 110; Lunch 110    Assessment:    Ketones: na.   Fasting blood glucoses: 66% in target.  After breakfast: 100% in target.  After lunch: 100% in target.  After dinner: 100% in target.    Plan/Response:  Recommend increase to insulin - NPH 0-0-0-58 --> 0-0-0-62, and trial no short acting insulin at breakfast (given numbers sometimes lower after breakfast compared to pre meal)  Follow up Monday    Te Leahy,    Thanks for the updates! I know, this has been an interesting time for all of us, and I think most people s routines have been a little different in this last week or two. Let s make just two small changes to your insulin doses:  1. Increase NPH to 62 units at night  2. Let s try not taking any insulin at breakfast, at least through the weekend. We may find that you  do need that 2 units still ,but with numbers sometimes being in the 80s after eating, I get a little worried that you might be heading low if you didn t eat a snack right away after that reading. For now, you can stop taking short acting insulin before breakfast, and I ll let you decide if you can test 1 or 2 hours after the meal (just put an * after a 2 hour reading so we know).    Will you send us an email update Monday, and we ll see how things are looking. Thanks!    Clari Harvey RD LD CDE    Any diabetes medication dose changes were made via the CDE Protocol and Collaborative Practice Agreement with the patient's OB/GYN provider. A copy of this encounter was shared with the provider.

## 2020-03-30 ENCOUNTER — TELEPHONE (OUTPATIENT)
Dept: MATERNAL FETAL MEDICINE | Facility: CLINIC | Age: 35
End: 2020-03-30

## 2020-03-30 NOTE — TELEPHONE ENCOUNTER
Patient scheduled for F/U Comp/BPP on 4/7 and 2x/week BPP's following that appointment.  Per MFM MD recommendation and new COVID protocol patient will come once weekly for BPP's.  Patient states understanding.  Note routed to primary OB,    Jayne Christy RN

## 2020-03-31 ENCOUNTER — PATIENT OUTREACH (OUTPATIENT)
Dept: EDUCATION SERVICES | Facility: CLINIC | Age: 35
End: 2020-03-31
Payer: COMMERCIAL

## 2020-03-31 DIAGNOSIS — O24.419 GDM, CLASS A2: ICD-10-CM

## 2020-03-31 NOTE — PROGRESS NOTES
Gestational Diabetes Follow-up    Subjective/Objective:    Tosin Pfeiffer sent in blood glucose log for review. Last date of communication was: 3/25/20.    Gestational diabetes is being managed with diet, activity and medications    Taking diabetes medications:   yes:     Diabetes Medication(s)     Insulin       insulin aspart (NOVOLOG PEN) 100 UNIT/ML pen    Take 2 units with breakfast, 4 units with lunch, and 7 units at dinner. Plus 6 units/day for priming. TDD 19 units     insulin  UNIT/ML vial    Take 62 units at bedtime, titrate as directed. Up to 70 units daily.          Estimated Date of Delivery: May 29, 2020    BG/Food Log:   Update on glucose levels - 62 Units of insulin at night and the 0 units at breakfast, 6 units at lunch, and 7 units at dinner - All are tested after 2 hours    3/26 - Morning 96; Breakfast 112; Lunch 137; Dinner 123  3/27 - Morning 95; Breakfast 112; Lunch 119; Dinner 115  3/28 - Morning 95; Breakfast 86; Lunch 125; Dinner 128  3/29 - Morning 106; Breakfast 107; Lunch 110; Dinner 128  3/30 - Morning 101; Breakfast 112; Lunch 104; Dinner 145  3/31 - Morning 100; Breakfast 85; Lunch 127      Assessment:  Post dinner readings remain above target, given in target fasting reading with in target post dinner recommend increase to dinner Novolog dose and re-assess in 3 days.     Ketones: none reported.   Fasting blood glucoses: 33% in target.  After breakfast: 100% in target.  After lunch: 67% in target.  After dinner: 20% in target.    Plan/Response:  Recommend increase to insulin - Novolog 0-6-7-0 --> 0-6-10-0.  Follow up 4/3/20.    Mercedes Morillo MS, RD, LD, CDE      Any diabetes medication dose changes were made via the CDE Protocol and Collaborative Practice Agreement with the patient's OB/GYN provider. A copy of this encounter was shared with the provider.    E-mail reply to patient:  Te Leahy,    Thanks for the update with dinner and fasting reading s elevated I d like to see if  we can correct dinner first and see if that will improve your fasting reading.    No change to your NPH insulin.    Novolog- continue 0 at breakfast, 6 at lunch, go up to 10 at dinner.    Can you send in an update on Friday before 2pm if possible?    Thanks!      Mercedes Morillo, MS, RD, LD, CDE  Diabetes

## 2020-04-03 ENCOUNTER — PATIENT OUTREACH (OUTPATIENT)
Dept: EDUCATION SERVICES | Facility: CLINIC | Age: 35
End: 2020-04-03
Payer: COMMERCIAL

## 2020-04-03 DIAGNOSIS — O24.419 GDM, CLASS A2: ICD-10-CM

## 2020-04-03 NOTE — PROGRESS NOTES
Gestational Diabetes Follow-up    Subjective/Objective:    Tosin Pfeiffer sent in blood glucose log for review. Last date of communication was: 3/31/20.    Gestational diabetes is being managed with medications    Taking diabetes medications:   yes:     Diabetes Medication(s)     Insulin       insulin aspart (NOVOLOG PEN) 100 UNIT/ML pen    Take 0 units with breakfast, 6 units with lunch, and 10 units at dinner. Plus 6 units/day for priming. TDD 19 units     insulin  UNIT/ML vial    Take 62 units at bedtime, titrate as directed. Up to 70 units daily.          Estimated Date of Delivery: May 29, 2020    BG/Food Log:   Update on glucose levels - 62 Units of insulin at night and the 0 units at breakfast, 6 units at lunch, and 10 units at dinner - All are tested after 2 hours    4/1 - Morning 89; Breakfast 117; Lunch 145; Dinner 132  4/2 - Morning 101; Breakfast 107; Lunch 149; Dinner 135  4/3 - Morning 95; Breakfast 109    Thank you,    Tosin Hernandez  1985      Assessment:    Ketones: NA.   Fasting blood glucoses: 66% in target.  After breakfast: 100% in target.  After lunch: 0% in target.  After dinner: 0% in target.    Plan/Response:  Recommend increase to insulin - Novolog 0-6-10-0 --> 0-8-12-0.  Recommend increase to insulin - NPH 0-0-0-62 --> 0-0-0-66   This represents a 10% increase in TDD  Follow-up in 3-4 days.    Email response:  Te Leahy,   Thanks for the email! Let s increase your lunch & dinner doses of Novolog to help bring those numbers down a bit. Please take 8 units at lunch and 12 units at dinner. Additionally, it looks like those morning numbers are on the rise. Please increase your bedtime NPH insulin to 66 units tonight. Can you please send in your numbers again on Tuesday, 4/7, for review?     Thank you so much!  Kristy Serna, RD, LD, CDE     Any diabetes medication dose changes were made via the CDE Protocol and Collaborative Practice Agreement with the patient's OB/GYN  provider. A copy of this encounter was shared with the provider.

## 2020-04-07 ENCOUNTER — TELEPHONE (OUTPATIENT)
Dept: EDUCATION SERVICES | Facility: CLINIC | Age: 35
End: 2020-04-07

## 2020-04-07 ENCOUNTER — HOSPITAL ENCOUNTER (OUTPATIENT)
Dept: ULTRASOUND IMAGING | Facility: CLINIC | Age: 35
End: 2020-04-07
Attending: OBSTETRICS & GYNECOLOGY
Payer: COMMERCIAL

## 2020-04-07 ENCOUNTER — OFFICE VISIT (OUTPATIENT)
Dept: MATERNAL FETAL MEDICINE | Facility: CLINIC | Age: 35
End: 2020-04-07
Attending: OBSTETRICS & GYNECOLOGY
Payer: COMMERCIAL

## 2020-04-07 DIAGNOSIS — O99.212 OBESITY AFFECTING PREGNANCY IN SECOND TRIMESTER: ICD-10-CM

## 2020-04-07 DIAGNOSIS — O24.414 INSULIN CONTROLLED GESTATIONAL DIABETES MELLITUS (GDM) IN THIRD TRIMESTER: Primary | ICD-10-CM

## 2020-04-07 DIAGNOSIS — O24.414 INSULIN CONTROLLED GESTATIONAL DIABETES MELLITUS (GDM) IN THIRD TRIMESTER: ICD-10-CM

## 2020-04-07 PROCEDURE — 76819 FETAL BIOPHYS PROFIL W/O NST: CPT | Performed by: OBSTETRICS & GYNECOLOGY

## 2020-04-07 PROCEDURE — 76816 OB US FOLLOW-UP PER FETUS: CPT

## 2020-04-07 NOTE — TELEPHONE ENCOUNTER
Gestational Diabetes Follow-up    Subjective/Objective:    Tosin Pfeiffer sent in blood glucose log for review. Last date of communication was: 4/3/2020.    Gestational diabetes is being managed with diet, activity and medications    Taking diabetes medications:   yes:     Diabetes Medication(s)     Insulin       insulin aspart (NOVOLOG PEN) 100 UNIT/ML pen    Take 0 units with breakfast, 8 units with lunch, and 12 units at dinner. Plus 6 units/day for priming. TDD 19 units     insulin  UNIT/ML vial    Take 66 units at bedtime, titrate as directed. Up to 70 units daily.          Estimated Date of Delivery: May 29, 2020    BG/Food Log:   Update on glucose levels - 66 Units of insulin at night and the 0 units at breakfast, 8 units at lunch, and 12 units at dinner - All are tested after 2 hours    4/4 - Morning 88; Breakfast 121; Lunch 113; Dinner 120  4/5 - Morning 96; Breakfast 112; Lunch 115; Dinner 122  4/6 - Morning 87; Breakfast 90; Lunch 150; Dinner 91  4/7 - Morning 89; Breakfast 81 (this after 3 hours)      Assessment:    Ketones: NA.   Fasting blood glucoses: 75% in target.  After breakfast: 100% in target.  After lunch: 67% in target.  After dinner: 67% in target.    Plan/Response:  Exercise / activity plan: walk for 5-15 minutes after meals,  try to walk 10-15 minutes after lunch and dinner.    Recommend increase to insulin - from 0-8-12-0->  0-9-12-0  Keep NPH at 66 units for now,    Send in # again on Friday. 4/10    Sarah Mckinnon RN/FILIPPO  [place] Diabetes Educator      Any diabetes medication dose changes were made via the CDE Protocol and Collaborative Practice Agreement with the patient's primary care provider and endocrinology provider. A copy of this encounter was shared with the provider.

## 2020-04-07 NOTE — PROGRESS NOTES
Please see full imaging report from [employer] program under imaging tab.    Gennaro Irizarry MD  Maternal Fetal Medicine

## 2020-04-10 ENCOUNTER — TELEPHONE (OUTPATIENT)
Dept: EDUCATION SERVICES | Facility: CLINIC | Age: 35
End: 2020-04-10

## 2020-04-10 DIAGNOSIS — O24.419 GDM, CLASS A2: ICD-10-CM

## 2020-04-10 NOTE — TELEPHONE ENCOUNTER
Gestational Diabetes Follow-up    Subjective/Objective:    Tosin Pfeiffer sent in blood glucose log for review. Last date of communication was: 2020.    Gestational diabetes is being managed with diet, activity and medications    Taking diabetes medications:   yes:     Diabetes Medication(s)     Insulin       insulin aspart (NOVOLOG PEN) 100 UNIT/ML pen    Take 0 units with breakfast, 8 units with lunch, and 12 units at dinner. Plus 6 units/day for priming. TDD 19 units     insulin  UNIT/ML vial    Take 66 units at bedtime, titrate as directed. Up to 70 units daily.          Estimated Date of Delivery: May 29, 2020    BG/Food Lo/8 - Morning 103; Breakfast 119; Lunch 116; Dinner 113   - Morning 103; Breakfast missed; Lunch 129; Dinner 107  4/10 - Morning 101    Update on glucose levels - 66 Units of insulin at night and the 0 units at breakfast, 9 units at lunch, and 12 units at dinner - All are tested after 2 hours    Assessment:    Ketones: na.   Fasting blood glucoses: 0% in target.  After breakfast: 100% in target.  After lunch: 50% in target.  After dinner: 100% in target.    Plan/Response:  Exercise / activity plan: walk for 5-15 minutes after meals, especially after lunch  Recommend increase to insulin - NPH 0-0-0-66- -> 0-0-0-73  Novolog  0-9-12-0  -> 0-10-12-0.  This represents a 10% increase in TDD  Follow up on     Email response:  Te Leahy,  Thank you again for your email.  Continue to do a great job, your hormones just are very active.  We will increase your lunch time insulin to 10 .  And NPH to 73.  How is your meal planning going, are you getting in enough carbs and proteins and having a bedtime snack?  Here are some ideas for snacks.  How are your ketones running, if you could send in those on Monday as well  Can you please send in again on  for review?    Thank you and take care    Sarah Mckinnon RN/ROXANNEE  [place] Diabetes Educator        Here are some ideas  for breakfast or bedtime snack. Pick a carbohydrate from the right and a protein from the left. If you have carbohydrates 30 grams of total carbohydrate and 3-5 grams of fiber that is even better.   Fruits are not listed as they can cause the blood sugar to raise blood sugar quickly and be used up early in the night. Fruits are better at meals, or morning or afternoon snack.     Protein/Fat list (about 14 grams of protein or 2 fat servings) Carbohydrate list (about 30 grams of carbohydrate)   2 slices of cheese English Muffin   2 TBS Peanut butter/[place] butter/Sun butter 10 crackers     cup Cottage cheese 2% 2 pieces of toast   2 oz any cooked meat - less than deck of cards size 1 hamburger or hot dog bun   1.5 oz of soy nuts 1 whole wheat nissa   Avocado or guacamole 6 rachna cracker squares     cup tuna - can add mayonnaise 1 cup full fat ice cream - no candy or sauce   2 eggs - boiled, poached, fried, scrambled, omelet 30 chips   1 veggie rito (might have carbohydrates also) Greek yogurt - 30 grams of carbohydrate   2 TBS Coconut 2 - 6 inch tortillas corn or flour   12 shrimp - not breaded 2 toaster waffles - no syrup   2-1oz meatballs   bagel   2 Tbs cream cheese - plain, veggie, salmon - no fruit or honey. 6 cups popcorn - unsweetened     cup of nuts Granola bar of 3o grams of carbohydrate   10 olives 1 cup of unsweetened lentils or beans.    Tofu or Temph 4-5oz 1 cup potato salad     You can always add vegetables with dip, salad dressing or salsa also.       Sarah Mckinnon RN/FILIPPO  [place] Diabetes Educator      Any diabetes medication dose changes were made via the CDE Protocol and Collaborative Practice Agreement with the patient's primary care provider and endocrinology provider. A copy of this encounter was shared with the provider.

## 2020-04-14 ENCOUNTER — TELEPHONE (OUTPATIENT)
Dept: EDUCATION SERVICES | Facility: CLINIC | Age: 35
End: 2020-04-14

## 2020-04-14 ENCOUNTER — OFFICE VISIT (OUTPATIENT)
Dept: MATERNAL FETAL MEDICINE | Facility: CLINIC | Age: 35
End: 2020-04-14
Attending: OBSTETRICS & GYNECOLOGY
Payer: COMMERCIAL

## 2020-04-14 ENCOUNTER — HOSPITAL ENCOUNTER (OUTPATIENT)
Dept: ULTRASOUND IMAGING | Facility: CLINIC | Age: 35
End: 2020-04-14
Attending: OBSTETRICS & GYNECOLOGY
Payer: COMMERCIAL

## 2020-04-14 DIAGNOSIS — O24.414 INSULIN CONTROLLED GESTATIONAL DIABETES MELLITUS (GDM) IN THIRD TRIMESTER: Primary | ICD-10-CM

## 2020-04-14 DIAGNOSIS — O24.414 INSULIN CONTROLLED GESTATIONAL DIABETES MELLITUS (GDM) IN THIRD TRIMESTER: ICD-10-CM

## 2020-04-14 DIAGNOSIS — O24.419 GDM, CLASS A2: ICD-10-CM

## 2020-04-14 DIAGNOSIS — O99.212 OBESITY AFFECTING PREGNANCY IN SECOND TRIMESTER: ICD-10-CM

## 2020-04-14 PROCEDURE — 76819 FETAL BIOPHYS PROFIL W/O NST: CPT

## 2020-04-14 NOTE — TELEPHONE ENCOUNTER
Gestational Diabetes Follow-up    Subjective/Objective:    Tosin Pfeiffer sent in blood glucose log for review. Last date of communication was: 4/10/20.    Gestational diabetes is being managed with diet, activity and medications    Taking diabetes medications:   yes:     Diabetes Medication(s)     Insulin       insulin aspart (NOVOLOG PEN) 100 UNIT/ML pen    Take 0 units with breakfast, 10 units with lunch, and 12 units at dinner. Plus 6 units/day for priming. TDD 19 units     insulin  UNIT/ML vial    Take 73 units at bedtime,          Estimated Date of Delivery: May 29, 2020    BG/Food Log:   Update on glucose levels - 73 Units of insulin at night and the 0 units at breakfast, 10 units at lunch, and 12 units at dinner - All are tested after 2 hours  Ketones all negative     4/11 - Morning 101; Breakfast 105; Lunch 86; Dinner 133  4/12 - Morning 80; Breakfast 120; Lunch 85; Dinner 128  4/13 - Morning 106; Breakfast 107; Lunch 125; Dinner 112  4/14 - Morning 104; Breakfast 115    Thank you,    Tosin Hernandez  1985    Assessment:    Ketones: neg.   Fasting blood glucoses: 25% in target.  After breakfast: 100% in target.  After lunch: 66% in target.  After dinner: 33% in target.    Patient fasting glucose continues to rise despite the increase in evening insulin dose of NPH.  Will recommend a small increase to the NPH but it would be beneficial to further evaluate glucose patterns going into the night. She may do better with adjusting the NPH to BID dosing at lunch and bedtime to allow better coverage overnight and into the morning. If so then dinner Novolog would need adjustment to prevent hypoglycemia.    Will request patient do bedtime and overnight testing over the next couple days and reevaluate before the weekend.     Will route note and considerations to OB provider:  Consider:  Recommend to continue with NPH 75units per day but split dose to 20%(15 units) at lunch and 80%(60 units) at bedtime.    Due to peak of NPH recommend decrease to dinner Novolog by 50% initially to prevent hypoglycemia.     Patient has not seen diabetes education since January 2020 and would benefit from  A diabetes education telephone visit to review healthy eating and insulin management in pregnancy.      Plan/Response:    Te Leahy,    Thank you for sending in your numbers. It looks like even with the increase last time your fasting numbers continue to be a struggle. I recommend an increase to your bedtime NPH but I would like to evaluate a few more blood sugar tests if you are willing and able over the next couple of days. I would like you to test at bedtime prior to your bedtime snack and if you can also test during the night  around 3 am. I want to make sure you are not going low overnight and Im looking to see how the insulin is working for you. I would also like to do a small increase to your dinner insulin.    Here are the changes I am recommending:     Novolog increase dinner from 12 units to 13 units  NPH at bedtime increase from 73 units to 75 units.    Test blood sugars as you have been . but also test before you go to bed and sometime midsleep. You can do this when you are up to the bathroom during the night. Just note what time you actually tested.   Please send your numbers on Friday so we can make adjustment before the weekend.      I also think it would be a good idea to have you speak with one of our educators on Friday to go over the plan more in depth. If you can send the blood sugar results after your breakfast reading of Friday  so  we can review them and consult with our doctor if needed.   I scheduled you for a telephone call with Malgorzata at 1pm. Please let me know if this time does not work for you and we can try to arrange another time.      Sabrina Cho RN, CDE  Diabetes         Any diabetes medication dose changes were made via the CDE Protocol and Collaborative Practice Agreement  with the patient's referring provider. A copy of this encounter was shared with the provider.

## 2020-04-17 ENCOUNTER — TELEPHONE (OUTPATIENT)
Dept: EDUCATION SERVICES | Facility: CLINIC | Age: 35
End: 2020-04-17

## 2020-04-17 ENCOUNTER — ALLIED HEALTH/NURSE VISIT (OUTPATIENT)
Dept: EDUCATION SERVICES | Facility: CLINIC | Age: 35
End: 2020-04-17
Payer: COMMERCIAL

## 2020-04-17 DIAGNOSIS — O24.419 GDM, CLASS A2: ICD-10-CM

## 2020-04-17 DIAGNOSIS — O24.414 INSULIN CONTROLLED GESTATIONAL DIABETES MELLITUS (GDM) IN THIRD TRIMESTER: Primary | ICD-10-CM

## 2020-04-17 PROCEDURE — 98966 PH1 ASSMT&MGMT NQHP 5-10: CPT

## 2020-04-17 NOTE — TELEPHONE ENCOUNTER
Dr. Ramirez,     Please let me know if you agree with plan and sign pended orders, or indicate alternate plan.    Patient fasting glucose continues to rise despite the increase in evening insulin dose of NPH. She may do better with adjusting the NPH to BID dosing at lunch and bedtime to allow better coverage overnight and into the morning. If so then dinner Novolog would need adjustment to prevent hypoglycemia.    Recommend to continue with NPH 75units per day but split dose to 20%(15 units) at lunch and 80%(60 units) at bedtime.   Due to peak of NPH recommend decrease to dinner Novolog by 50% initially to prevent hypoglycemia.     Thanks,  Malgorzata Donaldson, RD, LD, CDE

## 2020-04-17 NOTE — PROGRESS NOTES
Gestational Diabetes Follow-up    Subjective/Objective:    Telephone Visit for GDM Follow-up. Tosin Pfeiffer sent in blood glucose log for review. Last date of communication was: 4/14.    Patient verbally consented to the telephone visit service today: yes    Gestational diabetes is being managed with medications    Taking diabetes medications:   yes:     Diabetes Medication(s)     Insulin       insulin aspart (NOVOLOG PEN) 100 UNIT/ML pen    Take 0 units with breakfast, 10 units with lunch, and 13 units at dinner. Plus 6 units/day for priming. TDD 19 units     insulin  UNIT/ML vial    Take 75 units at bedtime,          Estimated Date of Delivery: May 29, 2020    BG/Food Log:   Update on glucose levels - 75 Units of insulin at night and the 0 units at breakfast, 10 units at lunch, and 13 units at dinner - All are tested after 2 hours  Phone scheduled this afternoon with diabetic counselor.     4/15 - 3:38 am 100; Morning 97; Breakfast 110; Lunch 128; Dinner 115  4/16 - 1:39 am 113; Morning 105; Breakfast 119; Lunch 120; Dinner 125  4/17 - 3:10 am 111; Morning 100  Thank you,  Tosin David  1985    Assessment:    Ketones: no data.   Fasting blood glucoses: 0% in target.  After breakfast: 100% in target.  After lunch: 50% in target.  After dinner: 50% in target.    See patient encounter from 4/14 with Sabrina, patient could benefit from more NPH coverage in the evening/overnight.  Would recommend to proceed with plan to split NPH dose between lunch (20%) and bedtime (80%)    Plan/Response:  Recommend to continue with NPH 75units per day but split dose to 20%(15 units) at lunch and 80%(60 units) at bedtime.   Due to peak of NPH recommend decrease to dinner Novolog by 50%(13 units to 7 units) initially to prevent hypoglycemia.   Routing to provider for approval    Malgorzata Donaldson RD, LD, CDE    Time spent on telephone call with patient: 6 minutes    Any diabetes medication dose changes were made via the  CDE Protocol and Collaborative Practice Agreement with the patient's referring provider. A copy of this encounter was shared with the provider.

## 2020-04-20 ENCOUNTER — PATIENT OUTREACH (OUTPATIENT)
Dept: EDUCATION SERVICES | Facility: CLINIC | Age: 35
End: 2020-04-20

## 2020-04-20 DIAGNOSIS — O24.419 GDM, CLASS A2: ICD-10-CM

## 2020-04-20 NOTE — PROGRESS NOTES
Gestational Diabetes Follow-up    Subjective/Objective:    Tosin Pfeiffer sent in blood glucose log for review. Last date of communication was: .    Gestational diabetes is being managed with diet, activity and medications    Taking diabetes medications:   yes:     Diabetes Medication(s)     Insulin       insulin aspart (NOVOLOG PEN) 100 UNIT/ML pen    Take 0 units with breakfast, 10 units with lunch, and 7 units at dinner. Plus 6 units/day for priming. TDD 19 units     insulin  UNIT/ML vial    Take 15 units at lunch and 60 units at bedtime          Estimated Date of Delivery: May 29, 2020    BG/Food Log:     Assessment:  Ketones: no data.   Fasting blood glucoses: 66% in target.  After breakfast: 66% in target.  After lunch: 100% in target.  After dinner: 50% in target.    The insulin changes made on Friday seemed to have worked out quite well as the last 2 fasting reading are in target. Recommend making a slight adjustment to Novolog with dinner due to 50% in target the last 2 days. Recommend to continue with NPH dosing.     Plan/Response:  Recommend the following insulin changes:  Novolo-10-7-0 --> 0-10-8-0    Follow-up on Thursday    Email response to patient:  Vandana Leahy!   It looks like the insulin changes that we made on Friday are working out well- your fasting numbers came down nicely!  We will just make a few adjustments to your Novolog dosing, change your dosing to as follows:  0 units with breakfast  10 units with lunch   8 units with dinner  Send us another update on Thursday!  Thanks!      Malgorzata Donaldson, RD, LD, CDE    Any diabetes medication dose changes were made via the CDE Protocol and Collaborative Practice Agreement with the patient's referring provider. A copy of this encounter was shared with the provider.

## 2020-04-21 ENCOUNTER — OFFICE VISIT (OUTPATIENT)
Dept: MATERNAL FETAL MEDICINE | Facility: CLINIC | Age: 35
End: 2020-04-21
Attending: OBSTETRICS & GYNECOLOGY
Payer: COMMERCIAL

## 2020-04-21 ENCOUNTER — HOSPITAL ENCOUNTER (OUTPATIENT)
Dept: ULTRASOUND IMAGING | Facility: CLINIC | Age: 35
End: 2020-04-21
Attending: OBSTETRICS & GYNECOLOGY
Payer: COMMERCIAL

## 2020-04-21 DIAGNOSIS — O24.414 INSULIN CONTROLLED GESTATIONAL DIABETES MELLITUS (GDM) IN THIRD TRIMESTER: Primary | ICD-10-CM

## 2020-04-21 DIAGNOSIS — O24.414 INSULIN CONTROLLED GESTATIONAL DIABETES MELLITUS (GDM) IN THIRD TRIMESTER: ICD-10-CM

## 2020-04-21 DIAGNOSIS — O99.212 OBESITY AFFECTING PREGNANCY IN SECOND TRIMESTER: ICD-10-CM

## 2020-04-21 DIAGNOSIS — O43.192 MARGINAL INSERTION OF UMBILICAL CORD AFFECTING MANAGEMENT OF MOTHER IN SECOND TRIMESTER: ICD-10-CM

## 2020-04-21 PROCEDURE — 76819 FETAL BIOPHYS PROFIL W/O NST: CPT

## 2020-04-21 NOTE — PROGRESS NOTES
"Please see \"Imaging\" tab under Chart Review for full details.    Tanna Richey MD  Maternal Fetal Medicine    "

## 2020-04-21 NOTE — TELEPHONE ENCOUNTER
I agree with the plan as documented to split the NPH insulin, decrease evening meal insulin, and diabetic ed phone appointment for review of this plan and her diet/exercise. Thanks.    Anika Ouwsu MD

## 2020-04-23 ENCOUNTER — TELEPHONE (OUTPATIENT)
Dept: EDUCATION SERVICES | Facility: CLINIC | Age: 35
End: 2020-04-23

## 2020-04-23 DIAGNOSIS — O24.419 GDM, CLASS A2: ICD-10-CM

## 2020-04-23 NOTE — TELEPHONE ENCOUNTER
Gestational Diabetes Follow-up    Subjective/Objective:    Tosin Pfeiffer sent in blood glucose log for review. Last date of communication was: 2020.    Gestational diabetes is being managed with diet, activity and medications    Taking diabetes medications:   yes:     Diabetes Medication(s)     Insulin       insulin aspart (NOVOLOG PEN) 100 UNIT/ML pen    Take 0 units with breakfast, 10 units with lunch, and 8 units at dinner. Plus 4 units/day for priming. TDD 22 units     insulin  UNIT/ML vial    Take 15 units at lunch and 60 units at bedtime          Estimated Date of Delivery: May 29, 2020    BG/Food Log:   Update on glucose levels - 60 NPH at night; 0 units at breakfast; 10 units at lunch with 15 NPH, and 8 units at dinner - All are tested after 2 hours     - 1:16 am 107 Morning 96; Breakfast 126; Lunch 118; Dinner 118   - 1:14 am 88; Morning 93; Breakfast; 103; Lunch 122; Dinner 121   - 1:42 am 91; Morning 96; Breakfast 114      Assessment:    Ketones: na.   Fasting blood glucoses: 88% in target.  After breakfast: 66% in target.  After lunch: 50% in target.  After dinner: 50% in target.    Plan/Response:    Te Leahy:  It is so nice to see your BG under better control.  The changes that were made are perfect for you.  I do recommend a few slight changes to both of your insulins.      Increase NPH bedtime to 62 units    Increase Novolog insulin for lunch to 11 units and dinner to 9 units. Both of those meals is in 50% , and fastings are doing ok, just a small bump of NPH at bedtime may make a differernce.   Recommend increase to insulin - NPH 0-15-0-60 => 0-15-0-62  Novolo-10-8-0=> 0-11-9-0.    Follow-up on Monday.    Great job and keep up the good work, if you can test a ketone, that would be great.    Sarah Mckinnon RN/CDE  [place]      Any diabetes medication dose changes were made via the CDE Protocol and Collaborative Practice Agreement with the patient's primary  care provider and endocrinology provider. A copy of this encounter was shared with the provider.

## 2020-04-28 ENCOUNTER — HOSPITAL ENCOUNTER (OUTPATIENT)
Dept: ULTRASOUND IMAGING | Facility: CLINIC | Age: 35
End: 2020-04-28
Attending: OBSTETRICS & GYNECOLOGY
Payer: COMMERCIAL

## 2020-04-28 ENCOUNTER — PRENATAL OFFICE VISIT (OUTPATIENT)
Dept: OBGYN | Facility: CLINIC | Age: 35
End: 2020-04-28
Payer: COMMERCIAL

## 2020-04-28 ENCOUNTER — OFFICE VISIT (OUTPATIENT)
Dept: MATERNAL FETAL MEDICINE | Facility: CLINIC | Age: 35
End: 2020-04-28
Attending: OBSTETRICS & GYNECOLOGY
Payer: COMMERCIAL

## 2020-04-28 VITALS
BODY MASS INDEX: 43.66 KG/M2 | HEIGHT: 63 IN | WEIGHT: 246.4 LBS | DIASTOLIC BLOOD PRESSURE: 78 MMHG | SYSTOLIC BLOOD PRESSURE: 128 MMHG

## 2020-04-28 DIAGNOSIS — O24.414 INSULIN CONTROLLED GESTATIONAL DIABETES MELLITUS (GDM) IN THIRD TRIMESTER: ICD-10-CM

## 2020-04-28 DIAGNOSIS — Z34.93 PRENATAL CARE IN THIRD TRIMESTER: ICD-10-CM

## 2020-04-28 DIAGNOSIS — O43.192 MARGINAL INSERTION OF UMBILICAL CORD AFFECTING MANAGEMENT OF MOTHER IN SECOND TRIMESTER: ICD-10-CM

## 2020-04-28 DIAGNOSIS — O24.414 INSULIN CONTROLLED GESTATIONAL DIABETES MELLITUS (GDM) IN THIRD TRIMESTER: Primary | ICD-10-CM

## 2020-04-28 DIAGNOSIS — O99.212 OBESITY AFFECTING PREGNANCY IN SECOND TRIMESTER: ICD-10-CM

## 2020-04-28 PROCEDURE — 76819 FETAL BIOPHYS PROFIL W/O NST: CPT

## 2020-04-28 PROCEDURE — 87653 STREP B DNA AMP PROBE: CPT | Performed by: FAMILY MEDICINE

## 2020-04-28 PROCEDURE — 99207 ZZC PRENATAL VISIT: CPT | Performed by: FAMILY MEDICINE

## 2020-04-28 ASSESSMENT — MIFFLIN-ST. JEOR: SCORE: 1768.85

## 2020-04-28 NOTE — PATIENT INSTRUCTIONS
"Return weekly   Return to clinic:  every 4 weeks till 28 weeks, then every 2 weeks till 36 weeks, then weekly till delivery      Phone numbers [place]:  Day/ night [phone] ask for ob triage  Emergency:  Call labor and delivery:  [phone]    What should I call about??    Contraction every 5 minutes for 1 hour 1 minute long (511), bleeding, loss of fluid, headache that doesn't resolve with tylenol, and decreased fetal movement     Start kick counts @ 26-28 weeks   There is an reggie for this!  It is called \"count the kicks\"  Keep track of movement and discover your normal baby movement pattern   guideline is listed below  Please call if you do not feel the baby move!  We will have you come in for fetal heart rate monitoring:   Perception of at least 10 FMs during 12 hours of normal maternal activity   Perception of least 10 FMs over two hours when the mother is at rest and focused on [facility] Address   [address], [place], [place] [address] [phone]    Dr. Tari Ramirez, DO    OB/GYN   [facility]                                                      "

## 2020-04-28 NOTE — NURSING NOTE
"35w4d    Chief Complaint   Patient presents with     Prenatal Care     GBS due--hip pain       Initial /78   Ht 1.588 m (5' 2.5\")   Wt 111.8 kg (246 lb 6.4 oz)   LMP 2019 (Exact Date)   BMI 44.35 kg/m   Estimated body mass index is 44.35 kg/m  as calculated from the following:    Height as of this encounter: 1.588 m (5' 2.5\").    Weight as of this encounter: 111.8 kg (246 lb 6.4 oz).  BP completed using cuff size: regular    Questioned patient about current smoking habits.  Pt. quit smoking some time ago.          The following HM Due: NONE             "

## 2020-04-28 NOTE — PROGRESS NOTES
"CC: Here for routine prenatal visit   34 year old y/o  @ 35w4d with Estimated Date of Delivery: May 29, 2020     /78   Ht 1.588 m (5' 2.5\")   Wt 111.8 kg (246 lb 6.4 oz)   LMP 2019 (Exact Date)   BMI 44.35 kg/m    See OB flowsheet  + fetal movement, no contractions, no bleeding, no loss of fluid some cramping   Discussed monitoring fetal movement baby moving well    1) concerns: doing well, able to socially isolate  2) Routine: innatal and preparent normal, boy, A-, RI   3) Risk factors:   A: GDM: insulin, will do growth and bpp testing, NPH 15/, Asparte 0, 15, 8  IOL  @ 39 weeks   B: BMI 39.44: MFM us   C: AMA:  MFM us               D: marginal cord insertion: repeat us @ 28 weeks               E: Hx of CS with : desires               F: GERD: rx prilosec              G: PPBC: vas Mirena IUD   4) Problem list   Patient Active Problem List   Diagnosis     Hyperlipidemia LDL goal <160     Blood type A-     BMI 38.0-38.9,adult     Exercise-induced asthma     Hip pain, right     Sacral pain     Somatic dysfunction of sacral region     Acute right-sided low back pain without sciatica     Somatic dysfunction of lumbar region       4) Return: weekly     [place]    "

## 2020-04-29 LAB
GP B STREP DNA SPEC QL NAA+PROBE: NEGATIVE
SPECIMEN SOURCE: NORMAL

## 2020-05-04 ENCOUNTER — HOSPITAL ENCOUNTER (INPATIENT)
Facility: CLINIC | Age: 35
LOS: 2 days | Discharge: HOME | End: 2020-05-06
Attending: OBSTETRICS & GYNECOLOGY | Admitting: OBSTETRICS & GYNECOLOGY
Payer: COMMERCIAL

## 2020-05-04 ENCOUNTER — ANESTHESIA EVENT (OUTPATIENT)
Dept: OBGYN | Facility: CLINIC | Age: 35
End: 2020-05-04
Payer: COMMERCIAL

## 2020-05-04 ENCOUNTER — ANESTHESIA (OUTPATIENT)
Dept: OBGYN | Facility: CLINIC | Age: 35
End: 2020-05-04
Payer: COMMERCIAL

## 2020-05-04 ENCOUNTER — TELEPHONE (OUTPATIENT)
Dept: OBGYN | Facility: CLINIC | Age: 35
End: 2020-05-04

## 2020-05-04 ENCOUNTER — MYC MEDICAL ADVICE (OUTPATIENT)
Dept: OBGYN | Facility: CLINIC | Age: 35
End: 2020-05-04

## 2020-05-04 PROBLEM — Z36.89 ENCOUNTER FOR TRIAGE IN PREGNANT PATIENT: Status: ACTIVE | Noted: 2020-05-04

## 2020-05-04 LAB
ABO + RH BLD: ABNORMAL
ABO + RH BLD: ABNORMAL
BLD GP AB INVEST PLASRBC-IMP: ABNORMAL
BLD GP AB SCN SERPL QL: ABNORMAL
BLOOD BANK CMNT PATIENT-IMP: ABNORMAL
BLOOD BANK CMNT PATIENT-IMP: NORMAL
GLUCOSE BLDC GLUCOMTR-MCNC: 129 MG/DL (ref 70–99)
GLUCOSE BLDC GLUCOMTR-MCNC: 159 MG/DL (ref 70–99)
GLUCOSE BLDC GLUCOMTR-MCNC: 58 MG/DL (ref 70–99)
GLUCOSE BLDC GLUCOMTR-MCNC: 67 MG/DL (ref 70–99)
GLUCOSE BLDC GLUCOMTR-MCNC: 80 MG/DL (ref 70–99)
GLUCOSE BLDC GLUCOMTR-MCNC: 85 MG/DL (ref 70–99)
GLUCOSE BLDC GLUCOMTR-MCNC: 88 MG/DL (ref 70–99)
GLUCOSE BLDC GLUCOMTR-MCNC: 92 MG/DL (ref 70–99)
HGB BLD-MCNC: 12 G/DL (ref 11.7–15.7)
RUPTURE OF FETAL MEMBRANES BY ROM PLUS: POSITIVE
SARS-COV-2 PCR COMMENT: NORMAL
SARS-COV-2 RNA SPEC QL NAA+PROBE: NEGATIVE
SARS-COV-2 RNA SPEC QL NAA+PROBE: NORMAL
SPECIMEN EXP DATE BLD: ABNORMAL
SPECIMEN SOURCE: NORMAL
SPECIMEN SOURCE: NORMAL

## 2020-05-04 PROCEDURE — 87635 SARS-COV-2 COVID-19 AMP PRB: CPT | Performed by: OBSTETRICS & GYNECOLOGY

## 2020-05-04 PROCEDURE — 00HU33Z INSERTION OF INFUSION DEVICE INTO SPINAL CANAL, PERCUTANEOUS APPROACH: ICD-10-PCS | Performed by: ANESTHESIOLOGY

## 2020-05-04 PROCEDURE — 86780 TREPONEMA PALLIDUM: CPT | Performed by: OBSTETRICS & GYNECOLOGY

## 2020-05-04 PROCEDURE — G0463 HOSPITAL OUTPT CLINIC VISIT: HCPCS

## 2020-05-04 PROCEDURE — 84112 EVAL AMNIOTIC FLUID PROTEIN: CPT | Performed by: OBSTETRICS & GYNECOLOGY

## 2020-05-04 PROCEDURE — 72200001 ZZH LABOR CARE VAGINAL DELIVERY SINGLE

## 2020-05-04 PROCEDURE — 00000146 ZZHCL STATISTIC GLUCOSE BY METER IP

## 2020-05-04 PROCEDURE — 25000132 ZZH RX MED GY IP 250 OP 250 PS 637: Performed by: OBSTETRICS & GYNECOLOGY

## 2020-05-04 PROCEDURE — 37000011 ZZH ANESTHESIA WARD SERVICE

## 2020-05-04 PROCEDURE — 25000125 ZZHC RX 250: Performed by: OBSTETRICS & GYNECOLOGY

## 2020-05-04 PROCEDURE — 86850 RBC ANTIBODY SCREEN: CPT | Performed by: OBSTETRICS & GYNECOLOGY

## 2020-05-04 PROCEDURE — 25000128 H RX IP 250 OP 636

## 2020-05-04 PROCEDURE — 85018 HEMOGLOBIN: CPT | Performed by: OBSTETRICS & GYNECOLOGY

## 2020-05-04 PROCEDURE — 3E0R3BZ INTRODUCTION OF ANESTHETIC AGENT INTO SPINAL CANAL, PERCUTANEOUS APPROACH: ICD-10-PCS | Performed by: ANESTHESIOLOGY

## 2020-05-04 PROCEDURE — 40000671 ZZH STATISTIC ANESTHESIA CASE

## 2020-05-04 PROCEDURE — 25800030 ZZH RX IP 258 OP 636: Performed by: OBSTETRICS & GYNECOLOGY

## 2020-05-04 PROCEDURE — 86901 BLOOD TYPING SEROLOGIC RH(D): CPT | Performed by: OBSTETRICS & GYNECOLOGY

## 2020-05-04 PROCEDURE — 86870 RBC ANTIBODY IDENTIFICATION: CPT | Performed by: OBSTETRICS & GYNECOLOGY

## 2020-05-04 PROCEDURE — 12000000 ZZH R&B MED SURG/OB

## 2020-05-04 PROCEDURE — 25000128 H RX IP 250 OP 636: Performed by: OBSTETRICS & GYNECOLOGY

## 2020-05-04 PROCEDURE — 86900 BLOOD TYPING SEROLOGIC ABO: CPT | Performed by: OBSTETRICS & GYNECOLOGY

## 2020-05-04 RX ORDER — OXYTOCIN 10 [USP'U]/ML
10 INJECTION, SOLUTION INTRAMUSCULAR; INTRAVENOUS
Status: DISCONTINUED | OUTPATIENT
Start: 2020-05-04 | End: 2020-05-06 | Stop reason: HOSPADM

## 2020-05-04 RX ORDER — NALOXONE HYDROCHLORIDE 0.4 MG/ML
.1-.4 INJECTION, SOLUTION INTRAMUSCULAR; INTRAVENOUS; SUBCUTANEOUS
Status: DISCONTINUED | OUTPATIENT
Start: 2020-05-04 | End: 2020-05-06 | Stop reason: HOSPADM

## 2020-05-04 RX ORDER — CARBOPROST TROMETHAMINE 250 UG/ML
250 INJECTION, SOLUTION INTRAMUSCULAR
Status: DISCONTINUED | OUTPATIENT
Start: 2020-05-04 | End: 2020-05-05 | Stop reason: CLARIF

## 2020-05-04 RX ORDER — IBUPROFEN 800 MG/1
800 TABLET, FILM COATED ORAL EVERY 6 HOURS PRN
Status: DISCONTINUED | OUTPATIENT
Start: 2020-05-04 | End: 2020-05-06 | Stop reason: HOSPADM

## 2020-05-04 RX ORDER — MODIFIED LANOLIN
OINTMENT (GRAM) TOPICAL
Status: DISCONTINUED | OUTPATIENT
Start: 2020-05-04 | End: 2020-05-06 | Stop reason: HOSPADM

## 2020-05-04 RX ORDER — NALOXONE HYDROCHLORIDE 0.4 MG/ML
.1-.4 INJECTION, SOLUTION INTRAMUSCULAR; INTRAVENOUS; SUBCUTANEOUS
Status: DISCONTINUED | OUTPATIENT
Start: 2020-05-04 | End: 2020-05-05 | Stop reason: CLARIF

## 2020-05-04 RX ORDER — METHYLERGONOVINE MALEATE 0.2 MG/ML
200 INJECTION INTRAVENOUS
Status: DISCONTINUED | OUTPATIENT
Start: 2020-05-04 | End: 2020-05-05 | Stop reason: CLARIF

## 2020-05-04 RX ORDER — FENTANYL/BUPIVACAINE/NS/PF 2-1250MCG
PLASTIC BAG, INJECTION (ML) INJECTION
Status: COMPLETED
Start: 2020-05-04 | End: 2020-05-04

## 2020-05-04 RX ORDER — NALBUPHINE HYDROCHLORIDE 20 MG/ML
2.5-5 INJECTION, SOLUTION INTRAMUSCULAR; INTRAVENOUS; SUBCUTANEOUS EVERY 6 HOURS PRN
Status: DISCONTINUED | OUTPATIENT
Start: 2020-05-04 | End: 2020-05-06

## 2020-05-04 RX ORDER — FENTANYL CITRATE 50 UG/ML
50-100 INJECTION, SOLUTION INTRAMUSCULAR; INTRAVENOUS
Status: DISCONTINUED | OUTPATIENT
Start: 2020-05-04 | End: 2020-05-05 | Stop reason: CLARIF

## 2020-05-04 RX ORDER — NICOTINE POLACRILEX 4 MG
15-30 LOZENGE BUCCAL
Status: DISCONTINUED | OUTPATIENT
Start: 2020-05-04 | End: 2020-05-06 | Stop reason: HOSPADM

## 2020-05-04 RX ORDER — TRANEXAMIC ACID 10 MG/ML
1 INJECTION, SOLUTION INTRAVENOUS EVERY 30 MIN PRN
Status: DISCONTINUED | OUTPATIENT
Start: 2020-05-04 | End: 2020-05-06 | Stop reason: HOSPADM

## 2020-05-04 RX ORDER — EPHEDRINE SULFATE 50 MG/ML
5 INJECTION, SOLUTION INTRAMUSCULAR; INTRAVENOUS; SUBCUTANEOUS
Status: DISCONTINUED | OUTPATIENT
Start: 2020-05-04 | End: 2020-05-06

## 2020-05-04 RX ORDER — BISACODYL 10 MG
10 SUPPOSITORY, RECTAL RECTAL DAILY PRN
Status: DISCONTINUED | OUTPATIENT
Start: 2020-05-06 | End: 2020-05-06 | Stop reason: HOSPADM

## 2020-05-04 RX ORDER — OXYTOCIN/0.9 % SODIUM CHLORIDE 30/500 ML
340 PLASTIC BAG, INJECTION (ML) INTRAVENOUS CONTINUOUS PRN
Status: DISCONTINUED | OUTPATIENT
Start: 2020-05-04 | End: 2020-05-06 | Stop reason: HOSPADM

## 2020-05-04 RX ORDER — DEXTROSE MONOHYDRATE 25 G/50ML
25-50 INJECTION, SOLUTION INTRAVENOUS
Status: DISCONTINUED | OUTPATIENT
Start: 2020-05-04 | End: 2020-05-04

## 2020-05-04 RX ORDER — IBUPROFEN 800 MG/1
800 TABLET, FILM COATED ORAL
Status: COMPLETED | OUTPATIENT
Start: 2020-05-04 | End: 2020-05-04

## 2020-05-04 RX ORDER — OXYTOCIN/0.9 % SODIUM CHLORIDE 30/500 ML
100 PLASTIC BAG, INJECTION (ML) INTRAVENOUS CONTINUOUS
Status: DISCONTINUED | OUTPATIENT
Start: 2020-05-04 | End: 2020-05-06 | Stop reason: HOSPADM

## 2020-05-04 RX ORDER — SODIUM CHLORIDE 9 MG/ML
INJECTION, SOLUTION INTRAVENOUS CONTINUOUS
Status: DISCONTINUED | OUTPATIENT
Start: 2020-05-04 | End: 2020-05-05 | Stop reason: CLARIF

## 2020-05-04 RX ORDER — OXYCODONE AND ACETAMINOPHEN 5; 325 MG/1; MG/1
1 TABLET ORAL
Status: DISCONTINUED | OUTPATIENT
Start: 2020-05-04 | End: 2020-05-05 | Stop reason: CLARIF

## 2020-05-04 RX ORDER — AMOXICILLIN 250 MG
2 CAPSULE ORAL 2 TIMES DAILY
Status: DISCONTINUED | OUTPATIENT
Start: 2020-05-04 | End: 2020-05-06 | Stop reason: HOSPADM

## 2020-05-04 RX ORDER — DEXTROSE MONOHYDRATE 25 G/50ML
25-50 INJECTION, SOLUTION INTRAVENOUS
Status: DISCONTINUED | OUTPATIENT
Start: 2020-05-04 | End: 2020-05-06 | Stop reason: HOSPADM

## 2020-05-04 RX ORDER — ACETAMINOPHEN 325 MG/1
650 TABLET ORAL EVERY 4 HOURS PRN
Status: DISCONTINUED | OUTPATIENT
Start: 2020-05-04 | End: 2020-05-06 | Stop reason: HOSPADM

## 2020-05-04 RX ORDER — NALOXONE HYDROCHLORIDE 0.4 MG/ML
.1-.4 INJECTION, SOLUTION INTRAMUSCULAR; INTRAVENOUS; SUBCUTANEOUS
Status: DISCONTINUED | OUTPATIENT
Start: 2020-05-04 | End: 2020-05-06

## 2020-05-04 RX ORDER — AMOXICILLIN 250 MG
1 CAPSULE ORAL 2 TIMES DAILY
Status: DISCONTINUED | OUTPATIENT
Start: 2020-05-04 | End: 2020-05-06 | Stop reason: HOSPADM

## 2020-05-04 RX ORDER — ONDANSETRON 2 MG/ML
4 INJECTION INTRAMUSCULAR; INTRAVENOUS EVERY 6 HOURS PRN
Status: DISCONTINUED | OUTPATIENT
Start: 2020-05-04 | End: 2020-05-05 | Stop reason: CLARIF

## 2020-05-04 RX ORDER — OXYTOCIN/0.9 % SODIUM CHLORIDE 30/500 ML
100-340 PLASTIC BAG, INJECTION (ML) INTRAVENOUS CONTINUOUS PRN
Status: COMPLETED | OUTPATIENT
Start: 2020-05-04 | End: 2020-05-04

## 2020-05-04 RX ORDER — NICOTINE POLACRILEX 4 MG
15-30 LOZENGE BUCCAL
Status: DISCONTINUED | OUTPATIENT
Start: 2020-05-04 | End: 2020-05-04

## 2020-05-04 RX ORDER — DEXTROSE, SODIUM CHLORIDE, SODIUM LACTATE, POTASSIUM CHLORIDE, AND CALCIUM CHLORIDE 5; .6; .31; .03; .02 G/100ML; G/100ML; G/100ML; G/100ML; G/100ML
INJECTION, SOLUTION INTRAVENOUS CONTINUOUS
Status: DISCONTINUED | OUTPATIENT
Start: 2020-05-04 | End: 2020-05-06 | Stop reason: HOSPADM

## 2020-05-04 RX ORDER — TRANEXAMIC ACID 10 MG/ML
1 INJECTION, SOLUTION INTRAVENOUS EVERY 30 MIN PRN
Status: DISCONTINUED | OUTPATIENT
Start: 2020-05-04 | End: 2020-05-05 | Stop reason: CLARIF

## 2020-05-04 RX ORDER — SODIUM CHLORIDE, SODIUM LACTATE, POTASSIUM CHLORIDE, CALCIUM CHLORIDE 600; 310; 30; 20 MG/100ML; MG/100ML; MG/100ML; MG/100ML
INJECTION, SOLUTION INTRAVENOUS CONTINUOUS
Status: DISCONTINUED | OUTPATIENT
Start: 2020-05-04 | End: 2020-05-05 | Stop reason: CLARIF

## 2020-05-04 RX ORDER — ACETAMINOPHEN 325 MG/1
650 TABLET ORAL EVERY 4 HOURS PRN
Status: DISCONTINUED | OUTPATIENT
Start: 2020-05-04 | End: 2020-05-04

## 2020-05-04 RX ORDER — OXYTOCIN 10 [USP'U]/ML
10 INJECTION, SOLUTION INTRAMUSCULAR; INTRAVENOUS
Status: DISCONTINUED | OUTPATIENT
Start: 2020-05-04 | End: 2020-05-05 | Stop reason: CLARIF

## 2020-05-04 RX ORDER — HYDROCORTISONE 2.5 %
CREAM (GRAM) TOPICAL 3 TIMES DAILY PRN
Status: DISCONTINUED | OUTPATIENT
Start: 2020-05-04 | End: 2020-05-06 | Stop reason: HOSPADM

## 2020-05-04 RX ADMIN — FENTANYL CITRATE 100 MCG: 50 INJECTION, SOLUTION INTRAMUSCULAR; INTRAVENOUS at 16:33

## 2020-05-04 RX ADMIN — Medication 10 ML/HR: at 19:16

## 2020-05-04 RX ADMIN — SODIUM CHLORIDE, SODIUM LACTATE, POTASSIUM CHLORIDE, CALCIUM CHLORIDE AND DEXTROSE MONOHYDRATE: 5; 600; 310; 30; 20 INJECTION, SOLUTION INTRAVENOUS at 18:31

## 2020-05-04 RX ADMIN — SODIUM CHLORIDE, POTASSIUM CHLORIDE, SODIUM LACTATE AND CALCIUM CHLORIDE: 600; 310; 30; 20 INJECTION, SOLUTION INTRAVENOUS at 16:19

## 2020-05-04 RX ADMIN — IBUPROFEN 800 MG: 800 TABLET ORAL at 20:29

## 2020-05-04 RX ADMIN — Medication 340 ML/HR: at 20:05

## 2020-05-04 RX ADMIN — ACETAMINOPHEN 650 MG: 325 TABLET, FILM COATED ORAL at 23:38

## 2020-05-04 RX ADMIN — FENTANYL CITRATE 100 MCG: 50 INJECTION, SOLUTION INTRAMUSCULAR; INTRAVENOUS at 17:52

## 2020-05-04 NOTE — H&P
[facility] Labor and Delivery History and Physical    Tosin Pfeiffer MRN# [id number]   Age: 34 year old YOB: 1985     Date of Admission:  2020    Primary care provider: Aaseby-Aguilera, Ramona Ann           Chief Complaint:   Tosin Pfeiffer is a 34 year old white female  Estimated Date of Delivery: May 29, 2020  who is 36w3d pregnant and being admitted for active labor management and PROM with return of clear fluid at approx 1330 hrs today.  No recent intercourse or other known ppt'ing event.  Previous op note reviewed (LST C/B for arrest of 2nd stage) with subsequent successful .  This pregnancy complicated with AMA and A2 GDM          Pregnancy history:     OBSTETRIC HISTORY:    OB History    Para Term  AB Living   3 2 2 0 0 2   SAB TAB Ectopic Multiple Live Births   0 0 0 0 2      # Outcome Date GA Lbr Bam/2nd Weight Sex Delivery Anes PTL Lv   3 Current            2 Term 09/15/18 38w3d 06:10 / 03:31 3.95 kg (8 lb 11.3 oz) M  EPI  ERIN      Name: BENNIE,SOFY JAMES      Apgar1: 9  Apgar5: 9   1 Term 14 38w1d 03:20 / 06:47 3.95 kg (8 lb 11.3 oz) F CS-LTranv EPI  ERIN      Name: Ada Pfeiffer      Apgar1: 8  Apgar5: 9      Obstetric Comments   Menarche at age of 10   lmp 4/26/15 -periods are regular, lasting about 4 days   No problems, using bcp for contraception   No hx of abnormal pap in past       EDC: Estimated Date of Delivery: 20    Prenatal Labs:   Lab Results   Component Value Date    ABO A 10/15/2019    RH Neg 10/15/2019    AS Neg 10/15/2019    HEPBANG Nonreactive 10/15/2019    CHPCRT Negative 10/15/2019    GCPCRT Negative 10/15/2019    TREPAB Negative 2018    RUBELLAABIGG 9 2013    HGB 12.0 2020    HIV Negative 2013       GBS Status:   Lab Results   Component Value Date    GBS Negative 2020       Active Problem List  Patient Active Problem List   Diagnosis     Hyperlipidemia LDL goal <160     Blood type  "A-     BMI 38.0-38.9,adult     Exercise-induced asthma     Hip pain, right     Sacral pain     Somatic dysfunction of sacral region     Acute right-sided low back pain without sciatica     Somatic dysfunction of lumbar region     Encounter for triage in pregnant patient     Indication for care in labor or delivery       Medication Prior to Admission  Medications Prior to Admission   Medication Sig Dispense Refill Last Dose     Docosahexaenoic Acid (DHA) 200 MG CAPS    5/3/2020 at Unknown time     insulin aspart (NOVOLOG PEN) 100 UNIT/ML pen Take 0 units with breakfast, 10 units with lunch, and 8 units at dinner. Plus 4 units/day for priming. TDD 18 units 6 mL 1 5/4/2020 at Unknown time     insulin  UNIT/ML vial Take 15 units at lunch and 65 units at bedtime 20 mL 3 5/4/2020 at Unknown time     omeprazole (PRILOSEC) 40 MG DR capsule Take 1 capsule (40 mg) by mouth daily 90 capsule 3 5/3/2020 at Unknown time     Prenatal Vit-Fe Fumarate-FA (PRENATAL MULTIVITAMIN PLUS IRON) 27-0.8 MG TABS per tablet Take by mouth daily   5/3/2020 at Unknown time     acetaminophen (TYLENOL) 325 MG tablet Take 325-650 mg by mouth every 6 hours as needed for mild pain        blood glucose (NO BRAND SPECIFIED) test strip Use to test blood sugar 4 times daily or as directed. accu chek brand 120 strip 3      blood glucose (ONETOUCH VERIO IQ) test strip Verio- Use to test blood sugar 4 times daily or as directed. 120 strip 11      blood glucose monitoring (ACCU-CHEK COMPACT CARE KIT) meter device kit Use to test blood sugar 4 times daily or as directed. 1 kit 0      blood glucose monitoring (ACCU-CHEK MULTICLIX) lancets Use to test blood sugar 4 times daily or as directed. 120 each 3      insulin pen needle (BD CHRIS U/F) 32G X 4 MM miscellaneous Use 3 pen needles daily or as directed. 100 each 3      insulin syringe-needle U-100 (BD VEO INSULIN SYRINGE U/F) 31G X 15/64\" 0.5 ML Use 1 syringe daily or as directed. 100 each 1      Urine " Glucose-Ketones Test STRP Daily as needed 50 each 1    .        Maternal Past Medical History:     Past Medical History:   Diagnosis Date     Anemia     on Iron     Asthma     exercise induced     Diabetes (H)     Gestational diabetes     Rh incompatibility     Received Rhogam at 28weeks                       Family History:   I have reviewed this patient's family history            Social History:   I have reviewed this patient's social history         Review of Systems:   CONSTITUTIONAL: NEGATIVE for fever, chills, change in weight  ENT/MOUTH: NEGATIVE for ear, mouth and throat problems  RESP: NEGATIVE for significant cough or SOB  CV: NEGATIVE for chest pain, palpitations or peripheral edema          Physical Exam:   Vitals were reviewed  All vitals stable  Temp: 98.9  F (37.2  C) Temp src: Oral BP: 125/76     Resp: 18 SpO2: 96 %      Constitutional:   awake, alert, cooperative, no apparent distress, and appears stated age     Eyes:   Lids and lashes normal, pupils equal, round and reactive to light, extra ocular muscles intact, sclera clear, conjunctiva normal     ENT:   Normocephalic, without obvious abnormality, atraumatic, sinuses nontender on palpation, external ears without lesions, oral pharynx with moist mucous membranes, tonsils without erythema or exudates, gums normal and good dentition.     Neck:   Supple, symmetrical, trachea midline, no adenopathy, thyroid symmetric, not enlarged and no tenderness, skin normal     Hematologic / Lymphatic:   no cervical lymphadenopathy and no supraclavicular lymphadenopathy     Back:   Symmetric, no curvature, spinous processes are non-tender on palpation, paraspinous muscles are non-tender on palpation, no costal vertebral tenderness     Lungs:   No increased work of breathing, good air exchange, clear to auscultation bilaterally, no crackles or wheezing     Cardiovascular:   Normal apical impulse, regular rate and rhythm, normal S1 and S2, no S3 or S4, and no  murmur noted     Abdomen:   Previous pfannenstiel scar , normal bowel sounds, soft, gravid uterus carbone infant Vtx EFW 7.2 # , non-tender, no masses palpated, no hepatosplenomegally     Genitounirinary:   External Genitalia:  General appearance; normal     Musculoskeletal:   There is no redness, warmth, or swelling of the joints.  Full range of motion noted.  Motor strength is 5 out of 5 all extremities bilaterally.  Tone is normal.     Neurologic:   Awake, alert, oriented to name, place and time.  Cranial nerves II-XII are grossly intact.  Motor is 5 out of 5 bilaterally.  Cerebellar finger to nose, heel to shin intact.  Sensory is intact.  Babinski down going, Romberg negative, and gait is normal.      Cervix:   Membranes: PPROM  clear amniotic fluid   Dilation: 3   Effacement: 90%   Station:-3   Consistency: average   Position: Posterior  Presentation:Cephalic  Fetal Heart Rate Tracing: reactive and reassuring, Tier 1 (normal)  Tocometer: external monitor and adequate                       Assessment:   Tosin Pfeiffer is a 36w3d pregnant female admitted with active labor management and previous  in a pregnancy complicated with A2 GDM , AMA and marginal cord insertion.          Plan:   Admit - see IP orders  Observation  Anticipate   Risks, benefits, and alternative modes of therapy discussed at length. Pathophysiology of the disease process reviewed, all of the patients questions answered and informed consent obtained.      Arya Plaza MD

## 2020-05-04 NOTE — TELEPHONE ENCOUNTER
36w3d  G 3 P 2.  Estimated Date of Delivery: May 29, 2020      Pt calls in and reports possible sx labor, they started 30 min ago.  Cxts: no cxts, just pressure  ROM (if not intact what time):  145pm  Fetal movement last felt: recent, nml  Position of baby at last OV:  Not noted  Dilated at last OV?: no.  GBS:  Neg  Blood type:  A neg    Scheduled for C section?: no,   Sent to L&D?:  Yes due to ROM    This message was routed to Dr Plaza, on call provider.  L&D aware.    Josie HECTOR R.N.

## 2020-05-04 NOTE — PROVIDER NOTIFICATION
05/04/20 1550   Provider Notification   Provider Name/Title Dr Plaza   Method of Notification Phone   Request Evaluate - Remote   Notification Reason Status Update   Updated re: pt arrival and history, contraction pattern, ROM plus positive, FHR category 1. Orders to admit for labor with diabetes management.

## 2020-05-04 NOTE — PROVIDER NOTIFICATION
05/04/20 1722   Provider Notification   Provider Name/Title Dr Plaza   Method of Notification At Bedside   Notification Reason Status Update   Dr at bedside talking to patient about POC. SVE done.

## 2020-05-04 NOTE — PLAN OF CARE
Data: [facility] presented to [facility]: 2020  2:45 PM.  Reason for maternal/fetal assessment is leaking vaginal fluid. Patient reports at 1330 she had a small gush of clear fluid with a small amount of bloody show. States the fluid and bloody show has continued to trickle off and on since then. Also states she began to feel contractions after the leaking started.  Patient is a .  Prenatal record reviewed. Pregnancy  has been complicated by gestational diabetes, insulin controlled, obesity, marginal cord insertion, and history of  with first delivery (vaginal for second delivery).  Gestational Age 36w3d. VSS. Fetal movement present. Support person, Juarez, is present.   Action: Verbal consent for EFM. Triage assessment completed. Bill of rights reviewed. ROM plus done, no obvious fluid noted at perineum, q-tip was pink tinged.  Response: Patient verbalized agreement with plan. Will contact Dr Arya Plaza with update and further orders.

## 2020-05-04 NOTE — PROVIDER NOTIFICATION
Dr Plaza in dept. Updated re: contraction pattern q 2-4 minutes increasing in strength. Pt has had one dose of fentanyl. First BS was elevated. Requested insulin from pharmacy, by the time the insulin arrived BS was 88 and insulin held.

## 2020-05-05 ENCOUNTER — TELEPHONE (OUTPATIENT)
Dept: OBGYN | Facility: CLINIC | Age: 35
End: 2020-05-05

## 2020-05-05 DIAGNOSIS — O24.419 GDM, CLASS A2: ICD-10-CM

## 2020-05-05 LAB
ABO + RH BLD: NORMAL
ABO + RH BLD: NORMAL
AMPHETAMINES UR QL SCN: NEGATIVE
BLOOD BANK CMNT PATIENT-IMP: NORMAL
CANNABINOIDS UR QL: NEGATIVE
COCAINE UR QL: NEGATIVE
DATE RH IMM GL GVN: NORMAL
FETAL CELL SCN BLD QL ROSETTE: NORMAL
GLUCOSE BLDC GLUCOMTR-MCNC: 100 MG/DL (ref 70–99)
GLUCOSE BLDC GLUCOMTR-MCNC: 109 MG/DL (ref 70–99)
GLUCOSE BLDC GLUCOMTR-MCNC: 88 MG/DL (ref 70–99)
GLUCOSE BLDC GLUCOMTR-MCNC: 97 MG/DL (ref 70–99)
OPIATES UR QL SCN: NEGATIVE
PCP UR QL SCN: NEGATIVE
RH IG VIALS RECOM PATIENT: NORMAL
T PALLIDUM AB SER QL: NONREACTIVE

## 2020-05-05 PROCEDURE — 86900 BLOOD TYPING SEROLOGIC ABO: CPT | Performed by: OBSTETRICS & GYNECOLOGY

## 2020-05-05 PROCEDURE — 25000128 H RX IP 250 OP 636: Performed by: OBSTETRICS & GYNECOLOGY

## 2020-05-05 PROCEDURE — 85461 HEMOGLOBIN FETAL: CPT | Performed by: OBSTETRICS & GYNECOLOGY

## 2020-05-05 PROCEDURE — 86901 BLOOD TYPING SEROLOGIC RH(D): CPT | Performed by: OBSTETRICS & GYNECOLOGY

## 2020-05-05 PROCEDURE — 25000132 ZZH RX MED GY IP 250 OP 250 PS 637: Performed by: OBSTETRICS & GYNECOLOGY

## 2020-05-05 PROCEDURE — 00000146 ZZHCL STATISTIC GLUCOSE BY METER IP

## 2020-05-05 PROCEDURE — 36415 COLL VENOUS BLD VENIPUNCTURE: CPT | Performed by: OBSTETRICS & GYNECOLOGY

## 2020-05-05 PROCEDURE — 12000000 ZZH R&B MED SURG/OB

## 2020-05-05 PROCEDURE — 80307 DRUG TEST PRSMV CHEM ANLYZR: CPT | Performed by: OBSTETRICS & GYNECOLOGY

## 2020-05-05 RX ORDER — INSULIN ASPART 100 [IU]/ML
INJECTION, SOLUTION INTRAVENOUS; SUBCUTANEOUS
Qty: 100 ML | Refills: 1 | Status: SHIPPED | OUTPATIENT
Start: 2020-05-05 | End: 2020-05-06

## 2020-05-05 RX ADMIN — SENNOSIDES AND DOCUSATE SODIUM 1 TABLET: 8.6; 5 TABLET ORAL at 09:33

## 2020-05-05 RX ADMIN — IBUPROFEN 800 MG: 800 TABLET ORAL at 02:24

## 2020-05-05 RX ADMIN — HUMAN RHO(D) IMMUNE GLOBULIN 300 MCG: 300 INJECTION, SOLUTION INTRAMUSCULAR at 11:26

## 2020-05-05 RX ADMIN — ACETAMINOPHEN 650 MG: 325 TABLET, FILM COATED ORAL at 04:23

## 2020-05-05 RX ADMIN — IBUPROFEN 800 MG: 800 TABLET ORAL at 09:33

## 2020-05-05 RX ADMIN — IBUPROFEN 800 MG: 800 TABLET ORAL at 21:15

## 2020-05-05 RX ADMIN — SENNOSIDES AND DOCUSATE SODIUM 1 TABLET: 8.6; 5 TABLET ORAL at 21:15

## 2020-05-05 NOTE — PLAN OF CARE
/ toxicity screening policy explained to patient including factors in history that require testing (unexplained  birth). Pt verbalized understanding and consents to testing on urine and cord segment.

## 2020-05-05 NOTE — PROGRESS NOTES
[facility]    Post-Partum Progress Note    Assessment & Plan   Assessment:  Post-partum day #1  Vaginal delivery after   GDMA2 with normal BS since delivery    Doing well.  No excessive bleeding  Pain well-controlled.  Baby to NICU for low BS issues.    Plan:  Ambulation encouraged  Pain control measures as needed  Reportable signs and symptoms dicussed with the patient  Anticipate discharge tomorrow  Discussed gestational diabetes follow up with 6 week postpartum exam.    Anika Owusu MD    Interval History   Doing well.  Pain is well-controlled.  No fevers.  No history of foul-smelling vaginal discharge.  Good appetite.  Denies chest pain, shortness of breath, nausea or vomiting.  Vaginal bleeding is similar to a heavy menstrual flow.  Ambulatory.      Medications     - MEDICATION INSTRUCTIONS -       dextrose 5% lactated ringers 125 mL/hr at 20 183     - MEDICATION INSTRUCTIONS -       - MEDICATION INSTRUCTIONS -       - MEDICATION INSTRUCTIONS -       - MEDICATION INSTRUCTIONS -       oxytocin in 0.9% NaCl 100 mL/hr (20)     oxytocin in 0.9% NaCl         fentaNYL (SUBLIMAZE) 2 mcg/mL, bupivacaine (MARCAINE) 0.125% in NaCl 0.9% for PCEA  10 mL/hr EPIDURAL PCEA     - MEDICATION INSTRUCTIONS -   Does not apply See Admin Instructions    Or     - MEDICATION INSTRUCTIONS -   Does not apply See Admin Instructions    Or     - MEDICATION INSTRUCTIONS -   Does not apply See Admin Instructions    Or     - MEDICATION INSTRUCTIONS -   Does not apply See Admin Instructions     insulin aspart  1-7 Units Subcutaneous TID AC     insulin aspart  1-5 Units Subcutaneous At Bedtime     senna-docusate  1 tablet Oral BID    Or     senna-docusate  2 tablet Oral BID       Physical Exam   Temp: 98.4  F (36.9  C) Temp src: Oral BP: 122/81 Pulse: 79 Heart Rate: 84 Resp: 18 SpO2: 100 %      There were no vitals filed for this visit.  Vital Signs with Ranges  Temp:  [97.7  F (36.5  C)-98.5  F  (36.9  C)] 98.4  F (36.9  C)  Pulse:  [79] 79  Heart Rate:  [71-84] 84  Resp:  [16-18] 18  BP: ()/(52-88) 122/81  SpO2:  [93 %-100 %] 100 %  I/O last 3 completed shifts:  In: 1236 [P.O.:236; I.V.:1000]  Out: 225 [Urine:150; Blood:75]    Uterine fundus is firm, non-tender and at the level of the umbilicus  Extremities Non-tender    Data   Recent Labs   Lab Test 05/05/20  0649 05/04/20  1610   ABO A A   RH Neg Neg   AS  --  Pos*     Recent Labs   Lab Test 05/04/20  1610 10/15/19  1332   HGB 12.0 13.8     Recent Labs   Lab Test 10/15/19  1332   RUQIGG 21

## 2020-05-05 NOTE — PROVIDER NOTIFICATION
05/04/20 1930   Provider Notification   Provider Name/Title Dr. Plaza   Method of Notification Phone   MD requested for delivery. SVE 10/100/0.

## 2020-05-05 NOTE — PROVIDER NOTIFICATION
05/04/20 1947   Provider Notification   Provider Name/Title Dr. Plaza   Method of Notification At Bedside   MD at bedside for delivery.

## 2020-05-05 NOTE — L&D DELIVERY NOTE
OB Vaginal Delivery Note    Tosin Pfeiffer MRN# [id number]   Age: 34 year old YOB: 1985       GA: 36w3d  GP:   Labor Complications:    EBL:   mL  Delivery QBL:    Delivery Type: , Spontaneous   ROM to Delivery Time: (Delivered) Hours: 6 Minutes: 31  [place] Weight: 3.4 kg (7 lb 7.9 oz)    1 Minute 5 Minute 10 Minute   Apgar Totals: 8   9             Delivery Details:  Tosin Pfeiffer, a 34 year old  female delivered a viable infant with apgars of 8  and 9 . Patient was fully dilated and pushing after   hours   minutes in active labor. Delivery was via , spontaneous  to a sterile field under intravenous regional;epidural  anesthesia. Infant delivered in vertex    occiput  anterior  position. Anterior and posterior shoulders delivered without difficulty. The cord was clamped, cut twice and 3 vessels  were noted. Cord blood was obtained in routine fashion with the following disposition: lab .      Cord complications: none   Placenta delivered at 2020  8:04 PM . Placental disposition was Hospital disposal . Fundal massage performed and fundus found to be firm.     Episiotomy: none    Perineum, vagina, cervix were inspected, and the following lacerations were noted:   Perineal lacerations: 1st   No repair required                   Excellent hemostasis was noted. Needle count correct. Infant and patient in delivery room in good and stable condition.        Labor Event Times    Labor onset date:  20 Onset time:   1:30 PM   Start pushing date/time:  2020 1954      Labor Events     labor?:  Yes   steroids:  None  Labor Type:  Spontaneous  Predominate monitoring during 1st stage:  continuous electronic fetal monitoring     Antibiotics received during labor?:  No     Rupture date/time: 20 1330   Rupture type:  Premature Rupture of Membranes  Fluid color:  Clear  Fluid odor:  Normal     1:1 continuous labor support provided by?:  RN       Delivery/Placenta Date  "and Time    Delivery Date:  20 Delivery Time:   8:01 PM   Placenta Date/Time:  2020  8:04 PM     Vaginal Counts     Initial count performed by 2 team members:   Two Team Members   Kerry Plaza        Needles Suture [place] Sponges Instruments   Initial counts 2  5    Added to count       Final counts       Placed during labor Accounted for at the end of labor   Yes Yes   No NA   No NA        Final count correct?:  Yes     Apgars    Living status:  Living   1 Minute 5 Minute 10 Minute 15 Minute 20 Minute   Skin color: 0  1       Heart rate: 2  2       Reflex irritability: 2  2       Muscle tone: 2  2       Respiratory effort: 2  2       Total: 8  9          Cord    Vessels:  3 Vessels Complications:  None   Cord Blood Disposition:  Lab Gases Sent?:  No      [place] Resuscitation    Methods:  None  [place] Care at Delivery:  NNP present for delivery      Measurements    Weight:  7 lb 7.9 oz Length:  1' 8\"   Head circumference:  30 cm       Skin to Skin and Feeding Plan    Skin to skin initiation date/time: 1841    Skin to skin with:  Mother  Skin to skin end date/time:     How do you plan to feed your baby:  Breastfeeding     Labor Events and Shoulder Dystocia    Fetal Tracing Prior to Delivery:  Category 1  Shoulder dystocia present?:  Neg     Delivery (Maternal) (Provider to Complete) (807410)    Episiotomy:  None  Perineal lacerations:  1st Repaired?:  No      Blood Loss  Mother: Tosin Pfeiffer #[id number]   Start of Mother's Information    IO Blood Loss  20 1330 - 20    None           End of Mother's Information  Mother: Tosin Pfeiffer #[id number]         Delivery - Provider to Complete ([id number])    Delivering clinician:  Arya Plaza MD  Attempted Delivery Types (Choose all that apply):  Vaginal Birth After   Delivery Type (Choose the 1 that will go to the Birth History):  , Spontaneous          Placenta    Delayed Cord Clamping:  Done  Date/Time:  " 5/4/2020  8:04 PM  Removal:  Spontaneous  Disposition:  Hospital disposal     Anesthesia    Method:  INTRAVENOUS REGIONAL, Epidural  Cervical dilation at placement:  4-7   Analgesic:   BIRTH HISTORY: ANALGESIC   FENTANYL (OPIOID AGONISTS)         Presentation and Position    Presentation:  Vertex   Occiput Anterior           Arya Plaza MD

## 2020-05-05 NOTE — PROVIDER NOTIFICATION
05/04/20 221   Provider Notification   Provider Name/Title Dr. Plaza   Method of Notification Phone   MD notified of 2 hr postpartum blood glucose of 159. Patient had two packages of rachna crackers. Two elevated pressures of 161/73 and 160/74 15 minutes apart followed by 135/66 5 minutes later. Patient denying s/s of preeclampsia at this time. MD verbalized understanding. Will continue to monitor, no additional orders at this time. Will plan on fasting glucose in AM.

## 2020-05-05 NOTE — PLAN OF CARE
VSS- pressures mildly elevated but patient denies s/s of preeclampsia. Breastfeeding  with some assistance on latch as  is LPT. Voiding without difficulty and ambulating in room ad ashley. Ibuprofen, Tylenol, and heat pack provided for lower back pain. Bonding well with . Fasting glucose 97 this AM.

## 2020-05-05 NOTE — LACTATION NOTE
This note was copied from a baby's chart.  LC visit.  Infant is LPT and had a low OT this morning. When LC entered the room, he was latched and nursing well and effectively.  He was given glucose gel and then latched again. He finished nursing, took donor milk and his blood sugar recovered well.  Plan today to continue to nurse often, use frequent STS contact, and initiate pumping post feeds if the need for donor milk persists. All questions were answered. Support provided.

## 2020-05-05 NOTE — PROVIDER NOTIFICATION
05/04/20 1922   Provider Notification   Provider Name/Title Dr Plaza   Method of Notification Phone   Request Evaluate - Remote   Notification Reason Status Update   Dr Plaza updated re: blood sugars and treatments including apple juice and initiating D5LR with improvement in blood sugars. Also updated re: some elevated BP's in the 140-150's systolic while patient was very uncomfortable with contraction pain. BP back in 120-130's systolic when not in pain. Pt received epidural at 1900.

## 2020-05-05 NOTE — PLAN OF CARE
Meeting goals for shift, see flow sheet. Up and voiding without difficulty, comfortable, using IBU and tylenol. Blood sugars before meals WNL. Infant transferred to NICU this am apx 1150 for unstable blood sugars. Mother doing well, is in NICU at this time. IPAD set up in room. Will start  breast pumping when mother returns.

## 2020-05-05 NOTE — ANESTHESIA PROCEDURE NOTES
Procedure note :  Staff -         Performed By: anesthesiologist               Assessment/Narrative  .  .  . Comments:  Pre-Procedure  Performed by Cosme Yung MD  Location: OB.      PreAnesthestic Checklist: patient identified, IV checked, risks and benefits discussed, informed consent obtained, monitors and equipment checked, pre-op evaluation and at physician/surgeon's request.    Timeout   Correct Patient: Yes  Correct Procedure: Epidural catheter placement  Correct Site: Yes   Correct Position: Yes    Procedure Documentation  Procedure:   Epidural catheter block for Labor    Patient currently in labor and she and OBMD request a labor epidural to control her labor pains. Patient was interviewed and examined. Procedure and risks including but not limited to bleeding, infection, nerve injury, paralysis, PDPH, and inadequate block requiring intervention discussed with patient. Questions answered. This epidural is to be placed in anticipation of vaginal delivery.  She consents to the epidural procedure.  Time-out was performed.  I or my partners remain immediately available for management of any issues or complications and will monitor at appropriate intervals.  Procedure: Patient sitting. Betadine prep x 3. Sterile drape applied.  Mask and gloves used.  Lidocaine 1%  local infiltration at L 3-4.  17 G. Tuohy needle at L3-4 by loss of resistance into epidural space.  No CSF, paresthesia or blood. 1.5 % Lidocaine with 1:200,000 Epinephrine 5cc test dose. Epidural catheter inserted w/o resistance to 5 cm in epidural space. Then 0.125% bupivicaine with fentanyl 2 mcg/ml 12 cc with NS 5 cc.  Aspiration negative for blood and CSF.   Negative for neuro change, paresthesia or symptoms of intravascular injection or intrathecal injection.  Infusion orders written and infusion of 0.125% bupivicaine with fentanyl 2 mcg/ml at 10cc per hour started.    Cosme Yung MD

## 2020-05-05 NOTE — PROGRESS NOTES
NUTRITION ASSESSMENT    REASON FOR ASSESSMENT:    Admission Nutrition Risk Screen for  New/Uncontrolled Diabetes     CURRENT DIET AND NOURISHMENT ORDER:  Information obtained from chart     Diet: Moderate Consistent CHO     Current Intake/Tolerance: none recorded    NUTRITION STATUS VALIDATION:  Weight status: Obesity Grade III BMI >40    MALNUTRITION:  Patient does not meet two of the following criteria necessary for diagnosing malnutrition: significant weight loss, reduced intake, subcutaneous fat loss, muscle loss or fluid retention    NUTRITION DIAGNOSIS:  No nutrition diagnosis at this time.    INTERVENTION:    Nutrition Prescription:     Gestational diabetes intervention not indicated as patient has already delivered on 5/4.    Anticipate patient will discharge on regular diet    Implementation:      Diet education - per MD order or as appropriate    Follow Up/Monitoring:      No need for further follow up unless another consult is received.    Lori Gonzalez RD, LD  Clinical Dietitian

## 2020-05-05 NOTE — PLAN OF CARE
Pt transferred via wheelchair to room 422 at 2230. Infant transferred in mother's arms. Vitally stable with some elevated BPS (see provider notification), postpartum bleeding and fundal checks WNL. Pt assisted to void but was unable to empty bladder. Will attempt again after transfer to postpartum and next infant feeding is complete. Ibuprofen provided for pain management.     Patients mobililty level scored using the bedside mobility assistance tool (BMAT). Patient is at a mobility level test number: 4. Mobility equipment used: none required. Required assist of 0 staff members. Further use of BMAT scoring not required.

## 2020-05-05 NOTE — ANESTHESIA POSTPROCEDURE EVALUATION
Patient: Tosin Pfeiffer    * No procedures listed *    Diagnosis:* No pre-op diagnosis entered *  Diagnosis Additional Information: No value filed.    Anesthesia Type:  No value filed.    Note:  Anesthesia Post Evaluation    Patient location during evaluation: Floor  Patient participation: Able to fully participate in evaluation  Level of consciousness: awake and alert  Pain management: adequate  Airway patency: patent  Cardiovascular status: acceptable  Respiratory status: acceptable  Hydration status: acceptable  PONV: none       Comments:   Labor Epidural Post delivery note.      Doing well. VSS Temp normal. Satisfactory respiratory and cardiovascular function. Return of neurologic function. Questions encouraged and answered. Denies positional headache. Minimal side effects easily managed w/ PRN meds. No apparent anesthetic complications. No follow-up required.    I or my partner was immediately available for management of this patient during epidural analgesia infusion.    Gil Brown MD          Last vitals:  Vitals:    05/05/20 0007 05/05/20 0415 05/05/20 0423   BP: (!) 152/88 133/79    Resp:  16 16   Temp:      SpO2:            Electronically Signed By: Gil Brown MD  May 5, 2020  8:31 AM

## 2020-05-05 NOTE — ANESTHESIA PREPROCEDURE EVALUATION
Anesthesia Pre-Procedure Evaluation    Patient: Tosin Pfeiffer   MRN: [id number] : 1985          Preoperative Diagnosis: * No surgery found *        Past Medical History:   Diagnosis Date     Anemia     on Iron     Asthma     exercise induced     Diabetes (H)     Gestational diabetes     Rh incompatibility     Received Rhogam at 28weeks     Past Surgical History:   Procedure Laterality Date      SECTION  2014    Procedure:  SECTION;  Surgeon: Cam Armendariz MD;  Location: [facility] OR      SECTION N/A 2018    did not have this c-sec.      wisdom teeth removed  age 15     Anesthesia Evaluation       history and physical reviewed .             ROS/MED HX    ENT/Pulmonary:       Neurologic:       Cardiovascular:  - neg cardiovascular ROS       METS/Exercise Tolerance:     Hematologic:         Musculoskeletal:         GI/Hepatic:         Renal/Genitourinary:         Endo:         Psychiatric:         Infectious Disease:         Malignancy:         Other:                       (+) gestational diabetes          Physical Exam      Airway   Mallampati: III  TM distance: > 3 FB  Neck ROM: full    Dental     Cardiovascular       Pulmonary             Lab Results   Component Value Date    WBC 9.6 10/15/2019    HGB 12.0 2020    HCT 41.6 10/15/2019     10/15/2019    CRP 39.0 (H) 09/10/2015    SED 6 09/10/2015     2018    POTASSIUM 4.1 2018    CHLORIDE 108 2018    CO2 21 2018    BUN 11 2018    CR 0.67 09/15/2018    GLC 89 2018    ABDIAS 8.7 2018    ALBUMIN 3.0 (L) 2018    PROTTOTAL 6.6 (L) 2018    ALT 17 09/15/2018    AST 19 09/15/2018    ALKPHOS 148 2018    BILITOTAL 0.6 2018    LIPASE 54 2013    TSH 2.43 2017    HCG Negative 2013       Preop Vitals  BP Readings from Last 3 Encounters:   20 133/75   20 128/78   20 120/70    Pulse Readings from Last 3 Encounters:  "  10/15/19 68   11/17/18 77   09/17/18 93      Resp Readings from Last 3 Encounters:   05/04/20 18   11/17/18 16   09/17/18 18    SpO2 Readings from Last 3 Encounters:   05/04/20 99%   11/17/18 97%   09/10/15 97%      Temp Readings from Last 1 Encounters:   05/04/20 97.7  F (36.5  C) (Oral)    Ht Readings from Last 1 Encounters:   04/28/20 1.588 m (5' 2.5\")      Wt Readings from Last 1 Encounters:   04/28/20 111.8 kg (246 lb 6.4 oz)    Estimated body mass index is 44.35 kg/m  as calculated from the following:    Height as of 4/28/20: 1.588 m (5' 2.5\").    Weight as of 4/28/20: 111.8 kg (246 lb 6.4 oz).       Anesthesia Plan      History & Physical Review      ASA Status:  3 .             Postoperative Care      Consents  Anesthetic plan, risks, benefits and alternatives discussed with:  Patient..                 Cosme Yung MD                    .  "

## 2020-05-06 VITALS
HEART RATE: 79 BPM | OXYGEN SATURATION: 100 % | RESPIRATION RATE: 16 BRPM | TEMPERATURE: 97.5 F | SYSTOLIC BLOOD PRESSURE: 134 MMHG | DIASTOLIC BLOOD PRESSURE: 82 MMHG

## 2020-05-06 PROCEDURE — 25000132 ZZH RX MED GY IP 250 OP 250 PS 637: Performed by: OBSTETRICS & GYNECOLOGY

## 2020-05-06 PROCEDURE — 40000083 ZZH STATISTIC IP LACTATION SERVICES 1-15 MIN

## 2020-05-06 RX ADMIN — IBUPROFEN 800 MG: 800 TABLET ORAL at 03:36

## 2020-05-06 RX ADMIN — SENNOSIDES AND DOCUSATE SODIUM 1 TABLET: 8.6; 5 TABLET ORAL at 13:09

## 2020-05-06 NOTE — LACTATION NOTE
LC visit. Infant remains in NICU.  Tosin is pumping and feels a little frustrated with low milk volumes.  LC reviewed pumping expectations, reviewed frequency and duration of pumping, assisted with HE and discussed the importance of tactile stimulation and breast massage.  LC also provided encouragement and support.  She will discharge today; infant will remain in NICU. LC discussed pumping at bedside immediately following latch attempts, pump settings at home, and average length of time before full milk transitions.  All questions were answered.

## 2020-05-06 NOTE — PLAN OF CARE
Spent much of shift in NICU with baby. Pumping with minimal results, consistency encouraged. Ibuprofen for pain with stated relief, ambulating in halls, denies difficulty voiding.

## 2020-05-06 NOTE — PROGRESS NOTES
NOTE COPIED FROM 'S CHART:    [facility]  MATERNAL CHILD HEALTH   INITIAL NICU PSYCHOSOCIAL ASSESSMENT      DATA:      Reason for Social Work Consult: NICU admission     Presenting Information: Parents are Tosin and Jesus. OMAR met with Tosin in her postpartum room.     Living Situation: Pts parents live together with their two older children, ages 6 and 20 months.     Social Support: Tosin reports having good social support.     Employment: Tosin reports being normally employed full-time. She reports she has a full three month maternity leave. She reports Jesus also works full-time and will have three weeks off.     Insurance: Commercial     Source of Financial Support: Employment      Mental Health History: Tosin denies any history of mental health concerns.     History of Postpartum Mood Disorders: Tosin denies any issues with postpartum mood disorders after her first two children were born.     Community Resources//Baby Supplies: Tosin denies any needs related to resources, supplies or financial concerns.     INTERVENTION:        OMAR completed chart review and collaborated with the multidisciplinary team.     Psychosocial Assessment     Introduction to Maternal Child Health  role and scope of practice     Discussed NICU experience and gave NICU welcome card    Reviewed Hospital and Community Resources     Assessed Mental Health History and Current Symptoms     Identified stressors, barriers and family concerns     Provided support and active empathetic listening and validation.     Provided psychoeducation on  mood and anxiety disorders, assessed for any current symptoms or history    Provided brochure Depression and Anxiety During and after Pregnancy.      ASSESSMENT:      OMAR met with pts mother, Tosin, briefly in her postpartum room. She reported a plan to be discharged this afternoon. She reports feeling well currently. She reports plan to  visit the NICU and then return home. She denies any concerns at this time or any needs. She reports that none of her other children needed to be in the NICU and she anticipates it will be hard to go home without her baby. OMAR provided support. SW provided parent resources, Depression and Anxiety after pregnancy pamphlet, and SW NICU welcome card.      PLAN:      OMAR will continue to follow throughout pt's Maternal-Child Health Journey as needs arise. OMAR will continue to collaborate with the multidisciplinary team.  MILANA Pride on 5/6/2020 at 2:59 PM

## 2020-05-06 NOTE — TELEPHONE ENCOUNTER
Pharmacy faxed message for Novolog, stating it is missing information of amount she is injecting daily.  Please re-send.    Kailey Frederick RN

## 2020-05-06 NOTE — PLAN OF CARE
Stable patient meeting expected goals. Pain being managed with ibuprofen. Motherlove given for comfort. Independent with self cares. Pumping and attempting to breastfeed  in NICU. Preparing for discharge.

## 2020-05-06 NOTE — DISCHARGE INSTRUCTIONS
Postpartum Vaginal Delivery Instructions  Lactation: [phone]    Activity       Ask family and friends for help when you need it.    Do not place anything in your vagina for 6 weeks.    You are not restricted on other activities, but take it easy for a few weeks to allow your body to recover from delivery.  You are able to do any activities you feel up to that point.    No driving until you have stopped taking your pain medications (usually two weeks after delivery).     Call your health care provider if you have any of these symptoms:       Increased pain, swelling, redness, or fluid around your stiches from an episiotomy or perineal tear.    A fever above 100.4 F (38 C) with or without chills when placing a thermometer under your tongue.    You soak a sanitary pad with blood within 1 hour, or you see blood clots larger than a golf ball.    Bleeding that lasts more than 6 weeks.    Vaginal discharge that smells bad.    Severe pain, cramping or tenderness in your lower belly area.    A need to urinate more frequently (use the toilet more often), more urgently (use the toilet very quickly), or it burns when you urinate.    Nausea and vomiting.    Redness, swelling or pain around a vein in your leg.    Problems breastfeeding or a red or painful area on your breast.    Chest pain and cough or are gasping for air.    Problems coping with sadness, anxiety, or depression.  If you have any concerns about hurting yourself or the baby, call your provider immediately.     You have questions or concerns after you return home.     Keep your hands clean:  Always wash your hands before touching your perineal area and stitches.  This helps reduce your risk of infection.  If your hands aren't dirty, you may use an alcohol hand-rub to clean your hands. Keep your nails clean and short.

## 2020-05-06 NOTE — DISCHARGE SUMMARY
[facility] Obstetrics Post-Partum Progress Note          Assessment and Plan:    Assessment:   Post-partum day #2  Vaginal delivery after   GDM,A2  L&D complications: None      Doing well.  No excessive bleeding  Pain well-controlled.  Normal BS since delivery      Plan:   Ambulation encouraged  Discharge later today  Follow up 6 weeks with 2hr GTT           Interval History:   Doing well.  Pain is well-controlled.  No fevers.  No history of foul-smelling vaginal discharge.  Good appetite.  Denies chest pain, shortness of breath, nausea or vomiting.  Vaginal bleeding is similar to a heavy menstrual flow.  Ambulatory.  Breastfeeding well.          Significant Problems:      Past Medical History:   Diagnosis Date     Anemia     on Iron     Asthma     exercise induced     Diabetes (H)     Gestational diabetes     Rh incompatibility     Received Rhogam at 28weeks                   Physical Exam:     All vitals stable  Patient Vitals for the past 12 hrs:   BP Temp Temp src Heart Rate Resp   20 0300 124/85 98  F (36.7  C) Oral 75 16     Uterine fundus is firm, non-tender and at the level of the umbilicus  Ext NT     Farshad Astudillo MD  2020 7:48 AM

## 2020-05-06 NOTE — PROVIDER NOTIFICATION
05/06/20 0745   Provider Notification   Provider Name/Title Dr. Astudillo   Method of Notification At Bedside   MD at bedside. Plan for patient to discharge today. Patient states Dr. Ramirez is sending prescription for her breast pump. MD states no need to check blood sugar this morning.

## 2020-05-06 NOTE — PLAN OF CARE
Stable postpartum patient meeting all expected goals.  Pain controlled with ibuprofen, voiding adequately, and up ad ashley.

## 2020-05-06 NOTE — PLAN OF CARE
Assumed cares of patient at 1100.  Patient was in the NICU until 1300 & daily assesment was done by previous nurse.  Patient denied any pain upon returning.  She stated that her son would be staying in the NICU until discharge-hopefully tomorrow.  She agreed that she was ready to be discharged today but requested that she nap before going.  Discharge paperwork was reviewed with patient and all questions were answered. Patient stated an understanding.  Patient discharged ambulatory to home & stopped in the NICU to see son.    will pick her up when she is ready to leave the NICU.

## 2020-05-07 NOTE — TELEPHONE ENCOUNTER
Hi, she is now done with insulin as she delivered!   So not needed   Dr. Tari Ramirez, DO    Obstetrics and Gynecology  [facility] [place] [facility] [place]

## 2020-08-07 ENCOUNTER — TELEPHONE (OUTPATIENT)
Dept: FAMILY MEDICINE | Facility: CLINIC | Age: 35
End: 2020-08-07

## 2020-08-07 NOTE — TELEPHONE ENCOUNTER
Reason for call:  Other   Patient called regarding (reason for call): call back    Additional comments: per patient , she wanted to get an order put in for labs ( 1 hour glucose and fasting labs)     Phone number to reach patient:  Home number on file [phone] (home)    Best Time:  Anytime     Can we leave a detailed message on this number?  NO    Travel screening: Not Applicable

## 2020-08-10 ENCOUNTER — MYC MEDICAL ADVICE (OUTPATIENT)
Dept: FAMILY MEDICINE | Facility: CLINIC | Age: 35
End: 2020-08-10

## 2020-08-10 DIAGNOSIS — Z86.32 HX GESTATIONAL DIABETES: Primary | ICD-10-CM

## 2020-08-13 DIAGNOSIS — Z86.32 HX GESTATIONAL DIABETES: ICD-10-CM

## 2020-08-13 LAB
GLUCOSE 2H P 75 G GLC PO SERPL-MCNC: 118 MG/DL (ref 60–200)
GLUCOSE PRE 75 G GLC PO SERPL-MCNC: 98 MG/DL (ref 60–126)

## 2020-08-13 PROCEDURE — 82947 ASSAY GLUCOSE BLOOD QUANT: CPT | Performed by: OBSTETRICS & GYNECOLOGY

## 2020-08-13 PROCEDURE — 82950 GLUCOSE TEST: CPT | Performed by: OBSTETRICS & GYNECOLOGY

## 2020-08-13 PROCEDURE — 36415 COLL VENOUS BLD VENIPUNCTURE: CPT | Performed by: OBSTETRICS & GYNECOLOGY

## 2020-08-20 ASSESSMENT — ENCOUNTER SYMPTOMS
NAUSEA: 0
HEADACHES: 0
CHILLS: 0
NERVOUS/ANXIOUS: 0
ABDOMINAL PAIN: 0
SORE THROAT: 0
BREAST MASS: 0
FEVER: 0
MYALGIAS: 0
COUGH: 0
JOINT SWELLING: 0
PARESTHESIAS: 0
CONSTIPATION: 0
PALPITATIONS: 0
WEAKNESS: 0
EYE PAIN: 0
HEMATURIA: 0
DYSURIA: 0
FREQUENCY: 0
ARTHRALGIAS: 0
HEARTBURN: 0
DIARRHEA: 0
DIZZINESS: 0
HEMATOCHEZIA: 0
SHORTNESS OF BREATH: 0

## 2020-08-21 ENCOUNTER — OFFICE VISIT (OUTPATIENT)
Dept: FAMILY MEDICINE | Facility: CLINIC | Age: 35
End: 2020-08-21
Payer: COMMERCIAL

## 2020-08-21 VITALS
SYSTOLIC BLOOD PRESSURE: 118 MMHG | DIASTOLIC BLOOD PRESSURE: 84 MMHG | BODY MASS INDEX: 37.56 KG/M2 | WEIGHT: 212 LBS | RESPIRATION RATE: 14 BRPM | TEMPERATURE: 98.3 F | HEIGHT: 63 IN | HEART RATE: 66 BPM

## 2020-08-21 DIAGNOSIS — Z13.29 SCREENING FOR THYROID DISORDER: ICD-10-CM

## 2020-08-21 DIAGNOSIS — Z12.83 SKIN CANCER SCREENING: ICD-10-CM

## 2020-08-21 DIAGNOSIS — Z13.1 SCREENING FOR DIABETES MELLITUS: ICD-10-CM

## 2020-08-21 DIAGNOSIS — E66.01 MORBID OBESITY (H): ICD-10-CM

## 2020-08-21 DIAGNOSIS — Z13.220 SCREENING, LIPID: ICD-10-CM

## 2020-08-21 DIAGNOSIS — Z13.0 SCREENING FOR BLOOD DISEASE: Primary | ICD-10-CM

## 2020-08-21 LAB
ERYTHROCYTE [DISTWIDTH] IN BLOOD BY AUTOMATED COUNT: 13.2 % (ref 10–15)
HCT VFR BLD AUTO: 41 % (ref 35–47)
HGB BLD-MCNC: 13.6 G/DL (ref 11.7–15.7)
MCH RBC QN AUTO: 30.8 PG (ref 26.5–33)
MCHC RBC AUTO-ENTMCNC: 33.2 G/DL (ref 31.5–36.5)
MCV RBC AUTO: 93 FL (ref 78–100)
PLATELET # BLD AUTO: 227 10E9/L (ref 150–450)
RBC # BLD AUTO: 4.42 10E12/L (ref 3.8–5.2)
WBC # BLD AUTO: 5.4 10E9/L (ref 4–11)

## 2020-08-21 PROCEDURE — 80061 LIPID PANEL: CPT | Performed by: PHYSICIAN ASSISTANT

## 2020-08-21 PROCEDURE — 99395 PREV VISIT EST AGE 18-39: CPT | Performed by: PHYSICIAN ASSISTANT

## 2020-08-21 PROCEDURE — 36415 COLL VENOUS BLD VENIPUNCTURE: CPT | Performed by: PHYSICIAN ASSISTANT

## 2020-08-21 PROCEDURE — 84443 ASSAY THYROID STIM HORMONE: CPT | Performed by: PHYSICIAN ASSISTANT

## 2020-08-21 PROCEDURE — 85027 COMPLETE CBC AUTOMATED: CPT | Performed by: PHYSICIAN ASSISTANT

## 2020-08-21 PROCEDURE — 80053 COMPREHEN METABOLIC PANEL: CPT | Performed by: PHYSICIAN ASSISTANT

## 2020-08-21 ASSESSMENT — ENCOUNTER SYMPTOMS
FEVER: 0
HEMATURIA: 0
HEMATOCHEZIA: 0
WEAKNESS: 0
ARTHRALGIAS: 0
ABDOMINAL PAIN: 0
NERVOUS/ANXIOUS: 0
SORE THROAT: 0
HEADACHES: 0
CHILLS: 0
NAUSEA: 0
JOINT SWELLING: 0
DIARRHEA: 0
FREQUENCY: 0
CONSTIPATION: 0
SHORTNESS OF BREATH: 0
DYSURIA: 0
EYE PAIN: 0
DIZZINESS: 0
BREAST MASS: 0
PALPITATIONS: 0
COUGH: 0
MYALGIAS: 0
PARESTHESIAS: 0
HEARTBURN: 0

## 2020-08-21 ASSESSMENT — MIFFLIN-ST. JEOR: SCORE: 1612.82

## 2020-08-22 LAB
ALBUMIN SERPL-MCNC: 4.4 G/DL (ref 3.4–5)
ALP SERPL-CCNC: 70 U/L (ref 40–150)
ALT SERPL W P-5'-P-CCNC: 23 U/L (ref 0–50)
ANION GAP SERPL CALCULATED.3IONS-SCNC: 8 MMOL/L (ref 3–14)
AST SERPL W P-5'-P-CCNC: 19 U/L (ref 0–45)
BILIRUB SERPL-MCNC: 0.9 MG/DL (ref 0.2–1.3)
BUN SERPL-MCNC: 16 MG/DL (ref 7–30)
CALCIUM SERPL-MCNC: 8.9 MG/DL (ref 8.5–10.1)
CHLORIDE SERPL-SCNC: 107 MMOL/L (ref 94–109)
CHOLEST SERPL-MCNC: 160 MG/DL
CO2 SERPL-SCNC: 23 MMOL/L (ref 20–32)
CREAT SERPL-MCNC: 0.83 MG/DL (ref 0.52–1.04)
GFR SERPL CREATININE-BSD FRML MDRD: >90 ML/MIN/{1.73_M2}
GLUCOSE SERPL-MCNC: 89 MG/DL (ref 70–99)
HDLC SERPL-MCNC: 45 MG/DL
LDLC SERPL CALC-MCNC: 98 MG/DL
NONHDLC SERPL-MCNC: 115 MG/DL
POTASSIUM SERPL-SCNC: 4.5 MMOL/L (ref 3.4–5.3)
PROT SERPL-MCNC: 7.7 G/DL (ref 6.8–8.8)
SODIUM SERPL-SCNC: 138 MMOL/L (ref 133–144)
TRIGL SERPL-MCNC: 84 MG/DL
TSH SERPL DL<=0.005 MIU/L-ACNC: 1.31 MU/L (ref 0.4–4)

## 2020-08-22 ASSESSMENT — ASTHMA QUESTIONNAIRES: ACT_TOTALSCORE: 25

## 2020-12-14 ENCOUNTER — HEALTH MAINTENANCE LETTER (OUTPATIENT)
Age: 35
End: 2020-12-14

## 2020-12-22 ENCOUNTER — OFFICE VISIT (OUTPATIENT)
Dept: DERMATOLOGY | Facility: CLINIC | Age: 35
End: 2020-12-22
Attending: PHYSICIAN ASSISTANT
Payer: COMMERCIAL

## 2020-12-22 DIAGNOSIS — D22.9 MULTIPLE NEVI: ICD-10-CM

## 2020-12-22 DIAGNOSIS — L81.4 SOLAR LENTIGINOSIS: ICD-10-CM

## 2020-12-22 DIAGNOSIS — L82.1 SEBORRHEIC KERATOSIS: Primary | ICD-10-CM

## 2020-12-22 DIAGNOSIS — Z12.83 SKIN CANCER SCREENING: ICD-10-CM

## 2020-12-22 PROCEDURE — 99203 OFFICE O/P NEW LOW 30 MIN: CPT | Performed by: DERMATOLOGY
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
## 2021-02-24 ENCOUNTER — OFFICE VISIT (OUTPATIENT)
Dept: FAMILY MEDICINE | Facility: CLINIC | Age: 36
End: 2021-02-24
Payer: COMMERCIAL

## 2021-02-24 ENCOUNTER — NURSE TRIAGE (OUTPATIENT)
Dept: NURSING | Facility: CLINIC | Age: 36
End: 2021-02-24

## 2021-02-24 VITALS
WEIGHT: 181 LBS | DIASTOLIC BLOOD PRESSURE: 80 MMHG | RESPIRATION RATE: 18 BRPM | HEIGHT: 63 IN | SYSTOLIC BLOOD PRESSURE: 128 MMHG | HEART RATE: 73 BPM | BODY MASS INDEX: 32.07 KG/M2 | TEMPERATURE: 98.4 F | OXYGEN SATURATION: 99 %

## 2021-02-24 DIAGNOSIS — K64.5 THROMBOSED EXTERNAL HEMORRHOIDS: Primary | ICD-10-CM

## 2021-02-24 DIAGNOSIS — Z80.0 FAMILY HISTORY OF COLON CANCER: ICD-10-CM

## 2021-02-24 PROCEDURE — 99213 OFFICE O/P EST LOW 20 MIN: CPT | Performed by: PHYSICIAN ASSISTANT

## 2021-02-24 RX ORDER — HYDROCORTISONE 25 MG/G
CREAM TOPICAL 2 TIMES DAILY PRN
Qty: 30 G | Refills: 0 | Status: SHIPPED | OUTPATIENT
Start: 2021-02-24 | End: 2021-12-08

## 2021-02-24 ASSESSMENT — MIFFLIN-ST. JEOR: SCORE: 1467.2

## 2021-02-24 NOTE — PROGRESS NOTES
"    Assessment & Plan     Thrombosed external hemorrhoids  Well refer to colorectal   - COLORECTAL SURGERY REFERRAL  - hydrocortisone, Perianal, (HYDROCORTISONE) 2.5 % cream; Place rectally 2 times daily as needed for hemorrhoids    Family history of colon cancer    - GASTROENTEROLOGY ADULT REF PROCEDURE ONLY; Future             BMI:   Estimated body mass index is 32.58 kg/m  as calculated from the following:    Height as of this encounter: 1.588 m (5' 2.5\").    Weight as of this encounter: 82.1 kg (181 lb).       See Patient Instructions    No follow-ups on file.    Ramona Ann Aaseby-Aguilera, PA-C [facility] is [facility] 35 year [facility] who presents for the following health issues     History of Present Illness       She eats 4 or more servings of fruits and vegetables daily.She consumes 0 sweetened beverage(s) daily.She exercises with enough effort to increase her heart rate 30 to 60 minutes per day.  She exercises with enough effort to increase her heart rate 6 days per week.   She is taking medications regularly.       Gave birth 10 months ago and has had hemorrhoids ever since. The last 2 months has had swelling and more pain.  States has easy BM's.  Doesn't strain. Has bm q 2 days.       Hemorrhoids  Onset/Duration: long time   Description:   Ellyn-anal lump: YES  Pain: YES  Itching: no  Accompanying Signs & Symptoms:  Blood in stool: no  Changes in stool pattern: no  History:   Any previous GI studies done:none  Family History of colon cancer: yes - (dad at 69 years old )   Precipitating factors:   None  Alleviating factors:  None  Therapies tried and outcome: none        Also, she has additional complaints of da diagnosed with colon cancer this last summer and had a portion of colon removed. He is in his late 60's.         Review of Systems   Constitutional, HEENT, cardiovascular, pulmonary, gi and gu systems are negative, except as otherwise noted.      Objective    BP " "128/80 (BP Location: Right arm, Patient Position: Chair, Cuff Size: Adult Large)   Pulse 73   Temp 98.4  F (36.9  C) (Oral)   Resp 18   Ht 1.588 m (5' 2.5\")   Wt 82.1 kg (181 lb)   SpO2 99%   BMI 32.58 kg/m    Body mass index is 32.58 kg/m .  Physical Exam   GENERAL: healthy, alert and no distress  RESP: lungs clear to auscultation - no rales, rhonchi or wheezes  CV: regular rate and rhythm, normal S1 S2, no S3 or S4, no murmur, click or rub, no peripheral edema and peripheral pulses strong  Rectal: 1 cm thrombosed hemorrhoid              "

## 2021-02-24 NOTE — NURSING NOTE
Future Appointments   Date Time Provider Department Center   2/24/2021  2:10 PM Aaseby-Aguilera, Ramona Ann, PA-C [facility]     Appointment Notes for this encounter:   fna triaged, rectal pain,hemorrhoids    Health Maintenance Due   Topic Date Due     ANNUAL REVIEW OF HM ORDERS  1985     Pneumococcal Vaccine: Pediatrics (0 to 5 Years) and At-Risk Patients (6 to 64 Years) (1 of 2 - PPSV23) 11/11/1989     HEPATITIS C SCREENING  11/11/2001     ASTHMA ACTION PLAN  05/09/2018     INFLUENZA VACCINE (1) 09/01/2020     ASTHMA CONTROL TEST  02/21/2021     Health Maintenance addressed:  Immunizations and ACT    PHQ9 / ROSIE / ACT Gave pt questionnaire to complete and Immunizations Pt declined    MyChart Status:  Active and Using

## 2021-02-24 NOTE — TELEPHONE ENCOUNTER
"\"I am having ongoing hemmorhoids x  2 months.\"   Patient is doing Epson Salt soaking. Reporting rectal swelling \"bigger then a pea.\" Denies bleeding. Reporting ongoing pain \"not too bad.\"     Disposition see provider today or tomorrow.   Transferred to Central Scheduling.    Lyn Sutherland RN  [facility]      COVID 19 Nurse Triage Plan/Patient Instructions    Please be aware that novel coronavirus (COVID-19) may be circulating in the community. If you develop symptoms such as fever, cough, or SOB or if you have concerns about the presence of another infection including coronavirus (COVID-19), please contact your health care provider or visit [email or web address].     Disposition/Instructions    In-Person Visit with provider recommended. Reference Visit Selection Guide.    Thank you for taking steps to prevent the spread of this virus.  o Limit your contact with others.  o Wear a simple mask to cover your cough.  o Wash your hands well and often.    Resources    [facility]: About COVID-19: [email or web address]    CDC: What to Do If You're Sick: www.cdc.gov/coronavirus/2019-ncov/about/steps-when-sick.html    CDC: Ending Home Isolation: www.cdc.gov/coronavirus/2019-ncov/hcp/disposition-in-home-patients.html     CDC: Caring for Someone: www.cdc.gov/coronavirus/2019-ncov/if-you-are-sick/care-for-someone.html     [facility]: Interim Guidance for Hospital Discharge to Home: www.health.[email or web address]/diseases/coronavirus/hcp/hospdischarge.pdf    [facility] clinical trials (COVID-19 research studies): [email or web address]-clinical-trials     Below are the COVID-19 hotlines at the [facility] of Health ([facility]). Interpreters are available.   o For health questions: Call 632-372-0109 or 1-777.504.5653 (7 a.m. to 7 p.m.)  o For questions about schools and childcare: Call 050-822-3851 or 1-635.184.7318 (7 a.m. to 7 p.m.)                         Additional Information    Negative: Sounds " like a life-threatening emergency to the triager    Negative: Diarrhea is the main symptom    Negative: Constipation is the main symptom (e.g., pain or discomfort caused by passage of hard BMs)    Negative: Blood in or on bowel movement is the main symptom    Negative: Sexual assault    Negative: Injury to rectum    Negative: Patient sounds very sick or weak to the triager    Negative: Severe rectal pain    Negative: Rectal pain or redness and fever > 100.5 F (38.1 C)    Negative: Acute onset rectal pain and constipation (straining with rectal pressure or fullness), which is not relieved by Sitz bath or suppository    Negative: MODERATE-SEVERE rectal pain (i.e., interferes with school, work, or sleep)    Negative: MODERATE-SEVERE rectal itching (i.e., interferes with school, work, or sleep)    Negative: Last bowel movement (BM) > 4 days ago    Negative: Rectal area looks infected (e.g., draining sore, spreading redness)    Negative: Rash of rectal area (e.g., open sore, painful tiny water blisters, unexplained bumps)    Negative: Caller is worried about a sexually transmitted disease (STD)    Home treatment > 3 days for rectal pain and not improved    Protocols used: RECTAL SYMPTOMS-A-[place]

## 2021-02-25 ASSESSMENT — ASTHMA QUESTIONNAIRES: ACT_TOTALSCORE: 25

## 2021-03-01 ENCOUNTER — TRANSFERRED RECORDS (OUTPATIENT)
Dept: HEALTH INFORMATION MANAGEMENT | Facility: CLINIC | Age: 36
End: 2021-03-01

## 2021-03-19 DIAGNOSIS — Z11.59 ENCOUNTER FOR SCREENING FOR OTHER VIRAL DISEASES: ICD-10-CM

## 2021-03-28 ENCOUNTER — HOSPITAL ENCOUNTER (OUTPATIENT)
Dept: LAB | Facility: CLINIC | Age: 36
Discharge: HOME | End: 2021-03-28
Attending: INTERNAL MEDICINE | Admitting: INTERNAL MEDICINE
Payer: COMMERCIAL

## 2021-03-28 DIAGNOSIS — Z11.59 ENCOUNTER FOR SCREENING FOR OTHER VIRAL DISEASES: ICD-10-CM

## 2021-03-28 LAB
SARS-COV-2 RNA RESP QL NAA+PROBE: NORMAL
SPECIMEN SOURCE: NORMAL

## 2021-03-28 PROCEDURE — U0003 INFECTIOUS AGENT DETECTION BY NUCLEIC ACID (DNA OR RNA); SEVERE ACUTE RESPIRATORY SYNDROME CORONAVIRUS 2 (SARS-COV-2) (CORONAVIRUS DISEASE [COVID-19]), AMPLIFIED PROBE TECHNIQUE, MAKING USE OF HIGH THROUGHPUT TECHNOLOGIES AS DESCRIBED BY CMS-2020-01-R: HCPCS | Performed by: INTERNAL MEDICINE

## 2021-03-28 PROCEDURE — U0005 INFEC AGEN DETEC AMPLI PROBE: HCPCS | Performed by: INTERNAL MEDICINE

## 2021-03-29 LAB
LABORATORY COMMENT REPORT: NORMAL
SARS-COV-2 RNA RESP QL NAA+PROBE: NEGATIVE
SPECIMEN SOURCE: NORMAL

## 2021-04-01 ENCOUNTER — HOSPITAL ENCOUNTER (OUTPATIENT)
Facility: CLINIC | Age: 36
Discharge: HOME | End: 2021-04-01
Attending: INTERNAL MEDICINE | Admitting: INTERNAL MEDICINE
Payer: COMMERCIAL

## 2021-04-01 VITALS
OXYGEN SATURATION: 99 % | SYSTOLIC BLOOD PRESSURE: 107 MMHG | HEART RATE: 62 BPM | DIASTOLIC BLOOD PRESSURE: 72 MMHG | RESPIRATION RATE: 16 BRPM

## 2021-04-01 LAB — COLONOSCOPY: NORMAL

## 2021-04-01 PROCEDURE — 250N000011 HC RX IP 250 OP 636: Performed by: INTERNAL MEDICINE

## 2021-04-01 PROCEDURE — G0500 MOD SEDAT ENDO SERVICE >5YRS: HCPCS | Performed by: INTERNAL MEDICINE

## 2021-04-01 PROCEDURE — 45378 DIAGNOSTIC COLONOSCOPY: CPT | Performed by: INTERNAL MEDICINE

## 2021-04-01 PROCEDURE — G0105 COLORECTAL SCRN; HI RISK IND: HCPCS | Performed by: INTERNAL MEDICINE

## 2021-04-01 RX ORDER — NALOXONE HYDROCHLORIDE 0.4 MG/ML
0.2 INJECTION, SOLUTION INTRAMUSCULAR; INTRAVENOUS; SUBCUTANEOUS
Status: DISCONTINUED | OUTPATIENT
Start: 2021-04-01 | End: 2021-04-01 | Stop reason: HOSPADM

## 2021-04-01 RX ORDER — ONDANSETRON 2 MG/ML
4 INJECTION INTRAMUSCULAR; INTRAVENOUS
Status: DISCONTINUED | OUTPATIENT
Start: 2021-04-01 | End: 2021-04-01 | Stop reason: HOSPADM

## 2021-04-01 RX ORDER — LIDOCAINE 40 MG/G
CREAM TOPICAL
Status: DISCONTINUED | OUTPATIENT
Start: 2021-04-01 | End: 2021-04-01 | Stop reason: HOSPADM

## 2021-04-01 RX ORDER — FLUMAZENIL 0.1 MG/ML
0.2 INJECTION, SOLUTION INTRAVENOUS
Status: DISCONTINUED | OUTPATIENT
Start: 2021-04-01 | End: 2021-04-01 | Stop reason: HOSPADM

## 2021-04-01 RX ORDER — NALOXONE HYDROCHLORIDE 0.4 MG/ML
0.4 INJECTION, SOLUTION INTRAMUSCULAR; INTRAVENOUS; SUBCUTANEOUS
Status: DISCONTINUED | OUTPATIENT
Start: 2021-04-01 | End: 2021-04-01 | Stop reason: HOSPADM

## 2021-04-01 RX ORDER — FENTANYL CITRATE 50 UG/ML
INJECTION, SOLUTION INTRAMUSCULAR; INTRAVENOUS PRN
Status: COMPLETED | OUTPATIENT
Start: 2021-04-01 | End: 2021-04-01

## 2021-04-01 RX ORDER — ONDANSETRON 2 MG/ML
4 INJECTION INTRAMUSCULAR; INTRAVENOUS EVERY 6 HOURS PRN
Status: DISCONTINUED | OUTPATIENT
Start: 2021-04-01 | End: 2021-04-01 | Stop reason: HOSPADM

## 2021-04-01 RX ORDER — PROCHLORPERAZINE MALEATE 10 MG
10 TABLET ORAL EVERY 6 HOURS PRN
Status: DISCONTINUED | OUTPATIENT
Start: 2021-04-01 | End: 2021-04-01 | Stop reason: HOSPADM

## 2021-04-01 RX ORDER — ONDANSETRON 4 MG/1
4 TABLET, ORALLY DISINTEGRATING ORAL EVERY 6 HOURS PRN
Status: DISCONTINUED | OUTPATIENT
Start: 2021-04-01 | End: 2021-04-01 | Stop reason: HOSPADM

## 2021-04-01 RX ADMIN — FENTANYL CITRATE 100 MCG: 50 INJECTION, SOLUTION INTRAMUSCULAR; INTRAVENOUS at 12:40

## 2021-04-01 RX ADMIN — MIDAZOLAM 2 MG: 1 INJECTION INTRAMUSCULAR; INTRAVENOUS at 12:40
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
## 2021-04-01 NOTE — LETTER
March 19, 2021      Tosin Pfeiffer  [address]  [facility] [address]        Dear Tosin,     Please be aware that coverage of these services is subject to the terms and limitations of your health insurance plan.  Call member services at your health plan with any benefit or coverage questions.    Thank you for choosing [facility]. You are scheduled for the following service(s):    Date:  4/1/21             Procedure:  COLONOSCOPY  Doctor:        Dr. Ma   Arrival Time:  12:00  *Enter and check in at the Main Hospital Entrance*  Procedure Time:  12:30      Location:   [facility]        Endoscopy Department, First Floor         [address] [place], [place] [address]      [phone] or [phone] (Formerly [facility]) to reschedule      MIRALAX -GATORADE  PREP  Colonoscopy is the most accurate test to detect colon polyps and colon cancer; and the only test where polyps can be removed. During this procedure, a doctor examines the lining of your large intestine and rectum through a flexible tube.   Transportation  You must arrange for a ride for the day of your procedure with a responsible adult. A taxi , Uber, etc, is not an option unless you are accompanied by a responsible adult. If you fail to arrange transportation with a responsible adult, your procedure will be cancelled and rescheduled.    Purchase the  following supplies at your local pharmacy:  - 2 (two) bisacodyl tablets: each tablet contains 5 mg.  (Dulcolax  laxative NOT Dulcolax  stool softener)   - 1 (one) 8.3 oz bottle of Polyethylene Glycol (PEG) 3350 Powder   (MiraLAX , Smooth LAX , ClearLAX  or equivalent)  - 64 oz Gatorade    Regular Gatorade, Gatorade G2 , Powerade , Powerade Zero  or Pedialyte  is acceptable. Red colored flavors are not allowed; all other colors (yellow, green, orange, purple and blue) are okay. It is also okay to buy two 2.12 oz packets of powdered Gatorade that can  be mixed with water to a total volume of 64 oz of liquid.  - 1 (one) 10 oz bottle of Magnesium Citrate (Red colored flavors are not allowed)  It is also okay for you to use a 0.5 oz package of powdered magnesium citrate (17 g) mixed with 10 oz of water.      PREPARATION FOR COLONOSCOPY    7 days before:    Discontinue fiber supplements and medications containing iron. This includes Metamucil  and Fibercon ; and multivitamins with iron.    3 days before:    Begin a low-fiber diet. A low-fiber diet helps making the cleanout more effective.     Examples of a low-fiber diet include (but are not limited to): white bread, white rice, pasta, crackers, fish, chicken, eggs, ground beef, creamy peanut butter, cooked/steamed/boiled vegetables, canned fruit, bananas, melons, milk, plain yogurt cheese, salad dressing and other condiments.     The following are not allowed on a low-fiber diet: seeds, nuts, popcorn, bran, whole wheat, corn, quinoa, raw fruits and vegetables, berries and dried fruit, beans and lentils.    For additional details on low-fiber diet, please refer to the table on the last page.    2 days before:    Continue the low-fiber diet.     Drink at least 8 glasses of water throughout the day.     Stop eating solid foods at 11:45 pm.    1 day before:    In the morning: begin a clear liquid diet (liquids you can see through).     Examples of a clear liquid diet include: water, clear broth or bouillon, Gatorade, Pedialyte or Powerade, carbonated and non-carbonated soft drinks (Sprite , 7-Up , ginger ale), strained fruit juices without pulp (apple, white grape, white cranberry), Jell-O  and popsicles.     The following are not allowed on a clear liquid diet: red liquids, alcoholic beverages, dairy products (milk, creamer, and yogurt), protein shakes, creamy broths, juice with pulp and chewing tobacco.    At noon: take 2 (two) bisacodyl tablets     At 4 (and no later than 6pm): start drinking the Miralax-Gatorade  preparation (8.3 oz of Miralax mixed with 64 oz of Gatorade in a large pitcher). Drink 1(one) 8 oz glass every 15 minutes thereafter, until the mixture is gone.    COLON CLEANSING TIPS: drink adequate amounts of fluids before and after your colon cleansing to prevent dehydration. Stay near a toilet because you will have diarrhea. Even if you are sitting on the toilet, continue to drink the cleansing solution every 15 minutes. If you feel nauseous or vomit, rinse your mouth with water, take a 15 to 30-minute-break and then continue drinking the solution. You will be uncomfortable until the stool has flushed from your colon (in about 2 to 4 hours). You may feel chilled.    Day of your procedure  You may take all of your morning medications including blood pressure medications, blood thinners (if you have not been instructed to stop these by our office), methadone, anti-seizure medications with sips of water 3 hours prior to your procedure or earlier. Do not take insulin or vitamins prior to your procedure. Continue the clear liquid diet.       4 hours prior: drink 10 oz of magnesium citrate. It may be easier to drink it with a straw.    STOP consuming all liquids after that.     Do not take anything by mouth during this time.     Allow extra time to travel to your procedure as you may need to stop and use a restroom along the way.    You are ready for the procedure, if you followed all instructions and your stool is no longer formed, but clear or yellow liquid. If you are unsure whether your colon is clean, please call our office at [phone] before you leave for your appointment.    Bring the following to your procedure:  - Insurance Card/Photo ID.   - List of current medications including over-the-counter medications and supplements.   - Your rescue inhaler if you currently use one to control asthma.    Canceling or rescheduling your appointment:   If you must cancel or reschedule your appointment, please call  [phone] as soon as possible.      COLONOSCOPY PRE-PROCEDURE CHECKLIST    If you have diabetes, ask your regular doctor for diet and medication restrictions.  If you take an anticoagulant or anti-platelet medication (such as Coumadin , Lovenox , Pradaxa , Xarelto , Eliquis , etc.), please call your primary doctor for advice on holding this medication.  If you take aspirin you may continue to do so.  If you are or may be pregnant, please discuss the risks and benefits of this procedure with your doctor.        What happens during a colonoscopy?    Plan to spend up to two hours, starting at registration time, at the endoscopy center the day of your procedure. The colonoscopy takes an average of 15 to 30 minutes. Recovery time is about 30 minutes.      Before the exam:    You will change into a gown.    Your medical history and medication list will be reviewed with you, unless that has been done over the phone prior to the procedure.     A nurse will insert an intravenous (IV) line into your hand or arm.    The doctor will meet with you and will give you a consent form to sign.  During the exam:     Medicine will be given through the IV line to help you relax.     Your heart rate and oxygen levels will be monitored. If your blood pressure is low, you may be given fluids through the IV line.     The doctor will insert a flexible hollow tube, called a colonoscope, into your rectum. The scope will be advanced slowly through the large intestine (colon).    You may have a feeling of fullness or pressure.     If an abnormal tissue or a polyp is found, the doctor may remove it through the endoscope for closer examination, or biopsy. Tissue removal is painless    After the exam:           Any tissue samples removed during the exam will be sent to a lab for evaluation. It may take 5-7 working days for you to be notified of the results.     A nurse will provide you with complete discharge instructions before you leave the  endoscopy center. Be sure to ask the nurse for specific instructions if you take blood thinners such as Aspirin, Coumadin or Plavix.     The doctor will prepare a full report for you and for the physician who referred you for the procedure.     Your doctor will talk with you about the initial results of your exam.      Medication given during the exam will prohibit you from driving for the rest of the day.     Following the exam, you may resume your normal diet. Your first meal should be light, no greasy foods. Avoid alcohol until the next day.     You may resume your regular activities the day after the procedure.         LOW-FIBER DIET    Foods RECOMMENDED Foods to AVOID   Breads, Cereal, Rice and Pasta:   White bread, rolls, biscuits, croissant and georgina toast.   Waffles, [place] toast and pancakes.   White rice, noodles, pasta, macaroni and peeled cooked potatoes.   Plain crackers and saltines.   Cooked cereals: farina, cream of rice.   Cold cereals: Puffed Rice , Rice Krispies , Corn Flakes  and Special K    Breads, Cereal, Rice and Pasta:   Breads or rolls with nuts, seeds or fruit.   Whole wheat, pumpernickel, rye breads and cornbread.   Potatoes with skin, brown or wild rice, and kasha (buckwheat).     Vegetables:   Tender cooked and canned vegetables without seeds: carrots, asparagus tips, green or wax beans, pumpkin, spinach, lima beans. Vegetables:   Raw or steamed vegetables.   Vegetables with seeds.   Sauerkraut.   Winter squash, peas, broccoli, Brussel sprouts, cabbage, onions, cauliflower, baked beans, peas and corn.   Fruits:   Strained fruit juice.   Canned fruit, except pineapple.   Ripe bananas and melon. Fruits:   Prunes and prune juice.   Raw fruits.   Dried fruits: figs, dates and raisins.   Milk/Dairy:   Milk: plain or flavored.   Yogurt, custard and ice cream.   Cheese and cottage cheese Milk/Dairy:     Meat and other proteins:   ground, well-cooked tender beef, lamb, ham, veal, pork, fish,  poultry and organ meats.   Eggs.   Peanut butter without nuts. Meat and other proteins:   Tough, fibrous meats with gristle.   Dry beans, peas and lentils.   Peanut butter with nuts.   Tofu.   Fats, Snack, Sweets, Condiments and Beverages:   Margarine, butter, oils, mayonnaise, sour cream and salad dressing, plain gravy.   Sugar, hard candy, clear jelly, honey and syrup.   Spices, cooked herbs, bouillon, broth and soups made with allowed vegetable, ketchup and mustard.   Coffee, tea and carbonated drinks.   Plain cakes, cookies and pretzels.   Gelatin, plain puddings, custard, ice cream, sherbet and popsicles. Fats, Snack, Sweets, Condiments and Beverages:   Nuts, seeds and coconut.   Jam, marmalade and preserves.   Pickles, olives, relish and horseradish.   All desserts containing nuts, seeds, dried fruit and coconut; or made from whole grains or bran.   Candy made with nuts or seeds.   Popcorn.         DIRECTIONS TO THE ENDOSCOPY DEPARTMENT    From the north ([facility])  Take [address] South, exit on [address] [address]. Get into the left hand wing, turn left (east), go one-half mile to [address] and turn left. Go north to the second stoplight, take a right on [address] and follow it to the Main Hospital entrance.    From the south ([facility])  Take 35N to the [address]E split and exit on [address] [address]. On [address] [address], turn left (west) to [address]. Turn right (north) on [address]. Go north to the second stoplight, take a right on [address] and follow it to the Main Hospital entrance.    From the east via 35E ([facility])  Take 35E south to [address] [address] exit. Turn right on [address] [address]. Go west to [address]. Turn right (north) on [address]. Go to the second stoplight, take a right on [address] to the Main Hospital entrance.    From the east via [address] ([facility])  Take [address] to [address]. Turn left  (south) on [address] to [address], turn left (east) on [address] and follow it to the Main Hospital entrance.    From the west via [address] ([place])  Take [address] east to [address]. Turn right (south) on [address] to [address], turn left (east) on [address] and follow it to the Main Hospital entrance.

## 2021-10-02 ENCOUNTER — HEALTH MAINTENANCE LETTER (OUTPATIENT)
Age: 36
End: 2021-10-02

## 2021-12-06 SDOH — ECONOMIC STABILITY: FOOD INSECURITY: WITHIN THE PAST 12 MONTHS, YOU WORRIED THAT YOUR FOOD WOULD RUN OUT BEFORE YOU GOT MONEY TO BUY MORE.: NEVER TRUE

## 2021-12-06 SDOH — HEALTH STABILITY: PHYSICAL HEALTH: ON AVERAGE, HOW MANY MINUTES DO YOU ENGAGE IN EXERCISE AT THIS LEVEL?: 50 MIN

## 2021-12-06 SDOH — HEALTH STABILITY: PHYSICAL HEALTH: ON AVERAGE, HOW MANY DAYS PER WEEK DO YOU ENGAGE IN MODERATE TO STRENUOUS EXERCISE (LIKE A BRISK WALK)?: 5 DAYS

## 2021-12-06 SDOH — ECONOMIC STABILITY: INCOME INSECURITY: HOW HARD IS IT FOR YOU TO PAY FOR THE VERY BASICS LIKE FOOD, HOUSING, MEDICAL CARE, AND HEATING?: NOT HARD AT ALL

## 2021-12-06 SDOH — ECONOMIC STABILITY: FOOD INSECURITY: WITHIN THE PAST 12 MONTHS, THE FOOD YOU BOUGHT JUST DIDN'T LAST AND YOU DIDN'T HAVE MONEY TO GET MORE.: NEVER TRUE

## 2021-12-06 SDOH — ECONOMIC STABILITY: INCOME INSECURITY: IN THE LAST 12 MONTHS, WAS THERE A TIME WHEN YOU WERE NOT ABLE TO PAY THE MORTGAGE OR RENT ON TIME?: NO

## 2021-12-06 ASSESSMENT — LIFESTYLE VARIABLES
HOW OFTEN DO YOU HAVE SIX OR MORE DRINKS ON ONE OCCASION: NEVER
HOW OFTEN DO YOU HAVE A DRINK CONTAINING ALCOHOL: 2-4 TIMES A MONTH
HOW MANY STANDARD DRINKS CONTAINING ALCOHOL DO YOU HAVE ON A TYPICAL DAY: 3 OR 4

## 2021-12-06 ASSESSMENT — SOCIAL DETERMINANTS OF HEALTH (SDOH)
IN A TYPICAL WEEK, HOW MANY TIMES DO YOU TALK ON THE PHONE WITH FAMILY, FRIENDS, OR NEIGHBORS?: MORE THAN THREE TIMES A WEEK
HOW OFTEN DO YOU ATTEND CHURCH OR RELIGIOUS SERVICES?: NEVER
DO YOU BELONG TO ANY CLUBS OR ORGANIZATIONS SUCH AS CHURCH GROUPS UNIONS, FRATERNAL OR ATHLETIC GROUPS, OR SCHOOL GROUPS?: NO
HOW OFTEN DO YOU GET TOGETHER WITH FRIENDS OR RELATIVES?: ONCE A WEEK

## 2021-12-08 ENCOUNTER — OFFICE VISIT (OUTPATIENT)
Dept: FAMILY MEDICINE | Facility: CLINIC | Age: 36
End: 2021-12-08
Payer: COMMERCIAL

## 2021-12-08 VITALS
SYSTOLIC BLOOD PRESSURE: 118 MMHG | DIASTOLIC BLOOD PRESSURE: 78 MMHG | HEART RATE: 78 BPM | TEMPERATURE: 98.2 F | WEIGHT: 166.4 LBS | BODY MASS INDEX: 30.62 KG/M2 | HEIGHT: 62 IN | OXYGEN SATURATION: 97 % | RESPIRATION RATE: 18 BRPM

## 2021-12-08 DIAGNOSIS — Z13.1 SCREENING FOR DIABETES MELLITUS: ICD-10-CM

## 2021-12-08 DIAGNOSIS — E78.5 HYPERLIPIDEMIA LDL GOAL <160: Primary | ICD-10-CM

## 2021-12-08 DIAGNOSIS — Z00.00 ROUTINE GENERAL MEDICAL EXAMINATION AT A HEALTH CARE FACILITY: ICD-10-CM

## 2021-12-08 DIAGNOSIS — Z11.59 NEED FOR HEPATITIS C SCREENING TEST: ICD-10-CM

## 2021-12-08 DIAGNOSIS — Z13.0 SCREENING FOR BLOOD DISEASE: ICD-10-CM

## 2021-12-08 DIAGNOSIS — E66.01 MORBID OBESITY (H): ICD-10-CM

## 2021-12-08 DIAGNOSIS — Z80.3 FAMILY HISTORY OF MALIGNANT NEOPLASM OF BREAST: ICD-10-CM

## 2021-12-08 DIAGNOSIS — Z11.51 SCREENING FOR HUMAN PAPILLOMAVIRUS: ICD-10-CM

## 2021-12-08 DIAGNOSIS — Z13.29 SCREENING FOR THYROID DISORDER: ICD-10-CM

## 2021-12-08 LAB
ERYTHROCYTE [DISTWIDTH] IN BLOOD BY AUTOMATED COUNT: 12.6 % (ref 10–15)
HCT VFR BLD AUTO: 39 % (ref 35–47)
HCV AB SERPL QL IA: NONREACTIVE
HGB BLD-MCNC: 12.6 G/DL (ref 11.7–15.7)
MCH RBC QN AUTO: 30.7 PG (ref 26.5–33)
MCHC RBC AUTO-ENTMCNC: 32.3 G/DL (ref 31.5–36.5)
MCV RBC AUTO: 95 FL (ref 78–100)
PLATELET # BLD AUTO: 271 10E3/UL (ref 150–450)
RBC # BLD AUTO: 4.1 10E6/UL (ref 3.8–5.2)
WBC # BLD AUTO: 5.6 10E3/UL (ref 4–11)

## 2021-12-08 PROCEDURE — 36415 COLL VENOUS BLD VENIPUNCTURE: CPT | Performed by: PHYSICIAN ASSISTANT

## 2021-12-08 PROCEDURE — 86803 HEPATITIS C AB TEST: CPT | Performed by: PHYSICIAN ASSISTANT

## 2021-12-08 PROCEDURE — 85027 COMPLETE CBC AUTOMATED: CPT | Performed by: PHYSICIAN ASSISTANT

## 2021-12-08 PROCEDURE — 99395 PREV VISIT EST AGE 18-39: CPT | Performed by: PHYSICIAN ASSISTANT

## 2021-12-08 PROCEDURE — 84443 ASSAY THYROID STIM HORMONE: CPT | Performed by: PHYSICIAN ASSISTANT

## 2021-12-08 PROCEDURE — 80061 LIPID PANEL: CPT | Performed by: PHYSICIAN ASSISTANT

## 2021-12-08 PROCEDURE — 80053 COMPREHEN METABOLIC PANEL: CPT | Performed by: PHYSICIAN ASSISTANT

## 2021-12-08 PROCEDURE — G0145 SCR C/V CYTO,THINLAYER,RESCR: HCPCS | Performed by: PHYSICIAN ASSISTANT

## 2021-12-08 PROCEDURE — 87624 HPV HI-RISK TYP POOLED RSLT: CPT | Performed by: PHYSICIAN ASSISTANT

## 2021-12-08 SDOH — HEALTH STABILITY: PHYSICAL HEALTH: ON AVERAGE, HOW MANY MINUTES DO YOU ENGAGE IN EXERCISE AT THIS LEVEL?: 50 MIN

## 2021-12-08 SDOH — ECONOMIC STABILITY: FOOD INSECURITY: WITHIN THE PAST 12 MONTHS, YOU WORRIED THAT YOUR FOOD WOULD RUN OUT BEFORE YOU GOT MONEY TO BUY MORE.: NEVER TRUE

## 2021-12-08 SDOH — ECONOMIC STABILITY: FOOD INSECURITY: WITHIN THE PAST 12 MONTHS, THE FOOD YOU BOUGHT JUST DIDN'T LAST AND YOU DIDN'T HAVE MONEY TO GET MORE.: NEVER TRUE

## 2021-12-08 SDOH — ECONOMIC STABILITY: INCOME INSECURITY: HOW HARD IS IT FOR YOU TO PAY FOR THE VERY BASICS LIKE FOOD, HOUSING, MEDICAL CARE, AND HEATING?: NOT HARD AT ALL

## 2021-12-08 SDOH — HEALTH STABILITY: PHYSICAL HEALTH: ON AVERAGE, HOW MANY DAYS PER WEEK DO YOU ENGAGE IN MODERATE TO STRENUOUS EXERCISE (LIKE A BRISK WALK)?: 5 DAYS

## 2021-12-08 SDOH — ECONOMIC STABILITY: INCOME INSECURITY: IN THE LAST 12 MONTHS, WAS THERE A TIME WHEN YOU WERE NOT ABLE TO PAY THE MORTGAGE OR RENT ON TIME?: NO

## 2021-12-08 ASSESSMENT — ENCOUNTER SYMPTOMS
NAUSEA: 0
HEMATOCHEZIA: 0
PARESTHESIAS: 0
FREQUENCY: 0
ABDOMINAL PAIN: 0
DIZZINESS: 0
HEADACHES: 0
SHORTNESS OF BREATH: 0
CHILLS: 0
JOINT SWELLING: 0
SORE THROAT: 0
DYSURIA: 0
NERVOUS/ANXIOUS: 0
HEARTBURN: 0
MYALGIAS: 0
CONSTIPATION: 0
HEMATURIA: 0
FEVER: 0
ARTHRALGIAS: 0
DIARRHEA: 0
WEAKNESS: 0
BREAST MASS: 0
EYE PAIN: 0
COUGH: 0
PALPITATIONS: 0

## 2021-12-08 ASSESSMENT — LIFESTYLE VARIABLES
HOW OFTEN DO YOU HAVE A DRINK CONTAINING ALCOHOL: 2-4 TIMES A MONTH
HOW MANY STANDARD DRINKS CONTAINING ALCOHOL DO YOU HAVE ON A TYPICAL DAY: 3 OR 4
HOW OFTEN DO YOU HAVE SIX OR MORE DRINKS ON ONE OCCASION: NEVER

## 2021-12-08 ASSESSMENT — SOCIAL DETERMINANTS OF HEALTH (SDOH)
HOW OFTEN DO YOU ATTEND CHURCH OR RELIGIOUS SERVICES?: NEVER
IN A TYPICAL WEEK, HOW MANY TIMES DO YOU TALK ON THE PHONE WITH FAMILY, FRIENDS, OR NEIGHBORS?: MORE THAN THREE TIMES A WEEK
HOW OFTEN DO YOU GET TOGETHER WITH FRIENDS OR RELATIVES?: ONCE A WEEK
DO YOU BELONG TO ANY CLUBS OR ORGANIZATIONS SUCH AS CHURCH GROUPS UNIONS, FRATERNAL OR ATHLETIC GROUPS, OR SCHOOL GROUPS?: NO

## 2021-12-08 ASSESSMENT — MIFFLIN-ST. JEOR: SCORE: 1388.04

## 2021-12-08 NOTE — PROGRESS NOTES
SUBJECTIVE:   CC: Tosin Pfeiffer is an 36 year old woman who presents for preventive health visit.       Patient has been advised of split billing requirements and indicates understanding: Yes  Healthy Habits:     Getting at least 3 servings of Calcium per day:  Yes    Bi-annual eye exam:  Yes    Dental care twice a year:  Yes    Sleep apnea or symptoms of sleep apnea:  None    Diet:  Regular (no restrictions)    Frequency of exercise:  4-5 days/week    Duration of exercise:  45-60 minutes    Taking medications regularly:  Yes    Medication side effects:  Not applicable    PHQ-2 Total Score: 0    Additional concerns today:  No              Today's PHQ-2 Score:   PHQ-2 (  [employer]) 2021   Q1: Little interest or pleasure in doing things 0   Q2: Feeling down, depressed or hopeless 0   PHQ-2 Score 0   PHQ-2 Total Score (12-17 Years)- Positive if 3 or more points; Administer PHQ-A if positive -   Q1: Little interest or pleasure in doing things Not at all   Q2: Feeling down, depressed or hopeless Not at all   PHQ-2 Score 0       Abuse: Current or Past (Physical, Sexual or Emotional) - No  Do you feel safe in your environment? Yes        Social History     Tobacco Use     Smoking status: Former Smoker     Packs/day: 1.00     Types: Cigarettes     Start date:      Quit date: 2010     Years since quittin.9     Smokeless tobacco: Never Used   Substance Use Topics     Alcohol use: No     Comment: occ         Alcohol Use 2021   Prescreen: >3 drinks/day or >7 drinks/week? No   Prescreen: >3 drinks/day or >7 drinks/week? -       Reviewed orders with patient.  Reviewed health maintenance and updated orders accordingly - Yes  BP Readings from Last 3 Encounters:   21 118/78   21 107/72   21 128/80    Wt Readings from Last 3 Encounters:   21 75.5 kg (166 lb 6.4 oz)   21 82.1 kg (181 lb)   20 96.2 kg (212 lb)                  No current outpatient medications on file.      Recent Labs   Lab Test 08/21/20  1432 10/15/19  1332 09/15/18  0140 09/13/18  1200 05/09/17  0912 09/10/15  0836 05/19/15  0910   A1C  --  4.9  --   --  5.0  --   --    LDL 98  --   --   --  77  --  83   HDL 45*  --   --   --  36*  --  38*   TRIG 84  --   --   --  81  --  112   ALT 23  --  17 17  --    < > 30   CR 0.83  --  0.67 0.71 0.80   < > 0.96   GFRESTIMATED >90  --  >90 >90 82   < > 67   GFRESTBLACK >90  --  >90 >90 >90  African American GFR Calc     < > 81   POTASSIUM 4.5  --   --  4.1 4.1   < > 4.9   TSH 1.31  --   --   --  2.43   < > 1.60    < > = values in this interval not displayed.        Breast Cancer Screening:    FHS-7:   Breast CA Risk Assessment (FHS-7) 12/6/2021 12/8/2021   Did any of your first-degree relatives have breast or ovarian cancer? No No   Did any of your relatives have bilateral breast cancer? No No   Did any man in your family have breast cancer? No No   Did any woman in your family have breast and ovarian cancer? No No   Did any woman in your family have breast cancer before age 50 y? Yes Yes   Do you have 2 or more relatives with breast and/or ovarian cancer? No No   Do you have 2 or more relatives with breast and/or bowel cancer? No No     click delete button to remove this line now  Patient under 40 years of age: Routine Mammogram Screening not recommended.   Pertinent mammograms are reviewed under the imaging tab.    History of abnormal Pap smear: NO - age 30-65 PAP every 5 years with negative HPV co-testing recommended  PAP / HPV Latest Ref Rng & Units 5/9/2017 2/15/2013   PAP (Historical) - NIL NIL   HPV16 NEG Negative -   HPV18 NEG Negative -   HRHPV NEG Negative -     Reviewed and updated as needed this visit by clinical staff  Tobacco  Allergies    Med Hx  Surg Hx  Fam Hx  Soc Hx       Reviewed and updated as needed this visit by Provider                   Review of Systems   Constitutional: Negative for chills and fever.   HENT: Negative for congestion, ear pain,  "hearing loss and sore throat.    Eyes: Negative for pain and visual disturbance.   Respiratory: Negative for cough and shortness of breath.    Cardiovascular: Negative for chest pain, palpitations and peripheral edema.   Gastrointestinal: Negative for abdominal pain, constipation, diarrhea, heartburn, hematochezia and nausea.   Breasts:  Negative for breast mass and discharge.   Genitourinary: Negative for dysuria, frequency, genital sores, hematuria, pelvic pain, urgency, vaginal bleeding and vaginal discharge.   Musculoskeletal: Negative for arthralgias, joint swelling and myalgias.   Skin: Negative for rash.   Neurological: Negative for dizziness, weakness, headaches and paresthesias.   Psychiatric/Behavioral: Negative for mood changes. The patient is not nervous/anxious.           OBJECTIVE:   /78 (BP Location: Right arm, Patient Position: Sitting, Cuff Size: Adult Regular)   Pulse 78   Temp 98.2  F (36.8  C) (Oral)   Resp 18   Ht 1.575 m (5' 2\")   Wt 75.5 kg (166 lb 6.4 oz)   LMP 12/03/2021 (Approximate)   SpO2 97%   BMI 30.43 kg/m    Physical Exam  GENERAL: healthy, alert and no distress  EYES: Eyes grossly normal to inspection, PERRL and conjunctivae and sclerae normal  HENT: ear canals and TM's normal, nose and mouth without ulcers or lesions  NECK: no adenopathy, no asymmetry, masses, or scars and thyroid normal to palpation  RESP: lungs clear to auscultation - no rales, rhonchi or wheezes  BREAST: normal without masses, tenderness or nipple discharge and no palpable axillary masses or adenopathy  CV: regular rate and rhythm, normal S1 S2, no S3 or S4, no murmur, click or rub, no peripheral edema and peripheral pulses strong  ABDOMEN: soft, nontender, no hepatosplenomegaly, no masses and bowel sounds normal  MS: no gross musculoskeletal defects noted, no edema  SKIN: no suspicious lesions or rashes  NEURO: Normal strength and tone, mentation intact and speech normal  PSYCH: mentation appears " "normal, affect normal/bright    Diagnostic Test Results:  Labs reviewed in Epic    ASSESSMENT/PLAN:   (E78.5) Hyperlipidemia LDL goal <160  (primary encounter diagnosis)  Comment:   Plan: Lipid Profile            (Z11.59) Need for hepatitis C screening test  Comment:   Plan: Hepatitis C Screen Reflex to HCV RNA Quant and         Genotype            (Z00.00) Routine general medical examination at a health care facility  Comment:   Plan:     (Z13.1) Screening for diabetes mellitus  Comment:   Plan: Comprehensive metabolic panel            (Z13.29) Screening for thyroid disorder  Comment:   Plan: TSH with free T4 reflex            (Z13.0) Screening for blood disease  Comment:   Plan: CBC with platelets            (Z11.51) Screening for human papillomavirus  Comment:   Plan: Pap Screen with HPV - recommended age 30 - 65         years            (Z80.3) Family history of malignant neoplasm of breast  Comment: maternal aunt diagnosed in 30's   Plan: MA Screen Bilateral w/Emery              Patient has been advised of split billing requirements and indicates understanding: Yes  COUNSELING:  Reviewed preventive health counseling, as reflected in patient instructions       Regular exercise       Healthy diet/nutrition       Vision screening       Hearing screening    Estimated body mass index is 30.43 kg/m  as calculated from the following:    Height as of this encounter: 1.575 m (5' 2\").    Weight as of this encounter: 75.5 kg (166 lb 6.4 oz).    Weight management plan: Discussed healthy diet and exercise guidelines    She reports that she quit smoking about 11 years ago. Her smoking use included cigarettes. She started smoking about 21 years ago. She smoked 1.00 pack per day. She has never used smokeless tobacco.      Counseling Resources:  ATP IV Guidelines  Pooled Cohorts Equation Calculator  Breast Cancer Risk Calculator  BRCA-Related Cancer Risk Assessment: FHS-7 Tool  FRAX Risk Assessment  ICSI Preventive " Guidelines  Dietary Guidelines for Americans, 2010  USDA's MyPlate  ASA Prophylaxis  Lung CA Screening    Ramona Ann Aaseby-Aguilera, PA-C [facility]

## 2021-12-09 LAB
ALBUMIN SERPL-MCNC: 3.9 G/DL (ref 3.4–5)
ALP SERPL-CCNC: 50 U/L (ref 40–150)
ALT SERPL W P-5'-P-CCNC: 25 U/L (ref 0–50)
ANION GAP SERPL CALCULATED.3IONS-SCNC: 6 MMOL/L (ref 3–14)
AST SERPL W P-5'-P-CCNC: 11 U/L (ref 0–45)
BILIRUB SERPL-MCNC: 0.4 MG/DL (ref 0.2–1.3)
BUN SERPL-MCNC: 22 MG/DL (ref 7–30)
CALCIUM SERPL-MCNC: 8.9 MG/DL (ref 8.5–10.1)
CHLORIDE BLD-SCNC: 108 MMOL/L (ref 94–109)
CO2 SERPL-SCNC: 26 MMOL/L (ref 20–32)
CREAT SERPL-MCNC: 0.77 MG/DL (ref 0.52–1.04)
GFR SERPL CREATININE-BSD FRML MDRD: >90 ML/MIN/1.73M2
GLUCOSE BLD-MCNC: 94 MG/DL (ref 70–99)
POTASSIUM BLD-SCNC: 4.7 MMOL/L (ref 3.4–5.3)
PROT SERPL-MCNC: 7.2 G/DL (ref 6.8–8.8)
SODIUM SERPL-SCNC: 140 MMOL/L (ref 133–144)
TSH SERPL DL<=0.005 MIU/L-ACNC: 1.47 MU/L (ref 0.4–4)

## 2021-12-10 LAB
BKR LAB AP GYN ADEQUACY: NORMAL
BKR LAB AP GYN INTERPRETATION: NORMAL
BKR LAB AP HPV REFLEX: NORMAL
BKR LAB AP PREVIOUS ABNORMAL: NORMAL
PATH REPORT.COMMENTS IMP SPEC: NORMAL
PATH REPORT.COMMENTS IMP SPEC: NORMAL
PATH REPORT.RELEVANT HX SPEC: NORMAL

## 2021-12-14 LAB
HUMAN PAPILLOMA VIRUS 16 DNA: NEGATIVE
HUMAN PAPILLOMA VIRUS 18 DNA: NEGATIVE
HUMAN PAPILLOMA VIRUS FINAL DIAGNOSIS: NORMAL
HUMAN PAPILLOMA VIRUS OTHER HR: NEGATIVE

## 2021-12-16 LAB
CHOLEST SERPL-MCNC: 159 MG/DL
FASTING STATUS PATIENT QL REPORTED: YES
HDLC SERPL-MCNC: 55 MG/DL
LDLC SERPL CALC-MCNC: 90 MG/DL
NONHDLC SERPL-MCNC: 104 MG/DL
TRIGL SERPL-MCNC: 70 MG/DL

## 2022-01-24 ENCOUNTER — ANCILLARY PROCEDURE (OUTPATIENT)
Dept: MAMMOGRAPHY | Facility: CLINIC | Age: 37
End: 2022-01-24
Attending: PHYSICIAN ASSISTANT
Payer: COMMERCIAL

## 2022-01-24 PROCEDURE — 77063 BREAST TOMOSYNTHESIS BI: CPT | Mod: TC | Performed by: RADIOLOGY

## 2022-01-24 PROCEDURE — 77067 SCR MAMMO BI INCL CAD: CPT | Mod: TC | Performed by: RADIOLOGY

## 2022-07-11 ENCOUNTER — MYC MEDICAL ADVICE (OUTPATIENT)
Dept: FAMILY MEDICINE | Facility: CLINIC | Age: 37
End: 2022-07-11

## 2022-07-22 ASSESSMENT — ASTHMA QUESTIONNAIRES: ACT_TOTALSCORE: 25

## 2022-07-22 NOTE — TELEPHONE ENCOUNTER
Chart updated    Millie Payne/[facility]

## 2022-09-03 ENCOUNTER — HEALTH MAINTENANCE LETTER (OUTPATIENT)
Age: 37
End: 2022-09-03

## 2023-01-15 ENCOUNTER — HEALTH MAINTENANCE LETTER (OUTPATIENT)
Age: 38
End: 2023-01-15

## 2023-02-06 NOTE — LETTER
6/21/2018         RE: Tosin Pfeiffer  [id number]  [facility] [address]        Dear Colleague,    Thank you for referring your patient, Tosin Pfeiffer, to the [facility]. Please see a copy of my visit note below.    Gestational Diabetes Follow-up Visit    SUBJECTIVE/OBJECTIVE:  Tosin Pfeiffer presents today for education and evaluation of glucose control related to gestational diabetes    She is accompanied by self    Patient's gestational diabetes management related comments/concerns: blood sugars are high    LMP 12/20/2017 (Exact Date)      Blood Glucose/Ketone Log:    Date Ketones Fasting Post Breakfast Post Lunch Post Supper   6/12 n 100 130 111 -   6/14 n 115 141 158 146   6/15 n 115 158 160 144   6/16 tr 114 130 172 166   6/17 n 117 116 170 115   6/18 n 120 142 122 148   6/19 tr 111 142 160 172   6/20 n 117 185/170 172 203/165   6/21 - 114 133 129      0% in target 44% in target 33% in target 14% in target     Current gestational diabetes management:    Taking medications for gestational diabetes? No    Physical Activity: no regular exercise program    Nutrition:  Patient eats 3 meals and 2-3 snacks per day  Dinner at 7:30pm    ASSESSMENT:  Ketones: neg-tr.   Fasting blood glucoses: 0% in target.  After breakfast: 44% in target.  After lunch: 33% in target.  After dinner: 14% in target.    Patient glucose consistently above goal,she would benefit from some dietary changes but not likely to bring glucose into goal.  Will review GDM diet plan and recommend she start NPH insulin 10 units bid.     Health Beliefs and Attitudes:   Stage of Change: ACTION (Actively working towards change)    INTERVENTION:  Order request sent to OB provider to start insulin NPH 10 units bid    Insulin administration technique taught using a Vial and Syringe and pen for NPH (Humulin-N or Novolin-N) - 10 units at morning and bedtime. Patient verbalized understanding and was able to perform an  Tell Rubi her thyroid function testing is borderline.  I would like her to repeat the thyroid function tests in about 6 to 8 weeks. accurate return demonstration of administration technique.       LibrePro sensor started today. (Lot # [id number], Serial # [id number], Expiration date 10/31/18) Sensor was inserted with no resistance or bleeding at insertion site.    Pt will plan to wear the sensor through  7/3/18.    WRITTEN AND VERBAL INFORMATION GIVEN TO SUPPORT UNDERSTANDING OF:  LibrePro CGM: Sensor insertion, intention of monitoring for 14 days. Keep records of BG, food intake, exercise, and medication dosing during wear.       Patient verbalizes understanding of how to remove sensor and all instructions provided.       Educational topics covered today:  Review of GDM meal plan, What to expect after delivery, Future testing for Type 2 diabetes (2 hour OGTT at 6 week post-partum check-up and annual fasting blood glucose level), Risk of GDM and planning ahead for future pregnancies, Recommended lifestyle interventions for reducing the risk of Type 2 Diabetes, When to Call a Diabetes Educator or OB Provider    Educational Materials provided today:  Rex Preventing Diabetes  Insulin instructions  Hypoglycemia signs and symptoms and treatment  45-60 gram meal ideas  GDM 30 gram carbohydrate + protein snack ideas    PLAN:  Check glucose before and 1 hour after mealsy.  Check ketones once a week when readings are consistently negative.  Continue with recommended physical activity.  Continue to follow recommended meal plan: 2-3 carbs at breakfast, 3-4 carbs at lunch, 3-4 carbs at supper, 1-2 carbs at snacks.  Follow consistent CHO meal plan, eat CHO and protein/fat at all meals/snacks.    Call/e-mail/MyChart message diabetes educator if 3 or more blood sugars are above the goal in 1 week or if ketones are positive.     FOLLOW-UP:  Follow up with diabetes educator in 1 week.    Call/e-mail/MyChart message diabetes educator if 3 or more blood sugars are above the goal in 1 week or if ketones are positive.     Sabrina Cho RN,CDE   Time spent was 70  minutes  Encounter type: Individual    Any diabetes medication dose changes were made via the CDE Protocol and Collaborative Practice Agreement with the patient's referring provider. A copy of this encounter was shared with the provider.

## 2024-02-17 ENCOUNTER — HEALTH MAINTENANCE LETTER (OUTPATIENT)
Age: 39
End: 2024-02-17

## 2024-03-09 ENCOUNTER — ANCILLARY PROCEDURE (OUTPATIENT)
Dept: MAMMOGRAPHY | Facility: CLINIC | Age: 39
End: 2024-03-09
Attending: PHYSICIAN ASSISTANT
Payer: COMMERCIAL

## 2024-03-09 DIAGNOSIS — Z12.31 VISIT FOR SCREENING MAMMOGRAM: ICD-10-CM

## 2024-03-09 PROCEDURE — 77067 SCR MAMMO BI INCL CAD: CPT | Mod: TC | Performed by: RADIOLOGY

## 2024-03-09 PROCEDURE — 77063 BREAST TOMOSYNTHESIS BI: CPT | Mod: TC | Performed by: RADIOLOGY

## 2024-06-24 SDOH — HEALTH STABILITY: PHYSICAL HEALTH: ON AVERAGE, HOW MANY MINUTES DO YOU ENGAGE IN EXERCISE AT THIS LEVEL?: 40 MIN

## 2024-06-24 SDOH — HEALTH STABILITY: PHYSICAL HEALTH: ON AVERAGE, HOW MANY DAYS PER WEEK DO YOU ENGAGE IN MODERATE TO STRENUOUS EXERCISE (LIKE A BRISK WALK)?: 4 DAYS

## 2024-06-24 ASSESSMENT — ASTHMA QUESTIONNAIRES
QUESTION_5 LAST FOUR WEEKS HOW WOULD YOU RATE YOUR ASTHMA CONTROL: WELL CONTROLLED
QUESTION_1 LAST FOUR WEEKS HOW MUCH OF THE TIME DID YOUR ASTHMA KEEP YOU FROM GETTING AS MUCH DONE AT WORK, SCHOOL OR AT HOME: NONE OF THE TIME
QUESTION_3 LAST FOUR WEEKS HOW OFTEN DID YOUR ASTHMA SYMPTOMS (WHEEZING, COUGHING, SHORTNESS OF BREATH, CHEST TIGHTNESS OR PAIN) WAKE YOU UP AT NIGHT OR EARLIER THAN USUAL IN THE MORNING: NOT AT ALL
ACT_TOTALSCORE: 24
ACT_TOTALSCORE: 24
QUESTION_2 LAST FOUR WEEKS HOW OFTEN HAVE YOU HAD SHORTNESS OF BREATH: NOT AT ALL
QUESTION_4 LAST FOUR WEEKS HOW OFTEN HAVE YOU USED YOUR RESCUE INHALER OR NEBULIZER MEDICATION (SUCH AS ALBUTEROL): NOT AT ALL

## 2024-06-24 ASSESSMENT — SOCIAL DETERMINANTS OF HEALTH (SDOH): HOW OFTEN DO YOU GET TOGETHER WITH FRIENDS OR RELATIVES?: ONCE A WEEK

## 2024-06-27 ENCOUNTER — OFFICE VISIT (OUTPATIENT)
Dept: FAMILY MEDICINE | Facility: CLINIC | Age: 39
End: 2024-06-27
Payer: COMMERCIAL

## 2024-06-27 VITALS
SYSTOLIC BLOOD PRESSURE: 144 MMHG | BODY MASS INDEX: 36.53 KG/M2 | HEIGHT: 62 IN | TEMPERATURE: 98.8 F | HEART RATE: 63 BPM | RESPIRATION RATE: 18 BRPM | OXYGEN SATURATION: 100 % | WEIGHT: 198.5 LBS | DIASTOLIC BLOOD PRESSURE: 95 MMHG

## 2024-06-27 DIAGNOSIS — Z13.1 SCREENING FOR DIABETES MELLITUS: ICD-10-CM

## 2024-06-27 DIAGNOSIS — Z13.0 SCREENING FOR BLOOD DISEASE: ICD-10-CM

## 2024-06-27 DIAGNOSIS — E78.5 HYPERLIPIDEMIA LDL GOAL <160: ICD-10-CM

## 2024-06-27 DIAGNOSIS — Z13.29 SCREENING FOR THYROID DISORDER: ICD-10-CM

## 2024-06-27 DIAGNOSIS — R03.0 ELEVATED BLOOD PRESSURE READING WITHOUT DIAGNOSIS OF HYPERTENSION: ICD-10-CM

## 2024-06-27 DIAGNOSIS — Z00.00 ROUTINE GENERAL MEDICAL EXAMINATION AT A HEALTH CARE FACILITY: Primary | ICD-10-CM

## 2024-06-27 LAB
ALBUMIN SERPL BCG-MCNC: 4.6 G/DL (ref 3.5–5.2)
ALP SERPL-CCNC: 64 U/L (ref 40–150)
ALT SERPL W P-5'-P-CCNC: 18 U/L (ref 0–50)
ANION GAP SERPL CALCULATED.3IONS-SCNC: 10 MMOL/L (ref 7–15)
AST SERPL W P-5'-P-CCNC: 18 U/L (ref 0–45)
BILIRUB SERPL-MCNC: 0.4 MG/DL
BUN SERPL-MCNC: 18.8 MG/DL (ref 6–20)
CALCIUM SERPL-MCNC: 9.4 MG/DL (ref 8.6–10)
CHLORIDE SERPL-SCNC: 103 MMOL/L (ref 98–107)
CHOLEST SERPL-MCNC: 146 MG/DL
CREAT SERPL-MCNC: 0.95 MG/DL (ref 0.51–0.95)
DEPRECATED HCO3 PLAS-SCNC: 25 MMOL/L (ref 22–29)
EGFRCR SERPLBLD CKD-EPI 2021: 77 ML/MIN/1.73M2
ERYTHROCYTE [DISTWIDTH] IN BLOOD BY AUTOMATED COUNT: 12.9 % (ref 10–15)
FASTING STATUS PATIENT QL REPORTED: YES
FASTING STATUS PATIENT QL REPORTED: YES
GLUCOSE SERPL-MCNC: 92 MG/DL (ref 70–99)
HBA1C MFR BLD: 5.2 % (ref 0–5.6)
HCT VFR BLD AUTO: 38.5 % (ref 35–47)
HDLC SERPL-MCNC: 56 MG/DL
HGB BLD-MCNC: 12.3 G/DL (ref 11.7–15.7)
LDLC SERPL CALC-MCNC: 79 MG/DL
MCH RBC QN AUTO: 28.5 PG (ref 26.5–33)
MCHC RBC AUTO-ENTMCNC: 31.9 G/DL (ref 31.5–36.5)
MCV RBC AUTO: 89 FL (ref 78–100)
NONHDLC SERPL-MCNC: 90 MG/DL
PLATELET # BLD AUTO: 236 10E3/UL (ref 150–450)
POTASSIUM SERPL-SCNC: 5.1 MMOL/L (ref 3.4–5.3)
PROT SERPL-MCNC: 7.1 G/DL (ref 6.4–8.3)
RBC # BLD AUTO: 4.31 10E6/UL (ref 3.8–5.2)
SODIUM SERPL-SCNC: 138 MMOL/L (ref 135–145)
TRIGL SERPL-MCNC: 57 MG/DL
TSH SERPL DL<=0.005 MIU/L-ACNC: 1.92 UIU/ML (ref 0.3–4.2)
WBC # BLD AUTO: 6 10E3/UL (ref 4–11)

## 2024-06-27 PROCEDURE — 83036 HEMOGLOBIN GLYCOSYLATED A1C: CPT | Performed by: PHYSICIAN ASSISTANT

## 2024-06-27 PROCEDURE — 99396 PREV VISIT EST AGE 40-64: CPT | Performed by: PHYSICIAN ASSISTANT

## 2024-06-27 PROCEDURE — 84443 ASSAY THYROID STIM HORMONE: CPT | Performed by: PHYSICIAN ASSISTANT

## 2024-06-27 PROCEDURE — 36415 COLL VENOUS BLD VENIPUNCTURE: CPT | Performed by: PHYSICIAN ASSISTANT

## 2024-06-27 PROCEDURE — 80061 LIPID PANEL: CPT | Performed by: PHYSICIAN ASSISTANT

## 2024-06-27 PROCEDURE — 80053 COMPREHEN METABOLIC PANEL: CPT | Performed by: PHYSICIAN ASSISTANT

## 2024-06-27 PROCEDURE — 85027 COMPLETE CBC AUTOMATED: CPT | Performed by: PHYSICIAN ASSISTANT

## 2024-06-27 NOTE — PATIENT INSTRUCTIONS
"Patient Education   Preventive Care Advice   This is general advice we often give to help people stay healthy. Your care team may have specific advice just for you. Please talk to your care team about your own preventive care needs.  Lifestyle  Exercise at least 150 minutes each week (30 minutes a day, 5 days a week).  Do muscle strengthening activities 2 days a week. These help control your weight and prevent disease.  No smoking.  Wear sunscreen to prevent skin cancer.  Have your home tested for radon every 2 to 5 years. Radon is a colorless, odorless gas that can harm your lungs. To learn more, go to www.health.[facility].[place] and search for \"Radon in Homes.\"  Keep guns unloaded and locked up in a safe place like a safe or gun vault, or, use a gun lock and hide the keys. Always lock away bullets separately. To learn more, visit [employer] test [employer].[place].gov and search for \"safe gun storage.\"  Nutrition  Eat 5 or more servings of fruits and vegetables each day.  Try wheat bread, brown rice and whole grain pasta (instead of white bread, rice, and pasta).  Get enough calcium and vitamin D. Check the label on foods and aim for 100% of the RDA (recommended daily allowance).  Regular exams  Have a dental exam and cleaning every 6 months.  See your health care team every year to talk about:  Any changes in your health.  Any medicines your care team has prescribed.  Preventive care, family planning, and ways to prevent chronic diseases.  Shots (vaccines)   HPV shots (up to age 26), if you've never had them before.  Hepatitis B shots (up to age 59), if you've never had them before.  COVID-19 shot: Get this shot when it's due.  Flu shot: Get a flu shot every year.  Tetanus shot: Get a tetanus shot every 10 years.  Pneumococcal, hepatitis A, and RSV shots: Ask your care team if you need these based on your risk.  Shingles shot (for age 50 and up).  General health tests  Diabetes screening:  Starting at age 35, Get screened for diabetes at least " every 3 years.  If you are younger than age 35, ask your care team if you should be screened for diabetes.  Cholesterol test: At age 39, start having a cholesterol test every 5 years, or more often if advised.  Bone density scan (DEXA): At age 50, ask your care team if you should have this scan for osteoporosis (brittle bones).  Hepatitis C: Get tested at least once in your life.  Abdominal aortic aneurysm screening: Talk to your doctor about having this screening if you:  Have ever smoked; and  Are biologically male; and  Are between the ages of 65 and 75.  STIs (sexually transmitted infections)  Before age 24: Ask your care team if you should be screened for STIs.  After age 24: Get screened for STIs if you're at risk. You are at risk for STIs (including HIV) if:  You are sexually active with more than one person.  You don't use condoms every time.  You or a partner was diagnosed with a sexually transmitted infection.  If you are at risk for HIV, ask about PrEP medicine to prevent HIV.  Get tested for HIV at least once in your life, whether you are at risk for HIV or not.  Cancer screening tests  Cervical cancer screening: If you have a cervix, begin getting regular cervical cancer screening tests at age 21. Most people who have regular screenings with normal results can stop after age 65. Talk about this with your provider.  Breast cancer scan (mammogram): If you've ever had breasts, begin having regular mammograms starting at age 40. This is a scan to check for breast cancer.  Colon cancer screening: It is important to start screening for colon cancer at age 45.  Have a colonoscopy test every 10 years (or more often if you're at risk) Or, ask your provider about stool tests like a FIT test every year or Cologuard test every 3 years.  To learn more about your testing options, visit: www.[employer]/786190.pdf.  For help making a decision, visit: oksana/pm17989.  Prostate cancer screening test: If you have a  prostate and are age 55 to 69, ask your provider if you would benefit from a yearly prostate cancer screening test.  Lung cancer screening: If you are a current or former smoker age 50 to 80, ask your care team if ongoing lung cancer screenings are right for you.  For informational purposes only. Not to replace the advice of your health care provider. Copyright   2023 [facility]. All rights reserved. Clinically reviewed by the [facility]. [place] 837064 - REV 04/24.

## 2024-06-27 NOTE — PROGRESS NOTES
"Preventive Care Visit  [facility] PA-C, Family Medicine  Jun 27, 2024      Assessment & Plan     Routine general medical examination at a health care facility  Age and gender appropriate preventive care and screenings are discussed.  Particular attention to personal preventive care and age appropriate lifestyle including the incorporation of healthy diet and physical activity is made       Elevated blood pressure reading without diagnosis of hypertension  Opted to not treat until we get more readings.   Will have come in for nurse only BP check x 2 and then follow-up for office visit in 1 month with BP diary.        Hyperlipidemia LDL goal <160    - Lipid Profile    Screening for diabetes mellitus    - Comprehensive metabolic panel  - Hemoglobin A1c    Screening for thyroid disorder    - TSH with free T4 reflex    Screening for blood disease    - CBC with platelets            BMI  Estimated body mass index is 36.31 kg/m  as calculated from the following:    Height as of this encounter: 1.575 m (5' 2\").    Weight as of this encounter: 90 kg (198 lb 8 oz).   Weight management plan: Discussed healthy diet and exercise guidelines    Counseling  Appropriate preventive services were discussed with this patient, including applicable screening as appropriate for fall prevention, nutrition, physical activity, Tobacco-use cessation, weight loss and cognition.  Checklist reviewing preventive services available has been given to the patient.  Reviewed patient's diet, addressing concerns and/or questions.   She is at risk for psychosocial distress and has been provided with information to reduce risk.           Dionte Leahy is a 38 year old, presenting for the following:  Physical (Female Physical - PT is fasting)        6/27/2024     9:21 AM   Additional Questions   Roomed by JOHNIE Gama   Accompanied by Self        Health Care Directive  Patient does not have " a Health Care Directive or Living Will: Discussed advance care planning with patient; however, patient declined at this time.    HPI              6/24/2024   General Health   How would you rate your overall physical health? Good   Feel stress (tense, anxious, or unable to sleep) Only a little      (!) STRESS CONCERN      6/24/2024   Nutrition   Three or more servings of calcium each day? Yes   Diet: Regular (no restrictions)   How many servings of fruit and vegetables per day? (!) 2-3   How many sweetened beverages each day? 0-1            6/24/2024   Exercise   Days per week of moderate/strenous exercise 4 days   Average minutes spent exercising at this level 40 min            6/24/2024   Social Factors   Frequency of gathering with friends or relatives Once a week   Worry food won't last until get money to buy more No   Food not last or not have enough money for food? No   Do you have housing? (Housing is defined as stable permanent housing and does not include staying ouside in a car, in a tent, in an abandoned building, in an overnight shelter, or couch-surfing.) Yes   Are you worried about losing your housing? No   Lack of transportation? No   Unable to get utilities (heat,electricity)? No            6/24/2024   Dental   Dentist two times every year? Yes            6/24/2024   TB Screening   Were you born outside of the [place]? No            Today's PHQ-2 Score:       6/27/2024     9:18 AM   PHQ-2 ( 1999 Pf[employer])   Q1: Little interest or pleasure in doing things 0   Q2: Feeling down, depressed or hopeless 0   PHQ-2 Score 0   Q1: Little interest or pleasure in doing things Not at all   Q2: Feeling down, depressed or hopeless Not at all   PHQ-2 Score 0           6/24/2024   Substance Use   Alcohol more than 3/day or more than 7/wk No   Do you use any other substances recreationally? No        Social History     Tobacco Use    Smoking status: Former     Current packs/day: 0.00     Average packs/day: 1 pack/day for 10.0  years (10.0 ttl pk-yrs)     Types: Cigarettes     Start date:      Quit date: 2012     Years since quittin.4    Smokeless tobacco: Never   Vaping Use    Vaping status: Never Used   Substance Use Topics    Alcohol use: Yes     Comment: usually on Saturday - a couple of drinks    Drug use: No           3/9/2024   LAST FHS-7 RESULTS   1st degree relative breast or ovarian cancer No   Any relative bilateral breast cancer No   Any male have breast cancer No   Any ONE woman have BOTH breast AND ovarian cancer No   Any woman with breast cancer before 50yrs Yes   2 or more relatives with breast AND/OR ovarian cancer No   2 or more relatives with breast AND/OR bowel cancer Yes           Mammogram Screening - Mammogram every 1-2 years updated in Health Maintenance based on mutual decision making        2024   STI Screening   New sexual partner(s) since last STI/HIV test? No        History of abnormal Pap smear: No - age 30-64 HPV with reflex Pap every 5 years recommended        Latest Ref Rng & Units 2021     9:46 AM 2017     9:30 AM 2017     9:05 AM   PAP / HPV   PAP  Negative for Intraepithelial Lesion or Malignancy (NILM)      PAP (Historical)    NIL    HPV 16 DNA Negative Negative  Negative     HPV 18 DNA Negative Negative  Negative     Other HR HPV Negative Negative  Negative       ASCVD Risk   The 10-year ASCVD risk score (Antwon DELA CRUZ, et al., 2019) is: 0.5%    Values used to calculate the score:      Age: 38 years      Sex: Female      Is Non- : No      Diabetic: No      Tobacco smoker: No      Systolic Blood Pressure: 144 mmHg      Is BP treated: No      HDL Cholesterol: 55 mg/dL      Total Cholesterol: 159 mg/dL        2024   Contraception/Family Planning   Questions about contraception or family planning No           Reviewed and updated as needed this visit by Provider                    BP Readings from Last 3 Encounters:   24 (!) 144/95    21 118/78   21 107/72    Wt Readings from Last 3 Encounters:   24 90 kg (198 lb 8 oz)   21 75.5 kg (166 lb 6.4 oz)   21 82.1 kg (181 lb)                  Patient Active Problem List   Diagnosis    Hyperlipidemia LDL goal <160    Blood type A-    BMI 38.0-38.9,adult    Exercise-induced asthma    Hip pain, right    Sacral pain    Somatic dysfunction of sacral region    Acute right-sided low back pain without sciatica    Somatic dysfunction of lumbar region    Encounter for triage in pregnant patient    Indication for care in labor or delivery    Postpartum state    Morbid obesity (H)     Past Surgical History:   Procedure Laterality Date     SECTION  2014    Procedure:  SECTION;  Surgeon: Cam Armendariz MD;  Location: [facility] OR     SECTION N/A 2018    did not have this c-sec.     COLONOSCOPY N/A 2021    Procedure: COLONOSCOPY;  Surgeon: Jesus Ma MD;  Location:  GI    wisdom teeth removed  age 15       Social History     Tobacco Use    Smoking status: Former     Current packs/day: 0.00     Average packs/day: 1 pack/day for 10.0 years (10.0 ttl pk-yrs)     Types: Cigarettes     Start date:      Quit date: 2012     Years since quittin.4    Smokeless tobacco: Never   Substance Use Topics    Alcohol use: Yes     Comment: usually on Saturday - a couple of drinks     Family History   Problem Relation Age of Onset    Psychotic Disorder Mother         bipolar    Mental Illness Mother         Bipolar    Hypertension Father     Hypertrophic cardiomyopathy Father     Colorectal Cancer Father     Cancer Father     Colon Cancer Father     Hyperlipidemia Father     Other Cancer Father         Renal    Myocardial Infarction Paternal Grandfather 50    Family History Negative Brother         1    Family History Negative Sister         2    Breast Cancer Other         paternal aunt ,dxed at age of early to mid 40's    Breast Cancer Other   "   Mental Illness Other         schizophrenia    Cancer - colorectal No family hx of          No current outpatient medications on file.     Allergies   Allergen Reactions    Sulfa Antibiotics Hives     Recent Labs   Lab Test 12/08/21  1014 08/21/20  1432 10/15/19  1332 09/15/18  0140 09/13/18  1200 05/09/17  0912   A1C  --   --  4.9  --   --  5.0   LDL 90 98  --   --   --  77   HDL 55 45*  --   --   --  36*   TRIG 70 84  --   --   --  81   ALT 25 23  --  17   < >  --    CR 0.77 0.83  --  0.67   < > 0.80   GFRESTIMATED >90 >90  --  >90   < > 82   GFRESTBLACK  --  >90  --  >90   < > >90  African American GFR Calc     POTASSIUM 4.7 4.5  --   --    < > 4.1   TSH 1.47 1.31  --   --   --  2.43    < > = values in this interval not displayed.          Review of Systems  CONSTITUTIONAL: NEGATIVE for fever, chills, change in weight  INTEGUMENTARY/SKIN: NEGATIVE for worrisome rashes, moles or lesions  EYES: NEGATIVE for vision changes or irritation  ENT/MOUTH: NEGATIVE for ear, mouth and throat problems  RESP: NEGATIVE for significant cough or SOB  BREAST: NEGATIVE for masses, tenderness or discharge  CV: NEGATIVE for chest pain, palpitations or peripheral edema  GI: NEGATIVE for nausea, abdominal pain, heartburn, or change in bowel habits  : NEGATIVE for frequency, dysuria, or hematuria  MUSCULOSKELETAL: NEGATIVE for significant arthralgias or myalgia  NEURO: NEGATIVE for weakness, dizziness or paresthesias  ENDOCRINE: NEGATIVE for temperature intolerance, skin/hair changes  HEME: NEGATIVE for bleeding problems  PSYCHIATRIC: NEGATIVE for changes in mood or affect     Objective    Exam  BP (!) 144/95 (BP Location: Left arm, Patient Position: Sitting, Cuff Size: Adult Large)   Pulse 63   Temp 98.8  F (37.1  C) (Oral)   Resp 18   Ht 1.575 m (5' 2\")   Wt 90 kg (198 lb 8 oz)   LMP 06/08/2024 (Exact Date)   SpO2 100%   Breastfeeding No   BMI 36.31 kg/m     Estimated body mass index is 36.31 kg/m  as calculated from the " "following:    Height as of this encounter: 1.575 m (5' 2\").    Weight as of this encounter: 90 kg (198 lb 8 oz).    Physical Exam  GENERAL: alert and no distress  EYES: Eyes grossly normal to inspection, PERRL and conjunctivae and sclerae normal  HENT: ear canals and TM's normal, nose and mouth without ulcers or lesions  RESP: lungs clear to auscultation - no rales, rhonchi or wheezes  BREAST: normal without masses, tenderness or nipple discharge and no palpable axillary masses or adenopathy  CV: regular rate and rhythm, normal S1 S2, no S3 or S4, no murmur, click or rub, no peripheral edema   ABDOMEN: soft, nontender, no hepatosplenomegaly, no masses and bowel sounds normal  MS: no gross musculoskeletal defects noted, no edema  SKIN: no suspicious lesions or rashes  NEURO: Normal strength and tone, mentation intact and speech normal  PSYCH: mentation appears normal, affect normal/bright        Signed Electronically by: Ramona Ann Aaseby-Aguilera, PA-C    "

## 2024-07-11 ENCOUNTER — TELEPHONE (OUTPATIENT)
Dept: FAMILY MEDICINE | Facility: CLINIC | Age: 39
End: 2024-07-11

## 2024-07-11 ENCOUNTER — ALLIED HEALTH/NURSE VISIT (OUTPATIENT)
Dept: FAMILY MEDICINE | Facility: CLINIC | Age: 39
End: 2024-07-11
Payer: COMMERCIAL

## 2024-07-11 VITALS — HEART RATE: 63 BPM | SYSTOLIC BLOOD PRESSURE: 139 MMHG | DIASTOLIC BLOOD PRESSURE: 89 MMHG

## 2024-07-11 DIAGNOSIS — R03.0 ELEVATED BLOOD PRESSURE READING WITHOUT DIAGNOSIS OF HYPERTENSION: Primary | ICD-10-CM

## 2024-07-11 PROCEDURE — 99207 PR NO CHARGE NURSE ONLY: CPT

## 2024-07-11 NOTE — PROGRESS NOTES
Tosin Pefiffer is a 38 year old patient who comes in today for a Blood Pressure check.  Initial BP:  LMP 06/08/2024 (Exact Date)      Data Unavailable  Disposition: results routed to provider    No complaints of headaches and or lightheadedness.     Caleb Friend CMA

## 2024-07-11 NOTE — TELEPHONE ENCOUNTER
Tosin Pfeiffer is a 38 year old patient who comes in today for a Blood Pressure check.  Initial BP:  LMP 06/08/2024 (Exact Date)      Data Unavailable  Disposition: results routed to provider     No complaints of headaches and or lightheadedness.      Caleb Friend CMA

## 2024-08-01 ENCOUNTER — ALLIED HEALTH/NURSE VISIT (OUTPATIENT)
Dept: FAMILY MEDICINE | Facility: CLINIC | Age: 39
End: 2024-08-01
Payer: COMMERCIAL

## 2024-08-01 VITALS — DIASTOLIC BLOOD PRESSURE: 78 MMHG | SYSTOLIC BLOOD PRESSURE: 124 MMHG | OXYGEN SATURATION: 98 % | HEART RATE: 66 BPM

## 2024-08-01 DIAGNOSIS — R03.0 ELEVATED BLOOD PRESSURE READING WITHOUT DIAGNOSIS OF HYPERTENSION: Primary | ICD-10-CM

## 2024-08-01 PROCEDURE — 99207 PR NO CHARGE NURSE ONLY: CPT

## 2024-08-01 NOTE — PROGRESS NOTES
Tosin Pfeiffer is a 38 year old patient who comes in today for a Blood Pressure check.  Initial BP:  LMP 06/08/2024 (Exact Date)        Disposition: follow-up as previously indicated by provider    Georgette Saravia, CMA

## 2024-10-27 ENCOUNTER — OFFICE VISIT (OUTPATIENT)
Dept: URGENT CARE | Facility: URGENT CARE | Age: 39
End: 2024-10-27
Payer: COMMERCIAL

## 2024-10-27 VITALS
SYSTOLIC BLOOD PRESSURE: 116 MMHG | RESPIRATION RATE: 18 BRPM | HEART RATE: 82 BPM | TEMPERATURE: 97.9 F | DIASTOLIC BLOOD PRESSURE: 76 MMHG | OXYGEN SATURATION: 98 %

## 2024-10-27 DIAGNOSIS — J02.9 ACUTE SORE THROAT: ICD-10-CM

## 2024-10-27 DIAGNOSIS — J02.0 STREP THROAT: Primary | ICD-10-CM

## 2024-10-27 LAB
DEPRECATED S PYO AG THROAT QL EIA: POSITIVE
FLUAV AG SPEC QL IA: NEGATIVE
FLUBV AG SPEC QL IA: NEGATIVE

## 2024-10-27 PROCEDURE — 87804 INFLUENZA ASSAY W/OPTIC: CPT | Performed by: PHYSICIAN ASSISTANT

## 2024-10-27 PROCEDURE — 87880 STREP A ASSAY W/OPTIC: CPT | Performed by: PHYSICIAN ASSISTANT

## 2024-10-27 PROCEDURE — 99213 OFFICE O/P EST LOW 20 MIN: CPT | Performed by: PHYSICIAN ASSISTANT

## 2024-10-27 RX ORDER — PENICILLIN V POTASSIUM 500 MG/1
500 TABLET, FILM COATED ORAL 2 TIMES DAILY
Qty: 20 TABLET | Refills: 0 | Status: SHIPPED | OUTPATIENT
Start: 2024-10-27 | End: 2024-11-06

## 2024-10-27 NOTE — PROGRESS NOTES
Assessment & Plan     Strep throat  Penicillin VK Rx. Tylenol or motrin prn fever/pain. Discard old toothbrush. Follow up if any worsening symptoms.  Patient agrees.    - penicillin V (VEETID) 500 MG tablet  Dispense: 20 tablet; Refill: 0    Acute sore throat  RST is positive.  Please see treatment above.  Influenza test is negative today.  - Streptococcus A Rapid Screen w/Reflex to PCR - Clinic Collect  - Influenza A & B Antigen - Clinic Collect       Return in about 5 days (around 11/1/2024) for Symptoms failing to improve.    Ruthann Hood PA-C  [facility] [place]    Dionte Leahy is a 38 year old female who presents to clinic today for the following health issues:  Chief Complaint   Patient presents with    Urgent Care    Pharyngitis     Sx started Thursday with a sore throat. Tx otc- dayquil, nyquil, ibuprofen.       HPI    Patient is presenting to urgent care today with a complaint of a sore throat.  Onset of symptoms 4 days ago.  No fevers or chills.  No cough.  Exposure to strep.  Her son tested positive for strep and flu B this morning.  Treatment tried: DayQuil, NyQuil, ibuprofen.      Review of Systems  Constitutional, HEENT, cardiovascular, pulmonary, GI, , musculoskeletal, neuro, skin, endocrine and psych systems are negative, except as otherwise noted.      Objective    /76   Pulse 82   Temp 97.9  F (36.6  C) (Tympanic)   Resp 18   LMP 10/27/2024   SpO2 98%   Physical Exam   GENERAL: alert and no distress  HENT: ear canals and TM's normal, mouth without ulcers or lesions, throat is moist and pink, uvula is midline, mild posterior pharynx inflammation  NECK: Submandibular glands are swollen bilaterally  RESP: lungs clear to auscultation - no rales, rhonchi or wheezes  CV: regular rate and rhythm, normal S1 S2  MS: no gross musculoskeletal defects noted, no edema    Results for orders placed or performed in visit on 10/27/24 (from the past 24 hours)    Streptococcus A Rapid Screen w/Reflex to PCR - Clinic Collect    Specimen: Throat; Swab   Result Value Ref Range    Group A Strep antigen Positive (A) Negative   Influenza A & B Antigen - Clinic Collect    Specimen: Nose; Swab   Result Value Ref Range    Influenza A antigen Negative Negative    Influenza B antigen Negative Negative    Narrative    Test results must be correlated with clinical data. If necessary, results should be confirmed by a molecular assay or viral culture.

## 2025-05-12 ENCOUNTER — ANCILLARY PROCEDURE (OUTPATIENT)
Dept: MAMMOGRAPHY | Facility: CLINIC | Age: 40
End: 2025-05-12
Attending: PHYSICIAN ASSISTANT
Payer: COMMERCIAL

## 2025-05-12 DIAGNOSIS — Z12.31 VISIT FOR SCREENING MAMMOGRAM: ICD-10-CM

## 2025-05-12 PROCEDURE — 77063 BREAST TOMOSYNTHESIS BI: CPT | Mod: TC | Performed by: RADIOLOGY

## 2025-05-12 PROCEDURE — 77067 SCR MAMMO BI INCL CAD: CPT | Mod: TC | Performed by: RADIOLOGY

## 2025-06-02 SDOH — HEALTH STABILITY: PHYSICAL HEALTH: ON AVERAGE, HOW MANY MINUTES DO YOU ENGAGE IN EXERCISE AT THIS LEVEL?: 40 MIN

## 2025-06-02 SDOH — HEALTH STABILITY: PHYSICAL HEALTH: ON AVERAGE, HOW MANY DAYS PER WEEK DO YOU ENGAGE IN MODERATE TO STRENUOUS EXERCISE (LIKE A BRISK WALK)?: 3 DAYS

## 2025-06-02 ASSESSMENT — ASTHMA QUESTIONNAIRES
QUESTION_2 LAST FOUR WEEKS HOW OFTEN HAVE YOU HAD SHORTNESS OF BREATH: ONCE OR TWICE A WEEK
QUESTION_3 LAST FOUR WEEKS HOW OFTEN DID YOUR ASTHMA SYMPTOMS (WHEEZING, COUGHING, SHORTNESS OF BREATH, CHEST TIGHTNESS OR PAIN) WAKE YOU UP AT NIGHT OR EARLIER THAN USUAL IN THE MORNING: NOT AT ALL
QUESTION_5 LAST FOUR WEEKS HOW WOULD YOU RATE YOUR ASTHMA CONTROL: WELL CONTROLLED
ACT_TOTALSCORE: 23
QUESTION_1 LAST FOUR WEEKS HOW MUCH OF THE TIME DID YOUR ASTHMA KEEP YOU FROM GETTING AS MUCH DONE AT WORK, SCHOOL OR AT HOME: NONE OF THE TIME
QUESTION_4 LAST FOUR WEEKS HOW OFTEN HAVE YOU USED YOUR RESCUE INHALER OR NEBULIZER MEDICATION (SUCH AS ALBUTEROL): NOT AT ALL

## 2025-06-02 ASSESSMENT — SOCIAL DETERMINANTS OF HEALTH (SDOH): HOW OFTEN DO YOU GET TOGETHER WITH FRIENDS OR RELATIVES?: ONCE A WEEK

## 2025-06-03 ENCOUNTER — OFFICE VISIT (OUTPATIENT)
Dept: FAMILY MEDICINE | Facility: CLINIC | Age: 40
End: 2025-06-03
Payer: COMMERCIAL

## 2025-06-03 VITALS
OXYGEN SATURATION: 100 % | SYSTOLIC BLOOD PRESSURE: 130 MMHG | BODY MASS INDEX: 36.66 KG/M2 | TEMPERATURE: 98.1 F | HEIGHT: 63 IN | WEIGHT: 206.9 LBS | RESPIRATION RATE: 20 BRPM | DIASTOLIC BLOOD PRESSURE: 86 MMHG | HEART RATE: 63 BPM

## 2025-06-03 DIAGNOSIS — Z00.00 ROUTINE GENERAL MEDICAL EXAMINATION AT A HEALTH CARE FACILITY: Primary | ICD-10-CM

## 2025-06-03 DIAGNOSIS — Z13.6 SCREENING FOR CARDIOVASCULAR CONDITION: ICD-10-CM

## 2025-06-03 DIAGNOSIS — Z13.1 SCREENING FOR DIABETES MELLITUS: ICD-10-CM

## 2025-06-03 DIAGNOSIS — E78.5 HYPERLIPIDEMIA LDL GOAL <160: ICD-10-CM

## 2025-06-03 DIAGNOSIS — Z13.29 SCREENING FOR THYROID DISORDER: ICD-10-CM

## 2025-06-03 DIAGNOSIS — Z13.0 SCREENING FOR BLOOD DISEASE: ICD-10-CM

## 2025-06-03 PROBLEM — E66.01 MORBID OBESITY (H): Status: RESOLVED | Noted: 2020-08-21 | Resolved: 2025-06-03

## 2025-06-03 LAB
ERYTHROCYTE [DISTWIDTH] IN BLOOD BY AUTOMATED COUNT: 13.4 % (ref 10–15)
EST. AVERAGE GLUCOSE BLD GHB EST-MCNC: 103 MG/DL
HBA1C MFR BLD: 5.2 % (ref 0–5.6)
HCT VFR BLD AUTO: 33.2 % (ref 35–47)
HGB BLD-MCNC: 10.8 G/DL (ref 11.7–15.7)
MCH RBC QN AUTO: 28.6 PG (ref 26.5–33)
MCHC RBC AUTO-ENTMCNC: 32.5 G/DL (ref 31.5–36.5)
MCV RBC AUTO: 88 FL (ref 78–100)
PLATELET # BLD AUTO: 292 10E3/UL (ref 150–450)
RBC # BLD AUTO: 3.78 10E6/UL (ref 3.8–5.2)
WBC # BLD AUTO: 6.3 10E3/UL (ref 4–11)

## 2025-06-03 PROCEDURE — 84443 ASSAY THYROID STIM HORMONE: CPT | Performed by: PHYSICIAN ASSISTANT

## 2025-06-03 PROCEDURE — 36415 COLL VENOUS BLD VENIPUNCTURE: CPT | Performed by: PHYSICIAN ASSISTANT

## 2025-06-03 PROCEDURE — 83036 HEMOGLOBIN GLYCOSYLATED A1C: CPT | Performed by: PHYSICIAN ASSISTANT

## 2025-06-03 PROCEDURE — 99396 PREV VISIT EST AGE 40-64: CPT | Performed by: PHYSICIAN ASSISTANT

## 2025-06-03 PROCEDURE — 80053 COMPREHEN METABOLIC PANEL: CPT | Performed by: PHYSICIAN ASSISTANT

## 2025-06-03 PROCEDURE — 80061 LIPID PANEL: CPT | Performed by: PHYSICIAN ASSISTANT

## 2025-06-03 PROCEDURE — 85027 COMPLETE CBC AUTOMATED: CPT | Performed by: PHYSICIAN ASSISTANT

## 2025-06-03 ASSESSMENT — PAIN SCALES - GENERAL: PAINLEVEL_OUTOF10: NO PAIN (0)

## 2025-06-03 NOTE — PROGRESS NOTES
"Preventive Care Visit  [facility] PA-C, Family Medicine  Kian 3, 2025      Assessment & Plan     Routine general medical examination at a health care facility  Age and gender appropriate preventive care and screenings are discussed.  Particular attention to personal preventive care and age appropriate lifestyle including the incorporation of healthy diet and physical activity is made       Screening for cardiovascular condition      Hyperlipidemia LDL goal <160    - Lipid panel reflex to direct LDL Non-fasting  - Lipid Profile    Screening for diabetes mellitus    - Comprehensive metabolic panel    Screening for thyroid disorder    - TSH with free T4 reflex    Screening for blood disease    - CBC with platelets    Patient has been advised of split billing requirements and indicates understanding: Yes        BMI  Estimated body mass index is 36.94 kg/m  as calculated from the following:    Height as of this encounter: 1.594 m (5' 2.75\").    Weight as of this encounter: 93.8 kg (206 lb 14.4 oz).   Weight management plan: Discussed healthy diet and exercise guidelines    Counseling  Appropriate preventive services were addressed with this patient via screening, questionnaire, or discussion as appropriate for fall prevention, nutrition, physical activity, Tobacco-use cessation, social engagement, weight loss and cognition.  Checklist reviewing preventive services available has been given to the patient.  Reviewed patient's diet, addressing concerns and/or questions.   She is at risk for lack of exercise and has been provided with information to increase physical activity for the benefit of her well-being.           Dionte Leahy is a 39 year old, presenting for the following:  Physical (fasting)        6/3/2025    10:41 AM   Additional Questions   Roomed by Will   Accompanied by self          HPI           Advance Care Planning    Discussed advance care " planning with patient; however, patient declined at this time.        6/2/2025   General Health   How would you rate your overall physical health? Good   Feel stress (tense, anxious, or unable to sleep) Only a little   (!) STRESS CONCERN      6/2/2025   Nutrition   Three or more servings of calcium each day? Yes   Diet: Regular (no restrictions)   How many servings of fruit and vegetables per day? (!) 2-3   How many sweetened beverages each day? 0-1         6/2/2025   Exercise   Days per week of moderate/strenous exercise 3 days   Average minutes spent exercising at this level 40 min         6/2/2025   Social Factors   Frequency of gathering with friends or relatives Once a week   Worry food won't last until get money to buy more No   Food not last or not have enough money for food? No   Do you have housing? (Housing is defined as stable permanent housing and does not include staying outside in a car, in a tent, in an abandoned building, in an overnight shelter, or couch-surfing.) No   Are you worried about losing your housing? No   Lack of transportation? No   Unable to get utilities (heat,electricity)? No   Want help with housing or utility concern? No   (!) HOUSING CONCERN PRESENT      6/2/2025   Dental   Dentist two times every year? Yes         Today's PHQ-2 Score:       6/2/2025     9:24 AM   PHQ-2 ( 1999 [employer])   Q1: Little interest or pleasure in doing things 0   Q2: Feeling down, depressed or hopeless 0   PHQ-2 Score 0    Q1: Little interest or pleasure in doing things Not at all   Q2: Feeling down, depressed or hopeless Not at all   PHQ-2 Score 0       Patient-reported           6/2/2025   Substance Use   Alcohol more than 3/day or more than 7/wk No   Do you use any other substances recreationally? No     Social History     Tobacco Use    Smoking status: Former     Current packs/day: 0.00     Average packs/day: 1 pack/day for 10.0 years (10.0 ttl pk-yrs)     Types: Cigarettes     Start date: 2000     Quit  date: 2012     Years since quittin.4    Smokeless tobacco: Never   Vaping Use    Vaping status: Never Used   Substance Use Topics    Alcohol use: Yes     Comment: usually on Saturday - a couple of drinks    Drug use: No           2025   LAST FHS-7 RESULTS   1st degree relative breast or ovarian cancer Yes   Any relative bilateral breast cancer No   Any male have breast cancer No   Any ONE woman have BOTH breast AND ovarian cancer No   Any woman with breast cancer before 50yrs Yes   2 or more relatives with breast AND/OR ovarian cancer No   2 or more relatives with breast AND/OR bowel cancer No        Mammogram Screening - Mammogram every 1-2 years updated in Health Maintenance based on mutual decision making        2025   STI Screening   New sexual partner(s) since last STI/HIV test? No     History of abnormal Pap smear: No - age 30-64 HPV with reflex Pap every 5 years recommended        Latest Ref Rng & Units 2021     9:46 AM 2017     9:30 AM 2017     9:05 AM   PAP / HPV   PAP  Negative for Intraepithelial Lesion or Malignancy (NILM)      PAP (Historical)    NIL    HPV 16 DNA Negative Negative  Negative     HPV 18 DNA Negative Negative  Negative     Other HR HPV Negative Negative  Negative       ASCVD Risk   The 10-year ASCVD risk score (Antwon DELAC RUZ, et al., 2019) is: 0.3%    Values used to calculate the score:      Age: 39 years      Sex: Female      Is Non- : No      Diabetic: No      Tobacco smoker: No      Systolic Blood Pressure: 130 mmHg      Is BP treated: No      HDL Cholesterol: 56 mg/dL      Total Cholesterol: 146 mg/dL        2025   Contraception/Family Planning   Questions about contraception or family planning No        Reviewed and updated as needed this visit by Provider                    BP Readings from Last 3 Encounters:   25 130/86   10/27/24 116/76   24 124/78    Wt Readings from Last 3 Encounters:   25 93.8 kg  "(206 lb 14.4 oz)   06/27/24 90 kg (198 lb 8 oz)   12/08/21 75.5 kg (166 lb 6.4 oz)                  Current Outpatient Medications   Medication Sig Dispense Refill    Multiple Vitamin (MULTIVITAMIN ADULT PO) Take by mouth.       Allergies   Allergen Reactions    Sulfa Antibiotics Hives     Recent Labs   Lab Test 06/27/24  0952 12/08/21  1014 08/21/20  1432 10/15/19  1332 09/15/18  0140 09/13/18  1200 05/09/17  0912   A1C 5.2  --   --  4.9  --   --  5.0   LDL 79 90 98  --   --   --  77   HDL 56 55 45*  --   --   --  36*   TRIG 57 70 84  --   --   --  81   ALT 18 25 23  --  17   < >  --    CR 0.95 0.77 0.83  --  0.67   < > 0.80   GFRESTIMATED 77 >90 >90  --  >90   < > 82   GFRESTBLACK  --   --  >90  --  >90   < > >90  African American GFR Calc     POTASSIUM 5.1 4.7 4.5  --   --    < > 4.1   TSH 1.92 1.47 1.31  --   --   --  2.43    < > = values in this interval not displayed.               Review of Systems  CONSTITUTIONAL: NEGATIVE for fever, chills, change in weight  INTEGUMENTARY/SKIN: NEGATIVE for worrisome rashes, moles or lesions  EYES: NEGATIVE for vision changes or irritation  ENT/MOUTH: NEGATIVE for ear, mouth and throat problems  RESP: NEGATIVE for significant cough or SOB  BREAST: NEGATIVE for masses, tenderness or discharge  CV: NEGATIVE for chest pain, palpitations or peripheral edema  GI: NEGATIVE for nausea, abdominal pain, heartburn, or change in bowel habits  : NEGATIVE for frequency, dysuria, or hematuria  MUSCULOSKELETAL: NEGATIVE for significant arthralgias or myalgia  NEURO: NEGATIVE for weakness, dizziness or paresthesias  ENDOCRINE: NEGATIVE for temperature intolerance, skin/hair changes  HEME: NEGATIVE for bleeding problems  PSYCHIATRIC: NEGATIVE for changes in mood or affect     Objective    Exam  /86 (BP Location: Right arm, Patient Position: Sitting, Cuff Size: Adult Large)   Pulse 63   Temp 98.1  F (36.7  C) (Oral)   Resp 20   Ht 1.594 m (5' 2.75\")   Wt 93.8 kg (206 lb 14.4 oz) " "  LMP 05/29/2025 (Exact Date)   SpO2 100%   Breastfeeding No   BMI 36.94 kg/m     Estimated body mass index is 36.94 kg/m  as calculated from the following:    Height as of this encounter: 1.594 m (5' 2.75\").    Weight as of this encounter: 93.8 kg (206 lb 14.4 oz).    Physical Exam  GENERAL: alert and no distress  EYES: Eyes grossly normal to inspection, PERRL and conjunctivae and sclerae normal  HENT: ear canals and TM's normal, nose and mouth without ulcers or lesions  NECK: no adenopathy, no asymmetry, masses, or scars  RESP: lungs clear to auscultation - no rales, rhonchi or wheezes  BREAST: normal without masses, tenderness or nipple discharge and no palpable axillary masses or adenopathy  CV: regular rate and rhythm, normal S1 S2, no S3 or S4, no murmur, click or rub, no peripheral edema  ABDOMEN: soft, nontender, no hepatosplenomegaly, no masses and bowel sounds normal  MS: no gross musculoskeletal defects noted, no edema  SKIN: no suspicious lesions or rashes  NEURO: Normal strength and tone, mentation intact and speech normal  PSYCH: mentation appears normal, affect normal/bright        Signed Electronically by: Ramona Ann Aaseby-Aguilera, PA-C    "

## 2025-06-03 NOTE — PATIENT INSTRUCTIONS
Patient Education   Preventive Care Advice   This is general advice given by our system to help you stay healthy. However, your care team may have specific advice just for you. Please talk to your care team about your preventive care needs.  Nutrition  Eat 5 or more servings of fruits and vegetables each day.  Try wheat bread, brown rice and whole grain pasta (instead of white bread, rice, and pasta).  Get enough calcium and vitamin D. Check the label on foods and aim for 100% of the RDA (recommended daily allowance).  Lifestyle  Exercise at least 150 minutes each week  (30 minutes a day, 5 days a week).  Do muscle strengthening activities 2 days a week. These help control your weight and prevent disease.  No smoking.  Wear sunscreen to prevent skin cancer.  Have a dental exam and cleaning every 6 months.  Yearly exams  See your health care team every year to talk about:  Any changes in your health.  Any medicines your care team has prescribed.  Preventive care, family planning, and ways to prevent chronic diseases.  Shots (vaccines)   HPV shots (up to age 26), if you've never had them before.  Hepatitis B shots (up to age 59), if you've never had them before.  COVID-19 shot: Get this shot when it's due.  Flu shot: Get a flu shot every year.  Tetanus shot: Get a tetanus shot every 10 years.  Pneumococcal, hepatitis A, and RSV shots: Ask your care team if you need these based on your risk.  Shingles shot (for age 50 and up)  General health tests  Diabetes screening:  Starting at age 35, Get screened for diabetes at least every 3 years.  If you are younger than age 35, ask your care team if you should be screened for diabetes.  Cholesterol test: At age 39, start having a cholesterol test every 5 years, or more often if advised.  Bone density scan (DEXA): At age 50, ask your care team if you should have this scan for osteoporosis (brittle bones).  Hepatitis C: Get tested at least once in your life.  STIs (sexually  transmitted infections)  Before age 24: Ask your care team if you should be screened for STIs.  After age 24: Get screened for STIs if you're at risk. You are at risk for STIs (including HIV) if:  You are sexually active with more than one person.  You don't use condoms every time.  You or a partner was diagnosed with a sexually transmitted infection.  If you are at risk for HIV, ask about PrEP medicine to prevent HIV.  Get tested for HIV at least once in your life, whether you are at risk for HIV or not.  Cancer screening tests  Cervical cancer screening: If you have a cervix, begin getting regular cervical cancer screening tests starting at age 21.  Breast cancer scan (mammogram): If you've ever had breasts, begin having regular mammograms starting at age 40. This is a scan to check for breast cancer.  Colon cancer screening: It is important to start screening for colon cancer at age 45.  Have a colonoscopy test every 10 years (or more often if you're at risk) Or, ask your provider about stool tests like a FIT test every year or Cologuard test every 3 years.  To learn more about your testing options, visit:   .  For help making a decision, visit:   https://bit.ly/vp23072.  Prostate cancer screening test: If you have a prostate, ask your care team if a prostate cancer screening test (PSA) at age 55 is right for you.  Lung cancer screening: If you are a current or former smoker ages 50 to 80, ask your care team if ongoing lung cancer screenings are right for you.  For informational purposes only. Not to replace the advice of your health care provider. Copyright   2023 [employer]. All rights reserved. Clinically reviewed by the [facility] Transitions Program. Echo it [id number] - REV 01/24.

## 2025-06-04 ENCOUNTER — RESULTS FOLLOW-UP (OUTPATIENT)
Dept: FAMILY MEDICINE | Facility: CLINIC | Age: 40
End: 2025-06-04

## 2025-06-04 LAB
ALBUMIN SERPL BCG-MCNC: 4.4 G/DL (ref 3.5–5.2)
ALP SERPL-CCNC: 69 U/L (ref 40–150)
ALT SERPL W P-5'-P-CCNC: 15 U/L (ref 0–50)
ANION GAP SERPL CALCULATED.3IONS-SCNC: 10 MMOL/L (ref 7–15)
AST SERPL W P-5'-P-CCNC: 21 U/L (ref 0–45)
BILIRUB SERPL-MCNC: 0.3 MG/DL
BUN SERPL-MCNC: 12.1 MG/DL (ref 6–20)
CALCIUM SERPL-MCNC: 9.1 MG/DL (ref 8.8–10.4)
CHLORIDE SERPL-SCNC: 103 MMOL/L (ref 98–107)
CHOLEST SERPL-MCNC: 160 MG/DL
CREAT SERPL-MCNC: 0.82 MG/DL (ref 0.51–0.95)
EGFRCR SERPLBLD CKD-EPI 2021: >90 ML/MIN/1.73M2
FASTING STATUS PATIENT QL REPORTED: YES
FASTING STATUS PATIENT QL REPORTED: YES
GLUCOSE SERPL-MCNC: 95 MG/DL (ref 70–99)
HCO3 SERPL-SCNC: 24 MMOL/L (ref 22–29)
HDLC SERPL-MCNC: 52 MG/DL
LDLC SERPL CALC-MCNC: 90 MG/DL
NONHDLC SERPL-MCNC: 108 MG/DL
POTASSIUM SERPL-SCNC: 4.8 MMOL/L (ref 3.4–5.3)
PROT SERPL-MCNC: 7 G/DL (ref 6.4–8.3)
SODIUM SERPL-SCNC: 137 MMOL/L (ref 135–145)
TRIGL SERPL-MCNC: 88 MG/DL
TSH SERPL DL<=0.005 MIU/L-ACNC: 2.07 UIU/ML (ref 0.3–4.2)

## (undated) DEVICE — BLADE CLIPPER SGL USE 9680

## (undated) DEVICE — CATH TRAY FOLEY SURESTEP 16FR DRAIN BAG STATOCK A899916

## (undated) DEVICE — DRSG STERI STRIP 1/2X4" R1547

## (undated) DEVICE — GLOVE PROTEXIS POWDER FREE SMT 6.0  2D72PT60X

## (undated) DEVICE — PAD CHUX UNDERPAD 30X36" P3036C

## (undated) DEVICE — SU MONOCRYL 4-0 PS-2 27" UND Y426H

## (undated) DEVICE — CAP BABY PINK/BLUE IC-2

## (undated) DEVICE — LINEN DRAPE 54X72" 5467

## (undated) DEVICE — SOL NACL 0.9% IRRIG 1000ML BOTTLE 2F7124

## (undated) DEVICE — LINEN HALF SHEET 5512

## (undated) DEVICE — ESU GROUND PAD ADULT W/CORD E7507

## (undated) DEVICE — PREP CHLORAPREP 26ML TINTED ORANGE  260815

## (undated) DEVICE — SU MONOCRYL 0 CTX 36" Y398H

## (undated) DEVICE — SU PDS II 0 CTX 60" Z990G

## (undated) DEVICE — LINEN FULL SHEET 5511

## (undated) DEVICE — SU MONOCRYL 0 CT 36" Y358H

## (undated) DEVICE — LINEN TOWEL PACK X10 5473

## (undated) DEVICE — SUCTION CANISTER MEDIVAC LINER 3000ML W/LID 65651-530

## (undated) DEVICE — PREP POVIDONE IODINE SOLUTION 10% 4OZ

## (undated) DEVICE — DRSG TEGADERM 4X10" 1627

## (undated) DEVICE — BARRIER SEPRAFILM 5X6" SINGLE SHEET 4301-02

## (undated) DEVICE — KIT ENDO TURNOVER/PROCEDURE W/CLEAN A SCOPE LINERS 103888

## (undated) DEVICE — STOCKING SLEEVE VASOPRESS COMPRESSION CALF MED 18" VP501M

## (undated) DEVICE — DRSG TELFA 3X8" 1238

## (undated) DEVICE — PREP SKIN SCRUB TRAY 4461A

## (undated) DEVICE — GLOVE PROTEXIS BLUE W/NEU-THERA 6.5  2D73EB65

## (undated) DEVICE — SU MONOCRYL 3-0 SH 27" Y316H

## (undated) DEVICE — LINEN BABY BLANKET 5434

## (undated) DEVICE — BAG CLEAR TRASH 1.3M 39X33" P4040C

## (undated) DEVICE — TRANSFER DEVICE BLOOD NDL HOLDER 364880

## (undated) DEVICE — PACK C-SECTION LF PL15OTA83B

## (undated) DEVICE — SOL WATER IRRIG 1000ML BOTTLE 2F7114

## (undated) RX ORDER — FENTANYL CITRATE 50 UG/ML
INJECTION, SOLUTION INTRAMUSCULAR; INTRAVENOUS
Status: DISPENSED
Start: 2021-04-01